# Patient Record
Sex: MALE | Race: OTHER | HISPANIC OR LATINO | Employment: OTHER | URBAN - METROPOLITAN AREA
[De-identification: names, ages, dates, MRNs, and addresses within clinical notes are randomized per-mention and may not be internally consistent; named-entity substitution may affect disease eponyms.]

---

## 2017-01-24 ENCOUNTER — HISTORICAL (OUTPATIENT)
Dept: ADMINISTRATIVE | Facility: HOSPITAL | Age: 57
End: 2017-01-24

## 2017-02-16 ENCOUNTER — HISTORICAL (OUTPATIENT)
Dept: RADIOLOGY | Facility: HOSPITAL | Age: 57
End: 2017-02-16

## 2017-04-17 ENCOUNTER — HISTORICAL (OUTPATIENT)
Dept: ADMINISTRATIVE | Facility: HOSPITAL | Age: 57
End: 2017-04-17

## 2017-04-17 LAB
ABS NEUT (OLG): 2.34 X10(3)/MCL (ref 2.1–9.2)
ALBUMIN SERPL-MCNC: 3.3 GM/DL (ref 3.4–5)
ALBUMIN/GLOB SERPL: 1 {RATIO}
ALP SERPL-CCNC: 288 UNIT/L (ref 50–136)
ALT SERPL-CCNC: 73 UNIT/L (ref 12–78)
AST SERPL-CCNC: 66 UNIT/L (ref 15–37)
BASOPHILS # BLD AUTO: 0 X10(3)/MCL (ref 0–0.2)
BASOPHILS NFR BLD AUTO: 1 %
BILIRUB SERPL-MCNC: 0.5 MG/DL (ref 0.2–1)
BILIRUBIN DIRECT+TOT PNL SERPL-MCNC: 0.2 MG/DL (ref 0–0.2)
BILIRUBIN DIRECT+TOT PNL SERPL-MCNC: 0.3 MG/DL (ref 0–0.8)
BUN SERPL-MCNC: 21 MG/DL (ref 7–18)
CALCIUM SERPL-MCNC: 7.8 MG/DL (ref 8.5–10.1)
CHLORIDE SERPL-SCNC: 105 MMOL/L (ref 98–107)
CO2 SERPL-SCNC: 27 MMOL/L (ref 21–32)
CREAT SERPL-MCNC: 1.44 MG/DL (ref 0.7–1.3)
EOSINOPHIL # BLD AUTO: 0 X10(3)/MCL (ref 0–0.9)
EOSINOPHIL NFR BLD AUTO: 0 %
ERYTHROCYTE [DISTWIDTH] IN BLOOD BY AUTOMATED COUNT: 13.5 % (ref 11.5–17)
EST. AVERAGE GLUCOSE BLD GHB EST-MCNC: 209 MG/DL
GLOBULIN SER-MCNC: 3.2 GM/DL (ref 2.4–3.5)
GLUCOSE SERPL-MCNC: 395 MG/DL (ref 74–106)
HBA1C MFR BLD: 8.9 % (ref 4.2–6.3)
HCT VFR BLD AUTO: 31.5 % (ref 42–52)
HGB BLD-MCNC: 10.4 GM/DL (ref 14–18)
LYMPHOCYTES # BLD AUTO: 1.3 X10(3)/MCL (ref 0.6–4.6)
LYMPHOCYTES NFR BLD AUTO: 32 %
MCH RBC QN AUTO: 31.9 PG (ref 27–31)
MCHC RBC AUTO-ENTMCNC: 33 GM/DL (ref 33–36)
MCV RBC AUTO: 96.6 FL (ref 80–94)
MONOCYTES # BLD AUTO: 0.3 X10(3)/MCL (ref 0.1–1.3)
MONOCYTES NFR BLD AUTO: 8 %
NEUTROPHILS # BLD AUTO: 2.34 X10(3)/MCL (ref 2.1–9.2)
NEUTROPHILS NFR BLD AUTO: 58 %
PLATELET # BLD AUTO: 115 X10(3)/MCL (ref 130–400)
PMV BLD AUTO: 12.3 FL (ref 9.4–12.4)
POTASSIUM SERPL-SCNC: 4.7 MMOL/L (ref 3.5–5.1)
PROT SERPL-MCNC: 6.5 GM/DL (ref 6.4–8.2)
RBC # BLD AUTO: 3.26 X10(6)/MCL (ref 4.7–6.1)
SODIUM SERPL-SCNC: 140 MMOL/L (ref 136–145)
TSH SERPL-ACNC: 0.84 MIU/ML (ref 0.36–3.74)
WBC # SPEC AUTO: 4 X10(3)/MCL (ref 4.5–11.5)

## 2017-07-03 ENCOUNTER — HISTORICAL (OUTPATIENT)
Dept: ADMINISTRATIVE | Facility: HOSPITAL | Age: 57
End: 2017-07-03

## 2017-07-03 LAB
ALBUMIN SERPL-MCNC: 2.6 GM/DL (ref 3.4–5)
ALBUMIN/GLOB SERPL: 0.7 RATIO (ref 1.1–2)
ALP SERPL-CCNC: 161 UNIT/L (ref 50–136)
ALT SERPL-CCNC: 48 UNIT/L (ref 12–78)
AST SERPL-CCNC: 49 UNIT/L (ref 15–37)
BILIRUB SERPL-MCNC: 0.8 MG/DL (ref 0.2–1)
BILIRUBIN DIRECT+TOT PNL SERPL-MCNC: 0.3 MG/DL (ref 0–0.8)
BILIRUBIN DIRECT+TOT PNL SERPL-MCNC: 0.5 MG/DL (ref 0–0.5)
BUN SERPL-MCNC: 12 MG/DL (ref 7–18)
CALCIUM SERPL-MCNC: 8.1 MG/DL (ref 8.5–10.1)
CHLORIDE SERPL-SCNC: 109 MMOL/L (ref 98–107)
CO2 SERPL-SCNC: 24 MMOL/L (ref 21–32)
CREAT SERPL-MCNC: 0.53 MG/DL (ref 0.7–1.3)
ERYTHROCYTE [DISTWIDTH] IN BLOOD BY AUTOMATED COUNT: 13.3 % (ref 11.5–17)
GLOBULIN SER-MCNC: 3.7 GM/DL (ref 2.4–3.5)
GLUCOSE SERPL-MCNC: 201 MG/DL (ref 74–106)
HCT VFR BLD AUTO: 32.1 % (ref 42–52)
HGB BLD-MCNC: 10.5 GM/DL (ref 14–18)
IRON SERPL-MCNC: 22 MCG/DL (ref 50–175)
MCH RBC QN AUTO: 31.8 PG (ref 27–31)
MCHC RBC AUTO-ENTMCNC: 32.7 GM/DL (ref 33–36)
MCV RBC AUTO: 97.3 FL (ref 80–94)
PLATELET # BLD AUTO: 141 X10(3)/MCL (ref 130–400)
PMV BLD AUTO: 11.3 FL (ref 9.4–12.4)
POTASSIUM SERPL-SCNC: 3.6 MMOL/L (ref 3.5–5.1)
PROT SERPL-MCNC: 6.3 GM/DL (ref 6.4–8.2)
RBC # BLD AUTO: 3.3 X10(6)/MCL (ref 4.7–6.1)
SODIUM SERPL-SCNC: 143 MMOL/L (ref 136–145)
WBC # SPEC AUTO: 5.2 X10(3)/MCL (ref 4.5–11.5)

## 2018-01-30 ENCOUNTER — HISTORICAL (OUTPATIENT)
Dept: ADMINISTRATIVE | Facility: HOSPITAL | Age: 58
End: 2018-01-30

## 2018-01-30 LAB
AMMONIA PLAS-MSCNC: 21 MCMOL/L (ref 11–32)
AMPHET UR QL SCN: NORMAL
BARBITURATE SCN PRESENT UR: NORMAL
BENZODIAZ UR QL SCN: NORMAL
CANNABINOIDS UR QL SCN: NORMAL
COCAINE UR QL SCN: NORMAL
OPIATES UR QL SCN: NORMAL
PCP UR QL: NORMAL
PH UR STRIP.AUTO: 7 [PH] (ref 5–7.5)
SP GR FLD REFRACTOMETRY: 1.02 (ref 1–1.03)

## 2018-02-05 ENCOUNTER — HISTORICAL (OUTPATIENT)
Dept: RADIOLOGY | Facility: HOSPITAL | Age: 58
End: 2018-02-05

## 2018-02-05 LAB — POC CREATININE: 0.5 MG/DL (ref 0.6–1.3)

## 2018-02-08 ENCOUNTER — HISTORICAL (OUTPATIENT)
Dept: RADIOLOGY | Facility: HOSPITAL | Age: 58
End: 2018-02-08

## 2018-02-12 LAB — CRC RECOMMENDATION EXT: NORMAL

## 2018-02-14 ENCOUNTER — HISTORICAL (OUTPATIENT)
Dept: ADMINISTRATIVE | Facility: HOSPITAL | Age: 58
End: 2018-02-14

## 2018-02-14 LAB
ABS NEUT (OLG): 1.24 X10(3)/MCL (ref 2.1–9.2)
ALBUMIN SERPL-MCNC: 3.2 GM/DL (ref 3.4–5)
ALBUMIN/GLOB SERPL: 1 RATIO (ref 1.1–2)
ALP SERPL-CCNC: 251 UNIT/L (ref 50–136)
ALT SERPL-CCNC: 57 UNIT/L (ref 12–78)
ANISOCYTOSIS BLD QL SMEAR: 2
AST SERPL-CCNC: 64 UNIT/L (ref 15–37)
BASOPHILS NFR BLD MANUAL: 1 % (ref 0–2)
BILIRUB SERPL-MCNC: 0.5 MG/DL (ref 0.2–1)
BILIRUBIN DIRECT+TOT PNL SERPL-MCNC: 0.2 MG/DL (ref 0–0.8)
BILIRUBIN DIRECT+TOT PNL SERPL-MCNC: 0.3 MG/DL (ref 0–0.5)
BUN SERPL-MCNC: 19 MG/DL (ref 7–18)
CALCIUM SERPL-MCNC: 8.5 MG/DL (ref 8.5–10.1)
CHLORIDE SERPL-SCNC: 105 MMOL/L (ref 98–107)
CO2 SERPL-SCNC: 28 MMOL/L (ref 21–32)
CREAT SERPL-MCNC: 0.57 MG/DL (ref 0.7–1.3)
EOSINOPHIL NFR BLD MANUAL: 1 % (ref 0–8)
ERYTHROCYTE [DISTWIDTH] IN BLOOD BY AUTOMATED COUNT: 15.4 % (ref 11.5–17)
GLOBULIN SER-MCNC: 3.3 GM/DL (ref 2.4–3.5)
GLUCOSE SERPL-MCNC: 186 MG/DL (ref 74–106)
HAV IGM SERPL QL IA: NEGATIVE
HBV CORE IGM SERPL QL IA: NEGATIVE
HBV SURFACE AG SERPL QL IA: NEGATIVE
HCT VFR BLD AUTO: 31.1 % (ref 42–52)
HGB BLD-MCNC: 10.3 GM/DL (ref 14–18)
HIV 1+2 AB+HIV1 P24 AG SERPL QL IA: NEGATIVE
INR PPP: 1.12 (ref 0–1.27)
LYMPHOCYTES NFR BLD MANUAL: 27 % (ref 13–40)
MACROCYTES BLD QL SMEAR: 2
MCH RBC QN AUTO: 33.2 PG (ref 27–31)
MCHC RBC AUTO-ENTMCNC: 33.1 GM/DL (ref 33–36)
MCV RBC AUTO: 100.3 FL (ref 80–94)
MONOCYTES NFR BLD MANUAL: 4 % (ref 2–11)
NEUTROPHILS NFR BLD MANUAL: 67 % (ref 47–80)
PLATELET # BLD AUTO: 93 X10(3)/MCL (ref 130–400)
PLATELET # BLD EST: NORMAL 10*3/UL
PMV BLD AUTO: 11.9 FL (ref 7.4–10.4)
POLYCHROMASIA BLD QL SMEAR: 1
POTASSIUM SERPL-SCNC: 4.4 MMOL/L (ref 3.5–5.1)
PROT SERPL-MCNC: 6.5 GM/DL (ref 6.4–8.2)
PROTHROMBIN TIME: 14.8 SECOND(S) (ref 12.2–14.7)
RBC # BLD AUTO: 3.1 X10(6)/MCL (ref 4.7–6.1)
SODIUM SERPL-SCNC: 139 MMOL/L (ref 136–145)
TARGETS BLD QL SMEAR: 1
WBC # SPEC AUTO: 2.5 X10(3)/MCL (ref 4.5–11.5)

## 2018-04-20 ENCOUNTER — HISTORICAL (OUTPATIENT)
Dept: ADMINISTRATIVE | Facility: HOSPITAL | Age: 58
End: 2018-04-20

## 2018-04-20 LAB
ALBUMIN SERPL-MCNC: 3.7 GM/DL (ref 3.4–5)
ALBUMIN/GLOB SERPL: 1 RATIO (ref 1.1–2)
ALP SERPL-CCNC: 248 UNIT/L (ref 50–136)
ALT SERPL-CCNC: 25 UNIT/L (ref 12–78)
AMMONIA PLAS-MSCNC: 26 MCMOL/L (ref 11–32)
AST SERPL-CCNC: 19 UNIT/L (ref 15–37)
BILIRUB SERPL-MCNC: 0.6 MG/DL (ref 0.2–1)
BILIRUBIN DIRECT+TOT PNL SERPL-MCNC: 0.2 MG/DL (ref 0–0.5)
BILIRUBIN DIRECT+TOT PNL SERPL-MCNC: 0.4 MG/DL (ref 0–0.8)
BUN SERPL-MCNC: 16 MG/DL (ref 7–18)
CALCIUM SERPL-MCNC: 9.1 MG/DL (ref 8.5–10.1)
CHLORIDE SERPL-SCNC: 107 MMOL/L (ref 98–107)
CO2 SERPL-SCNC: 26 MMOL/L (ref 21–32)
CREAT SERPL-MCNC: 0.64 MG/DL (ref 0.7–1.3)
ERYTHROCYTE [DISTWIDTH] IN BLOOD BY AUTOMATED COUNT: 12.5 % (ref 11.5–17)
EST. AVERAGE GLUCOSE BLD GHB EST-MCNC: 151 MG/DL
GLOBULIN SER-MCNC: 3.7 GM/DL (ref 2.4–3.5)
GLUCOSE SERPL-MCNC: 130 MG/DL (ref 74–106)
HBA1C MFR BLD: 6.9 % (ref 4.2–6.3)
HCT VFR BLD AUTO: 34.8 % (ref 42–52)
HGB BLD-MCNC: 11.4 GM/DL (ref 14–18)
MCH RBC QN AUTO: 32.5 PG (ref 27–31)
MCHC RBC AUTO-ENTMCNC: 32.8 GM/DL (ref 33–36)
MCV RBC AUTO: 99.1 FL (ref 80–94)
PLATELET # BLD AUTO: 110 X10(3)/MCL (ref 130–400)
PMV BLD AUTO: 11.8 FL (ref 9.4–12.4)
POTASSIUM SERPL-SCNC: 4.1 MMOL/L (ref 3.5–5.1)
PROT SERPL-MCNC: 7.4 GM/DL (ref 6.4–8.2)
RBC # BLD AUTO: 3.51 X10(6)/MCL (ref 4.7–6.1)
SODIUM SERPL-SCNC: 140 MMOL/L (ref 136–145)
WBC # SPEC AUTO: 4.2 X10(3)/MCL (ref 4.5–11.5)

## 2018-07-23 ENCOUNTER — HISTORICAL (OUTPATIENT)
Dept: RADIOLOGY | Facility: HOSPITAL | Age: 58
End: 2018-07-23

## 2020-10-16 ENCOUNTER — HISTORICAL (OUTPATIENT)
Dept: ADMINISTRATIVE | Facility: HOSPITAL | Age: 60
End: 2020-10-16

## 2020-10-16 LAB — AMMONIA PLAS-MSCNC: 71.5 UMOL/L (ref 18–72)

## 2021-03-30 ENCOUNTER — HISTORICAL (OUTPATIENT)
Dept: RADIOLOGY | Facility: HOSPITAL | Age: 61
End: 2021-03-30

## 2021-04-27 ENCOUNTER — HISTORICAL (OUTPATIENT)
Dept: RADIOLOGY | Facility: HOSPITAL | Age: 61
End: 2021-04-27

## 2021-04-27 LAB — POC CREATININE: 0.9 MG/DL (ref 0.6–1.3)

## 2021-05-17 ENCOUNTER — TELEPHONE (OUTPATIENT)
Dept: TRANSPLANT | Facility: CLINIC | Age: 61
End: 2021-05-17

## 2021-05-19 ENCOUNTER — TELEPHONE (OUTPATIENT)
Dept: TRANSPLANT | Facility: CLINIC | Age: 61
End: 2021-05-19

## 2021-05-20 ENCOUNTER — PATIENT MESSAGE (OUTPATIENT)
Dept: TRANSPLANT | Facility: CLINIC | Age: 61
End: 2021-05-20

## 2021-05-20 ENCOUNTER — TELEPHONE (OUTPATIENT)
Dept: TRANSPLANT | Facility: CLINIC | Age: 61
End: 2021-05-20

## 2021-05-25 ENCOUNTER — OFFICE VISIT (OUTPATIENT)
Dept: TRANSPLANT | Facility: CLINIC | Age: 61
End: 2021-05-25
Payer: COMMERCIAL

## 2021-05-25 ENCOUNTER — LAB VISIT (OUTPATIENT)
Dept: LAB | Facility: HOSPITAL | Age: 61
End: 2021-05-25
Attending: INTERNAL MEDICINE
Payer: COMMERCIAL

## 2021-05-25 VITALS
HEIGHT: 65 IN | DIASTOLIC BLOOD PRESSURE: 72 MMHG | TEMPERATURE: 98 F | WEIGHT: 155.44 LBS | OXYGEN SATURATION: 100 % | HEART RATE: 74 BPM | RESPIRATION RATE: 14 BRPM | BODY MASS INDEX: 25.9 KG/M2 | SYSTOLIC BLOOD PRESSURE: 112 MMHG

## 2021-05-25 DIAGNOSIS — K74.60 HEPATIC CIRRHOSIS, UNSPECIFIED HEPATIC CIRRHOSIS TYPE, UNSPECIFIED WHETHER ASCITES PRESENT: ICD-10-CM

## 2021-05-25 DIAGNOSIS — C22.0 HCC (HEPATOCELLULAR CARCINOMA): ICD-10-CM

## 2021-05-25 DIAGNOSIS — K74.69 COMPENSATED CIRRHOSIS RELATED TO HEPATITIS C VIRUS (HCV): Primary | ICD-10-CM

## 2021-05-25 DIAGNOSIS — Z76.82 ORGAN TRANSPLANT CANDIDATE: ICD-10-CM

## 2021-05-25 DIAGNOSIS — B19.20 COMPENSATED CIRRHOSIS RELATED TO HEPATITIS C VIRUS (HCV): Primary | ICD-10-CM

## 2021-05-25 PROBLEM — D64.9 ANEMIA: Status: ACTIVE | Noted: 2021-05-25

## 2021-05-25 PROBLEM — E11.9 DIABETES MELLITUS: Status: ACTIVE | Noted: 2021-05-25

## 2021-05-25 LAB
ABO + RH BLD: NORMAL
AFP SERPL-MCNC: 9.6 NG/ML (ref 0–8.4)
ALBUMIN SERPL BCP-MCNC: 3.9 G/DL (ref 3.5–5.2)
ALP SERPL-CCNC: 159 U/L (ref 55–135)
ALT SERPL W/O P-5'-P-CCNC: 20 U/L (ref 10–44)
AMPHET+METHAMPHET UR QL: NEGATIVE
ANION GAP SERPL CALC-SCNC: 8 MMOL/L (ref 8–16)
AST SERPL-CCNC: 22 U/L (ref 10–40)
BARBITURATES UR QL SCN>200 NG/ML: NEGATIVE
BASOPHILS # BLD AUTO: 0.05 K/UL (ref 0–0.2)
BASOPHILS NFR BLD: 0.7 % (ref 0–1.9)
BENZODIAZ UR QL SCN>200 NG/ML: NEGATIVE
BILIRUB DIRECT SERPL-MCNC: 0.2 MG/DL (ref 0.1–0.3)
BILIRUB SERPL-MCNC: 0.5 MG/DL (ref 0.1–1)
BILIRUB UR QL STRIP: NEGATIVE
BLD GP AB SCN CELLS X3 SERPL QL: NORMAL
BUN SERPL-MCNC: 26 MG/DL (ref 8–23)
BZE UR QL SCN: NEGATIVE
CALCIUM SERPL-MCNC: 9.3 MG/DL (ref 8.7–10.5)
CANNABINOIDS UR QL SCN: NEGATIVE
CHLORIDE SERPL-SCNC: 103 MMOL/L (ref 95–110)
CLARITY UR REFRACT.AUTO: CLEAR
CO2 SERPL-SCNC: 26 MMOL/L (ref 23–29)
COLOR UR AUTO: YELLOW
CREAT SERPL-MCNC: 0.9 MG/DL (ref 0.5–1.4)
CREAT UR-MCNC: 128 MG/DL (ref 23–375)
DIFFERENTIAL METHOD: ABNORMAL
EOSINOPHIL # BLD AUTO: 0.1 K/UL (ref 0–0.5)
EOSINOPHIL NFR BLD: 1.7 % (ref 0–8)
ERYTHROCYTE [DISTWIDTH] IN BLOOD BY AUTOMATED COUNT: 13.4 % (ref 11.5–14.5)
EST. GFR  (AFRICAN AMERICAN): >60 ML/MIN/1.73 M^2
EST. GFR  (NON AFRICAN AMERICAN): >60 ML/MIN/1.73 M^2
ETHANOL UR-MCNC: <10 MG/DL
GGT SERPL-CCNC: 52 U/L (ref 8–55)
GLUCOSE SERPL-MCNC: 134 MG/DL (ref 70–110)
GLUCOSE UR QL STRIP: ABNORMAL
HCT VFR BLD AUTO: 38.8 % (ref 40–54)
HGB BLD-MCNC: 12.7 G/DL (ref 14–18)
HGB UR QL STRIP: NEGATIVE
IMM GRANULOCYTES # BLD AUTO: 0.01 K/UL (ref 0–0.04)
IMM GRANULOCYTES NFR BLD AUTO: 0.1 % (ref 0–0.5)
INR PPP: 1 (ref 0.8–1.2)
KETONES UR QL STRIP: NEGATIVE
LEUKOCYTE ESTERASE UR QL STRIP: NEGATIVE
LYMPHOCYTES # BLD AUTO: 1.2 K/UL (ref 1–4.8)
LYMPHOCYTES NFR BLD: 17.2 % (ref 18–48)
MCH RBC QN AUTO: 31.7 PG (ref 27–31)
MCHC RBC AUTO-ENTMCNC: 32.7 G/DL (ref 32–36)
MCV RBC AUTO: 97 FL (ref 82–98)
METHADONE UR QL SCN>300 NG/ML: NEGATIVE
MICROSCOPIC COMMENT: NORMAL
MONOCYTES # BLD AUTO: 0.6 K/UL (ref 0.3–1)
MONOCYTES NFR BLD: 8.7 % (ref 4–15)
NEUTROPHILS # BLD AUTO: 5 K/UL (ref 1.8–7.7)
NEUTROPHILS NFR BLD: 71.6 % (ref 38–73)
NITRITE UR QL STRIP: NEGATIVE
NRBC BLD-RTO: 0 /100 WBC
OPIATES UR QL SCN: NEGATIVE
PCP UR QL SCN>25 NG/ML: NEGATIVE
PH UR STRIP: 5 [PH] (ref 5–8)
PLATELET # BLD AUTO: 149 K/UL (ref 150–450)
PMV BLD AUTO: 12.7 FL (ref 9.2–12.9)
POTASSIUM SERPL-SCNC: 5 MMOL/L (ref 3.5–5.1)
PROT SERPL-MCNC: 7.8 G/DL (ref 6–8.4)
PROT UR QL STRIP: NEGATIVE
PROTHROMBIN TIME: 11.3 SEC (ref 9–12.5)
RBC # BLD AUTO: 4.01 M/UL (ref 4.6–6.2)
RBC #/AREA URNS AUTO: 0 /HPF (ref 0–4)
SODIUM SERPL-SCNC: 137 MMOL/L (ref 136–145)
SP GR UR STRIP: 1.02 (ref 1–1.03)
SQUAMOUS #/AREA URNS AUTO: 0 /HPF
TOXICOLOGY INFORMATION: NORMAL
URN SPEC COLLECT METH UR: ABNORMAL
WBC # BLD AUTO: 6.93 K/UL (ref 3.9–12.7)
WBC #/AREA URNS AUTO: 0 /HPF (ref 0–5)

## 2021-05-25 PROCEDURE — 99999 PR PBB SHADOW E&M-EST. PATIENT-LVL V: ICD-10-PCS | Mod: PBBFAC,TXP,, | Performed by: STUDENT IN AN ORGANIZED HEALTH CARE EDUCATION/TRAINING PROGRAM

## 2021-05-25 PROCEDURE — 80321 ALCOHOLS BIOMARKERS 1OR 2: CPT | Mod: TXP | Performed by: STUDENT IN AN ORGANIZED HEALTH CARE EDUCATION/TRAINING PROGRAM

## 2021-05-25 PROCEDURE — 3008F PR BODY MASS INDEX (BMI) DOCUMENTED: ICD-10-PCS | Mod: CPTII,S$GLB,TXP, | Performed by: STUDENT IN AN ORGANIZED HEALTH CARE EDUCATION/TRAINING PROGRAM

## 2021-05-25 PROCEDURE — 99999 PR PBB SHADOW E&M-EST. PATIENT-LVL V: CPT | Mod: PBBFAC,TXP,, | Performed by: STUDENT IN AN ORGANIZED HEALTH CARE EDUCATION/TRAINING PROGRAM

## 2021-05-25 PROCEDURE — 82977 ASSAY OF GGT: CPT | Mod: TXP | Performed by: STUDENT IN AN ORGANIZED HEALTH CARE EDUCATION/TRAINING PROGRAM

## 2021-05-25 PROCEDURE — 86706 HEP B SURFACE ANTIBODY: CPT | Mod: TXP | Performed by: STUDENT IN AN ORGANIZED HEALTH CARE EDUCATION/TRAINING PROGRAM

## 2021-05-25 PROCEDURE — 80307 DRUG TEST PRSMV CHEM ANLYZR: CPT | Mod: TXP | Performed by: STUDENT IN AN ORGANIZED HEALTH CARE EDUCATION/TRAINING PROGRAM

## 2021-05-25 PROCEDURE — 87340 HEPATITIS B SURFACE AG IA: CPT | Mod: TXP | Performed by: STUDENT IN AN ORGANIZED HEALTH CARE EDUCATION/TRAINING PROGRAM

## 2021-05-25 PROCEDURE — 86803 HEPATITIS C AB TEST: CPT | Mod: TXP | Performed by: STUDENT IN AN ORGANIZED HEALTH CARE EDUCATION/TRAINING PROGRAM

## 2021-05-25 PROCEDURE — 82105 ALPHA-FETOPROTEIN SERUM: CPT | Mod: TXP | Performed by: STUDENT IN AN ORGANIZED HEALTH CARE EDUCATION/TRAINING PROGRAM

## 2021-05-25 PROCEDURE — 86900 BLOOD TYPING SEROLOGIC ABO: CPT | Mod: TXP | Performed by: STUDENT IN AN ORGANIZED HEALTH CARE EDUCATION/TRAINING PROGRAM

## 2021-05-25 PROCEDURE — 82248 BILIRUBIN DIRECT: CPT | Mod: TXP | Performed by: STUDENT IN AN ORGANIZED HEALTH CARE EDUCATION/TRAINING PROGRAM

## 2021-05-25 PROCEDURE — 36415 COLL VENOUS BLD VENIPUNCTURE: CPT | Mod: TXP | Performed by: STUDENT IN AN ORGANIZED HEALTH CARE EDUCATION/TRAINING PROGRAM

## 2021-05-25 PROCEDURE — 86790 VIRUS ANTIBODY NOS: CPT | Mod: TXP | Performed by: STUDENT IN AN ORGANIZED HEALTH CARE EDUCATION/TRAINING PROGRAM

## 2021-05-25 PROCEDURE — 99205 PR OFFICE/OUTPT VISIT, NEW, LEVL V, 60-74 MIN: ICD-10-PCS | Mod: S$GLB,TXP,, | Performed by: STUDENT IN AN ORGANIZED HEALTH CARE EDUCATION/TRAINING PROGRAM

## 2021-05-25 PROCEDURE — 85610 PROTHROMBIN TIME: CPT | Mod: TXP | Performed by: STUDENT IN AN ORGANIZED HEALTH CARE EDUCATION/TRAINING PROGRAM

## 2021-05-25 PROCEDURE — 81001 URINALYSIS AUTO W/SCOPE: CPT | Mod: TXP | Performed by: STUDENT IN AN ORGANIZED HEALTH CARE EDUCATION/TRAINING PROGRAM

## 2021-05-25 PROCEDURE — 3008F BODY MASS INDEX DOCD: CPT | Mod: CPTII,S$GLB,TXP, | Performed by: STUDENT IN AN ORGANIZED HEALTH CARE EDUCATION/TRAINING PROGRAM

## 2021-05-25 PROCEDURE — 80053 COMPREHEN METABOLIC PANEL: CPT | Mod: TXP | Performed by: STUDENT IN AN ORGANIZED HEALTH CARE EDUCATION/TRAINING PROGRAM

## 2021-05-25 PROCEDURE — 86704 HEP B CORE ANTIBODY TOTAL: CPT | Mod: TXP | Performed by: STUDENT IN AN ORGANIZED HEALTH CARE EDUCATION/TRAINING PROGRAM

## 2021-05-25 PROCEDURE — 1126F PR PAIN SEVERITY QUANTIFIED, NO PAIN PRESENT: ICD-10-PCS | Mod: S$GLB,TXP,, | Performed by: STUDENT IN AN ORGANIZED HEALTH CARE EDUCATION/TRAINING PROGRAM

## 2021-05-25 PROCEDURE — 99205 OFFICE O/P NEW HI 60 MIN: CPT | Mod: S$GLB,TXP,, | Performed by: STUDENT IN AN ORGANIZED HEALTH CARE EDUCATION/TRAINING PROGRAM

## 2021-05-25 PROCEDURE — 85025 COMPLETE CBC W/AUTO DIFF WBC: CPT | Mod: TXP | Performed by: STUDENT IN AN ORGANIZED HEALTH CARE EDUCATION/TRAINING PROGRAM

## 2021-05-25 PROCEDURE — 1126F AMNT PAIN NOTED NONE PRSNT: CPT | Mod: S$GLB,TXP,, | Performed by: STUDENT IN AN ORGANIZED HEALTH CARE EDUCATION/TRAINING PROGRAM

## 2021-05-25 RX ORDER — INSULIN GLARGINE 300 U/ML
INJECTION, SOLUTION SUBCUTANEOUS
Status: ON HOLD | COMMUNITY
Start: 2021-05-20 | End: 2022-01-28 | Stop reason: HOSPADM

## 2021-05-25 RX ORDER — ROSUVASTATIN CALCIUM 20 MG/1
20 TABLET, COATED ORAL NIGHTLY
COMMUNITY
Start: 2020-12-02 | End: 2021-06-10

## 2021-05-25 RX ORDER — SEMAGLUTIDE 1.34 MG/ML
INJECTION, SOLUTION SUBCUTANEOUS
Status: ON HOLD | COMMUNITY
Start: 2021-04-30 | End: 2022-01-28 | Stop reason: DRUGHIGH

## 2021-05-25 RX ORDER — FERROUS SULFATE 325(65) MG
325 TABLET ORAL 2 TIMES DAILY
Status: ON HOLD | COMMUNITY
End: 2022-01-24 | Stop reason: HOSPADM

## 2021-05-25 RX ORDER — CARVEDILOL 12.5 MG/1
3.12 TABLET ORAL 2 TIMES DAILY
Status: ON HOLD | COMMUNITY
End: 2022-01-27 | Stop reason: HOSPADM

## 2021-05-25 RX ORDER — PEN NEEDLE, DIABETIC 31 GX5/16"
NEEDLE, DISPOSABLE MISCELLANEOUS
COMMUNITY
Start: 2021-02-24

## 2021-05-25 RX ORDER — ESCITALOPRAM OXALATE 5 MG/1
5 TABLET ORAL DAILY
Status: ON HOLD | COMMUNITY
Start: 2021-03-09 | End: 2022-01-24 | Stop reason: SDUPTHER

## 2021-05-25 RX ORDER — GLECAPREVIR AND PIBRENTASVIR 40; 100 MG/1; MG/1
3 TABLET, FILM COATED ORAL DAILY
COMMUNITY
End: 2021-06-10

## 2021-05-25 RX ORDER — BENAZEPRIL HYDROCHLORIDE 5 MG/1
5 TABLET ORAL DAILY
Status: ON HOLD | COMMUNITY
Start: 2021-04-22 | End: 2022-01-24 | Stop reason: HOSPADM

## 2021-05-25 RX ORDER — SITAGLIPTIN 50 MG/1
50 TABLET, FILM COATED ORAL DAILY
Status: ON HOLD | COMMUNITY
Start: 2021-05-21 | End: 2022-01-28 | Stop reason: HOSPADM

## 2021-05-25 RX ORDER — METOPROLOL SUCCINATE 25 MG/1
25 TABLET, EXTENDED RELEASE ORAL DAILY
COMMUNITY
End: 2021-06-10

## 2021-05-25 RX ORDER — SUCRALFATE 1 G/1
1 TABLET ORAL
COMMUNITY
End: 2021-06-10

## 2021-05-25 RX ORDER — ASPIRIN 81 MG/1
TABLET ORAL NIGHTLY
Status: ON HOLD | COMMUNITY
Start: 2021-05-12 | End: 2022-01-24 | Stop reason: SDUPTHER

## 2021-05-25 RX ORDER — BLOOD SUGAR DIAGNOSTIC
STRIP MISCELLANEOUS
COMMUNITY
Start: 2020-12-17

## 2021-05-25 RX ORDER — PIOGLITAZONEHYDROCHLORIDE 45 MG/1
45 TABLET ORAL DAILY
COMMUNITY
End: 2021-06-10

## 2021-05-26 ENCOUNTER — HISTORICAL (OUTPATIENT)
Dept: RADIOLOGY | Facility: HOSPITAL | Age: 61
End: 2021-05-26

## 2021-05-26 ENCOUNTER — TELEPHONE (OUTPATIENT)
Dept: TRANSPLANT | Facility: CLINIC | Age: 61
End: 2021-05-26

## 2021-05-26 LAB
HBV CORE AB SERPL QL IA: POSITIVE
HBV SURFACE AB SER-ACNC: NEGATIVE M[IU]/ML
HBV SURFACE AG SERPL QL IA: NEGATIVE
HCV AB SERPL QL IA: POSITIVE
HEPATITIS A ANTIBODY, IGG: NEGATIVE

## 2021-06-01 ENCOUNTER — CONFERENCE (OUTPATIENT)
Dept: TRANSPLANT | Facility: CLINIC | Age: 61
End: 2021-06-01

## 2021-06-01 LAB — PHOSPHATIDYLETHANOL (PETH): NEGATIVE NG/ML

## 2021-06-03 ENCOUNTER — TELEPHONE (OUTPATIENT)
Dept: TRANSPLANT | Facility: CLINIC | Age: 61
End: 2021-06-03

## 2021-06-07 ENCOUNTER — TELEPHONE (OUTPATIENT)
Dept: TRANSPLANT | Facility: CLINIC | Age: 61
End: 2021-06-07

## 2021-06-10 ENCOUNTER — OFFICE VISIT (OUTPATIENT)
Dept: INTERVENTIONAL RADIOLOGY/VASCULAR | Facility: CLINIC | Age: 61
End: 2021-06-10
Payer: COMMERCIAL

## 2021-06-10 DIAGNOSIS — E78.5 DYSLIPIDEMIA: ICD-10-CM

## 2021-06-10 DIAGNOSIS — K74.60 HEPATIC CIRRHOSIS, UNSPECIFIED HEPATIC CIRRHOSIS TYPE, UNSPECIFIED WHETHER ASCITES PRESENT: ICD-10-CM

## 2021-06-10 DIAGNOSIS — K74.69 COMPENSATED CIRRHOSIS RELATED TO HEPATITIS C VIRUS (HCV): ICD-10-CM

## 2021-06-10 DIAGNOSIS — Z76.82 ORGAN TRANSPLANT CANDIDATE: ICD-10-CM

## 2021-06-10 DIAGNOSIS — I10 HYPERTENSION, UNSPECIFIED TYPE: ICD-10-CM

## 2021-06-10 DIAGNOSIS — C22.0 HCC (HEPATOCELLULAR CARCINOMA): ICD-10-CM

## 2021-06-10 DIAGNOSIS — E11.65 TYPE 2 DIABETES MELLITUS WITH HYPERGLYCEMIA, UNSPECIFIED WHETHER LONG TERM INSULIN USE: Primary | ICD-10-CM

## 2021-06-10 DIAGNOSIS — C22.0 HCC (HEPATOCELLULAR CARCINOMA): Primary | ICD-10-CM

## 2021-06-10 DIAGNOSIS — B19.20 COMPENSATED CIRRHOSIS RELATED TO HEPATITIS C VIRUS (HCV): ICD-10-CM

## 2021-06-10 PROCEDURE — 99203 OFFICE O/P NEW LOW 30 MIN: CPT | Mod: 95,TXP,, | Performed by: FAMILY MEDICINE

## 2021-06-10 PROCEDURE — 99203 PR OFFICE/OUTPT VISIT, NEW, LEVL III, 30-44 MIN: ICD-10-PCS | Mod: 95,TXP,, | Performed by: FAMILY MEDICINE

## 2021-06-14 ENCOUNTER — TELEPHONE (OUTPATIENT)
Dept: TRANSPLANT | Facility: CLINIC | Age: 61
End: 2021-06-14

## 2021-06-16 RX ORDER — MIDAZOLAM HYDROCHLORIDE 1 MG/ML
1 INJECTION INTRAMUSCULAR; INTRAVENOUS
Status: CANCELLED | OUTPATIENT
Start: 2021-06-16

## 2021-06-16 RX ORDER — HEPARIN SODIUM 200 [USP'U]/100ML
1000 INJECTION, SOLUTION INTRAVENOUS CONTINUOUS
Status: CANCELLED | OUTPATIENT
Start: 2021-06-16

## 2021-06-16 RX ORDER — FENTANYL CITRATE 50 UG/ML
50 INJECTION, SOLUTION INTRAMUSCULAR; INTRAVENOUS
Status: CANCELLED | OUTPATIENT
Start: 2021-06-16

## 2021-06-17 ENCOUNTER — HOSPITAL ENCOUNTER (OUTPATIENT)
Dept: RADIOLOGY | Facility: HOSPITAL | Age: 61
Discharge: HOME | End: 2021-06-17
Attending: STUDENT IN AN ORGANIZED HEALTH CARE EDUCATION/TRAINING PROGRAM
Payer: COMMERCIAL

## 2021-06-17 ENCOUNTER — CLINICAL SUPPORT (OUTPATIENT)
Dept: TRANSPLANT | Facility: CLINIC | Age: 61
End: 2021-06-17
Payer: COMMERCIAL

## 2021-06-17 ENCOUNTER — EVALUATION (OUTPATIENT)
Dept: TRANSPLANT | Facility: CLINIC | Age: 61
End: 2021-06-17
Payer: COMMERCIAL

## 2021-06-17 VITALS
BODY MASS INDEX: 25.42 KG/M2 | SYSTOLIC BLOOD PRESSURE: 108 MMHG | WEIGHT: 152.56 LBS | SYSTOLIC BLOOD PRESSURE: 108 MMHG | TEMPERATURE: 98 F | OXYGEN SATURATION: 97 % | WEIGHT: 152.56 LBS | HEIGHT: 65 IN | BODY MASS INDEX: 25.42 KG/M2 | DIASTOLIC BLOOD PRESSURE: 65 MMHG | HEART RATE: 75 BPM | RESPIRATION RATE: 16 BRPM | DIASTOLIC BLOOD PRESSURE: 65 MMHG | HEART RATE: 75 BPM | TEMPERATURE: 98 F | OXYGEN SATURATION: 97 % | RESPIRATION RATE: 16 BRPM | HEIGHT: 65 IN

## 2021-06-17 DIAGNOSIS — Z76.82 ORGAN TRANSPLANT CANDIDATE: ICD-10-CM

## 2021-06-17 DIAGNOSIS — K74.60 HEPATIC CIRRHOSIS, UNSPECIFIED HEPATIC CIRRHOSIS TYPE, UNSPECIFIED WHETHER ASCITES PRESENT: ICD-10-CM

## 2021-06-17 DIAGNOSIS — I10 HYPERTENSION, UNSPECIFIED TYPE: ICD-10-CM

## 2021-06-17 DIAGNOSIS — E78.5 DYSLIPIDEMIA: ICD-10-CM

## 2021-06-17 DIAGNOSIS — C22.0 HCC (HEPATOCELLULAR CARCINOMA): ICD-10-CM

## 2021-06-17 DIAGNOSIS — E11.65 TYPE 2 DIABETES MELLITUS WITH HYPERGLYCEMIA, UNSPECIFIED WHETHER LONG TERM INSULIN USE: ICD-10-CM

## 2021-06-17 DIAGNOSIS — B19.20 COMPENSATED CIRRHOSIS RELATED TO HEPATITIS C VIRUS (HCV): ICD-10-CM

## 2021-06-17 DIAGNOSIS — K74.69 COMPENSATED CIRRHOSIS RELATED TO HEPATITIS C VIRUS (HCV): ICD-10-CM

## 2021-06-17 PROCEDURE — 99999 PR PBB SHADOW E&M-EST. PATIENT-LVL III: ICD-10-PCS | Mod: PBBFAC,TXP,, | Performed by: TRANSPLANT SURGERY

## 2021-06-17 PROCEDURE — 3051F HG A1C>EQUAL 7.0%<8.0%: CPT | Mod: CPTII,NTX,S$GLB, | Performed by: TRANSPLANT SURGERY

## 2021-06-17 PROCEDURE — 3051F PR MOST RECENT HEMOGLOBIN A1C LEVEL 7.0 - < 8.0%: ICD-10-PCS | Mod: CPTII,NTX,S$GLB, | Performed by: TRANSPLANT SURGERY

## 2021-06-17 PROCEDURE — 76700 US ABDOMEN COMP WITH DOPPLER_PRE LIVER TRANSPLANT: ICD-10-PCS | Mod: 26,59,TXP, | Performed by: RADIOLOGY

## 2021-06-17 PROCEDURE — 99999 PR PBB SHADOW E&M-EST. PATIENT-LVL III: CPT | Mod: PBBFAC,TXP,,

## 2021-06-17 PROCEDURE — 71046 XR CHEST PA AND LATERAL: ICD-10-PCS | Mod: 26,TXP,, | Performed by: RADIOLOGY

## 2021-06-17 PROCEDURE — 99999 PR PBB SHADOW E&M-EST. PATIENT-LVL III: ICD-10-PCS | Mod: PBBFAC,TXP,,

## 2021-06-17 PROCEDURE — 93975 US ABDOMEN COMP WITH DOPPLER_PRE LIVER TRANSPLANT: ICD-10-PCS | Mod: 26,TXP,, | Performed by: RADIOLOGY

## 2021-06-17 PROCEDURE — 99205 OFFICE O/P NEW HI 60 MIN: CPT | Mod: NTX,S$GLB,, | Performed by: TRANSPLANT SURGERY

## 2021-06-17 PROCEDURE — 99999 PR PBB SHADOW E&M-EST. PATIENT-LVL III: CPT | Mod: PBBFAC,TXP,, | Performed by: TRANSPLANT SURGERY

## 2021-06-17 PROCEDURE — 93975 VASCULAR STUDY: CPT | Mod: TC,TXP

## 2021-06-17 PROCEDURE — 99205 PR OFFICE/OUTPT VISIT, NEW, LEVL V, 60-74 MIN: ICD-10-PCS | Mod: NTX,S$GLB,, | Performed by: TRANSPLANT SURGERY

## 2021-06-17 PROCEDURE — 71046 X-RAY EXAM CHEST 2 VIEWS: CPT | Mod: 26,TXP,, | Performed by: RADIOLOGY

## 2021-06-17 PROCEDURE — 93975 VASCULAR STUDY: CPT | Mod: 26,TXP,, | Performed by: RADIOLOGY

## 2021-06-17 PROCEDURE — 76700 US EXAM ABDOM COMPLETE: CPT | Mod: 26,59,TXP, | Performed by: RADIOLOGY

## 2021-06-17 PROCEDURE — 71046 X-RAY EXAM CHEST 2 VIEWS: CPT | Mod: TC,FY,TXP

## 2021-06-18 ENCOUNTER — DOCUMENTATION ONLY (OUTPATIENT)
Dept: CARDIOLOGY | Facility: CLINIC | Age: 61
End: 2021-06-18

## 2021-06-18 ENCOUNTER — HOSPITAL ENCOUNTER (OUTPATIENT)
Dept: RADIOLOGY | Facility: HOSPITAL | Age: 61
Discharge: HOME | End: 2021-06-18
Attending: STUDENT IN AN ORGANIZED HEALTH CARE EDUCATION/TRAINING PROGRAM
Payer: COMMERCIAL

## 2021-06-18 ENCOUNTER — OFFICE VISIT (OUTPATIENT)
Dept: CARDIOLOGY | Facility: CLINIC | Age: 61
End: 2021-06-18
Payer: COMMERCIAL

## 2021-06-18 VITALS
HEART RATE: 70 BPM | SYSTOLIC BLOOD PRESSURE: 111 MMHG | DIASTOLIC BLOOD PRESSURE: 62 MMHG | WEIGHT: 155.88 LBS | HEIGHT: 65 IN | BODY MASS INDEX: 25.97 KG/M2 | OXYGEN SATURATION: 98 %

## 2021-06-18 DIAGNOSIS — B19.20 HEPATITIS C VIRUS INFECTION WITHOUT HEPATIC COMA, UNSPECIFIED CHRONICITY: ICD-10-CM

## 2021-06-18 DIAGNOSIS — I10 HYPERTENSION, UNSPECIFIED TYPE: ICD-10-CM

## 2021-06-18 DIAGNOSIS — B19.20 COMPENSATED CIRRHOSIS RELATED TO HEPATITIS C VIRUS (HCV): ICD-10-CM

## 2021-06-18 DIAGNOSIS — K74.69 COMPENSATED CIRRHOSIS RELATED TO HEPATITIS C VIRUS (HCV): ICD-10-CM

## 2021-06-18 DIAGNOSIS — C22.0 HCC (HEPATOCELLULAR CARCINOMA): ICD-10-CM

## 2021-06-18 DIAGNOSIS — Z76.82 ORGAN TRANSPLANT CANDIDATE: ICD-10-CM

## 2021-06-18 DIAGNOSIS — E11.65 TYPE 2 DIABETES MELLITUS WITH HYPERGLYCEMIA, UNSPECIFIED WHETHER LONG TERM INSULIN USE: ICD-10-CM

## 2021-06-18 DIAGNOSIS — K74.60 HEPATIC CIRRHOSIS, UNSPECIFIED HEPATIC CIRRHOSIS TYPE, UNSPECIFIED WHETHER ASCITES PRESENT: ICD-10-CM

## 2021-06-18 DIAGNOSIS — E78.5 DYSLIPIDEMIA: ICD-10-CM

## 2021-06-18 DIAGNOSIS — Z95.3 HISTORY OF AORTIC VALVE REPLACEMENT WITH BIOPROSTHETIC VALVE: ICD-10-CM

## 2021-06-18 PROCEDURE — 99205 PR OFFICE/OUTPT VISIT, NEW, LEVL V, 60-74 MIN: ICD-10-PCS | Mod: S$GLB,TXP,, | Performed by: INTERNAL MEDICINE

## 2021-06-18 PROCEDURE — 71250 CT CHEST WITHOUT CONTRAST: ICD-10-PCS | Mod: 26,TXP,, | Performed by: RADIOLOGY

## 2021-06-18 PROCEDURE — 71250 CT THORAX DX C-: CPT | Mod: TC,TXP

## 2021-06-18 PROCEDURE — 71250 CT THORAX DX C-: CPT | Mod: 26,TXP,, | Performed by: RADIOLOGY

## 2021-06-18 PROCEDURE — 3008F BODY MASS INDEX DOCD: CPT | Mod: CPTII,S$GLB,TXP, | Performed by: INTERNAL MEDICINE

## 2021-06-18 PROCEDURE — 99205 OFFICE O/P NEW HI 60 MIN: CPT | Mod: S$GLB,TXP,, | Performed by: INTERNAL MEDICINE

## 2021-06-18 PROCEDURE — 3008F PR BODY MASS INDEX (BMI) DOCUMENTED: ICD-10-PCS | Mod: CPTII,S$GLB,TXP, | Performed by: INTERNAL MEDICINE

## 2021-06-18 PROCEDURE — 99999 PR PBB SHADOW E&M-EST. PATIENT-LVL IV: ICD-10-PCS | Mod: PBBFAC,TXP,, | Performed by: INTERNAL MEDICINE

## 2021-06-18 PROCEDURE — 99999 PR PBB SHADOW E&M-EST. PATIENT-LVL IV: CPT | Mod: PBBFAC,TXP,, | Performed by: INTERNAL MEDICINE

## 2021-06-18 PROCEDURE — 1126F AMNT PAIN NOTED NONE PRSNT: CPT | Mod: S$GLB,TXP,, | Performed by: INTERNAL MEDICINE

## 2021-06-18 PROCEDURE — 1126F PR PAIN SEVERITY QUANTIFIED, NO PAIN PRESENT: ICD-10-PCS | Mod: S$GLB,TXP,, | Performed by: INTERNAL MEDICINE

## 2021-06-18 RX ORDER — DIPHENHYDRAMINE HCL 50 MG
50 CAPSULE ORAL ONCE
Status: CANCELLED | OUTPATIENT
Start: 2021-06-18 | End: 2021-06-18

## 2021-06-18 RX ORDER — SODIUM CHLORIDE 9 MG/ML
INJECTION, SOLUTION INTRAVENOUS CONTINUOUS
Status: CANCELLED | OUTPATIENT
Start: 2021-06-18 | End: 2021-06-18

## 2021-06-21 DIAGNOSIS — K74.69 COMPENSATED CIRRHOSIS RELATED TO HEPATITIS C VIRUS (HCV): ICD-10-CM

## 2021-06-21 DIAGNOSIS — I10 HYPERTENSION, UNSPECIFIED TYPE: ICD-10-CM

## 2021-06-21 DIAGNOSIS — B19.20 COMPENSATED CIRRHOSIS RELATED TO HEPATITIS C VIRUS (HCV): ICD-10-CM

## 2021-06-21 DIAGNOSIS — Z76.82 ORGAN TRANSPLANT CANDIDATE: Primary | ICD-10-CM

## 2021-06-21 DIAGNOSIS — C22.0 HCC (HEPATOCELLULAR CARCINOMA): ICD-10-CM

## 2021-06-21 DIAGNOSIS — E11.65 TYPE 2 DIABETES MELLITUS WITH HYPERGLYCEMIA, UNSPECIFIED WHETHER LONG TERM INSULIN USE: ICD-10-CM

## 2021-06-24 ENCOUNTER — HOSPITAL ENCOUNTER (OUTPATIENT)
Dept: RADIOLOGY | Facility: HOSPITAL | Age: 61
Discharge: HOME | End: 2021-06-24
Attending: FAMILY MEDICINE
Payer: COMMERCIAL

## 2021-06-24 ENCOUNTER — RESEARCH ENCOUNTER (OUTPATIENT)
Dept: RESEARCH | Facility: HOSPITAL | Age: 61
End: 2021-06-24

## 2021-06-24 ENCOUNTER — HOSPITAL ENCOUNTER (OUTPATIENT)
Dept: INTERVENTIONAL RADIOLOGY/VASCULAR | Facility: HOSPITAL | Age: 61
Discharge: HOME | End: 2021-06-24
Attending: FAMILY MEDICINE
Payer: COMMERCIAL

## 2021-06-24 ENCOUNTER — OFFICE VISIT (OUTPATIENT)
Dept: INFECTIOUS DISEASES | Facility: CLINIC | Age: 61
End: 2021-06-24
Payer: COMMERCIAL

## 2021-06-24 ENCOUNTER — TELEPHONE (OUTPATIENT)
Dept: INFECTIOUS DISEASES | Facility: CLINIC | Age: 61
End: 2021-06-24

## 2021-06-24 VITALS
HEART RATE: 62 BPM | OXYGEN SATURATION: 99 % | SYSTOLIC BLOOD PRESSURE: 134 MMHG | RESPIRATION RATE: 18 BRPM | DIASTOLIC BLOOD PRESSURE: 72 MMHG | BODY MASS INDEX: 26.16 KG/M2 | TEMPERATURE: 97 F | WEIGHT: 157 LBS | HEIGHT: 65 IN

## 2021-06-24 VITALS
SYSTOLIC BLOOD PRESSURE: 147 MMHG | BODY MASS INDEX: 25.75 KG/M2 | DIASTOLIC BLOOD PRESSURE: 74 MMHG | TEMPERATURE: 98 F | HEIGHT: 65 IN | WEIGHT: 154.56 LBS | HEART RATE: 71 BPM

## 2021-06-24 DIAGNOSIS — K74.69 COMPENSATED CIRRHOSIS RELATED TO HEPATITIS C VIRUS (HCV): ICD-10-CM

## 2021-06-24 DIAGNOSIS — Z76.82 ORGAN TRANSPLANT CANDIDATE: ICD-10-CM

## 2021-06-24 DIAGNOSIS — Z23 NEED FOR ZOSTER VACCINATION: ICD-10-CM

## 2021-06-24 DIAGNOSIS — C22.0 HCC (HEPATOCELLULAR CARCINOMA): ICD-10-CM

## 2021-06-24 DIAGNOSIS — I10 HYPERTENSION, UNSPECIFIED TYPE: ICD-10-CM

## 2021-06-24 DIAGNOSIS — E78.5 DYSLIPIDEMIA: ICD-10-CM

## 2021-06-24 DIAGNOSIS — E11.65 TYPE 2 DIABETES MELLITUS WITH HYPERGLYCEMIA, UNSPECIFIED WHETHER LONG TERM INSULIN USE: ICD-10-CM

## 2021-06-24 DIAGNOSIS — B78.9 STRONGYLOIDES STERCORALIS INFECTION: ICD-10-CM

## 2021-06-24 DIAGNOSIS — Z23 NEED FOR VACCINATION WITH TWINRIX: ICD-10-CM

## 2021-06-24 DIAGNOSIS — K74.60 HEPATIC CIRRHOSIS, UNSPECIFIED HEPATIC CIRRHOSIS TYPE, UNSPECIFIED WHETHER ASCITES PRESENT: ICD-10-CM

## 2021-06-24 DIAGNOSIS — Z23 NEED FOR VACCINATION WITH 13-POLYVALENT PNEUMOCOCCAL CONJUGATE VACCINE: ICD-10-CM

## 2021-06-24 DIAGNOSIS — Z23 IMMUNIZATION DUE: Primary | ICD-10-CM

## 2021-06-24 DIAGNOSIS — B19.20 COMPENSATED CIRRHOSIS RELATED TO HEPATITIS C VIRUS (HCV): ICD-10-CM

## 2021-06-24 DIAGNOSIS — Z95.3 HISTORY OF AORTIC VALVE REPLACEMENT WITH BIOPROSTHETIC VALVE: Primary | ICD-10-CM

## 2021-06-24 LAB
ALBUMIN SERPL BCP-MCNC: 3.9 G/DL (ref 3.5–5.2)
ALP SERPL-CCNC: 153 U/L (ref 55–135)
ALT SERPL W/O P-5'-P-CCNC: 20 U/L (ref 10–44)
ANION GAP SERPL CALC-SCNC: 10 MMOL/L (ref 8–16)
AST SERPL-CCNC: 23 U/L (ref 10–40)
BASOPHILS # BLD AUTO: 0.03 K/UL (ref 0–0.2)
BASOPHILS NFR BLD: 0.6 % (ref 0–1.9)
BILIRUB DIRECT SERPL-MCNC: 0.2 MG/DL (ref 0.1–0.3)
BILIRUB SERPL-MCNC: 0.5 MG/DL (ref 0.1–1)
BUN SERPL-MCNC: 32 MG/DL (ref 8–23)
CALCIUM SERPL-MCNC: 9.6 MG/DL (ref 8.7–10.5)
CHLORIDE SERPL-SCNC: 107 MMOL/L (ref 95–110)
CO2 SERPL-SCNC: 22 MMOL/L (ref 23–29)
CREAT SERPL-MCNC: 0.9 MG/DL (ref 0.5–1.4)
DIFFERENTIAL METHOD: ABNORMAL
EOSINOPHIL # BLD AUTO: 0.2 K/UL (ref 0–0.5)
EOSINOPHIL NFR BLD: 3 % (ref 0–8)
ERYTHROCYTE [DISTWIDTH] IN BLOOD BY AUTOMATED COUNT: 12.7 % (ref 11.5–14.5)
EST. GFR  (AFRICAN AMERICAN): >60 ML/MIN/1.73 M^2
EST. GFR  (NON AFRICAN AMERICAN): >60 ML/MIN/1.73 M^2
GLUCOSE SERPL-MCNC: 184 MG/DL (ref 70–110)
HCT VFR BLD AUTO: 34.8 % (ref 40–54)
HGB BLD-MCNC: 11.7 G/DL (ref 14–18)
IMM GRANULOCYTES # BLD AUTO: 0.03 K/UL (ref 0–0.04)
IMM GRANULOCYTES NFR BLD AUTO: 0.6 % (ref 0–0.5)
INR PPP: 1 (ref 0.8–1.2)
LYMPHOCYTES # BLD AUTO: 1 K/UL (ref 1–4.8)
LYMPHOCYTES NFR BLD: 20 % (ref 18–48)
MCH RBC QN AUTO: 32.4 PG (ref 27–31)
MCHC RBC AUTO-ENTMCNC: 33.6 G/DL (ref 32–36)
MCV RBC AUTO: 96 FL (ref 82–98)
MONOCYTES # BLD AUTO: 0.5 K/UL (ref 0.3–1)
MONOCYTES NFR BLD: 9.6 % (ref 4–15)
NEUTROPHILS # BLD AUTO: 3.3 K/UL (ref 1.8–7.7)
NEUTROPHILS NFR BLD: 66.2 % (ref 38–73)
NRBC BLD-RTO: 0 /100 WBC
PLATELET # BLD AUTO: 142 K/UL (ref 150–450)
PMV BLD AUTO: 11.4 FL (ref 9.2–12.9)
POCT GLUCOSE: 211 MG/DL (ref 70–110)
POTASSIUM SERPL-SCNC: 5.2 MMOL/L (ref 3.5–5.1)
PROT SERPL-MCNC: 7.2 G/DL (ref 6–8.4)
PROTHROMBIN TIME: 11.1 SEC (ref 9–12.5)
RBC # BLD AUTO: 3.61 M/UL (ref 4.6–6.2)
SODIUM SERPL-SCNC: 139 MMOL/L (ref 136–145)
WBC # BLD AUTO: 4.99 K/UL (ref 3.9–12.7)

## 2021-06-24 PROCEDURE — 99152 MOD SED SAME PHYS/QHP 5/>YRS: CPT | Mod: NTX,,, | Performed by: RADIOLOGY

## 2021-06-24 PROCEDURE — 36247 INS CATH ABD/L-EXT ART 3RD: CPT | Mod: 51,RT,NTX, | Performed by: RADIOLOGY

## 2021-06-24 PROCEDURE — 75726 ARTERY X-RAYS ABDOMEN: CPT | Mod: 26,59,NTX, | Performed by: RADIOLOGY

## 2021-06-24 PROCEDURE — 99152 MOD SED SAME PHYS/QHP 5/>YRS: CPT | Mod: NTX | Performed by: RADIOLOGY

## 2021-06-24 PROCEDURE — 99999 PR PBB SHADOW E&M-EST. PATIENT-LVL IV: ICD-10-PCS | Mod: PBBFAC,TXP,, | Performed by: INTERNAL MEDICINE

## 2021-06-24 PROCEDURE — 90636 HEP A/HEP B VACC ADULT IM: CPT | Mod: S$GLB,TXP,, | Performed by: INTERNAL MEDICINE

## 2021-06-24 PROCEDURE — 90636 HEPATITIS A HEPATITIS B COMBINED VACCINE IM: ICD-10-PCS | Mod: S$GLB,TXP,, | Performed by: INTERNAL MEDICINE

## 2021-06-24 PROCEDURE — 99203 OFFICE O/P NEW LOW 30 MIN: CPT | Mod: 25,S$GLB,TXP, | Performed by: INTERNAL MEDICINE

## 2021-06-24 PROCEDURE — 75726 CHG ANGIO VISCERAL SELECTV/SUBSELEC: ICD-10-PCS | Mod: 26,59,NTX, | Performed by: RADIOLOGY

## 2021-06-24 PROCEDURE — 37243 IR EMBOLIZATION COMP FOR TUMOR_ORGAN ISCHEMIA_INFARC: ICD-10-PCS | Mod: NTX,,, | Performed by: RADIOLOGY

## 2021-06-24 PROCEDURE — 90471 IMMUNIZATION ADMIN: CPT | Mod: S$GLB,TXP,, | Performed by: INTERNAL MEDICINE

## 2021-06-24 PROCEDURE — 90750 HZV VACC RECOMBINANT IM: CPT | Mod: S$GLB,TXP,, | Performed by: INTERNAL MEDICINE

## 2021-06-24 PROCEDURE — 99152 PR MOD CONSCIOUS SEDATION, SAME PHYS, 5+ YRS, FIRST 15 MIN: ICD-10-PCS | Mod: NTX,,, | Performed by: RADIOLOGY

## 2021-06-24 PROCEDURE — 90670 PNEUMOCOCCAL CONJUGATE VACCINE 13-VALENT LESS THAN 5YO & GREATER THAN: ICD-10-PCS | Mod: S$GLB,TXP,, | Performed by: INTERNAL MEDICINE

## 2021-06-24 PROCEDURE — 25500020 PHARM REV CODE 255: Mod: NTX | Performed by: FAMILY MEDICINE

## 2021-06-24 PROCEDURE — 90472 IMMUNIZATION ADMIN EACH ADD: CPT | Mod: S$GLB,TXP,, | Performed by: INTERNAL MEDICINE

## 2021-06-24 PROCEDURE — 76937 US GUIDE VASCULAR ACCESS: CPT | Mod: 26,NTX,, | Performed by: RADIOLOGY

## 2021-06-24 PROCEDURE — 85025 COMPLETE CBC W/AUTO DIFF WBC: CPT | Mod: NTX | Performed by: FAMILY MEDICINE

## 2021-06-24 PROCEDURE — 76937 PR  US GUIDE, VASCULAR ACCESS: ICD-10-PCS | Mod: 26,NTX,, | Performed by: RADIOLOGY

## 2021-06-24 PROCEDURE — 72197 MRI PELVIS W WO CONTRAST: ICD-10-PCS | Mod: 26,NTX,, | Performed by: RADIOLOGY

## 2021-06-24 PROCEDURE — A9585 GADOBUTROL INJECTION: HCPCS | Mod: NTX | Performed by: FAMILY MEDICINE

## 2021-06-24 PROCEDURE — 36248 PR PR INS CATH ABD/L-EXT ART ADDL 2ND ORD/3RD ORD/BYD: ICD-10-PCS | Mod: LT,NTX,, | Performed by: RADIOLOGY

## 2021-06-24 PROCEDURE — 3008F BODY MASS INDEX DOCD: CPT | Mod: CPTII,S$GLB,TXP, | Performed by: INTERNAL MEDICINE

## 2021-06-24 PROCEDURE — 36248 INS CATH ABD/L-EXT ART ADDL: CPT | Mod: LT,NTX | Performed by: RADIOLOGY

## 2021-06-24 PROCEDURE — 36248 INS CATH ABD/L-EXT ART ADDL: CPT | Mod: LT,NTX,, | Performed by: RADIOLOGY

## 2021-06-24 PROCEDURE — 80053 COMPREHEN METABOLIC PANEL: CPT | Mod: NTX | Performed by: FAMILY MEDICINE

## 2021-06-24 PROCEDURE — 90471 ZOSTER RECOMBINANT VACCINE: ICD-10-PCS | Mod: S$GLB,TXP,, | Performed by: INTERNAL MEDICINE

## 2021-06-24 PROCEDURE — 75774 PR  ANGIO EA ADDNL SELECTV VESSEL: ICD-10-PCS | Mod: 26,59,NTX, | Performed by: RADIOLOGY

## 2021-06-24 PROCEDURE — 82248 BILIRUBIN DIRECT: CPT | Mod: NTX | Performed by: FAMILY MEDICINE

## 2021-06-24 PROCEDURE — 25000003 PHARM REV CODE 250: Mod: NTX | Performed by: RADIOLOGY

## 2021-06-24 PROCEDURE — 99153 MOD SED SAME PHYS/QHP EA: CPT | Mod: NTX | Performed by: RADIOLOGY

## 2021-06-24 PROCEDURE — 76937 US GUIDE VASCULAR ACCESS: CPT | Mod: TC,TXP | Performed by: RADIOLOGY

## 2021-06-24 PROCEDURE — 3008F PR BODY MASS INDEX (BMI) DOCUMENTED: ICD-10-PCS | Mod: CPTII,S$GLB,TXP, | Performed by: INTERNAL MEDICINE

## 2021-06-24 PROCEDURE — 75726 ARTERY X-RAYS ABDOMEN: CPT | Mod: TC,59,NTX | Performed by: RADIOLOGY

## 2021-06-24 PROCEDURE — 1126F AMNT PAIN NOTED NONE PRSNT: CPT | Mod: S$GLB,TXP,, | Performed by: INTERNAL MEDICINE

## 2021-06-24 PROCEDURE — 75774 ARTERY X-RAY EACH VESSEL: CPT | Mod: TC,59,NTX | Performed by: RADIOLOGY

## 2021-06-24 PROCEDURE — 72197 MRI PELVIS W/O & W/DYE: CPT | Mod: TC,NTX

## 2021-06-24 PROCEDURE — 63600175 PHARM REV CODE 636 W HCPCS: Mod: NTX | Performed by: RADIOLOGY

## 2021-06-24 PROCEDURE — 1126F PR PAIN SEVERITY QUANTIFIED, NO PAIN PRESENT: ICD-10-PCS | Mod: S$GLB,TXP,, | Performed by: INTERNAL MEDICINE

## 2021-06-24 PROCEDURE — 90750 ZOSTER RECOMBINANT VACCINE: ICD-10-PCS | Mod: S$GLB,TXP,, | Performed by: INTERNAL MEDICINE

## 2021-06-24 PROCEDURE — 3051F HG A1C>EQUAL 7.0%<8.0%: CPT | Mod: CPTII,S$GLB,TXP, | Performed by: INTERNAL MEDICINE

## 2021-06-24 PROCEDURE — 63600175 PHARM REV CODE 636 W HCPCS: Mod: TXP | Performed by: PHYSICIAN ASSISTANT

## 2021-06-24 PROCEDURE — C1894 INTRO/SHEATH, NON-LASER: HCPCS | Mod: TXP

## 2021-06-24 PROCEDURE — G0269 OCCLUSIVE DEVICE IN VEIN ART: HCPCS | Mod: NTX | Performed by: RADIOLOGY

## 2021-06-24 PROCEDURE — 36247 PR PLACE CATH SUBSUBSELECT ART,ABD/PEL: ICD-10-PCS | Mod: 51,RT,NTX, | Performed by: RADIOLOGY

## 2021-06-24 PROCEDURE — 90670 PCV13 VACCINE IM: CPT | Mod: S$GLB,TXP,, | Performed by: INTERNAL MEDICINE

## 2021-06-24 PROCEDURE — 99999 PR PBB SHADOW E&M-EST. PATIENT-LVL IV: CPT | Mod: PBBFAC,TXP,, | Performed by: INTERNAL MEDICINE

## 2021-06-24 PROCEDURE — 90472 HEPATITIS A HEPATITIS B COMBINED VACCINE IM: ICD-10-PCS | Mod: S$GLB,TXP,, | Performed by: INTERNAL MEDICINE

## 2021-06-24 PROCEDURE — 37243 VASC EMBOLIZE/OCCLUDE ORGAN: CPT | Mod: NTX | Performed by: RADIOLOGY

## 2021-06-24 PROCEDURE — 36415 COLL VENOUS BLD VENIPUNCTURE: CPT | Mod: NTX | Performed by: FAMILY MEDICINE

## 2021-06-24 PROCEDURE — 27200996 IR EMBOLIZATION COMP FOR TUMOR_ORGAN ISCHEMIA_INFARC: Mod: TXP

## 2021-06-24 PROCEDURE — 85610 PROTHROMBIN TIME: CPT | Mod: NTX | Performed by: FAMILY MEDICINE

## 2021-06-24 PROCEDURE — 3051F PR MOST RECENT HEMOGLOBIN A1C LEVEL 7.0 - < 8.0%: ICD-10-PCS | Mod: CPTII,S$GLB,TXP, | Performed by: INTERNAL MEDICINE

## 2021-06-24 PROCEDURE — 25000003 PHARM REV CODE 250: Mod: NTX | Performed by: PHYSICIAN ASSISTANT

## 2021-06-24 PROCEDURE — 72197 MRI PELVIS W/O & W/DYE: CPT | Mod: 26,NTX,, | Performed by: RADIOLOGY

## 2021-06-24 PROCEDURE — 75774 ARTERY X-RAY EACH VESSEL: CPT | Mod: 26,59,NTX, | Performed by: RADIOLOGY

## 2021-06-24 PROCEDURE — 99203 PR OFFICE/OUTPT VISIT, NEW, LEVL III, 30-44 MIN: ICD-10-PCS | Mod: 25,S$GLB,TXP, | Performed by: INTERNAL MEDICINE

## 2021-06-24 PROCEDURE — 36247 INS CATH ABD/L-EXT ART 3RD: CPT | Mod: RT,NTX | Performed by: RADIOLOGY

## 2021-06-24 RX ORDER — AMOXICILLIN AND CLAVULANATE POTASSIUM 500; 125 MG/1; MG/1
1 TABLET, FILM COATED ORAL 2 TIMES DAILY
Qty: 14 TABLET | Refills: 0 | Status: SHIPPED | OUTPATIENT
Start: 2021-06-24 | End: 2021-07-26

## 2021-06-24 RX ORDER — GADOBUTROL 604.72 MG/ML
8 INJECTION INTRAVENOUS
Status: COMPLETED | OUTPATIENT
Start: 2021-06-24 | End: 2021-06-24

## 2021-06-24 RX ORDER — LIDOCAINE HYDROCHLORIDE 10 MG/ML
1 INJECTION, SOLUTION EPIDURAL; INFILTRATION; INTRACAUDAL; PERINEURAL ONCE AS NEEDED
Status: DISCONTINUED | OUTPATIENT
Start: 2021-06-24 | End: 2021-06-25 | Stop reason: HOSPADM

## 2021-06-24 RX ORDER — OXYCODONE HYDROCHLORIDE 5 MG/1
5 TABLET ORAL EVERY 4 HOURS PRN
Qty: 20 TABLET | Refills: 0 | Status: ON HOLD | OUTPATIENT
Start: 2021-06-24 | End: 2022-01-27 | Stop reason: HOSPADM

## 2021-06-24 RX ORDER — DIPHENHYDRAMINE HYDROCHLORIDE 50 MG/ML
50 INJECTION INTRAMUSCULAR; INTRAVENOUS ONCE
Status: COMPLETED | OUTPATIENT
Start: 2021-06-24 | End: 2021-06-24

## 2021-06-24 RX ORDER — MIDAZOLAM HYDROCHLORIDE 1 MG/ML
INJECTION INTRAMUSCULAR; INTRAVENOUS CODE/TRAUMA/SEDATION MEDICATION
Status: COMPLETED | OUTPATIENT
Start: 2021-06-24 | End: 2021-06-24

## 2021-06-24 RX ORDER — LIDOCAINE HYDROCHLORIDE 10 MG/ML
INJECTION INFILTRATION; PERINEURAL CODE/TRAUMA/SEDATION MEDICATION
Status: COMPLETED | OUTPATIENT
Start: 2021-06-24 | End: 2021-06-24

## 2021-06-24 RX ORDER — ONDANSETRON 4 MG/1
4 TABLET, FILM COATED ORAL EVERY 6 HOURS PRN
Qty: 28 TABLET | Refills: 0 | Status: SHIPPED | OUTPATIENT
Start: 2021-06-24 | End: 2021-07-26 | Stop reason: SDUPTHER

## 2021-06-24 RX ORDER — OXYCODONE HYDROCHLORIDE 5 MG/1
5 TABLET ORAL EVERY 4 HOURS PRN
Status: DISCONTINUED | OUTPATIENT
Start: 2021-06-24 | End: 2021-06-25 | Stop reason: HOSPADM

## 2021-06-24 RX ORDER — SODIUM CHLORIDE 9 MG/ML
INJECTION, SOLUTION INTRAVENOUS CONTINUOUS
Status: DISCONTINUED | OUTPATIENT
Start: 2021-06-24 | End: 2021-06-25 | Stop reason: HOSPADM

## 2021-06-24 RX ORDER — IVERMECTIN 3 MG/1
15 TABLET ORAL ONCE
Qty: 5 TABLET | Refills: 0 | Status: SHIPPED | OUTPATIENT
Start: 2021-06-24 | End: 2021-06-24

## 2021-06-24 RX ORDER — ONDANSETRON 2 MG/ML
4 INJECTION INTRAMUSCULAR; INTRAVENOUS EVERY 6 HOURS PRN
Status: DISCONTINUED | OUTPATIENT
Start: 2021-06-24 | End: 2021-06-25 | Stop reason: HOSPADM

## 2021-06-24 RX ORDER — FENTANYL CITRATE 50 UG/ML
INJECTION, SOLUTION INTRAMUSCULAR; INTRAVENOUS CODE/TRAUMA/SEDATION MEDICATION
Status: COMPLETED | OUTPATIENT
Start: 2021-06-24 | End: 2021-06-24

## 2021-06-24 RX ADMIN — SODIUM CHLORIDE 3 G: 9 INJECTION, SOLUTION INTRAVENOUS at 08:06

## 2021-06-24 RX ADMIN — HYDROCORTISONE SODIUM SUCCINATE 200 MG: 100 INJECTION, POWDER, FOR SOLUTION INTRAMUSCULAR; INTRAVENOUS at 08:06

## 2021-06-24 RX ADMIN — MIDAZOLAM HYDROCHLORIDE 2 MG: 1 INJECTION INTRAMUSCULAR; INTRAVENOUS at 10:06

## 2021-06-24 RX ADMIN — GADOBUTROL 8 ML: 604.72 INJECTION INTRAVENOUS at 04:06

## 2021-06-24 RX ADMIN — OXYCODONE HYDROCHLORIDE 5 MG: 5 TABLET ORAL at 01:06

## 2021-06-24 RX ADMIN — SODIUM CHLORIDE: 0.9 INJECTION, SOLUTION INTRAVENOUS at 08:06

## 2021-06-24 RX ADMIN — LIDOCAINE HYDROCHLORIDE 5 ML: 10 INJECTION, SOLUTION INFILTRATION; PERINEURAL at 10:06

## 2021-06-24 RX ADMIN — IOHEXOL 45 ML: 300 INJECTION, SOLUTION INTRAVENOUS at 11:06

## 2021-06-24 RX ADMIN — DIPHENHYDRAMINE HYDROCHLORIDE 50 MG: 50 INJECTION, SOLUTION INTRAMUSCULAR; INTRAVENOUS at 08:06

## 2021-06-24 RX ADMIN — FENTANYL CITRATE 50 MCG: 50 INJECTION, SOLUTION INTRAMUSCULAR; INTRAVENOUS at 10:06

## 2021-06-25 DIAGNOSIS — C22.0 HCC (HEPATOCELLULAR CARCINOMA): Primary | ICD-10-CM

## 2021-06-30 ENCOUNTER — HOSPITAL ENCOUNTER (OUTPATIENT)
Dept: CARDIOLOGY | Facility: HOSPITAL | Age: 61
Discharge: HOME | End: 2021-06-30
Attending: STUDENT IN AN ORGANIZED HEALTH CARE EDUCATION/TRAINING PROGRAM
Payer: COMMERCIAL

## 2021-06-30 VITALS
BODY MASS INDEX: 25.66 KG/M2 | SYSTOLIC BLOOD PRESSURE: 134 MMHG | DIASTOLIC BLOOD PRESSURE: 72 MMHG | HEART RATE: 74 BPM | HEIGHT: 65 IN | WEIGHT: 154 LBS

## 2021-06-30 DIAGNOSIS — I10 HYPERTENSION, UNSPECIFIED TYPE: ICD-10-CM

## 2021-06-30 DIAGNOSIS — Z76.82 ORGAN TRANSPLANT CANDIDATE: ICD-10-CM

## 2021-06-30 DIAGNOSIS — C22.0 HCC (HEPATOCELLULAR CARCINOMA): ICD-10-CM

## 2021-06-30 DIAGNOSIS — E11.65 TYPE 2 DIABETES MELLITUS WITH HYPERGLYCEMIA, UNSPECIFIED WHETHER LONG TERM INSULIN USE: ICD-10-CM

## 2021-06-30 DIAGNOSIS — K74.69 COMPENSATED CIRRHOSIS RELATED TO HEPATITIS C VIRUS (HCV): ICD-10-CM

## 2021-06-30 DIAGNOSIS — B19.20 COMPENSATED CIRRHOSIS RELATED TO HEPATITIS C VIRUS (HCV): ICD-10-CM

## 2021-06-30 LAB
ASCENDING AORTA: 2.69 CM
AV INDEX (PROSTH): 0.65
AV MEAN GRADIENT: 9 MMHG
AV PEAK GRADIENT: 19 MMHG
AV VALVE AREA: 1.95 CM2
AV VELOCITY RATIO: 0.5
BSA FOR ECHO PROCEDURE: 1.78 M2
CV ECHO LV RWT: 0.46 CM
DOP CALC AO PEAK VEL: 2.2 M/S
DOP CALC AO VTI: 32.01 CM
DOP CALC LVOT AREA: 3 CM2
DOP CALC LVOT DIAMETER: 1.95 CM
DOP CALC LVOT PEAK VEL: 1.1 M/S
DOP CALC LVOT STROKE VOLUME: 62.33 CM3
DOP CALCLVOT PEAK VEL VTI: 20.88 CM
E WAVE DECELERATION TIME: 207.5 MSEC
E/A RATIO: 1.42
E/E' RATIO: 7.5 M/S
ECHO LV POSTERIOR WALL: 0.97 CM (ref 0.6–1.1)
EJECTION FRACTION: 65 %
FRACTIONAL SHORTENING: 36 % (ref 28–44)
INTERVENTRICULAR SEPTUM: 0.98 CM (ref 0.6–1.1)
IVRT: 91.34 MSEC
LA MAJOR: 4.98 CM
LA MINOR: 4.94 CM
LA WIDTH: 4.12 CM
LEFT ATRIUM SIZE: 4.34 CM
LEFT ATRIUM VOLUME INDEX MOD: 36 ML/M2
LEFT ATRIUM VOLUME INDEX: 42.8 ML/M2
LEFT ATRIUM VOLUME MOD: 63.34 CM3
LEFT ATRIUM VOLUME: 75.38 CM3
LEFT INTERNAL DIMENSION IN SYSTOLE: 2.73 CM (ref 2.1–4)
LEFT VENTRICLE DIASTOLIC VOLUME INDEX: 46.22 ML/M2
LEFT VENTRICLE DIASTOLIC VOLUME: 81.34 ML
LEFT VENTRICLE MASS INDEX: 77 G/M2
LEFT VENTRICLE SYSTOLIC VOLUME INDEX: 15.8 ML/M2
LEFT VENTRICLE SYSTOLIC VOLUME: 27.85 ML
LEFT VENTRICULAR INTERNAL DIMENSION IN DIASTOLE: 4.26 CM (ref 3.5–6)
LEFT VENTRICULAR MASS: 135.53 G
LV LATERAL E/E' RATIO: 6.82 M/S
LV SEPTAL E/E' RATIO: 8.33 M/S
MV A" WAVE DURATION": 11.13 MSEC
MV PEAK A VEL: 0.53 M/S
MV PEAK E VEL: 0.75 M/S
MV STENOSIS PRESSURE HALF TIME: 60.17 MS
MV VALVE AREA P 1/2 METHOD: 3.66 CM2
PISA TR MAX VEL: 2.34 M/S
PULM VEIN S/D RATIO: 0.91
PV PEAK D VEL: 0.58 M/S
PV PEAK S VEL: 0.53 M/S
RA MAJOR: 5.08 CM
RA PRESSURE: 3 MMHG
RA WIDTH: 4.25 CM
RIGHT VENTRICULAR END-DIASTOLIC DIMENSION: 3.85 CM
RV TISSUE DOPPLER FREE WALL SYSTOLIC VELOCITY 1 (APICAL 4 CHAMBER VIEW): 7.1 CM/S
SINUS: 2.92 CM
STJ: 2.59 CM
TDI LATERAL: 0.11 M/S
TDI SEPTAL: 0.09 M/S
TDI: 0.1 M/S
TR MAX PG: 22 MMHG
TRICUSPID ANNULAR PLANE SYSTOLIC EXCURSION: 1.15 CM
TV REST PULMONARY ARTERY PRESSURE: 25 MMHG

## 2021-06-30 PROCEDURE — 93306 ECHO (CUPID ONLY): ICD-10-PCS | Mod: 26,TXP,, | Performed by: INTERNAL MEDICINE

## 2021-06-30 PROCEDURE — 93306 TTE W/DOPPLER COMPLETE: CPT | Mod: TXP

## 2021-06-30 PROCEDURE — 93306 TTE W/DOPPLER COMPLETE: CPT | Mod: 26,TXP,, | Performed by: INTERNAL MEDICINE

## 2021-07-05 ENCOUNTER — PATIENT MESSAGE (OUTPATIENT)
Dept: MEDSURG UNIT | Facility: HOSPITAL | Age: 61
End: 2021-07-05

## 2021-07-06 ENCOUNTER — DOCUMENTATION ONLY (OUTPATIENT)
Dept: CARDIOLOGY | Facility: CLINIC | Age: 61
End: 2021-07-06

## 2021-07-08 ENCOUNTER — HOSPITAL ENCOUNTER (OUTPATIENT)
Facility: HOSPITAL | Age: 61
Discharge: HOME | End: 2021-07-08
Attending: INTERNAL MEDICINE | Admitting: INTERNAL MEDICINE
Payer: COMMERCIAL

## 2021-07-08 VITALS
OXYGEN SATURATION: 98 % | TEMPERATURE: 98 F | HEIGHT: 65 IN | DIASTOLIC BLOOD PRESSURE: 65 MMHG | HEART RATE: 70 BPM | WEIGHT: 155 LBS | SYSTOLIC BLOOD PRESSURE: 132 MMHG | RESPIRATION RATE: 16 BRPM | BODY MASS INDEX: 25.83 KG/M2

## 2021-07-08 DIAGNOSIS — C22.0 HCC (HEPATOCELLULAR CARCINOMA): ICD-10-CM

## 2021-07-08 DIAGNOSIS — B19.20 HEPATITIS C VIRUS INFECTION WITHOUT HEPATIC COMA, UNSPECIFIED CHRONICITY: ICD-10-CM

## 2021-07-08 DIAGNOSIS — Z95.3 HISTORY OF AORTIC VALVE REPLACEMENT WITH BIOPROSTHETIC VALVE: ICD-10-CM

## 2021-07-08 LAB
ABO + RH BLD: NORMAL
ANION GAP SERPL CALC-SCNC: 8 MMOL/L (ref 8–16)
APTT BLDCRRT: 25.9 SEC (ref 21–32)
BASOPHILS # BLD AUTO: 0.03 K/UL (ref 0–0.2)
BASOPHILS NFR BLD: 0.6 % (ref 0–1.9)
BLD GP AB SCN CELLS X3 SERPL QL: NORMAL
BUN SERPL-MCNC: 17 MG/DL (ref 8–23)
CALCIUM SERPL-MCNC: 9.5 MG/DL (ref 8.7–10.5)
CHLORIDE SERPL-SCNC: 105 MMOL/L (ref 95–110)
CO2 SERPL-SCNC: 25 MMOL/L (ref 23–29)
CREAT SERPL-MCNC: 0.8 MG/DL (ref 0.5–1.4)
DIFFERENTIAL METHOD: ABNORMAL
EOSINOPHIL # BLD AUTO: 0.1 K/UL (ref 0–0.5)
EOSINOPHIL NFR BLD: 1.4 % (ref 0–8)
ERYTHROCYTE [DISTWIDTH] IN BLOOD BY AUTOMATED COUNT: 11.9 % (ref 11.5–14.5)
EST. GFR  (AFRICAN AMERICAN): >60 ML/MIN/1.73 M^2
EST. GFR  (NON AFRICAN AMERICAN): >60 ML/MIN/1.73 M^2
GLUCOSE SERPL-MCNC: 264 MG/DL (ref 70–110)
HCT VFR BLD AUTO: 30.2 % (ref 40–54)
HGB BLD-MCNC: 10.1 G/DL (ref 14–18)
IMM GRANULOCYTES # BLD AUTO: 0.01 K/UL (ref 0–0.04)
IMM GRANULOCYTES NFR BLD AUTO: 0.2 % (ref 0–0.5)
INR PPP: 1 (ref 0.8–1.2)
LYMPHOCYTES # BLD AUTO: 0.7 K/UL (ref 1–4.8)
LYMPHOCYTES NFR BLD: 14 % (ref 18–48)
MCH RBC QN AUTO: 31.9 PG (ref 27–31)
MCHC RBC AUTO-ENTMCNC: 33.4 G/DL (ref 32–36)
MCV RBC AUTO: 95 FL (ref 82–98)
MONOCYTES # BLD AUTO: 0.4 K/UL (ref 0.3–1)
MONOCYTES NFR BLD: 8 % (ref 4–15)
NEUTROPHILS # BLD AUTO: 3.9 K/UL (ref 1.8–7.7)
NEUTROPHILS NFR BLD: 75.8 % (ref 38–73)
NRBC BLD-RTO: 0 /100 WBC
PLATELET # BLD AUTO: 223 K/UL (ref 150–450)
PMV BLD AUTO: 10.8 FL (ref 9.2–12.9)
POCT GLUCOSE: 249 MG/DL (ref 70–110)
POTASSIUM SERPL-SCNC: 5.1 MMOL/L (ref 3.5–5.1)
PROTHROMBIN TIME: 11.4 SEC (ref 9–12.5)
RBC # BLD AUTO: 3.17 M/UL (ref 4.6–6.2)
SODIUM SERPL-SCNC: 138 MMOL/L (ref 136–145)
WBC # BLD AUTO: 5.14 K/UL (ref 3.9–12.7)

## 2021-07-08 PROCEDURE — 93005 ELECTROCARDIOGRAM TRACING: CPT | Mod: TXP

## 2021-07-08 PROCEDURE — 85610 PROTHROMBIN TIME: CPT | Mod: TXP | Performed by: PHYSICIAN ASSISTANT

## 2021-07-08 PROCEDURE — C1769 GUIDE WIRE: HCPCS | Mod: TXP | Performed by: INTERNAL MEDICINE

## 2021-07-08 PROCEDURE — 93010 EKG 12-LEAD: ICD-10-PCS | Mod: TXP,,, | Performed by: INTERNAL MEDICINE

## 2021-07-08 PROCEDURE — 63600175 PHARM REV CODE 636 W HCPCS: Mod: TXP | Performed by: INTERNAL MEDICINE

## 2021-07-08 PROCEDURE — C1894 INTRO/SHEATH, NON-LASER: HCPCS | Mod: TXP | Performed by: INTERNAL MEDICINE

## 2021-07-08 PROCEDURE — 85730 THROMBOPLASTIN TIME PARTIAL: CPT | Mod: TXP | Performed by: PHYSICIAN ASSISTANT

## 2021-07-08 PROCEDURE — 93010 ELECTROCARDIOGRAM REPORT: CPT | Mod: TXP,,, | Performed by: INTERNAL MEDICINE

## 2021-07-08 PROCEDURE — 93456 PR CATH PLACE/CORONARY ANGIO, IMG SUPER/INTERP,W RIGHT HEART CATH: ICD-10-PCS | Mod: 26,TXP,, | Performed by: INTERNAL MEDICINE

## 2021-07-08 PROCEDURE — 99152 MOD SED SAME PHYS/QHP 5/>YRS: CPT | Mod: TXP | Performed by: INTERNAL MEDICINE

## 2021-07-08 PROCEDURE — C1887 CATHETER, GUIDING: HCPCS | Mod: TXP | Performed by: INTERNAL MEDICINE

## 2021-07-08 PROCEDURE — 25000003 PHARM REV CODE 250: Mod: TXP | Performed by: PHYSICIAN ASSISTANT

## 2021-07-08 PROCEDURE — 86900 BLOOD TYPING SEROLOGIC ABO: CPT | Mod: TXP | Performed by: PHYSICIAN ASSISTANT

## 2021-07-08 PROCEDURE — 93456 R HRT CORONARY ARTERY ANGIO: CPT | Mod: TXP | Performed by: INTERNAL MEDICINE

## 2021-07-08 PROCEDURE — 25000003 PHARM REV CODE 250: Mod: TXP | Performed by: INTERNAL MEDICINE

## 2021-07-08 PROCEDURE — 99152 PR MOD CONSCIOUS SEDATION, SAME PHYS, 5+ YRS, FIRST 15 MIN: ICD-10-PCS | Mod: TXP,,, | Performed by: INTERNAL MEDICINE

## 2021-07-08 PROCEDURE — 82962 GLUCOSE BLOOD TEST: CPT | Mod: TXP | Performed by: INTERNAL MEDICINE

## 2021-07-08 PROCEDURE — C1751 CATH, INF, PER/CENT/MIDLINE: HCPCS | Mod: TXP | Performed by: INTERNAL MEDICINE

## 2021-07-08 PROCEDURE — 25500020 PHARM REV CODE 255: Mod: TXP | Performed by: INTERNAL MEDICINE

## 2021-07-08 PROCEDURE — 93456 R HRT CORONARY ARTERY ANGIO: CPT | Mod: 26,TXP,, | Performed by: INTERNAL MEDICINE

## 2021-07-08 PROCEDURE — 99152 MOD SED SAME PHYS/QHP 5/>YRS: CPT | Mod: TXP,,, | Performed by: INTERNAL MEDICINE

## 2021-07-08 PROCEDURE — 80048 BASIC METABOLIC PNL TOTAL CA: CPT | Mod: TXP | Performed by: PHYSICIAN ASSISTANT

## 2021-07-08 PROCEDURE — 85025 COMPLETE CBC W/AUTO DIFF WBC: CPT | Mod: TXP | Performed by: PHYSICIAN ASSISTANT

## 2021-07-08 RX ORDER — DIPHENHYDRAMINE HCL 50 MG
50 CAPSULE ORAL ONCE
Status: COMPLETED | OUTPATIENT
Start: 2021-07-08 | End: 2021-07-08

## 2021-07-08 RX ORDER — SODIUM CHLORIDE 9 MG/ML
INJECTION, SOLUTION INTRAVENOUS CONTINUOUS
Status: ACTIVE | OUTPATIENT
Start: 2021-07-08 | End: 2021-07-08

## 2021-07-08 RX ORDER — HEPARIN SOD,PORCINE/0.9 % NACL 1000/500ML
INTRAVENOUS SOLUTION INTRAVENOUS
Status: DISCONTINUED | OUTPATIENT
Start: 2021-07-08 | End: 2021-07-08 | Stop reason: HOSPADM

## 2021-07-08 RX ORDER — FENTANYL CITRATE 50 UG/ML
INJECTION, SOLUTION INTRAMUSCULAR; INTRAVENOUS
Status: DISCONTINUED | OUTPATIENT
Start: 2021-07-08 | End: 2021-07-08 | Stop reason: HOSPADM

## 2021-07-08 RX ORDER — HEPARIN SODIUM 1000 [USP'U]/ML
INJECTION, SOLUTION INTRAVENOUS; SUBCUTANEOUS
Status: DISCONTINUED | OUTPATIENT
Start: 2021-07-08 | End: 2021-07-08 | Stop reason: HOSPADM

## 2021-07-08 RX ORDER — ACETAMINOPHEN 325 MG/1
650 TABLET ORAL EVERY 4 HOURS PRN
Status: DISCONTINUED | OUTPATIENT
Start: 2021-07-08 | End: 2021-07-08 | Stop reason: HOSPADM

## 2021-07-08 RX ORDER — ONDANSETRON 8 MG/1
8 TABLET, ORALLY DISINTEGRATING ORAL EVERY 8 HOURS PRN
Status: DISCONTINUED | OUTPATIENT
Start: 2021-07-08 | End: 2021-07-08 | Stop reason: HOSPADM

## 2021-07-08 RX ORDER — SODIUM CHLORIDE 9 MG/ML
INJECTION, SOLUTION INTRAVENOUS CONTINUOUS
Status: DISCONTINUED | OUTPATIENT
Start: 2021-07-08 | End: 2021-07-08 | Stop reason: HOSPADM

## 2021-07-08 RX ORDER — MIDAZOLAM HYDROCHLORIDE 1 MG/ML
INJECTION, SOLUTION INTRAMUSCULAR; INTRAVENOUS
Status: DISCONTINUED | OUTPATIENT
Start: 2021-07-08 | End: 2021-07-08 | Stop reason: HOSPADM

## 2021-07-08 RX ADMIN — DIPHENHYDRAMINE HYDROCHLORIDE 50 MG: 50 CAPSULE ORAL at 08:07

## 2021-07-08 RX ADMIN — SODIUM CHLORIDE: 0.9 INJECTION, SOLUTION INTRAVENOUS at 08:07

## 2021-07-10 ENCOUNTER — NURSE TRIAGE (OUTPATIENT)
Dept: ADMINISTRATIVE | Facility: CLINIC | Age: 61
End: 2021-07-10

## 2021-07-12 ENCOUNTER — HOSPITAL ENCOUNTER (OUTPATIENT)
Dept: RADIOLOGY | Facility: CLINIC | Age: 61
Discharge: HOME | End: 2021-07-12
Attending: STUDENT IN AN ORGANIZED HEALTH CARE EDUCATION/TRAINING PROGRAM
Payer: COMMERCIAL

## 2021-07-12 DIAGNOSIS — E78.5 DYSLIPIDEMIA: ICD-10-CM

## 2021-07-12 DIAGNOSIS — C22.0 HCC (HEPATOCELLULAR CARCINOMA): ICD-10-CM

## 2021-07-12 DIAGNOSIS — K74.60 HEPATIC CIRRHOSIS, UNSPECIFIED HEPATIC CIRRHOSIS TYPE, UNSPECIFIED WHETHER ASCITES PRESENT: ICD-10-CM

## 2021-07-12 DIAGNOSIS — Z76.82 ORGAN TRANSPLANT CANDIDATE: ICD-10-CM

## 2021-07-12 DIAGNOSIS — K74.69 COMPENSATED CIRRHOSIS RELATED TO HEPATITIS C VIRUS (HCV): ICD-10-CM

## 2021-07-12 DIAGNOSIS — B19.20 COMPENSATED CIRRHOSIS RELATED TO HEPATITIS C VIRUS (HCV): ICD-10-CM

## 2021-07-12 DIAGNOSIS — E11.65 TYPE 2 DIABETES MELLITUS WITH HYPERGLYCEMIA, UNSPECIFIED WHETHER LONG TERM INSULIN USE: ICD-10-CM

## 2021-07-12 DIAGNOSIS — I10 HYPERTENSION, UNSPECIFIED TYPE: ICD-10-CM

## 2021-07-12 PROCEDURE — 77080 DEXA BONE DENSITY SPINE HIP: ICD-10-PCS | Mod: 26,TXP,, | Performed by: INTERNAL MEDICINE

## 2021-07-12 PROCEDURE — 77080 DXA BONE DENSITY AXIAL: CPT | Mod: 26,TXP,, | Performed by: INTERNAL MEDICINE

## 2021-07-12 PROCEDURE — 77080 DXA BONE DENSITY AXIAL: CPT | Mod: TC,TXP

## 2021-07-13 ENCOUNTER — EPISODE CHANGES (OUTPATIENT)
Dept: TRANSPLANT | Facility: CLINIC | Age: 61
End: 2021-07-13

## 2021-07-15 ENCOUNTER — HOSPITAL ENCOUNTER (OUTPATIENT)
Dept: RADIOLOGY | Facility: HOSPITAL | Age: 61
Discharge: HOME | End: 2021-07-15
Attending: PHYSICIAN ASSISTANT
Payer: COMMERCIAL

## 2021-07-15 ENCOUNTER — OFFICE VISIT (OUTPATIENT)
Dept: INTERVENTIONAL RADIOLOGY/VASCULAR | Facility: CLINIC | Age: 61
End: 2021-07-15
Payer: COMMERCIAL

## 2021-07-15 VITALS
DIASTOLIC BLOOD PRESSURE: 72 MMHG | WEIGHT: 154.56 LBS | HEIGHT: 65 IN | BODY MASS INDEX: 25.75 KG/M2 | HEART RATE: 71 BPM | SYSTOLIC BLOOD PRESSURE: 135 MMHG

## 2021-07-15 DIAGNOSIS — C22.0 HCC (HEPATOCELLULAR CARCINOMA): ICD-10-CM

## 2021-07-15 DIAGNOSIS — C22.0 HCC (HEPATOCELLULAR CARCINOMA): Primary | ICD-10-CM

## 2021-07-15 PROCEDURE — 74183 MRI ABD W/O CNTR FLWD CNTR: CPT | Mod: TC,TXP

## 2021-07-15 PROCEDURE — 25500020 PHARM REV CODE 255: Mod: TXP | Performed by: PHYSICIAN ASSISTANT

## 2021-07-15 PROCEDURE — 3008F BODY MASS INDEX DOCD: CPT | Mod: CPTII,S$GLB,TXP, | Performed by: FAMILY MEDICINE

## 2021-07-15 PROCEDURE — 74183 MRI ABD W/O CNTR FLWD CNTR: CPT | Mod: 26,TXP,, | Performed by: RADIOLOGY

## 2021-07-15 PROCEDURE — 99213 OFFICE O/P EST LOW 20 MIN: CPT | Mod: S$GLB,TXP,, | Performed by: FAMILY MEDICINE

## 2021-07-15 PROCEDURE — 74183 MRI ABDOMEN W WO CONTRAST: ICD-10-PCS | Mod: 26,TXP,, | Performed by: RADIOLOGY

## 2021-07-15 PROCEDURE — 3008F PR BODY MASS INDEX (BMI) DOCUMENTED: ICD-10-PCS | Mod: CPTII,S$GLB,TXP, | Performed by: FAMILY MEDICINE

## 2021-07-15 PROCEDURE — A9585 GADOBUTROL INJECTION: HCPCS | Mod: TXP | Performed by: PHYSICIAN ASSISTANT

## 2021-07-15 PROCEDURE — 99999 PR PBB SHADOW E&M-EST. PATIENT-LVL III: ICD-10-PCS | Mod: PBBFAC,TXP,, | Performed by: FAMILY MEDICINE

## 2021-07-15 PROCEDURE — 99999 PR PBB SHADOW E&M-EST. PATIENT-LVL III: CPT | Mod: PBBFAC,TXP,, | Performed by: FAMILY MEDICINE

## 2021-07-15 PROCEDURE — 99213 PR OFFICE/OUTPT VISIT, EST, LEVL III, 20-29 MIN: ICD-10-PCS | Mod: S$GLB,TXP,, | Performed by: FAMILY MEDICINE

## 2021-07-15 RX ORDER — GADOBUTROL 604.72 MG/ML
10 INJECTION INTRAVENOUS
Status: COMPLETED | OUTPATIENT
Start: 2021-07-15 | End: 2021-07-15

## 2021-07-15 RX ADMIN — GADOBUTROL 10 ML: 604.72 INJECTION INTRAVENOUS at 08:07

## 2021-07-16 ENCOUNTER — PATIENT MESSAGE (OUTPATIENT)
Dept: TRANSPLANT | Facility: CLINIC | Age: 61
End: 2021-07-16

## 2021-07-16 ENCOUNTER — OFFICE VISIT (OUTPATIENT)
Dept: TRANSPLANT | Facility: CLINIC | Age: 61
End: 2021-07-16
Payer: COMMERCIAL

## 2021-07-16 DIAGNOSIS — C22.0 HCC (HEPATOCELLULAR CARCINOMA): ICD-10-CM

## 2021-07-16 DIAGNOSIS — B19.20 COMPENSATED CIRRHOSIS RELATED TO HEPATITIS C VIRUS (HCV): Primary | ICD-10-CM

## 2021-07-16 DIAGNOSIS — K74.69 COMPENSATED CIRRHOSIS RELATED TO HEPATITIS C VIRUS (HCV): Primary | ICD-10-CM

## 2021-07-16 DIAGNOSIS — Z76.82 LIVER TRANSPLANT CANDIDATE: ICD-10-CM

## 2021-07-16 PROCEDURE — 99213 PR OFFICE/OUTPT VISIT, EST, LEVL III, 20-29 MIN: ICD-10-PCS | Mod: 95,TXP,, | Performed by: STUDENT IN AN ORGANIZED HEALTH CARE EDUCATION/TRAINING PROGRAM

## 2021-07-16 PROCEDURE — 99213 OFFICE O/P EST LOW 20 MIN: CPT | Mod: 95,TXP,, | Performed by: STUDENT IN AN ORGANIZED HEALTH CARE EDUCATION/TRAINING PROGRAM

## 2021-07-20 ENCOUNTER — CONFERENCE (OUTPATIENT)
Dept: TRANSPLANT | Facility: CLINIC | Age: 61
End: 2021-07-20

## 2021-07-23 ENCOUNTER — TELEPHONE (OUTPATIENT)
Dept: INTERVENTIONAL RADIOLOGY/VASCULAR | Facility: HOSPITAL | Age: 61
End: 2021-07-23

## 2021-07-23 DIAGNOSIS — Z00.6 RESEARCH STUDY PATIENT: Primary | ICD-10-CM

## 2021-07-23 DIAGNOSIS — C22.0 HCC (HEPATOCELLULAR CARCINOMA): ICD-10-CM

## 2021-07-26 ENCOUNTER — RESEARCH ENCOUNTER (OUTPATIENT)
Dept: RESEARCH | Facility: HOSPITAL | Age: 61
End: 2021-07-26

## 2021-07-26 ENCOUNTER — CLINICAL SUPPORT (OUTPATIENT)
Dept: INFECTIOUS DISEASES | Facility: CLINIC | Age: 61
End: 2021-07-26
Payer: COMMERCIAL

## 2021-07-26 ENCOUNTER — HOSPITAL ENCOUNTER (OUTPATIENT)
Dept: INTERVENTIONAL RADIOLOGY/VASCULAR | Facility: HOSPITAL | Age: 61
Discharge: HOME | End: 2021-07-26
Attending: FAMILY MEDICINE
Payer: COMMERCIAL

## 2021-07-26 VITALS
RESPIRATION RATE: 16 BRPM | BODY MASS INDEX: 26.16 KG/M2 | SYSTOLIC BLOOD PRESSURE: 156 MMHG | OXYGEN SATURATION: 100 % | HEART RATE: 65 BPM | HEIGHT: 65 IN | WEIGHT: 157 LBS | TEMPERATURE: 98 F | DIASTOLIC BLOOD PRESSURE: 78 MMHG

## 2021-07-26 DIAGNOSIS — C22.0 HCC (HEPATOCELLULAR CARCINOMA): Primary | ICD-10-CM

## 2021-07-26 DIAGNOSIS — C22.0 HCC (HEPATOCELLULAR CARCINOMA): ICD-10-CM

## 2021-07-26 LAB — POCT GLUCOSE: 194 MG/DL (ref 70–110)

## 2021-07-26 PROCEDURE — 76377 3D RENDER W/INTRP POSTPROCES: CPT | Mod: 26,NTX,, | Performed by: RADIOLOGY

## 2021-07-26 PROCEDURE — 76937 US GUIDE VASCULAR ACCESS: CPT | Mod: TC,TXP | Performed by: RADIOLOGY

## 2021-07-26 PROCEDURE — 36247 INS CATH ABD/L-EXT ART 3RD: CPT | Mod: RT,NTX | Performed by: RADIOLOGY

## 2021-07-26 PROCEDURE — 76377 PR  3D RENDERING W/ IMAGE POSTPROCESS: ICD-10-PCS | Mod: 26,NTX,, | Performed by: RADIOLOGY

## 2021-07-26 PROCEDURE — 76937 US GUIDE VASCULAR ACCESS: CPT | Mod: 26,NTX,, | Performed by: RADIOLOGY

## 2021-07-26 PROCEDURE — 90471 HEPATITIS A HEPATITIS B COMBINED VACCINE IM: ICD-10-PCS | Mod: S$GLB,TXP,, | Performed by: INTERNAL MEDICINE

## 2021-07-26 PROCEDURE — 36247 INS CATH ABD/L-EXT ART 3RD: CPT | Mod: 51,RT,NTX, | Performed by: RADIOLOGY

## 2021-07-26 PROCEDURE — C1894 INTRO/SHEATH, NON-LASER: HCPCS | Mod: NTX

## 2021-07-26 PROCEDURE — 75887 VEIN X-RAY LIVER W/O HEMODYN: CPT | Mod: 26,NTX,, | Performed by: RADIOLOGY

## 2021-07-26 PROCEDURE — 63600175 PHARM REV CODE 636 W HCPCS: Mod: NTX | Performed by: RADIOLOGY

## 2021-07-26 PROCEDURE — 37243 VASC EMBOLIZE/OCCLUDE ORGAN: CPT | Mod: NTX | Performed by: RADIOLOGY

## 2021-07-26 PROCEDURE — 99152 MOD SED SAME PHYS/QHP 5/>YRS: CPT | Mod: TXP | Performed by: RADIOLOGY

## 2021-07-26 PROCEDURE — 27200996 IR EMBOLIZATION COMP FOR TUMOR_ORGAN ISCHEMIA_INFARC: Mod: NTX

## 2021-07-26 PROCEDURE — 96420 CHEMO IA PUSH TECNIQUE: CPT | Mod: NTX | Performed by: RADIOLOGY

## 2021-07-26 PROCEDURE — 76380 PR  CT SCAN,LIMITED/LOCALIZED F/U STUDY: ICD-10-PCS | Mod: 26,59,NTX, | Performed by: RADIOLOGY

## 2021-07-26 PROCEDURE — 75726 ARTERY X-RAYS ABDOMEN: CPT | Mod: TC,59,TXP | Performed by: RADIOLOGY

## 2021-07-26 PROCEDURE — 75887 VEIN X-RAY LIVER W/O HEMODYN: CPT | Mod: TC,NTX | Performed by: RADIOLOGY

## 2021-07-26 PROCEDURE — 99153 MOD SED SAME PHYS/QHP EA: CPT | Mod: TXP | Performed by: RADIOLOGY

## 2021-07-26 PROCEDURE — 99152 PR MOD CONSCIOUS SEDATION, SAME PHYS, 5+ YRS, FIRST 15 MIN: ICD-10-PCS | Mod: NTX,,, | Performed by: RADIOLOGY

## 2021-07-26 PROCEDURE — 75774 PR  ANGIO EA ADDNL SELECTV VESSEL: ICD-10-PCS | Mod: 26,59,NTX, | Performed by: RADIOLOGY

## 2021-07-26 PROCEDURE — 99152 MOD SED SAME PHYS/QHP 5/>YRS: CPT | Mod: NTX,,, | Performed by: RADIOLOGY

## 2021-07-26 PROCEDURE — 75726 ARTERY X-RAYS ABDOMEN: CPT | Mod: 26,59,NTX, | Performed by: RADIOLOGY

## 2021-07-26 PROCEDURE — 76377 3D RENDER W/INTRP POSTPROCES: CPT | Mod: TC,NTX | Performed by: RADIOLOGY

## 2021-07-26 PROCEDURE — 75887 PR  PERCUT XHEPATIC PORTOGRAM: ICD-10-PCS | Mod: 26,NTX,, | Performed by: RADIOLOGY

## 2021-07-26 PROCEDURE — 76380 CAT SCAN FOLLOW-UP STUDY: CPT | Mod: 26,59,NTX, | Performed by: RADIOLOGY

## 2021-07-26 PROCEDURE — 63600175 PHARM REV CODE 636 W HCPCS: Mod: TXP | Performed by: FAMILY MEDICINE

## 2021-07-26 PROCEDURE — 90636 HEP A/HEP B VACC ADULT IM: CPT | Mod: S$GLB,TXP,, | Performed by: INTERNAL MEDICINE

## 2021-07-26 PROCEDURE — 36247 PR PLACE CATH SUBSUBSELECT ART,ABD/PEL: ICD-10-PCS | Mod: 51,RT,NTX, | Performed by: RADIOLOGY

## 2021-07-26 PROCEDURE — 90636 HEPATITIS A HEPATITIS B COMBINED VACCINE IM: ICD-10-PCS | Mod: S$GLB,TXP,, | Performed by: INTERNAL MEDICINE

## 2021-07-26 PROCEDURE — 75774 ARTERY X-RAY EACH VESSEL: CPT | Mod: TC,59,TXP | Performed by: RADIOLOGY

## 2021-07-26 PROCEDURE — 75774 ARTERY X-RAY EACH VESSEL: CPT | Mod: 26,59,NTX, | Performed by: RADIOLOGY

## 2021-07-26 PROCEDURE — 90471 IMMUNIZATION ADMIN: CPT | Mod: S$GLB,TXP,, | Performed by: INTERNAL MEDICINE

## 2021-07-26 PROCEDURE — 75726 CHG ANGIO VISCERAL SELECTV/SUBSELEC: ICD-10-PCS | Mod: 26,59,NTX, | Performed by: RADIOLOGY

## 2021-07-26 PROCEDURE — 76380 CAT SCAN FOLLOW-UP STUDY: CPT | Mod: TC,59,TXP | Performed by: RADIOLOGY

## 2021-07-26 PROCEDURE — 76937 PR  US GUIDE, VASCULAR ACCESS: ICD-10-PCS | Mod: 26,NTX,, | Performed by: RADIOLOGY

## 2021-07-26 PROCEDURE — 37243 IR EMBOLIZATION COMP FOR TUMOR_ORGAN ISCHEMIA_INFARC: ICD-10-PCS | Mod: NTX,,, | Performed by: RADIOLOGY

## 2021-07-26 PROCEDURE — G0269 OCCLUSIVE DEVICE IN VEIN ART: HCPCS | Mod: NTX | Performed by: RADIOLOGY

## 2021-07-26 RX ORDER — FENTANYL CITRATE 50 UG/ML
INJECTION, SOLUTION INTRAMUSCULAR; INTRAVENOUS CODE/TRAUMA/SEDATION MEDICATION
Status: COMPLETED | OUTPATIENT
Start: 2021-07-26 | End: 2021-07-26

## 2021-07-26 RX ORDER — MIDAZOLAM HYDROCHLORIDE 1 MG/ML
1 INJECTION INTRAMUSCULAR; INTRAVENOUS
Status: DISCONTINUED | OUTPATIENT
Start: 2021-07-26 | End: 2021-07-27 | Stop reason: HOSPADM

## 2021-07-26 RX ORDER — ONDANSETRON 4 MG/1
4 TABLET, FILM COATED ORAL EVERY 6 HOURS PRN
Qty: 10 TABLET | Refills: 0 | Status: SHIPPED | OUTPATIENT
Start: 2021-07-26 | End: 2021-08-19 | Stop reason: HOSPADM

## 2021-07-26 RX ORDER — AMOXICILLIN AND CLAVULANATE POTASSIUM 500; 125 MG/1; MG/1
1 TABLET, FILM COATED ORAL 2 TIMES DAILY
Qty: 10 TABLET | Refills: 0 | Status: SHIPPED | OUTPATIENT
Start: 2021-07-26 | End: 2021-08-19 | Stop reason: HOSPADM

## 2021-07-26 RX ORDER — LIDOCAINE HYDROCHLORIDE 10 MG/ML
1 INJECTION, SOLUTION EPIDURAL; INFILTRATION; INTRACAUDAL; PERINEURAL ONCE
Status: DISCONTINUED | OUTPATIENT
Start: 2021-07-26 | End: 2021-07-27 | Stop reason: HOSPADM

## 2021-07-26 RX ORDER — LORAZEPAM 2 MG/ML
INJECTION INTRAMUSCULAR CODE/TRAUMA/SEDATION MEDICATION
Status: COMPLETED | OUTPATIENT
Start: 2021-07-26 | End: 2021-07-26

## 2021-07-26 RX ORDER — CALCIUM CARBONATE 600 MG
600 TABLET ORAL 2 TIMES DAILY
Status: ON HOLD | COMMUNITY
End: 2022-01-24 | Stop reason: HOSPADM

## 2021-07-26 RX ORDER — OXYCODONE AND ACETAMINOPHEN 5; 325 MG/1; MG/1
1 TABLET ORAL EVERY 6 HOURS PRN
Qty: 15 EACH | Refills: 0 | Status: SHIPPED | OUTPATIENT
Start: 2021-07-26 | End: 2021-07-26 | Stop reason: SDUPTHER

## 2021-07-26 RX ORDER — AMOXICILLIN AND CLAVULANATE POTASSIUM 500; 125 MG/1; MG/1
1 TABLET, FILM COATED ORAL 2 TIMES DAILY
Qty: 10 TABLET | Refills: 0 | Status: SHIPPED | OUTPATIENT
Start: 2021-07-26 | End: 2021-07-26 | Stop reason: SDUPTHER

## 2021-07-26 RX ORDER — ONDANSETRON 2 MG/ML
4 INJECTION INTRAMUSCULAR; INTRAVENOUS ONCE
Status: DISCONTINUED | OUTPATIENT
Start: 2021-07-26 | End: 2021-07-27 | Stop reason: HOSPADM

## 2021-07-26 RX ORDER — ONDANSETRON 4 MG/1
4 TABLET, FILM COATED ORAL EVERY 6 HOURS PRN
Qty: 10 TABLET | Refills: 0 | Status: SHIPPED | OUTPATIENT
Start: 2021-07-26 | End: 2021-07-26 | Stop reason: SDUPTHER

## 2021-07-26 RX ORDER — LIDOCAINE HYDROCHLORIDE 10 MG/ML
1 INJECTION, SOLUTION EPIDURAL; INFILTRATION; INTRACAUDAL; PERINEURAL ONCE AS NEEDED
Status: DISCONTINUED | OUTPATIENT
Start: 2021-07-26 | End: 2021-07-27 | Stop reason: HOSPADM

## 2021-07-26 RX ORDER — SODIUM CHLORIDE 9 MG/ML
INJECTION, SOLUTION INTRAVENOUS CONTINUOUS
Status: DISCONTINUED | OUTPATIENT
Start: 2021-07-26 | End: 2021-07-27 | Stop reason: HOSPADM

## 2021-07-26 RX ORDER — FENTANYL CITRATE 50 UG/ML
50 INJECTION, SOLUTION INTRAMUSCULAR; INTRAVENOUS
Status: DISCONTINUED | OUTPATIENT
Start: 2021-07-26 | End: 2021-07-27 | Stop reason: HOSPADM

## 2021-07-26 RX ORDER — MIDAZOLAM HYDROCHLORIDE 1 MG/ML
INJECTION INTRAMUSCULAR; INTRAVENOUS CODE/TRAUMA/SEDATION MEDICATION
Status: COMPLETED | OUTPATIENT
Start: 2021-07-26 | End: 2021-07-26

## 2021-07-26 RX ORDER — HEPARIN SODIUM 200 [USP'U]/100ML
1000 INJECTION, SOLUTION INTRAVENOUS CONTINUOUS
Status: DISCONTINUED | OUTPATIENT
Start: 2021-07-26 | End: 2021-07-27 | Stop reason: HOSPADM

## 2021-07-26 RX ORDER — OXYCODONE AND ACETAMINOPHEN 5; 325 MG/1; MG/1
1 TABLET ORAL EVERY 6 HOURS PRN
Qty: 15 EACH | Refills: 0 | Status: SHIPPED | OUTPATIENT
Start: 2021-07-26 | End: 2021-08-19 | Stop reason: HOSPADM

## 2021-07-26 RX ORDER — DIPHENHYDRAMINE HYDROCHLORIDE 50 MG/ML
50 INJECTION INTRAMUSCULAR; INTRAVENOUS ONCE
Status: COMPLETED | OUTPATIENT
Start: 2021-07-26 | End: 2021-07-26

## 2021-07-26 RX ADMIN — HYDROCORTISONE SODIUM SUCCINATE 200 MG: 100 INJECTION, POWDER, FOR SOLUTION INTRAMUSCULAR; INTRAVENOUS at 10:07

## 2021-07-26 RX ADMIN — MIDAZOLAM HYDROCHLORIDE 1 MG: 1 INJECTION INTRAMUSCULAR; INTRAVENOUS at 12:07

## 2021-07-26 RX ADMIN — AMPICILLIN SODIUM AND SULBACTAM SODIUM 3 G: 2; 1 INJECTION, POWDER, FOR SOLUTION INTRAMUSCULAR; INTRAVENOUS at 11:07

## 2021-07-26 RX ADMIN — DIPHENHYDRAMINE HYDROCHLORIDE 50 MG: 50 INJECTION, SOLUTION INTRAMUSCULAR; INTRAVENOUS at 10:07

## 2021-07-26 RX ADMIN — FENTANYL CITRATE 50 MCG: 50 INJECTION, SOLUTION INTRAMUSCULAR; INTRAVENOUS at 12:07

## 2021-07-28 ENCOUNTER — TELEPHONE (OUTPATIENT)
Dept: INTERVENTIONAL RADIOLOGY/VASCULAR | Facility: HOSPITAL | Age: 61
End: 2021-07-28

## 2021-08-02 ENCOUNTER — TELEPHONE (OUTPATIENT)
Dept: INTERVENTIONAL RADIOLOGY/VASCULAR | Facility: CLINIC | Age: 61
End: 2021-08-02

## 2021-08-09 ENCOUNTER — HISTORICAL (OUTPATIENT)
Dept: ADMINISTRATIVE | Facility: HOSPITAL | Age: 61
End: 2021-08-09

## 2021-08-09 ENCOUNTER — TELEPHONE (OUTPATIENT)
Dept: TRANSPLANT | Facility: CLINIC | Age: 61
End: 2021-08-09

## 2021-08-09 LAB
AFP-TM SERPL-MCNC: 3.8 NG/ML
ALBUMIN SERPL-MCNC: 3.2 GM/DL (ref 3.4–4.8)
ALBUMIN/GLOB SERPL: 0.9 RATIO (ref 1.1–2)
ALP SERPL-CCNC: 225 UNIT/L (ref 40–150)
ALT SERPL-CCNC: 42 UNIT/L (ref 0–55)
AST SERPL-CCNC: 35 UNIT/L (ref 5–34)
BILIRUB SERPL-MCNC: 0.3 MG/DL
BILIRUBIN DIRECT+TOT PNL SERPL-MCNC: 0.1 MG/DL (ref 0–0.8)
BILIRUBIN DIRECT+TOT PNL SERPL-MCNC: 0.2 MG/DL (ref 0–0.5)
BUN SERPL-MCNC: 16.9 MG/DL (ref 8.4–25.7)
CALCIUM SERPL-MCNC: 9.2 MG/DL (ref 8.8–10)
CHLORIDE SERPL-SCNC: 102 MMOL/L (ref 98–107)
CO2 SERPL-SCNC: 26 MMOL/L (ref 23–31)
CREAT SERPL-MCNC: 0.79 MG/DL (ref 0.73–1.18)
GLOBULIN SER-MCNC: 3.4 GM/DL (ref 2.4–3.5)
GLUCOSE SERPL-MCNC: 292 MG/DL (ref 82–115)
POTASSIUM SERPL-SCNC: 5 MMOL/L (ref 3.5–5.1)
PROT SERPL-MCNC: 6.6 GM/DL (ref 5.8–7.6)
SODIUM SERPL-SCNC: 137 MMOL/L (ref 136–145)

## 2021-08-11 ENCOUNTER — TELEPHONE (OUTPATIENT)
Dept: TRANSPLANT | Facility: CLINIC | Age: 61
End: 2021-08-11

## 2021-08-12 ENCOUNTER — TELEPHONE (OUTPATIENT)
Dept: TRANSPLANT | Facility: CLINIC | Age: 61
End: 2021-08-12

## 2021-08-13 DIAGNOSIS — Z01.812 PRE-PROCEDURE LAB EXAM: ICD-10-CM

## 2021-08-13 DIAGNOSIS — C22.0 HCC (HEPATOCELLULAR CARCINOMA): Primary | ICD-10-CM

## 2021-08-16 ENCOUNTER — TELEPHONE (OUTPATIENT)
Dept: TRANSPLANT | Facility: CLINIC | Age: 61
End: 2021-08-16

## 2021-08-17 ENCOUNTER — PATIENT MESSAGE (OUTPATIENT)
Dept: INFECTIOUS DISEASES | Facility: CLINIC | Age: 61
End: 2021-08-17

## 2021-08-17 DIAGNOSIS — C22.0 HCC (HEPATOCELLULAR CARCINOMA): Primary | ICD-10-CM

## 2021-08-18 ENCOUNTER — TELEPHONE (OUTPATIENT)
Dept: INTERVENTIONAL RADIOLOGY/VASCULAR | Facility: HOSPITAL | Age: 61
End: 2021-08-18

## 2021-08-18 DIAGNOSIS — C22.0 HCC (HEPATOCELLULAR CARCINOMA): ICD-10-CM

## 2021-08-18 DIAGNOSIS — Z00.6 RESEARCH STUDY PATIENT: Primary | ICD-10-CM

## 2021-08-19 ENCOUNTER — RESEARCH ENCOUNTER (OUTPATIENT)
Dept: RESEARCH | Facility: HOSPITAL | Age: 61
End: 2021-08-19

## 2021-08-19 ENCOUNTER — HOSPITAL ENCOUNTER (OUTPATIENT)
Dept: INTERVENTIONAL RADIOLOGY/VASCULAR | Facility: HOSPITAL | Age: 61
Discharge: HOME | End: 2021-08-19
Attending: FAMILY MEDICINE
Payer: COMMERCIAL

## 2021-08-19 VITALS
OXYGEN SATURATION: 100 % | BODY MASS INDEX: 25.83 KG/M2 | HEIGHT: 65 IN | DIASTOLIC BLOOD PRESSURE: 71 MMHG | WEIGHT: 155 LBS | SYSTOLIC BLOOD PRESSURE: 128 MMHG | RESPIRATION RATE: 12 BRPM | HEART RATE: 66 BPM | TEMPERATURE: 98 F

## 2021-08-19 DIAGNOSIS — C22.0 HCC (HEPATOCELLULAR CARCINOMA): ICD-10-CM

## 2021-08-19 LAB
POCT GLUCOSE: 193 MG/DL (ref 70–110)
SARS-COV-2 RDRP RESP QL NAA+PROBE: NEGATIVE

## 2021-08-19 PROCEDURE — 76937 US GUIDE VASCULAR ACCESS: CPT | Mod: 26,NTX,, | Performed by: RADIOLOGY

## 2021-08-19 PROCEDURE — 76380 CAT SCAN FOLLOW-UP STUDY: CPT | Mod: 26,59,NTX, | Performed by: RADIOLOGY

## 2021-08-19 PROCEDURE — 37243 IR EMBOLIZATION COMP FOR TUMOR_ORGAN ISCHEMIA_INFARC: ICD-10-PCS | Mod: NTX,,, | Performed by: RADIOLOGY

## 2021-08-19 PROCEDURE — 75726 ARTERY X-RAYS ABDOMEN: CPT | Mod: 26,59,NTX, | Performed by: RADIOLOGY

## 2021-08-19 PROCEDURE — 37243 VASC EMBOLIZE/OCCLUDE ORGAN: CPT | Mod: NTX | Performed by: RADIOLOGY

## 2021-08-19 PROCEDURE — 77263 PR  RADIATION THERAPY PLAN COMPLEX: ICD-10-PCS | Mod: NTX,,, | Performed by: RADIOLOGY

## 2021-08-19 PROCEDURE — G0269 OCCLUSIVE DEVICE IN VEIN ART: HCPCS | Mod: TXP | Performed by: RADIOLOGY

## 2021-08-19 PROCEDURE — 76380 CAT SCAN FOLLOW-UP STUDY: CPT | Mod: TC,NTX | Performed by: RADIOLOGY

## 2021-08-19 PROCEDURE — 75887 VEIN X-RAY LIVER W/O HEMODYN: CPT | Mod: TC,TXP | Performed by: RADIOLOGY

## 2021-08-19 PROCEDURE — 36247 PR PLACE CATH SUBSUBSELECT ART,ABD/PEL: ICD-10-PCS | Mod: 51,NTX,, | Performed by: RADIOLOGY

## 2021-08-19 PROCEDURE — 75726 ARTERY X-RAYS ABDOMEN: CPT | Mod: TC,NTX | Performed by: RADIOLOGY

## 2021-08-19 PROCEDURE — 36247 INS CATH ABD/L-EXT ART 3RD: CPT | Mod: 51,NTX,, | Performed by: RADIOLOGY

## 2021-08-19 PROCEDURE — 76380 PR  CT SCAN,LIMITED/LOCALIZED F/U STUDY: ICD-10-PCS | Mod: 26,59,NTX, | Performed by: RADIOLOGY

## 2021-08-19 PROCEDURE — 76937 PR  US GUIDE, VASCULAR ACCESS: ICD-10-PCS | Mod: 26,NTX,, | Performed by: RADIOLOGY

## 2021-08-19 PROCEDURE — 75887 VEIN X-RAY LIVER W/O HEMODYN: CPT | Mod: 26,NTX,, | Performed by: RADIOLOGY

## 2021-08-19 PROCEDURE — 96420 CHEMO IA PUSH TECNIQUE: CPT | Mod: TC,TXP | Performed by: RADIOLOGY

## 2021-08-19 PROCEDURE — 76377 3D RENDER W/INTRP POSTPROCES: CPT | Mod: TC,TXP | Performed by: RADIOLOGY

## 2021-08-19 PROCEDURE — 77263 THER RADIOLOGY TX PLNG CPLX: CPT | Mod: NTX,,, | Performed by: RADIOLOGY

## 2021-08-19 PROCEDURE — 99153 MOD SED SAME PHYS/QHP EA: CPT | Mod: TXP | Performed by: RADIOLOGY

## 2021-08-19 PROCEDURE — 63600175 PHARM REV CODE 636 W HCPCS: Mod: NTX | Performed by: PHYSICIAN ASSISTANT

## 2021-08-19 PROCEDURE — 75726 CHG ANGIO VISCERAL SELECTV/SUBSELEC: ICD-10-PCS | Mod: 26,59,NTX, | Performed by: RADIOLOGY

## 2021-08-19 PROCEDURE — 75774 ARTERY X-RAY EACH VESSEL: CPT | Mod: TC,NTX,59 | Performed by: RADIOLOGY

## 2021-08-19 PROCEDURE — 36247 INS CATH ABD/L-EXT ART 3RD: CPT | Mod: TXP | Performed by: RADIOLOGY

## 2021-08-19 PROCEDURE — C1894 INTRO/SHEATH, NON-LASER: HCPCS | Mod: NTX

## 2021-08-19 PROCEDURE — 75774 PR  ANGIO EA ADDNL SELECTV VESSEL: ICD-10-PCS | Mod: 26,59,NTX, | Performed by: RADIOLOGY

## 2021-08-19 PROCEDURE — 76377 3D RENDER W/INTRP POSTPROCES: CPT | Mod: 26,NTX,, | Performed by: RADIOLOGY

## 2021-08-19 PROCEDURE — 63600175 PHARM REV CODE 636 W HCPCS: Mod: NTX | Performed by: RADIOLOGY

## 2021-08-19 PROCEDURE — 99152 MOD SED SAME PHYS/QHP 5/>YRS: CPT | Mod: NTX | Performed by: RADIOLOGY

## 2021-08-19 PROCEDURE — 75887 PR  PERCUT XHEPATIC PORTOGRAM: ICD-10-PCS | Mod: 26,NTX,, | Performed by: RADIOLOGY

## 2021-08-19 PROCEDURE — 76377 PR  3D RENDERING W/ IMAGE POSTPROCESS: ICD-10-PCS | Mod: 26,NTX,, | Performed by: RADIOLOGY

## 2021-08-19 PROCEDURE — 75774 ARTERY X-RAY EACH VESSEL: CPT | Mod: 26,59,NTX, | Performed by: RADIOLOGY

## 2021-08-19 PROCEDURE — 76937 US GUIDE VASCULAR ACCESS: CPT | Mod: TC,TXP | Performed by: RADIOLOGY

## 2021-08-19 PROCEDURE — U0002 COVID-19 LAB TEST NON-CDC: HCPCS | Mod: TXP | Performed by: PHYSICIAN ASSISTANT

## 2021-08-19 RX ORDER — ONDANSETRON 4 MG/1
4 TABLET, FILM COATED ORAL 2 TIMES DAILY
Qty: 20 TABLET | Refills: 0 | Status: ON HOLD | OUTPATIENT
Start: 2021-08-19 | End: 2022-01-24 | Stop reason: HOSPADM

## 2021-08-19 RX ORDER — FENTANYL CITRATE 50 UG/ML
INJECTION, SOLUTION INTRAMUSCULAR; INTRAVENOUS CODE/TRAUMA/SEDATION MEDICATION
Status: COMPLETED | OUTPATIENT
Start: 2021-08-19 | End: 2021-08-19

## 2021-08-19 RX ORDER — OXYCODONE AND ACETAMINOPHEN 5; 325 MG/1; MG/1
1 TABLET ORAL EVERY 8 HOURS PRN
Qty: 20 TABLET | Refills: 0 | Status: ON HOLD | OUTPATIENT
Start: 2021-08-19 | End: 2022-01-24 | Stop reason: HOSPADM

## 2021-08-19 RX ORDER — ONDANSETRON 2 MG/ML
INJECTION INTRAMUSCULAR; INTRAVENOUS CODE/TRAUMA/SEDATION MEDICATION
Status: COMPLETED | OUTPATIENT
Start: 2021-08-19 | End: 2021-08-19

## 2021-08-19 RX ORDER — DIPHENHYDRAMINE HYDROCHLORIDE 50 MG/ML
50 INJECTION INTRAMUSCULAR; INTRAVENOUS ONCE
Status: COMPLETED | OUTPATIENT
Start: 2021-08-19 | End: 2021-08-19

## 2021-08-19 RX ORDER — AMOXICILLIN AND CLAVULANATE POTASSIUM 875; 125 MG/1; MG/1
1 TABLET, FILM COATED ORAL EVERY 12 HOURS
Qty: 14 TABLET | Refills: 0 | Status: SHIPPED | OUTPATIENT
Start: 2021-08-19 | End: 2021-08-26

## 2021-08-19 RX ORDER — MIDAZOLAM HYDROCHLORIDE 1 MG/ML
1 INJECTION INTRAMUSCULAR; INTRAVENOUS
Status: DISCONTINUED | OUTPATIENT
Start: 2021-08-19 | End: 2021-08-20 | Stop reason: HOSPADM

## 2021-08-19 RX ORDER — MIDAZOLAM HYDROCHLORIDE 1 MG/ML
INJECTION INTRAMUSCULAR; INTRAVENOUS CODE/TRAUMA/SEDATION MEDICATION
Status: COMPLETED | OUTPATIENT
Start: 2021-08-19 | End: 2021-08-19

## 2021-08-19 RX ORDER — SODIUM CHLORIDE 9 MG/ML
INJECTION, SOLUTION INTRAVENOUS CONTINUOUS
Status: DISCONTINUED | OUTPATIENT
Start: 2021-08-19 | End: 2021-08-20 | Stop reason: HOSPADM

## 2021-08-19 RX ORDER — FENTANYL CITRATE 50 UG/ML
50 INJECTION, SOLUTION INTRAMUSCULAR; INTRAVENOUS
Status: DISCONTINUED | OUTPATIENT
Start: 2021-08-19 | End: 2021-08-20 | Stop reason: HOSPADM

## 2021-08-19 RX ORDER — LIDOCAINE HYDROCHLORIDE 10 MG/ML
1 INJECTION, SOLUTION EPIDURAL; INFILTRATION; INTRACAUDAL; PERINEURAL ONCE AS NEEDED
Status: DISCONTINUED | OUTPATIENT
Start: 2021-08-19 | End: 2021-08-20 | Stop reason: HOSPADM

## 2021-08-19 RX ADMIN — FENTANYL CITRATE 50 MCG: 50 INJECTION, SOLUTION INTRAMUSCULAR; INTRAVENOUS at 02:08

## 2021-08-19 RX ADMIN — HYDROCORTISONE SODIUM SUCCINATE 200 MG: 100 INJECTION, POWDER, FOR SOLUTION INTRAMUSCULAR; INTRAVENOUS at 11:08

## 2021-08-19 RX ADMIN — DOXORUBICIN HYDROCHLORIDE 50 MG: 2 INJECTION, SOLUTION INTRAVENOUS at 02:08

## 2021-08-19 RX ADMIN — DIPHENHYDRAMINE HYDROCHLORIDE 50 MG: 50 INJECTION, SOLUTION INTRAMUSCULAR; INTRAVENOUS at 11:08

## 2021-08-19 RX ADMIN — AMPICILLIN SODIUM AND SULBACTAM SODIUM 3 G: 2; 1 INJECTION, POWDER, FOR SOLUTION INTRAMUSCULAR; INTRAVENOUS at 12:08

## 2021-08-19 RX ADMIN — ONDANSETRON 4 MG: 2 INJECTION INTRAMUSCULAR; INTRAVENOUS at 03:08

## 2021-08-19 RX ADMIN — MIDAZOLAM HYDROCHLORIDE 1 MG: 1 INJECTION INTRAMUSCULAR; INTRAVENOUS at 02:08

## 2021-08-20 PROCEDURE — 63600175 PHARM REV CODE 636 W HCPCS: Mod: TXP | Performed by: PHYSICIAN ASSISTANT

## 2021-08-24 ENCOUNTER — TELEPHONE (OUTPATIENT)
Dept: INTERVENTIONAL RADIOLOGY/VASCULAR | Facility: HOSPITAL | Age: 61
End: 2021-08-24

## 2021-08-24 DIAGNOSIS — C22.0 HCC (HEPATOCELLULAR CARCINOMA): Primary | ICD-10-CM

## 2021-09-15 ENCOUNTER — TELEPHONE (OUTPATIENT)
Dept: TRANSPLANT | Facility: CLINIC | Age: 61
End: 2021-09-15

## 2021-09-17 ENCOUNTER — TELEPHONE (OUTPATIENT)
Dept: TRANSPLANT | Facility: CLINIC | Age: 61
End: 2021-09-17

## 2021-09-17 LAB
ERYTHROCYTE [DISTWIDTH] IN BLOOD BY AUTOMATED COUNT: 12.4 % (ref 11.5–15)
EXT ALBUMIN: 4 (ref 3.5–5.2)
EXT ALBUMIN: 40 (ref 35–52)
EXT ALKALINE PHOSPHATASE: 522 (ref 40–123)
EXT ALKALINE PHOSPHATASE: 522 (ref 40–123)
EXT ALT: 61 (ref 5–50)
EXT ALT: 81 (ref 5–50)
EXT AST: 40 (ref 9–50)
EXT AST: 40 (ref 9–50)
EXT BASOPHIL%: 0.7 (ref 0–0.2)
EXT BASOPHIL%: 0.7 (ref 0–2)
EXT BILIRUBIN TOTAL: 0.3
EXT BILIRUBIN TOTAL: 0.3 (ref ?–1.2)
EXT BUN: 23 (ref 8–23)
EXT BUN: 23 (ref 8–23)
EXT CALCIUM: 9.7 (ref 8.5–10.5)
EXT CALCIUM: 9.7 (ref 8.5–10.5)
EXT CHLORIDE: 95 (ref 95–107)
EXT CHLORIDE: 95 (ref 95–107)
EXT CO2: 26 (ref 19–31)
EXT CO2: 26 (ref 19–31)
EXT CREATININE: 0.59 MG/DL (ref 0.8–1.4)
EXT CREATININE: 0.59 MG/DL (ref 0.8–1.4)
EXT EOSINOPHIL%: 1.4 (ref 0–7)
EXT EOSINOPHIL%: 1.4 (ref 0–7)
EXT GFR MDRD AF AMER: 125 (ref 1.73–?)
EXT GFR MDRD AF AMER: 125 (ref 60–?)
EXT GFR MDRD NON AF AMER: 109 (ref 1.73–?)
EXT GFR MDRD NON AF AMER: 109 (ref 60–?)
EXT GLUCOSE: 277 (ref 70–90)
EXT GLUCOSE: 277 (ref 70–99)
EXT HEMATOCRIT: 31.5 (ref 40–51)
EXT HEMATOCRIT: 31.5 (ref 40–51)
EXT HEMOGLOBIN: 9.9 (ref 13.5–17)
EXT HEMOGLOBIN: 9.9 (ref 13.5–17)
EXT LYMPH%: 15.2 (ref 20–45)
EXT LYMPH%: 15.2 (ref 20–45)
EXT MONOCYTES%: 7.1 (ref 4–12)
EXT MONOCYTES%: 7.1 (ref 4–12)
EXT PLATELETS: 235 (ref 130–400)
EXT PLATELETS: 235 (ref 130–400)
EXT POTASSIUM: 5.1 (ref 3.5–5.4)
EXT POTASSIUM: 5.1 (ref 3.5–5.4)
EXT PROTEIN TOTAL: 7.3 (ref 6.1–8.3)
EXT PROTEIN TOTAL: 7.3 (ref 6.1–8.3)
EXT SODIUM: 135 MMOL/L (ref 133–146)
EXT SODIUM: 135 MMOL/L (ref 133–148)
EXT WBC: 8.3 (ref 3.5–11)
EXT WBC: 8.3 (ref 3.5–11)
IMMATURE GRANULOCYTES: 0.4 (ref 0–1)
MCH RBC QN AUTO: 28.1 PG (ref 25–33)
MCHC RBC AUTO-ENTMCNC: 31.4 G/DL (ref 31–36)
MCV RBC AUTO: 89.5 FL (ref 80–99)

## 2021-09-21 ENCOUNTER — HOSPITAL ENCOUNTER (OUTPATIENT)
Dept: RADIOLOGY | Facility: HOSPITAL | Age: 61
Discharge: HOME | End: 2021-09-21
Attending: PHYSICIAN ASSISTANT
Payer: COMMERCIAL

## 2021-09-21 ENCOUNTER — DOCUMENTATION ONLY (OUTPATIENT)
Dept: TRANSPLANT | Facility: CLINIC | Age: 61
End: 2021-09-21

## 2021-09-21 DIAGNOSIS — C22.0 HCC (HEPATOCELLULAR CARCINOMA): ICD-10-CM

## 2021-09-21 DIAGNOSIS — Z00.6 RESEARCH STUDY PATIENT: Primary | ICD-10-CM

## 2021-09-21 PROCEDURE — 74183 MRI ABDOMEN W WO CONTRAST: ICD-10-PCS | Mod: 26,TXP,, | Performed by: INTERNAL MEDICINE

## 2021-09-21 PROCEDURE — 74183 MRI ABD W/O CNTR FLWD CNTR: CPT | Mod: TC,TXP

## 2021-09-21 PROCEDURE — 74183 MRI ABD W/O CNTR FLWD CNTR: CPT | Mod: 26,TXP,, | Performed by: INTERNAL MEDICINE

## 2021-09-21 PROCEDURE — A9585 GADOBUTROL INJECTION: HCPCS | Mod: TXP | Performed by: PHYSICIAN ASSISTANT

## 2021-09-21 PROCEDURE — 25500020 PHARM REV CODE 255: Mod: TXP | Performed by: PHYSICIAN ASSISTANT

## 2021-09-21 RX ORDER — GADOBUTROL 604.72 MG/ML
10 INJECTION INTRAVENOUS
Status: COMPLETED | OUTPATIENT
Start: 2021-09-21 | End: 2021-09-21

## 2021-09-21 RX ADMIN — GADOBUTROL 10 ML: 604.72 INJECTION INTRAVENOUS at 11:09

## 2021-09-22 ENCOUNTER — PATIENT MESSAGE (OUTPATIENT)
Dept: TRANSPLANT | Facility: CLINIC | Age: 61
End: 2021-09-22

## 2021-09-22 ENCOUNTER — TELEPHONE (OUTPATIENT)
Dept: TRANSPLANT | Facility: CLINIC | Age: 61
End: 2021-09-22

## 2021-09-22 ENCOUNTER — OFFICE VISIT (OUTPATIENT)
Dept: TRANSPLANT | Facility: CLINIC | Age: 61
End: 2021-09-22
Payer: COMMERCIAL

## 2021-09-22 ENCOUNTER — CLINICAL SUPPORT (OUTPATIENT)
Dept: INTERVENTIONAL RADIOLOGY/VASCULAR | Facility: CLINIC | Age: 61
End: 2021-09-22
Payer: COMMERCIAL

## 2021-09-22 DIAGNOSIS — B19.20 COMPENSATED CIRRHOSIS RELATED TO HEPATITIS C VIRUS (HCV): Primary | ICD-10-CM

## 2021-09-22 DIAGNOSIS — C22.0 HCC (HEPATOCELLULAR CARCINOMA): ICD-10-CM

## 2021-09-22 DIAGNOSIS — Z76.82 LIVER TRANSPLANT CANDIDATE: ICD-10-CM

## 2021-09-22 DIAGNOSIS — K74.69 COMPENSATED CIRRHOSIS RELATED TO HEPATITIS C VIRUS (HCV): Primary | ICD-10-CM

## 2021-09-22 DIAGNOSIS — C22.0 HCC (HEPATOCELLULAR CARCINOMA): Primary | ICD-10-CM

## 2021-09-22 PROCEDURE — 3051F PR MOST RECENT HEMOGLOBIN A1C LEVEL 7.0 - < 8.0%: ICD-10-PCS | Mod: CPTII,95,TXP, | Performed by: STUDENT IN AN ORGANIZED HEALTH CARE EDUCATION/TRAINING PROGRAM

## 2021-09-22 PROCEDURE — 99214 OFFICE O/P EST MOD 30 MIN: CPT | Mod: 95,TXP,, | Performed by: STUDENT IN AN ORGANIZED HEALTH CARE EDUCATION/TRAINING PROGRAM

## 2021-09-22 PROCEDURE — 99214 OFFICE O/P EST MOD 30 MIN: CPT | Mod: 95,TXP,, | Performed by: PHYSICIAN ASSISTANT

## 2021-09-22 PROCEDURE — 1159F MED LIST DOCD IN RCRD: CPT | Mod: CPTII,95,TXP, | Performed by: STUDENT IN AN ORGANIZED HEALTH CARE EDUCATION/TRAINING PROGRAM

## 2021-09-22 PROCEDURE — 1160F PR REVIEW ALL MEDS BY PRESCRIBER/CLIN PHARMACIST DOCUMENTED: ICD-10-PCS | Mod: CPTII,95,TXP, | Performed by: STUDENT IN AN ORGANIZED HEALTH CARE EDUCATION/TRAINING PROGRAM

## 2021-09-22 PROCEDURE — 3051F HG A1C>EQUAL 7.0%<8.0%: CPT | Mod: CPTII,95,TXP, | Performed by: STUDENT IN AN ORGANIZED HEALTH CARE EDUCATION/TRAINING PROGRAM

## 2021-09-22 PROCEDURE — 1159F PR MEDICATION LIST DOCUMENTED IN MEDICAL RECORD: ICD-10-PCS | Mod: CPTII,95,TXP, | Performed by: STUDENT IN AN ORGANIZED HEALTH CARE EDUCATION/TRAINING PROGRAM

## 2021-09-22 PROCEDURE — 99214 PR OFFICE/OUTPT VISIT, EST, LEVL IV, 30-39 MIN: ICD-10-PCS | Mod: 95,TXP,, | Performed by: PHYSICIAN ASSISTANT

## 2021-09-22 PROCEDURE — 1160F RVW MEDS BY RX/DR IN RCRD: CPT | Mod: CPTII,95,TXP, | Performed by: STUDENT IN AN ORGANIZED HEALTH CARE EDUCATION/TRAINING PROGRAM

## 2021-09-22 PROCEDURE — 99214 PR OFFICE/OUTPT VISIT, EST, LEVL IV, 30-39 MIN: ICD-10-PCS | Mod: 95,TXP,, | Performed by: STUDENT IN AN ORGANIZED HEALTH CARE EDUCATION/TRAINING PROGRAM

## 2021-09-22 PROCEDURE — 4010F ACE/ARB THERAPY RXD/TAKEN: CPT | Mod: CPTII,95,TXP, | Performed by: STUDENT IN AN ORGANIZED HEALTH CARE EDUCATION/TRAINING PROGRAM

## 2021-09-22 PROCEDURE — 4010F PR ACE/ARB THEARPY RXD/TAKEN: ICD-10-PCS | Mod: CPTII,95,TXP, | Performed by: STUDENT IN AN ORGANIZED HEALTH CARE EDUCATION/TRAINING PROGRAM

## 2021-09-24 ENCOUNTER — TELEPHONE (OUTPATIENT)
Dept: INTERVENTIONAL RADIOLOGY/VASCULAR | Facility: CLINIC | Age: 61
End: 2021-09-24

## 2021-09-28 ENCOUNTER — TELEPHONE (OUTPATIENT)
Dept: TRANSPLANT | Facility: HOSPITAL | Age: 61
End: 2021-09-28

## 2021-09-28 ENCOUNTER — CONFERENCE (OUTPATIENT)
Dept: TRANSPLANT | Facility: CLINIC | Age: 61
End: 2021-09-28

## 2021-09-29 ENCOUNTER — TELEPHONE (OUTPATIENT)
Dept: TRANSPLANT | Facility: CLINIC | Age: 61
End: 2021-09-29

## 2021-09-29 ENCOUNTER — COMMITTEE REVIEW (OUTPATIENT)
Dept: TRANSPLANT | Facility: CLINIC | Age: 61
End: 2021-09-29

## 2021-11-04 DIAGNOSIS — Z76.82 LIVER TRANSPLANT CANDIDATE: ICD-10-CM

## 2021-11-04 DIAGNOSIS — C22.0 HCC (HEPATOCELLULAR CARCINOMA): Primary | ICD-10-CM

## 2021-11-04 DIAGNOSIS — B19.20 COMPENSATED CIRRHOSIS RELATED TO HEPATITIS C VIRUS (HCV): ICD-10-CM

## 2021-11-04 DIAGNOSIS — K74.69 COMPENSATED CIRRHOSIS RELATED TO HEPATITIS C VIRUS (HCV): ICD-10-CM

## 2021-11-18 ENCOUNTER — PATIENT MESSAGE (OUTPATIENT)
Dept: INFECTIOUS DISEASES | Facility: CLINIC | Age: 61
End: 2021-11-18
Payer: COMMERCIAL

## 2021-11-30 DIAGNOSIS — C22.0 HCC (HEPATOCELLULAR CARCINOMA): ICD-10-CM

## 2021-11-30 DIAGNOSIS — Z00.6 RESEARCH STUDY PATIENT: Primary | ICD-10-CM

## 2021-12-01 ENCOUNTER — CLINICAL SUPPORT (OUTPATIENT)
Dept: INFECTIOUS DISEASES | Facility: CLINIC | Age: 61
End: 2021-12-01
Payer: COMMERCIAL

## 2021-12-01 ENCOUNTER — HOSPITAL ENCOUNTER (OUTPATIENT)
Dept: RADIOLOGY | Facility: HOSPITAL | Age: 61
Discharge: HOME | End: 2021-12-01
Attending: PHYSICIAN ASSISTANT
Payer: COMMERCIAL

## 2021-12-01 ENCOUNTER — OFFICE VISIT (OUTPATIENT)
Dept: TRANSPLANT | Facility: CLINIC | Age: 61
End: 2021-12-01
Payer: COMMERCIAL

## 2021-12-01 VITALS
TEMPERATURE: 98 F | OXYGEN SATURATION: 96 % | HEART RATE: 80 BPM | RESPIRATION RATE: 18 BRPM | DIASTOLIC BLOOD PRESSURE: 59 MMHG | BODY MASS INDEX: 25.53 KG/M2 | HEIGHT: 65 IN | SYSTOLIC BLOOD PRESSURE: 100 MMHG | WEIGHT: 153.25 LBS

## 2021-12-01 DIAGNOSIS — C22.0 HCC (HEPATOCELLULAR CARCINOMA): ICD-10-CM

## 2021-12-01 DIAGNOSIS — Z23 IMMUNIZATION DUE: ICD-10-CM

## 2021-12-01 DIAGNOSIS — K76.9 LIVER DISEASE, UNSPECIFIED: ICD-10-CM

## 2021-12-01 DIAGNOSIS — B19.20 COMPENSATED CIRRHOSIS RELATED TO HEPATITIS C VIRUS (HCV): Primary | ICD-10-CM

## 2021-12-01 DIAGNOSIS — K74.69 COMPENSATED CIRRHOSIS RELATED TO HEPATITIS C VIRUS (HCV): Primary | ICD-10-CM

## 2021-12-01 DIAGNOSIS — Z76.82 LIVER TRANSPLANT CANDIDATE: ICD-10-CM

## 2021-12-01 LAB
AMPHET+METHAMPHET UR QL: NEGATIVE
BARBITURATES UR QL SCN>200 NG/ML: NEGATIVE
BENZODIAZ UR QL SCN>200 NG/ML: NEGATIVE
BZE UR QL SCN: NEGATIVE
CANNABINOIDS UR QL SCN: NEGATIVE
CREAT UR-MCNC: 193 MG/DL (ref 23–375)
ETHANOL UR-MCNC: <10 MG/DL
METHADONE UR QL SCN>300 NG/ML: NEGATIVE
OPIATES UR QL SCN: NEGATIVE
PCP UR QL SCN>25 NG/ML: NEGATIVE
TOXICOLOGY INFORMATION: NORMAL

## 2021-12-01 PROCEDURE — 90732 PNEUMOCOCCAL POLYSACCHARIDE VACCINE 23-VALENT =>2YO SQ IM: ICD-10-PCS | Mod: S$GLB,TXP,, | Performed by: INTERNAL MEDICINE

## 2021-12-01 PROCEDURE — 90472 IMMUNIZATION ADMIN EACH ADD: CPT | Mod: S$GLB,TXP,, | Performed by: INTERNAL MEDICINE

## 2021-12-01 PROCEDURE — 74183 MRI ABD W/O CNTR FLWD CNTR: CPT | Mod: TC,TXP

## 2021-12-01 PROCEDURE — 99215 PR OFFICE/OUTPT VISIT, EST, LEVL V, 40-54 MIN: ICD-10-PCS | Mod: S$GLB,TXP,, | Performed by: STUDENT IN AN ORGANIZED HEALTH CARE EDUCATION/TRAINING PROGRAM

## 2021-12-01 PROCEDURE — 90750 HZV VACC RECOMBINANT IM: CPT | Mod: S$GLB,TXP,, | Performed by: INTERNAL MEDICINE

## 2021-12-01 PROCEDURE — 90686 FLU VACCINE (QUAD) GREATER THAN OR EQUAL TO 3YO PRESERVATIVE FREE IM: ICD-10-PCS | Mod: S$GLB,TXP,, | Performed by: INTERNAL MEDICINE

## 2021-12-01 PROCEDURE — 90636 HEP A/HEP B VACC ADULT IM: CPT | Mod: S$GLB,TXP,, | Performed by: INTERNAL MEDICINE

## 2021-12-01 PROCEDURE — 90471 PNEUMOCOCCAL POLYSACCHARIDE VACCINE 23-VALENT =>2YO SQ IM: ICD-10-PCS | Mod: S$GLB,TXP,, | Performed by: INTERNAL MEDICINE

## 2021-12-01 PROCEDURE — 74183 MRI ABD W/O CNTR FLWD CNTR: CPT | Mod: 26,TXP,, | Performed by: RADIOLOGY

## 2021-12-01 PROCEDURE — 90686 IIV4 VACC NO PRSV 0.5 ML IM: CPT | Mod: S$GLB,TXP,, | Performed by: INTERNAL MEDICINE

## 2021-12-01 PROCEDURE — 90471 IMMUNIZATION ADMIN: CPT | Mod: S$GLB,TXP,, | Performed by: INTERNAL MEDICINE

## 2021-12-01 PROCEDURE — 90636 HEPATITIS A HEPATITIS B COMBINED VACCINE IM: ICD-10-PCS | Mod: S$GLB,TXP,, | Performed by: INTERNAL MEDICINE

## 2021-12-01 PROCEDURE — 90472 ZOSTER RECOMBINANT VACCINE: ICD-10-PCS | Mod: S$GLB,TXP,, | Performed by: INTERNAL MEDICINE

## 2021-12-01 PROCEDURE — 90732 PPSV23 VACC 2 YRS+ SUBQ/IM: CPT | Mod: S$GLB,TXP,, | Performed by: INTERNAL MEDICINE

## 2021-12-01 PROCEDURE — 74183 MRI ABDOMEN W WO CONTRAST: ICD-10-PCS | Mod: 26,TXP,, | Performed by: RADIOLOGY

## 2021-12-01 PROCEDURE — 25500020 PHARM REV CODE 255: Mod: TXP | Performed by: PHYSICIAN ASSISTANT

## 2021-12-01 PROCEDURE — 90750 ZOSTER RECOMBINANT VACCINE: ICD-10-PCS | Mod: S$GLB,TXP,, | Performed by: INTERNAL MEDICINE

## 2021-12-01 PROCEDURE — 4010F ACE/ARB THERAPY RXD/TAKEN: CPT | Mod: CPTII,S$GLB,TXP, | Performed by: STUDENT IN AN ORGANIZED HEALTH CARE EDUCATION/TRAINING PROGRAM

## 2021-12-01 PROCEDURE — A9585 GADOBUTROL INJECTION: HCPCS | Mod: TXP | Performed by: PHYSICIAN ASSISTANT

## 2021-12-01 PROCEDURE — 99999 PR PBB SHADOW E&M-EST. PATIENT-LVL III: CPT | Mod: PBBFAC,TXP,,

## 2021-12-01 PROCEDURE — 90471 PR IMMUNIZ ADMIN,1 SINGLE/COMB VAC/TOXOID: ICD-10-PCS | Mod: S$GLB,TXP,, | Performed by: INTERNAL MEDICINE

## 2021-12-01 PROCEDURE — 99215 OFFICE O/P EST HI 40 MIN: CPT | Mod: S$GLB,TXP,, | Performed by: STUDENT IN AN ORGANIZED HEALTH CARE EDUCATION/TRAINING PROGRAM

## 2021-12-01 PROCEDURE — 99999 PR PBB SHADOW E&M-EST. PATIENT-LVL III: ICD-10-PCS | Mod: PBBFAC,TXP,,

## 2021-12-01 PROCEDURE — 99999 PR PBB SHADOW E&M-EST. PATIENT-LVL IV: ICD-10-PCS | Mod: PBBFAC,TXP,, | Performed by: STUDENT IN AN ORGANIZED HEALTH CARE EDUCATION/TRAINING PROGRAM

## 2021-12-01 PROCEDURE — 99999 PR PBB SHADOW E&M-EST. PATIENT-LVL IV: CPT | Mod: PBBFAC,TXP,, | Performed by: STUDENT IN AN ORGANIZED HEALTH CARE EDUCATION/TRAINING PROGRAM

## 2021-12-01 PROCEDURE — 4010F PR ACE/ARB THEARPY RXD/TAKEN: ICD-10-PCS | Mod: CPTII,S$GLB,TXP, | Performed by: STUDENT IN AN ORGANIZED HEALTH CARE EDUCATION/TRAINING PROGRAM

## 2021-12-01 PROCEDURE — 80307 DRUG TEST PRSMV CHEM ANLYZR: CPT | Mod: TXP | Performed by: STUDENT IN AN ORGANIZED HEALTH CARE EDUCATION/TRAINING PROGRAM

## 2021-12-01 RX ORDER — GADOBUTROL 604.72 MG/ML
10 INJECTION INTRAVENOUS
Status: COMPLETED | OUTPATIENT
Start: 2021-12-01 | End: 2021-12-01

## 2021-12-01 RX ADMIN — GADOBUTROL 10 ML: 604.72 INJECTION INTRAVENOUS at 09:12

## 2021-12-03 ENCOUNTER — TELEPHONE (OUTPATIENT)
Dept: INTERVENTIONAL RADIOLOGY/VASCULAR | Facility: CLINIC | Age: 61
End: 2021-12-03
Payer: COMMERCIAL

## 2021-12-03 ENCOUNTER — TELEPHONE (OUTPATIENT)
Dept: TRANSPLANT | Facility: CLINIC | Age: 61
End: 2021-12-03
Payer: COMMERCIAL

## 2021-12-07 ENCOUNTER — CONFERENCE (OUTPATIENT)
Dept: TRANSPLANT | Facility: CLINIC | Age: 61
End: 2021-12-07
Payer: COMMERCIAL

## 2021-12-08 ENCOUNTER — COMMITTEE REVIEW (OUTPATIENT)
Dept: TRANSPLANT | Facility: CLINIC | Age: 61
End: 2021-12-08
Payer: COMMERCIAL

## 2021-12-08 DIAGNOSIS — Z76.82 LIVER TRANSPLANT CANDIDATE: ICD-10-CM

## 2021-12-08 DIAGNOSIS — C22.0 HCC (HEPATOCELLULAR CARCINOMA): Primary | ICD-10-CM

## 2021-12-08 NOTE — COMMITTEE REVIEW
Eder Kong's case presented to selection committee.  Patient has been accepted for liver transplant due to Primary Liver Malignancy: Hepatoma (HCC) and Cirrhosis and complications of end stage liver disease including HCC, Anemia, Thrombocytopenia  with a MELD score of 6.  Patient has no absolute contraindications for liver transplant.  Patient will be listed pending financial approval.    Patient will accept HBcAb positive livers.  Patient will accept HCVAB positive livers.  Patient will accept DCD livers.  Patient will accept HCV SHIVANI positive livers  Patient will accept HBV SHIVANI positive livers  I was present at the committee meeting and attest to the decision of the committee.    Uche Coy  12/08/2021

## 2021-12-09 ENCOUNTER — TELEPHONE (OUTPATIENT)
Dept: TRANSPLANT | Facility: CLINIC | Age: 61
End: 2021-12-09
Payer: COMMERCIAL

## 2021-12-10 ENCOUNTER — PATIENT MESSAGE (OUTPATIENT)
Dept: TRANSPLANT | Facility: CLINIC | Age: 61
End: 2021-12-10
Payer: COMMERCIAL

## 2021-12-10 ENCOUNTER — TELEPHONE (OUTPATIENT)
Dept: TRANSPLANT | Facility: CLINIC | Age: 61
End: 2021-12-10
Payer: COMMERCIAL

## 2021-12-13 ENCOUNTER — TELEPHONE (OUTPATIENT)
Dept: TRANSPLANT | Facility: CLINIC | Age: 61
End: 2021-12-13
Payer: COMMERCIAL

## 2021-12-22 ENCOUNTER — CLINICAL SUPPORT (OUTPATIENT)
Dept: INTERVENTIONAL RADIOLOGY/VASCULAR | Facility: CLINIC | Age: 61
End: 2021-12-22
Payer: COMMERCIAL

## 2021-12-22 DIAGNOSIS — C22.0 HCC (HEPATOCELLULAR CARCINOMA): Primary | ICD-10-CM

## 2021-12-22 PROCEDURE — 99213 PR OFFICE/OUTPT VISIT, EST, LEVL III, 20-29 MIN: ICD-10-PCS | Mod: 95,TXP,, | Performed by: PHYSICIAN ASSISTANT

## 2021-12-22 PROCEDURE — 99213 OFFICE O/P EST LOW 20 MIN: CPT | Mod: 95,TXP,, | Performed by: PHYSICIAN ASSISTANT

## 2022-01-07 DIAGNOSIS — C22.0 HCC (HEPATOCELLULAR CARCINOMA): ICD-10-CM

## 2022-01-07 DIAGNOSIS — Z00.6 RESEARCH STUDY PATIENT: Primary | ICD-10-CM

## 2022-01-13 ENCOUNTER — LAB VISIT (OUTPATIENT)
Dept: LAB | Facility: HOSPITAL | Age: 62
End: 2022-01-13
Attending: STUDENT IN AN ORGANIZED HEALTH CARE EDUCATION/TRAINING PROGRAM
Payer: COMMERCIAL

## 2022-01-13 DIAGNOSIS — Z00.6 RESEARCH STUDY PATIENT: ICD-10-CM

## 2022-01-13 DIAGNOSIS — C22.0 HCC (HEPATOCELLULAR CARCINOMA): ICD-10-CM

## 2022-01-13 DIAGNOSIS — Z76.82 LIVER TRANSPLANT CANDIDATE: ICD-10-CM

## 2022-01-13 LAB
AFP SERPL-MCNC: 5.2 NG/ML (ref 0–8.4)
RESEARCH LAB: NORMAL

## 2022-01-13 PROCEDURE — 82105 ALPHA-FETOPROTEIN SERUM: CPT | Mod: TXP | Performed by: STUDENT IN AN ORGANIZED HEALTH CARE EDUCATION/TRAINING PROGRAM

## 2022-01-13 PROCEDURE — 36415 COLL VENOUS BLD VENIPUNCTURE: CPT | Mod: TXP | Performed by: STUDENT IN AN ORGANIZED HEALTH CARE EDUCATION/TRAINING PROGRAM

## 2022-01-14 ENCOUNTER — RESEARCH ENCOUNTER (OUTPATIENT)
Dept: RESEARCH | Facility: HOSPITAL | Age: 62
End: 2022-01-14
Payer: COMMERCIAL

## 2022-01-14 NOTE — PROGRESS NOTES
RESEARCH STUDY FOLLOW-UP SPECIMEN COLLECTION ENCOUNTER  ORGAN TRANSPLANT  [facility] JESSY LARRY    Study Title: Role of Tumor-Induced Immune Tolerance in the Patient Response to Locoregional Therapy: Implications in Assessment Risk of Hepatocellular Carcinoma Recurrence Following Liver Transplantation    IRB #: [id number]    IRB Approval Date: 6/8/2016    : Darrin Leyva MD  Sub-investigator: Pranav Nieto, PhD    Patient Number: [id number]    In accordance with the study protocol, Research Lab orders were placed and follow-up specimens were collected on (date: 01/13/2022) in:  [facility] VNP: YES/NO: no  [facility] LABTX: YES/NO: no  [facility] IM: YES/NO: no  Blood specimens were collected at [facility]. A  [facility] processing.    Sarah Renteria  Admin Research- Liver Transplant

## 2022-01-19 ENCOUNTER — TELEPHONE (OUTPATIENT)
Dept: INTERVENTIONAL RADIOLOGY/VASCULAR | Facility: CLINIC | Age: 62
End: 2022-01-19
Payer: COMMERCIAL

## 2022-01-19 DIAGNOSIS — C22.0 HCC (HEPATOCELLULAR CARCINOMA): Primary | ICD-10-CM

## 2022-01-19 NOTE — TELEPHONE ENCOUNTER
Called patient to discuss lab results.  AFP stable.  Will MRI and appoint in 2 mo as per previous plan.     Questions solicited and answered.     Yvrose Dennison PA-C  Interventional Radiology  Clinic [phone]

## 2022-01-22 ENCOUNTER — TELEPHONE (OUTPATIENT)
Dept: TRANSPLANT | Facility: HOSPITAL | Age: 62
End: 2022-01-22
Payer: COMMERCIAL

## 2022-01-22 ENCOUNTER — ANESTHESIA (OUTPATIENT)
Dept: SURGERY | Facility: HOSPITAL | Age: 62
DRG: 006 | End: 2022-01-22
Payer: COMMERCIAL

## 2022-01-22 ENCOUNTER — TELEPHONE (OUTPATIENT)
Dept: TRANSPLANT | Facility: CLINIC | Age: 62
End: 2022-01-22
Payer: COMMERCIAL

## 2022-01-22 ENCOUNTER — HOSPITAL ENCOUNTER (INPATIENT)
Facility: HOSPITAL | Age: 62
LOS: 6 days | Discharge: HOME HEALTH | DRG: 006 | End: 2022-01-28
Attending: TRANSPLANT SURGERY | Admitting: TRANSPLANT SURGERY
Payer: COMMERCIAL

## 2022-01-22 DIAGNOSIS — E44.1 MALNUTRITION OF MILD DEGREE: ICD-10-CM

## 2022-01-22 DIAGNOSIS — E11.65 TYPE 2 DIABETES MELLITUS WITH HYPERGLYCEMIA, UNSPECIFIED WHETHER LONG TERM INSULIN USE: ICD-10-CM

## 2022-01-22 DIAGNOSIS — Z94.4 S/P LIVER TRANSPLANT: Primary | ICD-10-CM

## 2022-01-22 DIAGNOSIS — Z99.11 ENCOUNTER FOR WEANING FROM VENTILATOR: ICD-10-CM

## 2022-01-22 DIAGNOSIS — Z91.89 AT RISK FOR OPPORTUNISTIC INFECTIONS: ICD-10-CM

## 2022-01-22 DIAGNOSIS — T38.0X5A ADVERSE EFFECT OF CORTICOSTEROIDS, INITIAL ENCOUNTER: ICD-10-CM

## 2022-01-22 DIAGNOSIS — Z76.82 LIVER TRANSPLANT CANDIDATE: ICD-10-CM

## 2022-01-22 DIAGNOSIS — R53.1 WEAKNESS: ICD-10-CM

## 2022-01-22 DIAGNOSIS — T38.0X5S ADVERSE EFFECT OF CORTICOSTEROIDS, SEQUELA: ICD-10-CM

## 2022-01-22 DIAGNOSIS — D63.8 ANEMIA OF CHRONIC DISEASE: ICD-10-CM

## 2022-01-22 DIAGNOSIS — Z29.89 PROPHYLACTIC IMMUNOTHERAPY: ICD-10-CM

## 2022-01-22 DIAGNOSIS — Z79.60 LONG-TERM USE OF IMMUNOSUPPRESSANT MEDICATION: ICD-10-CM

## 2022-01-22 DIAGNOSIS — D69.6 THROMBOCYTOPENIA, UNSPECIFIED: ICD-10-CM

## 2022-01-22 LAB
BASOPHILS # BLD AUTO: 0.04 K/UL (ref 0–0.2)
BASOPHILS NFR BLD: 0.8 % (ref 0–1.9)
BLOOD BANK HEPATITIS FREEZE AND HOLD: NORMAL
DIFFERENTIAL METHOD: ABNORMAL
EOSINOPHIL # BLD AUTO: 0.1 K/UL (ref 0–0.5)
EOSINOPHIL NFR BLD: 2.2 % (ref 0–8)
ERYTHROCYTE [DISTWIDTH] IN BLOOD BY AUTOMATED COUNT: 13.5 % (ref 11.5–14.5)
ESTIMATED AVG GLUCOSE: 171 MG/DL (ref 68–131)
HBA1C MFR BLD: 7.6 % (ref 4–5.6)
HCT VFR BLD AUTO: 36.5 % (ref 40–54)
HGB BLD-MCNC: 11.7 G/DL (ref 14–18)
IMM GRANULOCYTES # BLD AUTO: 0.02 K/UL (ref 0–0.04)
IMM GRANULOCYTES NFR BLD AUTO: 0.4 % (ref 0–0.5)
LYMPHOCYTES # BLD AUTO: 1 K/UL (ref 1–4.8)
LYMPHOCYTES NFR BLD: 20.2 % (ref 18–48)
MCH RBC QN AUTO: 30.6 PG (ref 27–31)
MCHC RBC AUTO-ENTMCNC: 32.1 G/DL (ref 32–36)
MCV RBC AUTO: 96 FL (ref 82–98)
MONOCYTES # BLD AUTO: 0.5 K/UL (ref 0.3–1)
MONOCYTES NFR BLD: 9.7 % (ref 4–15)
NEUTROPHILS # BLD AUTO: 3.3 K/UL (ref 1.8–7.7)
NEUTROPHILS NFR BLD: 66.7 % (ref 38–73)
NRBC BLD-RTO: 0 /100 WBC
PLATELET # BLD AUTO: 132 K/UL (ref 150–450)
PMV BLD AUTO: 11.3 FL (ref 9.2–12.9)
POCT GLUCOSE: 244 MG/DL (ref 70–110)
RBC # BLD AUTO: 3.82 M/UL (ref 4.6–6.2)
SARS-COV-2 RDRP RESP QL NAA+PROBE: NEGATIVE
WBC # BLD AUTO: 4.94 K/UL (ref 3.9–12.7)

## 2022-01-22 PROCEDURE — 93010 EKG 12-LEAD: ICD-10-PCS | Mod: ,,, | Performed by: INTERNAL MEDICINE

## 2022-01-22 PROCEDURE — 86920 COMPATIBILITY TEST SPIN: CPT | Mod: NTX | Performed by: NURSE PRACTITIONER

## 2022-01-22 PROCEDURE — 36415 COLL VENOUS BLD VENIPUNCTURE: CPT | Mod: NTX | Performed by: NURSE PRACTITIONER

## 2022-01-22 PROCEDURE — 83735 ASSAY OF MAGNESIUM: CPT | Mod: NTX | Performed by: NURSE PRACTITIONER

## 2022-01-22 PROCEDURE — 86706 HEP B SURFACE ANTIBODY: CPT | Mod: NTX | Performed by: NURSE PRACTITIONER

## 2022-01-22 PROCEDURE — 85610 PROTHROMBIN TIME: CPT | Mod: NTX | Performed by: NURSE PRACTITIONER

## 2022-01-22 PROCEDURE — 85384 FIBRINOGEN ACTIVITY: CPT | Mod: NTX | Performed by: NURSE PRACTITIONER

## 2022-01-22 PROCEDURE — 86900 BLOOD TYPING SEROLOGIC ABO: CPT | Mod: NTX | Performed by: NURSE PRACTITIONER

## 2022-01-22 PROCEDURE — 87389 HIV-1 AG W/HIV-1&-2 AB AG IA: CPT | Mod: NTX | Performed by: NURSE PRACTITIONER

## 2022-01-22 PROCEDURE — 20600001 HC STEP DOWN PRIVATE ROOM: Mod: NTX

## 2022-01-22 PROCEDURE — 85730 THROMBOPLASTIN TIME PARTIAL: CPT | Mod: NTX | Performed by: NURSE PRACTITIONER

## 2022-01-22 PROCEDURE — 85025 COMPLETE CBC W/AUTO DIFF WBC: CPT | Mod: NTX | Performed by: NURSE PRACTITIONER

## 2022-01-22 PROCEDURE — 99000 SPECIMEN HANDLING OFFICE-LAB: CPT | Mod: NTX | Performed by: NURSE PRACTITIONER

## 2022-01-22 PROCEDURE — U0002 COVID-19 LAB TEST NON-CDC: HCPCS | Mod: NTX | Performed by: NURSE PRACTITIONER

## 2022-01-22 PROCEDURE — 93005 ELECTROCARDIOGRAM TRACING: CPT

## 2022-01-22 PROCEDURE — 82248 BILIRUBIN DIRECT: CPT | Mod: NTX | Performed by: NURSE PRACTITIONER

## 2022-01-22 PROCEDURE — 99223 PR INITIAL HOSPITAL CARE,LEVL III: ICD-10-PCS | Mod: NTX,,, | Performed by: NURSE PRACTITIONER

## 2022-01-22 PROCEDURE — 93010 ELECTROCARDIOGRAM REPORT: CPT | Mod: ,,, | Performed by: INTERNAL MEDICINE

## 2022-01-22 PROCEDURE — 86704 HEP B CORE ANTIBODY TOTAL: CPT | Mod: NTX | Performed by: NURSE PRACTITIONER

## 2022-01-22 PROCEDURE — 87522 HEPATITIS C REVRS TRNSCRPJ: CPT | Mod: NTX | Performed by: NURSE PRACTITIONER

## 2022-01-22 PROCEDURE — 80053 COMPREHEN METABOLIC PANEL: CPT | Mod: NTX | Performed by: NURSE PRACTITIONER

## 2022-01-22 PROCEDURE — 86803 HEPATITIS C AB TEST: CPT | Mod: NTX | Performed by: NURSE PRACTITIONER

## 2022-01-22 PROCEDURE — 86850 RBC ANTIBODY SCREEN: CPT | Mod: NTX | Performed by: NURSE PRACTITIONER

## 2022-01-22 PROCEDURE — 87340 HEPATITIS B SURFACE AG IA: CPT | Mod: NTX | Performed by: NURSE PRACTITIONER

## 2022-01-22 PROCEDURE — 99223 1ST HOSP IP/OBS HIGH 75: CPT | Mod: NTX,,, | Performed by: NURSE PRACTITIONER

## 2022-01-22 PROCEDURE — 83036 HEMOGLOBIN GLYCOSYLATED A1C: CPT | Mod: NTX | Performed by: NURSE PRACTITIONER

## 2022-01-22 RX ORDER — MUPIROCIN 20 MG/G
OINTMENT TOPICAL
Status: DISCONTINUED | OUTPATIENT
Start: 2022-01-22 | End: 2022-01-23 | Stop reason: HOSPADM

## 2022-01-22 RX ORDER — HYDROCODONE BITARTRATE AND ACETAMINOPHEN 500; 5 MG/1; MG/1
TABLET ORAL
Status: DISCONTINUED | OUTPATIENT
Start: 2022-01-22 | End: 2022-01-23 | Stop reason: HOSPADM

## 2022-01-22 RX ORDER — METHYLPREDNISOLONE SODIUM SUCCINATE 500 MG/8ML
500 INJECTION INTRAMUSCULAR; INTRAVENOUS
Status: DISCONTINUED | OUTPATIENT
Start: 2022-01-22 | End: 2022-01-23 | Stop reason: HOSPADM

## 2022-01-23 ENCOUNTER — ANESTHESIA EVENT (OUTPATIENT)
Dept: SURGERY | Facility: HOSPITAL | Age: 62
DRG: 006 | End: 2022-01-23
Payer: COMMERCIAL

## 2022-01-23 PROBLEM — E11.65 TYPE 2 DIABETES MELLITUS WITH HYPERGLYCEMIA: Status: ACTIVE | Noted: 2022-01-23

## 2022-01-23 PROBLEM — Z91.89 AT RISK FOR OPPORTUNISTIC INFECTIONS: Status: ACTIVE | Noted: 2022-01-23

## 2022-01-23 PROBLEM — T38.0X5A ADVERSE EFFECT OF CORTICOSTEROIDS: Status: ACTIVE | Noted: 2022-01-23

## 2022-01-23 PROBLEM — Z94.4 S/P LIVER TRANSPLANT: Status: ACTIVE | Noted: 2022-01-23

## 2022-01-23 PROBLEM — Z29.89 PROPHYLACTIC IMMUNOTHERAPY: Status: ACTIVE | Noted: 2022-01-23

## 2022-01-23 LAB
ABO + RH BLD: NORMAL
ALBUMIN SERPL BCP-MCNC: 2.9 G/DL (ref 3.5–5.2)
ALBUMIN SERPL BCP-MCNC: 3.1 G/DL (ref 3.5–5.2)
ALBUMIN SERPL BCP-MCNC: 3.2 G/DL (ref 3.5–5.2)
ALBUMIN SERPL BCP-MCNC: 3.6 G/DL (ref 3.5–5.2)
ALLENS TEST: ABNORMAL
ALP SERPL-CCNC: 229 U/L (ref 55–135)
ALP SERPL-CCNC: 539 U/L (ref 55–135)
ALT SERPL W/O P-5'-P-CCNC: 2861 U/L (ref 10–44)
ALT SERPL W/O P-5'-P-CCNC: 2913 U/L (ref 10–44)
ALT SERPL W/O P-5'-P-CCNC: 38 U/L (ref 10–44)
AMYLASE SERPL-CCNC: 116 U/L (ref 20–110)
ANION GAP SERPL CALC-SCNC: 11 MMOL/L (ref 8–16)
ANION GAP SERPL CALC-SCNC: 12 MMOL/L (ref 8–16)
ANION GAP SERPL CALC-SCNC: 18 MMOL/L (ref 8–16)
APTT BLDCRRT: 24.4 SEC (ref 21–32)
APTT BLDCRRT: 25.1 SEC (ref 21–32)
APTT BLDCRRT: 37 SEC (ref 21–32)
APTT BLDCRRT: 37 SEC (ref 21–32)
APTT BLDCRRT: 38.7 SEC (ref 21–32)
APTT BLDCRRT: 40.1 SEC (ref 21–32)
APTT BLDCRRT: 54.1 SEC (ref 21–32)
AST SERPL-CCNC: 3229 U/L (ref 10–40)
AST SERPL-CCNC: 46 U/L (ref 10–40)
AST SERPL-CCNC: 4716 U/L (ref 10–40)
AST SERPL-CCNC: 6187 U/L (ref 10–40)
AST SERPL-CCNC: 7221 U/L (ref 10–40)
BACTERIA #/AREA URNS AUTO: NORMAL /HPF
BASOPHILS # BLD AUTO: 0.01 K/UL (ref 0–0.2)
BASOPHILS # BLD AUTO: 0.01 K/UL (ref 0–0.2)
BASOPHILS # BLD AUTO: 0.03 K/UL (ref 0–0.2)
BASOPHILS NFR BLD: 0.1 % (ref 0–1.9)
BASOPHILS NFR BLD: 0.1 % (ref 0–1.9)
BASOPHILS NFR BLD: 0.3 % (ref 0–1.9)
BILIRUB DIRECT SERPL-MCNC: 0.2 MG/DL (ref 0.1–0.3)
BILIRUB SERPL-MCNC: 0.4 MG/DL (ref 0.1–1)
BILIRUB SERPL-MCNC: 1.4 MG/DL (ref 0.1–1)
BILIRUB UR QL STRIP: NEGATIVE
BLD GP AB SCN CELLS X3 SERPL QL: NORMAL
BLD PROD TYP BPU: NORMAL
BLOOD BANK HEPATITIS FREEZE AND HOLD: NORMAL
BLOOD UNIT EXPIRATION DATE: NORMAL
BLOOD UNIT TYPE CODE: 5100
BLOOD UNIT TYPE: NORMAL
BUN SERPL-MCNC: 18 MG/DL (ref 8–23)
BUN SERPL-MCNC: 19 MG/DL (ref 8–23)
BUN SERPL-MCNC: 25 MG/DL (ref 8–23)
CA-I BLDV-SCNC: 1.03 MMOL/L (ref 1.06–1.42)
CA-I BLDV-SCNC: 1.17 MMOL/L (ref 1.06–1.42)
CALCIUM SERPL-MCNC: 10.1 MG/DL (ref 8.7–10.5)
CALCIUM SERPL-MCNC: 7.8 MG/DL (ref 8.7–10.5)
CALCIUM SERPL-MCNC: 9.2 MG/DL (ref 8.7–10.5)
CHLORIDE SERPL-SCNC: 101 MMOL/L (ref 95–110)
CHLORIDE SERPL-SCNC: 105 MMOL/L (ref 95–110)
CHLORIDE SERPL-SCNC: 111 MMOL/L (ref 95–110)
CLARITY UR REFRACT.AUTO: CLEAR
CO2 SERPL-SCNC: 17 MMOL/L (ref 23–29)
CO2 SERPL-SCNC: 18 MMOL/L (ref 23–29)
CO2 SERPL-SCNC: 25 MMOL/L (ref 23–29)
CODING SYSTEM: NORMAL
COLOR UR AUTO: YELLOW
CREAT SERPL-MCNC: 0.8 MG/DL (ref 0.5–1.4)
CREAT SERPL-MCNC: 0.8 MG/DL (ref 0.5–1.4)
CREAT SERPL-MCNC: 1.1 MG/DL (ref 0.5–1.4)
DELSYS: ABNORMAL
DIFFERENTIAL METHOD: ABNORMAL
DISPENSE STATUS: NORMAL
EOSINOPHIL # BLD AUTO: 0 K/UL (ref 0–0.5)
EOSINOPHIL NFR BLD: 0 % (ref 0–8)
EOSINOPHIL NFR BLD: 0 % (ref 0–8)
EOSINOPHIL NFR BLD: 0.1 % (ref 0–8)
ERYTHROCYTE [DISTWIDTH] IN BLOOD BY AUTOMATED COUNT: 13.8 % (ref 11.5–14.5)
ERYTHROCYTE [DISTWIDTH] IN BLOOD BY AUTOMATED COUNT: 13.9 % (ref 11.5–14.5)
ERYTHROCYTE [DISTWIDTH] IN BLOOD BY AUTOMATED COUNT: 14.5 % (ref 11.5–14.5)
ERYTHROCYTE [SEDIMENTATION RATE] IN BLOOD BY WESTERGREN METHOD: 18 MM/H
EST. GFR  (AFRICAN AMERICAN): >60 ML/MIN/1.73 M^2
EST. GFR  (NON AFRICAN AMERICAN): >60 ML/MIN/1.73 M^2
FIBRINOGEN PPP-MCNC: 108 MG/DL (ref 182–400)
FIBRINOGEN PPP-MCNC: 110 MG/DL (ref 182–400)
FIBRINOGEN PPP-MCNC: 121 MG/DL (ref 182–400)
FIBRINOGEN PPP-MCNC: 133 MG/DL (ref 182–400)
FIBRINOGEN PPP-MCNC: 304 MG/DL (ref 182–400)
FIBRINOGEN PPP-MCNC: 354 MG/DL (ref 182–400)
FIO2: 100
FIO2: 100
FIO2: 40
GLUCOSE SERPL-MCNC: 130 MG/DL (ref 70–110)
GLUCOSE SERPL-MCNC: 134 MG/DL (ref 70–110)
GLUCOSE SERPL-MCNC: 135 MG/DL (ref 70–110)
GLUCOSE SERPL-MCNC: 241 MG/DL (ref 70–110)
GLUCOSE SERPL-MCNC: 259 MG/DL (ref 70–110)
GLUCOSE UR QL STRIP: ABNORMAL
HCO3 UR-SCNC: 21.1 MMOL/L (ref 24–28)
HCO3 UR-SCNC: 22 MMOL/L (ref 24–28)
HCO3 UR-SCNC: 22.1 MMOL/L (ref 24–28)
HCO3 UR-SCNC: 25.4 MMOL/L (ref 24–28)
HCT VFR BLD AUTO: 21 % (ref 40–54)
HCT VFR BLD AUTO: 24.8 % (ref 40–54)
HCT VFR BLD AUTO: 25.4 % (ref 40–54)
HCT VFR BLD AUTO: 27 % (ref 40–54)
HCT VFR BLD AUTO: 31.6 % (ref 40–54)
HCT VFR BLD CALC: 24 %PCV (ref 36–54)
HGB BLD-MCNC: 10.5 G/DL (ref 14–18)
HGB BLD-MCNC: 6.8 G/DL (ref 14–18)
HGB BLD-MCNC: 8.2 G/DL (ref 14–18)
HGB BLD-MCNC: 8.4 G/DL (ref 14–18)
HGB BLD-MCNC: 8.8 G/DL (ref 14–18)
HGB UR QL STRIP: NEGATIVE
IMM GRANULOCYTES # BLD AUTO: 0.02 K/UL (ref 0–0.04)
IMM GRANULOCYTES # BLD AUTO: 0.06 K/UL (ref 0–0.04)
IMM GRANULOCYTES # BLD AUTO: 0.07 K/UL (ref 0–0.04)
IMM GRANULOCYTES NFR BLD AUTO: 0.2 % (ref 0–0.5)
IMM GRANULOCYTES NFR BLD AUTO: 0.5 % (ref 0–0.5)
IMM GRANULOCYTES NFR BLD AUTO: 0.7 % (ref 0–0.5)
INR PPP: 1 (ref 0.8–1.2)
INR PPP: 1.1 (ref 0.8–1.2)
INR PPP: 1.7 (ref 0.8–1.2)
INR PPP: 2.1 (ref 0.8–1.2)
INR PPP: 2.1 (ref 0.8–1.2)
INR PPP: 2.4 (ref 0.8–1.2)
KETONES UR QL STRIP: NEGATIVE
LACTATE SERPL-SCNC: 2.9 MMOL/L (ref 0.5–2.2)
LDH SERPL L TO P-CCNC: 4.38 MMOL/L (ref 0.36–1.25)
LDH SERPL L TO P-CCNC: 6867 U/L (ref 110–260)
LEUKOCYTE ESTERASE UR QL STRIP: NEGATIVE
LYMPHOCYTES # BLD AUTO: 0.1 K/UL (ref 1–4.8)
LYMPHOCYTES # BLD AUTO: 0.2 K/UL (ref 1–4.8)
LYMPHOCYTES # BLD AUTO: 0.7 K/UL (ref 1–4.8)
LYMPHOCYTES NFR BLD: 1.1 % (ref 18–48)
LYMPHOCYTES NFR BLD: 1.8 % (ref 18–48)
LYMPHOCYTES NFR BLD: 6.1 % (ref 18–48)
MAGNESIUM SERPL-MCNC: 1.6 MG/DL (ref 1.6–2.6)
MAGNESIUM SERPL-MCNC: 1.8 MG/DL (ref 1.6–2.6)
MAGNESIUM SERPL-MCNC: 2.1 MG/DL (ref 1.6–2.6)
MCH RBC QN AUTO: 31.2 PG (ref 27–31)
MCH RBC QN AUTO: 31.3 PG (ref 27–31)
MCH RBC QN AUTO: 31.7 PG (ref 27–31)
MCHC RBC AUTO-ENTMCNC: 32.4 G/DL (ref 32–36)
MCHC RBC AUTO-ENTMCNC: 32.6 G/DL (ref 32–36)
MCHC RBC AUTO-ENTMCNC: 33.1 G/DL (ref 32–36)
MCV RBC AUTO: 96 FL (ref 82–98)
MCV RBC AUTO: 96 FL (ref 82–98)
MCV RBC AUTO: 97 FL (ref 82–98)
MICROSCOPIC COMMENT: NORMAL
MIN VOL: 7
MIN VOL: 8
MODE: ABNORMAL
MONOCYTES # BLD AUTO: 0.2 K/UL (ref 0.3–1)
MONOCYTES # BLD AUTO: 0.5 K/UL (ref 0.3–1)
MONOCYTES # BLD AUTO: 0.7 K/UL (ref 0.3–1)
MONOCYTES NFR BLD: 2.1 % (ref 4–15)
MONOCYTES NFR BLD: 4.2 % (ref 4–15)
MONOCYTES NFR BLD: 6 % (ref 4–15)
NEUTROPHILS # BLD AUTO: 10.3 K/UL (ref 1.8–7.7)
NEUTROPHILS # BLD AUTO: 8.9 K/UL (ref 1.8–7.7)
NEUTROPHILS # BLD AUTO: 9.3 K/UL (ref 1.8–7.7)
NEUTROPHILS NFR BLD: 86.8 % (ref 38–73)
NEUTROPHILS NFR BLD: 93.4 % (ref 38–73)
NEUTROPHILS NFR BLD: 96.5 % (ref 38–73)
NITRITE UR QL STRIP: NEGATIVE
NRBC BLD-RTO: 0 /100 WBC
PCO2 BLDA: 39.8 MMHG (ref 35–45)
PCO2 BLDA: 40 MMHG (ref 35–45)
PCO2 BLDA: 40.6 MMHG (ref 35–45)
PCO2 BLDA: 41.5 MMHG (ref 35–45)
PEEP: 5
PH SMN: 7.32 [PH] (ref 7.35–7.45)
PH SMN: 7.33 [PH] (ref 7.35–7.45)
PH SMN: 7.35 [PH] (ref 7.35–7.45)
PH SMN: 7.41 [PH] (ref 7.35–7.45)
PH UR STRIP: 6 [PH] (ref 5–8)
PIP: 17
PIP: 17
PLATELET # BLD AUTO: 119 K/UL (ref 150–450)
PLATELET # BLD AUTO: 119 K/UL (ref 150–450)
PLATELET # BLD AUTO: 123 K/UL (ref 150–450)
PLATELET # BLD AUTO: 80 K/UL (ref 150–450)
PLATELET # BLD AUTO: 91 K/UL (ref 150–450)
PMV BLD AUTO: 11 FL (ref 9.2–12.9)
PMV BLD AUTO: 11.1 FL (ref 9.2–12.9)
PMV BLD AUTO: 11.2 FL (ref 9.2–12.9)
PMV BLD AUTO: 11.4 FL (ref 9.2–12.9)
PMV BLD AUTO: 11.5 FL (ref 9.2–12.9)
PO2 BLDA: 175 MMHG (ref 80–100)
PO2 BLDA: 180 MMHG (ref 80–100)
PO2 BLDA: 184 MMHG (ref 80–100)
PO2 BLDA: 480 MMHG (ref 80–100)
PO2 BLDA: 49 MMHG (ref 40–60)
POC BE: -3 MMOL/L
POC BE: -4 MMOL/L
POC BE: -5 MMOL/L
POC BE: 1 MMOL/L
POC IONIZED CALCIUM: 0.98 MMOL/L (ref 1.06–1.42)
POC SATURATED O2: 100 % (ref 95–100)
POC SATURATED O2: 82 % (ref 95–100)
POC SATURATED O2: 99 % (ref 95–100)
POC TCO2: 22 MMOL/L (ref 23–27)
POC TCO2: 23 MMOL/L (ref 23–27)
POC TCO2: 23 MMOL/L (ref 23–27)
POC TCO2: 27 MMOL/L (ref 23–27)
POCT GLUCOSE: 138 MG/DL (ref 70–110)
POCT GLUCOSE: 192 MG/DL (ref 70–110)
POCT GLUCOSE: 196 MG/DL (ref 70–110)
POCT GLUCOSE: 197 MG/DL (ref 70–110)
POCT GLUCOSE: 228 MG/DL (ref 70–110)
POCT GLUCOSE: 241 MG/DL (ref 70–110)
POCT GLUCOSE: 254 MG/DL (ref 70–110)
POCT GLUCOSE: 264 MG/DL (ref 70–110)
POCT GLUCOSE: 264 MG/DL (ref 70–110)
POCT GLUCOSE: 277 MG/DL (ref 70–110)
POCT GLUCOSE: 290 MG/DL (ref 70–110)
POCT GLUCOSE: 301 MG/DL (ref 70–110)
POOLED CRYOPPT GVN BPU: NORMAL
POTASSIUM BLD-SCNC: 3.6 MMOL/L (ref 3.5–5.1)
POTASSIUM SERPL-SCNC: 3.5 MMOL/L (ref 3.5–5.1)
POTASSIUM SERPL-SCNC: 3.7 MMOL/L (ref 3.5–5.1)
POTASSIUM SERPL-SCNC: 3.9 MMOL/L (ref 3.5–5.1)
POTASSIUM SERPL-SCNC: 4.1 MMOL/L (ref 3.5–5.1)
POTASSIUM SERPL-SCNC: 4.3 MMOL/L (ref 3.5–5.1)
PROT SERPL-MCNC: 5 G/DL (ref 6–8.4)
PROT SERPL-MCNC: 7.6 G/DL (ref 6–8.4)
PROT UR QL STRIP: NEGATIVE
PROTHROMBIN TIME: 10.7 SEC (ref 9–12.5)
PROTHROMBIN TIME: 11.5 SEC (ref 9–12.5)
PROTHROMBIN TIME: 18.2 SEC (ref 9–12.5)
PROTHROMBIN TIME: 21.6 SEC (ref 9–12.5)
PROTHROMBIN TIME: 22.2 SEC (ref 9–12.5)
PROTHROMBIN TIME: 24.5 SEC (ref 9–12.5)
RBC # BLD AUTO: 2.17 M/UL (ref 4.6–6.2)
RBC # BLD AUTO: 2.59 M/UL (ref 4.6–6.2)
RBC # BLD AUTO: 2.82 M/UL (ref 4.6–6.2)
RBC #/AREA URNS AUTO: 1 /HPF (ref 0–4)
SAMPLE: ABNORMAL
SITE: ABNORMAL
SODIUM BLD-SCNC: 139 MMOL/L (ref 136–145)
SODIUM SERPL-SCNC: 137 MMOL/L (ref 136–145)
SODIUM SERPL-SCNC: 137 MMOL/L (ref 136–145)
SODIUM SERPL-SCNC: 138 MMOL/L (ref 136–145)
SODIUM SERPL-SCNC: 140 MMOL/L (ref 136–145)
SODIUM SERPL-SCNC: 141 MMOL/L (ref 136–145)
SP GR UR STRIP: 1.02 (ref 1–1.03)
SP02: 100
SP02: 100
URN SPEC COLLECT METH UR: ABNORMAL
VT: 400
WBC # BLD AUTO: 10.76 K/UL (ref 3.9–12.7)
WBC # BLD AUTO: 10.98 K/UL (ref 3.9–12.7)
WBC # BLD AUTO: 9.17 K/UL (ref 3.9–12.7)
YEAST UR QL AUTO: NORMAL

## 2022-01-23 PROCEDURE — 88313 PR  SPECIAL STAINS,GROUP II: ICD-10-PCS | Mod: 26,,, | Performed by: PATHOLOGY

## 2022-01-23 PROCEDURE — 85049 AUTOMATED PLATELET COUNT: CPT | Mod: 91 | Performed by: TRANSPLANT SURGERY

## 2022-01-23 PROCEDURE — 27100088 HC CELL SAVER

## 2022-01-23 PROCEDURE — P9045 ALBUMIN (HUMAN), 5%, 250 ML: HCPCS | Mod: JG | Performed by: TRANSPLANT SURGERY

## 2022-01-23 PROCEDURE — 88307 PR  SURG PATH,LEVEL V: ICD-10-PCS | Mod: 26,,, | Performed by: PATHOLOGY

## 2022-01-23 PROCEDURE — 63600175 PHARM REV CODE 636 W HCPCS: Performed by: STUDENT IN AN ORGANIZED HEALTH CARE EDUCATION/TRAINING PROGRAM

## 2022-01-23 PROCEDURE — 83735 ASSAY OF MAGNESIUM: CPT | Mod: 91 | Performed by: TRANSPLANT SURGERY

## 2022-01-23 PROCEDURE — 88342 IMHCHEM/IMCYTCHM 1ST ANTB: CPT | Performed by: PATHOLOGY

## 2022-01-23 PROCEDURE — 63600175 PHARM REV CODE 636 W HCPCS: Performed by: NURSE ANESTHETIST, CERTIFIED REGISTERED

## 2022-01-23 PROCEDURE — P9012 CRYOPRECIPITATE EACH UNIT: HCPCS | Performed by: ANESTHESIOLOGY

## 2022-01-23 PROCEDURE — 36430 TRANSFUSION BLD/BLD COMPNT: CPT

## 2022-01-23 PROCEDURE — 36620 ARTERIAL: ICD-10-PCS | Mod: 59,,, | Performed by: ANESTHESIOLOGY

## 2022-01-23 PROCEDURE — 99223 1ST HOSP IP/OBS HIGH 75: CPT | Mod: ,,, | Performed by: NURSE PRACTITIONER

## 2022-01-23 PROCEDURE — 85002 BLEEDING TIME TEST: CPT

## 2022-01-23 PROCEDURE — P9017 PLASMA 1 DONOR FRZ W/IN 8 HR: HCPCS | Performed by: NURSE PRACTITIONER

## 2022-01-23 PROCEDURE — 80048 BASIC METABOLIC PNL TOTAL CA: CPT | Performed by: STUDENT IN AN ORGANIZED HEALTH CARE EDUCATION/TRAINING PROGRAM

## 2022-01-23 PROCEDURE — 85025 COMPLETE CBC W/AUTO DIFF WBC: CPT | Mod: 91 | Performed by: STUDENT IN AN ORGANIZED HEALTH CARE EDUCATION/TRAINING PROGRAM

## 2022-01-23 PROCEDURE — 47135 TRANSPLANTATION OF LIVER: CPT | Mod: 82,,, | Performed by: TRANSPLANT SURGERY

## 2022-01-23 PROCEDURE — 25000003 PHARM REV CODE 250: Performed by: TRANSPLANT SURGERY

## 2022-01-23 PROCEDURE — 84132 ASSAY OF SERUM POTASSIUM: CPT | Performed by: TRANSPLANT SURGERY

## 2022-01-23 PROCEDURE — 85730 THROMBOPLASTIN TIME PARTIAL: CPT | Mod: 91 | Performed by: TRANSPLANT SURGERY

## 2022-01-23 PROCEDURE — 93503 INSERT/PLACE HEART CATHETER: CPT | Mod: 59,,, | Performed by: ANESTHESIOLOGY

## 2022-01-23 PROCEDURE — 82150 ASSAY OF AMYLASE: CPT | Performed by: STUDENT IN AN ORGANIZED HEALTH CARE EDUCATION/TRAINING PROGRAM

## 2022-01-23 PROCEDURE — 25000003 PHARM REV CODE 250: Performed by: NURSE PRACTITIONER

## 2022-01-23 PROCEDURE — 94761 N-INVAS EAR/PLS OXIMETRY MLT: CPT

## 2022-01-23 PROCEDURE — 88341 IMHCHEM/IMCYTCHM EA ADD ANTB: CPT | Mod: 59 | Performed by: PATHOLOGY

## 2022-01-23 PROCEDURE — 83615 LACTATE (LD) (LDH) ENZYME: CPT | Performed by: STUDENT IN AN ORGANIZED HEALTH CARE EDUCATION/TRAINING PROGRAM

## 2022-01-23 PROCEDURE — 85610 PROTHROMBIN TIME: CPT | Performed by: TRANSPLANT SURGERY

## 2022-01-23 PROCEDURE — 93503 PR INSERT/PLACE FLOW DIRECT CATH: ICD-10-PCS | Mod: 59,,, | Performed by: ANESTHESIOLOGY

## 2022-01-23 PROCEDURE — 25000003 PHARM REV CODE 250: Performed by: NURSE ANESTHETIST, CERTIFIED REGISTERED

## 2022-01-23 PROCEDURE — 85018 HEMOGLOBIN: CPT | Mod: 91 | Performed by: TRANSPLANT SURGERY

## 2022-01-23 PROCEDURE — P9021 RED BLOOD CELLS UNIT: HCPCS | Performed by: NURSE PRACTITIONER

## 2022-01-23 PROCEDURE — 99223 PR INITIAL HOSPITAL CARE,LEVL III: ICD-10-PCS | Mod: ,,, | Performed by: NURSE PRACTITIONER

## 2022-01-23 PROCEDURE — 37000008 HC ANESTHESIA 1ST 15 MINUTES: Performed by: TRANSPLANT SURGERY

## 2022-01-23 PROCEDURE — 81300000 HC LIVER ACQUISITION CHARGE

## 2022-01-23 PROCEDURE — 88341 IMHCHEM/IMCYTCHM EA ADD ANTB: CPT | Mod: 26,,, | Performed by: PATHOLOGY

## 2022-01-23 PROCEDURE — 84132 ASSAY OF SERUM POTASSIUM: CPT

## 2022-01-23 PROCEDURE — 83605 ASSAY OF LACTIC ACID: CPT

## 2022-01-23 PROCEDURE — 99233 PR SUBSEQUENT HOSPITAL CARE,LEVL III: ICD-10-PCS | Mod: 24,,, | Performed by: SURGERY

## 2022-01-23 PROCEDURE — 85384 FIBRINOGEN ACTIVITY: CPT | Mod: 91 | Performed by: TRANSPLANT SURGERY

## 2022-01-23 PROCEDURE — 85730 THROMBOPLASTIN TIME PARTIAL: CPT | Mod: 91 | Performed by: STUDENT IN AN ORGANIZED HEALTH CARE EDUCATION/TRAINING PROGRAM

## 2022-01-23 PROCEDURE — 88313 SPECIAL STAINS GROUP 2: CPT | Mod: 26,,, | Performed by: PATHOLOGY

## 2022-01-23 PROCEDURE — 80053 COMPREHEN METABOLIC PANEL: CPT | Performed by: STUDENT IN AN ORGANIZED HEALTH CARE EDUCATION/TRAINING PROGRAM

## 2022-01-23 PROCEDURE — 20000000 HC ICU ROOM

## 2022-01-23 PROCEDURE — 85025 COMPLETE CBC W/AUTO DIFF WBC: CPT | Performed by: TRANSPLANT SURGERY

## 2022-01-23 PROCEDURE — 81001 URINALYSIS AUTO W/SCOPE: CPT | Performed by: NURSE PRACTITIONER

## 2022-01-23 PROCEDURE — 84295 ASSAY OF SERUM SODIUM: CPT | Mod: 91 | Performed by: TRANSPLANT SURGERY

## 2022-01-23 PROCEDURE — 86965 POOLING BLOOD PLATELETS: CPT | Performed by: ANESTHESIOLOGY

## 2022-01-23 PROCEDURE — 87086 URINE CULTURE/COLONY COUNT: CPT | Performed by: NURSE PRACTITIONER

## 2022-01-23 PROCEDURE — 81300031 HC LIVER TRANSPORT, FLIGHT WITHIN 301-700 MILES

## 2022-01-23 PROCEDURE — 99900035 HC TECH TIME PER 15 MIN (STAT)

## 2022-01-23 PROCEDURE — 94002 VENT MGMT INPAT INIT DAY: CPT

## 2022-01-23 PROCEDURE — 85520 HEPARIN ASSAY: CPT

## 2022-01-23 PROCEDURE — 25000003 PHARM REV CODE 250: Performed by: STUDENT IN AN ORGANIZED HEALTH CARE EDUCATION/TRAINING PROGRAM

## 2022-01-23 PROCEDURE — 88341 PR IHC OR ICC EACH ADD'L SINGLE ANTIBODY  STAINPR: ICD-10-PCS | Mod: 26,,, | Performed by: PATHOLOGY

## 2022-01-23 PROCEDURE — 88307 TISSUE EXAM BY PATHOLOGIST: CPT | Performed by: PATHOLOGY

## 2022-01-23 PROCEDURE — P9045 ALBUMIN (HUMAN), 5%, 250 ML: HCPCS | Mod: JG | Performed by: NURSE ANESTHETIST, CERTIFIED REGISTERED

## 2022-01-23 PROCEDURE — 88309 TISSUE EXAM BY PATHOLOGIST: CPT | Performed by: PATHOLOGY

## 2022-01-23 PROCEDURE — 63600175 PHARM REV CODE 636 W HCPCS: Mod: NTX | Performed by: NURSE PRACTITIONER

## 2022-01-23 PROCEDURE — 27201423 OPTIME MED/SURG SUP & DEVICES STERILE SUPPLY: Performed by: TRANSPLANT SURGERY

## 2022-01-23 PROCEDURE — 85610 PROTHROMBIN TIME: CPT | Mod: 91 | Performed by: STUDENT IN AN ORGANIZED HEALTH CARE EDUCATION/TRAINING PROGRAM

## 2022-01-23 PROCEDURE — P9045 ALBUMIN (HUMAN), 5%, 250 ML: HCPCS | Mod: JG

## 2022-01-23 PROCEDURE — 88342 CHG IMMUNOCYTOCHEMISTRY: ICD-10-PCS | Mod: 26,,, | Performed by: PATHOLOGY

## 2022-01-23 PROCEDURE — 47135 PR TRANSPLANT LIVER,ALLOTRANSPLANT: ICD-10-PCS | Mod: 82,,, | Performed by: TRANSPLANT SURGERY

## 2022-01-23 PROCEDURE — 37799 UNLISTED PX VASCULAR SURGERY: CPT

## 2022-01-23 PROCEDURE — 85384 FIBRINOGEN ACTIVITY: CPT | Mod: 91 | Performed by: STUDENT IN AN ORGANIZED HEALTH CARE EDUCATION/TRAINING PROGRAM

## 2022-01-23 PROCEDURE — 27000221 HC OXYGEN, UP TO 24 HOURS

## 2022-01-23 PROCEDURE — 85014 HEMATOCRIT: CPT

## 2022-01-23 PROCEDURE — 36620 INSERTION CATHETER ARTERY: CPT | Mod: 59,,, | Performed by: ANESTHESIOLOGY

## 2022-01-23 PROCEDURE — 88342 IMHCHEM/IMCYTCHM 1ST ANTB: CPT | Mod: 91 | Performed by: PATHOLOGY

## 2022-01-23 PROCEDURE — 82803 BLOOD GASES ANY COMBINATION: CPT

## 2022-01-23 PROCEDURE — 27000191 HC C-V MONITORING

## 2022-01-23 PROCEDURE — 36415 COLL VENOUS BLD VENIPUNCTURE: CPT | Performed by: TRANSPLANT SURGERY

## 2022-01-23 PROCEDURE — D9220A PRA ANESTHESIA: ICD-10-PCS | Mod: ,,, | Performed by: ANESTHESIOLOGY

## 2022-01-23 PROCEDURE — 84460 ALANINE AMINO (ALT) (SGPT): CPT | Performed by: TRANSPLANT SURGERY

## 2022-01-23 PROCEDURE — 88313 SPECIAL STAINS GROUP 2: CPT | Performed by: PATHOLOGY

## 2022-01-23 PROCEDURE — 85014 HEMATOCRIT: CPT | Performed by: TRANSPLANT SURGERY

## 2022-01-23 PROCEDURE — 82947 ASSAY GLUCOSE BLOOD QUANT: CPT | Performed by: TRANSPLANT SURGERY

## 2022-01-23 PROCEDURE — 88342 IMHCHEM/IMCYTCHM 1ST ANTB: CPT | Mod: 26,,, | Performed by: PATHOLOGY

## 2022-01-23 PROCEDURE — D9220A PRA ANESTHESIA: Mod: ,,, | Performed by: ANESTHESIOLOGY

## 2022-01-23 PROCEDURE — D9220A PRA ANESTHESIA: Mod: ,,, | Performed by: NURSE ANESTHETIST, CERTIFIED REGISTERED

## 2022-01-23 PROCEDURE — 47135 TRANSPLANTATION OF LIVER: CPT | Mod: ,,, | Performed by: TRANSPLANT SURGERY

## 2022-01-23 PROCEDURE — 99900026 HC AIRWAY MAINTENANCE (STAT)

## 2022-01-23 PROCEDURE — C1729 CATH, DRAINAGE: HCPCS | Performed by: TRANSPLANT SURGERY

## 2022-01-23 PROCEDURE — 27201041 HC RESERVOIR, CARDIOTOMY

## 2022-01-23 PROCEDURE — 47143 PR TRANSPLANT,PREP DONOR LIVER, WHOLE: ICD-10-PCS | Mod: 51,,, | Performed by: TRANSPLANT SURGERY

## 2022-01-23 PROCEDURE — 63600175 PHARM REV CODE 636 W HCPCS: Mod: JG | Performed by: NURSE ANESTHETIST, CERTIFIED REGISTERED

## 2022-01-23 PROCEDURE — 88309 PR  SURG PATH,LEVEL VI: ICD-10-PCS | Mod: 26,,, | Performed by: PATHOLOGY

## 2022-01-23 PROCEDURE — 47135 PR TRANSPLANT LIVER,ALLOTRANSPLANT: ICD-10-PCS | Mod: ,,, | Performed by: TRANSPLANT SURGERY

## 2022-01-23 PROCEDURE — 63600175 PHARM REV CODE 636 W HCPCS: Mod: JG

## 2022-01-23 PROCEDURE — 36000930 HC OR TIME LEV VII 1ST 15 MIN: Performed by: TRANSPLANT SURGERY

## 2022-01-23 PROCEDURE — 37000009 HC ANESTHESIA EA ADD 15 MINS: Performed by: TRANSPLANT SURGERY

## 2022-01-23 PROCEDURE — 99000 SPECIMEN HANDLING OFFICE-LAB: CPT | Performed by: STUDENT IN AN ORGANIZED HEALTH CARE EDUCATION/TRAINING PROGRAM

## 2022-01-23 PROCEDURE — 82330 ASSAY OF CALCIUM: CPT | Performed by: TRANSPLANT SURGERY

## 2022-01-23 PROCEDURE — 84450 TRANSFERASE (AST) (SGOT): CPT | Mod: 91 | Performed by: STUDENT IN AN ORGANIZED HEALTH CARE EDUCATION/TRAINING PROGRAM

## 2022-01-23 PROCEDURE — 88309 TISSUE EXAM BY PATHOLOGIST: CPT | Mod: 26,,, | Performed by: PATHOLOGY

## 2022-01-23 PROCEDURE — 99233 SBSQ HOSP IP/OBS HIGH 50: CPT | Mod: 24,,, | Performed by: SURGERY

## 2022-01-23 PROCEDURE — 84450 TRANSFERASE (AST) (SGOT): CPT | Performed by: TRANSPLANT SURGERY

## 2022-01-23 PROCEDURE — 63600175 PHARM REV CODE 636 W HCPCS: Mod: JG | Performed by: TRANSPLANT SURGERY

## 2022-01-23 PROCEDURE — 84295 ASSAY OF SERUM SODIUM: CPT

## 2022-01-23 PROCEDURE — 82040 ASSAY OF SERUM ALBUMIN: CPT | Performed by: TRANSPLANT SURGERY

## 2022-01-23 PROCEDURE — 83605 ASSAY OF LACTIC ACID: CPT | Performed by: STUDENT IN AN ORGANIZED HEALTH CARE EDUCATION/TRAINING PROGRAM

## 2022-01-23 PROCEDURE — 36000931 HC OR TIME LEV VII EA ADD 15 MIN: Performed by: TRANSPLANT SURGERY

## 2022-01-23 PROCEDURE — D9220A PRA ANESTHESIA: ICD-10-PCS | Mod: ,,, | Performed by: NURSE ANESTHETIST, CERTIFIED REGISTERED

## 2022-01-23 PROCEDURE — 82330 ASSAY OF CALCIUM: CPT

## 2022-01-23 PROCEDURE — 88307 TISSUE EXAM BY PATHOLOGIST: CPT | Mod: 26,,, | Performed by: PATHOLOGY

## 2022-01-23 RX ORDER — NICARDIPINE HYDROCHLORIDE 2.5 MG/ML
INJECTION INTRAVENOUS
Status: DISCONTINUED | OUTPATIENT
Start: 2022-01-23 | End: 2022-01-23 | Stop reason: HOSPADM

## 2022-01-23 RX ORDER — ALBUMIN HUMAN 50 G/1000ML
SOLUTION INTRAVENOUS
Status: COMPLETED | OUTPATIENT
Start: 2022-01-23 | End: 2022-01-23

## 2022-01-23 RX ORDER — LIDOCAINE HCL/PF 100 MG/5ML
SYRINGE (ML) INTRAVENOUS
Status: DISCONTINUED | OUTPATIENT
Start: 2022-01-23 | End: 2022-01-23

## 2022-01-23 RX ORDER — SODIUM CHLORIDE 9 MG/ML
INJECTION, SOLUTION INTRAVENOUS CONTINUOUS
Status: DISCONTINUED | OUTPATIENT
Start: 2022-01-23 | End: 2022-01-28 | Stop reason: HOSPADM

## 2022-01-23 RX ORDER — PREDNISONE 20 MG/1
20 TABLET ORAL DAILY
Status: CANCELLED | OUTPATIENT
Start: 2022-01-29

## 2022-01-23 RX ORDER — KETAMINE HCL IN 0.9 % NACL 50 MG/5 ML
SYRINGE (ML) INTRAVENOUS
Status: DISCONTINUED | OUTPATIENT
Start: 2022-01-23 | End: 2022-01-23

## 2022-01-23 RX ORDER — HYDROCODONE BITARTRATE AND ACETAMINOPHEN 500; 5 MG/1; MG/1
TABLET ORAL
Status: DISCONTINUED | OUTPATIENT
Start: 2022-01-23 | End: 2022-01-23

## 2022-01-23 RX ORDER — METHYLPREDNISOLONE SOD SUCC 125 MG
200 VIAL (EA) INJECTION DAILY
Status: COMPLETED | OUTPATIENT
Start: 2022-01-24 | End: 2022-01-24

## 2022-01-23 RX ORDER — MAGNESIUM SULFATE HEPTAHYDRATE 40 MG/ML
2 INJECTION, SOLUTION INTRAVENOUS
Status: DISCONTINUED | OUTPATIENT
Start: 2022-01-23 | End: 2022-01-25

## 2022-01-23 RX ORDER — HEPARIN SODIUM 1000 [USP'U]/ML
INJECTION, SOLUTION INTRAVENOUS; SUBCUTANEOUS
Status: DISCONTINUED | OUTPATIENT
Start: 2022-01-23 | End: 2022-01-23

## 2022-01-23 RX ORDER — ALBUMIN HUMAN 50 G/1000ML
SOLUTION INTRAVENOUS
Status: COMPLETED
Start: 2022-01-23 | End: 2022-01-23

## 2022-01-23 RX ORDER — MAGNESIUM SULFATE HEPTAHYDRATE 40 MG/ML
4 INJECTION, SOLUTION INTRAVENOUS
Status: DISCONTINUED | OUTPATIENT
Start: 2022-01-23 | End: 2022-01-25

## 2022-01-23 RX ORDER — METHYLPREDNISOLONE SOD SUCC 125 MG
160 VIAL (EA) INJECTION DAILY
Status: COMPLETED | OUTPATIENT
Start: 2022-01-25 | End: 2022-01-25

## 2022-01-23 RX ORDER — PROPOFOL 10 MG/ML
0-50 INJECTION, EMULSION INTRAVENOUS CONTINUOUS
Status: DISCONTINUED | OUTPATIENT
Start: 2022-01-23 | End: 2022-01-24

## 2022-01-23 RX ORDER — PREDNISONE 20 MG/1
20 TABLET ORAL DAILY
Status: DISCONTINUED | OUTPATIENT
Start: 2022-01-29 | End: 2022-01-28 | Stop reason: HOSPADM

## 2022-01-23 RX ORDER — MIDAZOLAM HYDROCHLORIDE 1 MG/ML
INJECTION INTRAMUSCULAR; INTRAVENOUS
Status: DISCONTINUED | OUTPATIENT
Start: 2022-01-23 | End: 2022-01-23

## 2022-01-23 RX ORDER — FUROSEMIDE 10 MG/ML
INJECTION INTRAMUSCULAR; INTRAVENOUS
Status: DISCONTINUED | OUTPATIENT
Start: 2022-01-23 | End: 2022-01-23

## 2022-01-23 RX ORDER — OXYCODONE HCL 5 MG/5 ML
5 SOLUTION, ORAL ORAL EVERY 4 HOURS PRN
Status: DISCONTINUED | OUTPATIENT
Start: 2022-01-23 | End: 2022-01-24

## 2022-01-23 RX ORDER — POTASSIUM CHLORIDE 29.8 MG/ML
80 INJECTION INTRAVENOUS
Status: DISCONTINUED | OUTPATIENT
Start: 2022-01-23 | End: 2022-01-25

## 2022-01-23 RX ORDER — FAMOTIDINE 10 MG/ML
20 INJECTION INTRAVENOUS EVERY 12 HOURS
Status: DISCONTINUED | OUTPATIENT
Start: 2022-01-23 | End: 2022-01-26

## 2022-01-23 RX ORDER — METHYLPREDNISOLONE SOD SUCC 125 MG
120 VIAL (EA) INJECTION DAILY
Status: CANCELLED | OUTPATIENT
Start: 2022-01-26 | End: 2022-01-27

## 2022-01-23 RX ORDER — HYDROMORPHONE HYDROCHLORIDE 1 MG/ML
0.5 INJECTION, SOLUTION INTRAMUSCULAR; INTRAVENOUS; SUBCUTANEOUS EVERY 4 HOURS PRN
Status: DISCONTINUED | OUTPATIENT
Start: 2022-01-23 | End: 2022-01-25

## 2022-01-23 RX ORDER — ROCURONIUM BROMIDE 10 MG/ML
INJECTION, SOLUTION INTRAVENOUS
Status: DISCONTINUED | OUTPATIENT
Start: 2022-01-23 | End: 2022-01-23

## 2022-01-23 RX ORDER — HEPARIN SODIUM 1000 [USP'U]/ML
INJECTION, SOLUTION INTRAVENOUS; SUBCUTANEOUS
Status: DISCONTINUED | OUTPATIENT
Start: 2022-01-23 | End: 2022-01-23 | Stop reason: HOSPADM

## 2022-01-23 RX ORDER — PROPOFOL 10 MG/ML
VIAL (ML) INTRAVENOUS CONTINUOUS PRN
Status: DISCONTINUED | OUTPATIENT
Start: 2022-01-23 | End: 2022-01-23

## 2022-01-23 RX ORDER — PHENYLEPHRINE HYDROCHLORIDE 10 MG/ML
INJECTION INTRAVENOUS
Status: DISCONTINUED | OUTPATIENT
Start: 2022-01-23 | End: 2022-01-23

## 2022-01-23 RX ORDER — POTASSIUM CHLORIDE 14.9 MG/ML
60 INJECTION INTRAVENOUS
Status: DISCONTINUED | OUTPATIENT
Start: 2022-01-23 | End: 2022-01-25

## 2022-01-23 RX ORDER — HYDROCODONE BITARTRATE AND ACETAMINOPHEN 500; 5 MG/1; MG/1
TABLET ORAL
Status: DISCONTINUED | OUTPATIENT
Start: 2022-01-23 | End: 2022-01-25

## 2022-01-23 RX ORDER — MYCOPHENOLATE MOFETIL 200 MG/ML
1000 POWDER, FOR SUSPENSION ORAL 2 TIMES DAILY
Status: DISCONTINUED | OUTPATIENT
Start: 2022-01-23 | End: 2022-01-25

## 2022-01-23 RX ORDER — VALGANCICLOVIR 450 MG/1
450 TABLET, FILM COATED ORAL DAILY
Status: DISCONTINUED | OUTPATIENT
Start: 2022-02-02 | End: 2022-01-28 | Stop reason: HOSPADM

## 2022-01-23 RX ORDER — ALBUMIN HUMAN 50 G/1000ML
25 SOLUTION INTRAVENOUS ONCE
Status: DISCONTINUED | OUTPATIENT
Start: 2022-01-23 | End: 2022-01-23

## 2022-01-23 RX ORDER — ALBUMIN HUMAN 50 G/1000ML
25 SOLUTION INTRAVENOUS ONCE
Status: COMPLETED | OUTPATIENT
Start: 2022-01-23 | End: 2022-01-23

## 2022-01-23 RX ORDER — MUPIROCIN 20 MG/G
1 OINTMENT TOPICAL 2 TIMES DAILY
Status: DISPENSED | OUTPATIENT
Start: 2022-01-23 | End: 2022-01-28

## 2022-01-23 RX ORDER — NOREPINEPHRINE BITARTRATE/D5W 4MG/250ML
0-3 PLASTIC BAG, INJECTION (ML) INTRAVENOUS CONTINUOUS
Status: DISCONTINUED | OUTPATIENT
Start: 2022-01-23 | End: 2022-01-23

## 2022-01-23 RX ORDER — NYSTATIN 100000 [USP'U]/ML
500000 SUSPENSION ORAL
Status: DISCONTINUED | OUTPATIENT
Start: 2022-01-23 | End: 2022-01-28 | Stop reason: HOSPADM

## 2022-01-23 RX ORDER — ONDANSETRON 2 MG/ML
INJECTION INTRAMUSCULAR; INTRAVENOUS
Status: DISCONTINUED | OUTPATIENT
Start: 2022-01-23 | End: 2022-01-23

## 2022-01-23 RX ORDER — SULFAMETHOXAZOLE AND TRIMETHOPRIM 400; 80 MG/1; MG/1
1 TABLET ORAL EVERY MORNING
Status: DISCONTINUED | OUTPATIENT
Start: 2022-01-30 | End: 2022-01-28 | Stop reason: HOSPADM

## 2022-01-23 RX ORDER — VASOPRESSIN 20 [USP'U]/ML
INJECTION, SOLUTION INTRAMUSCULAR; SUBCUTANEOUS
Status: DISCONTINUED | OUTPATIENT
Start: 2022-01-23 | End: 2022-01-23

## 2022-01-23 RX ORDER — PROPOFOL 10 MG/ML
VIAL (ML) INTRAVENOUS
Status: DISCONTINUED | OUTPATIENT
Start: 2022-01-23 | End: 2022-01-23

## 2022-01-23 RX ORDER — EPINEPHRINE 1 MG/ML
INJECTION, SOLUTION INTRACARDIAC; INTRAMUSCULAR; INTRAVENOUS; SUBCUTANEOUS
Status: DISCONTINUED | OUTPATIENT
Start: 2022-01-23 | End: 2022-01-23

## 2022-01-23 RX ORDER — ALBUMIN HUMAN 50 G/1000ML
SOLUTION INTRAVENOUS CONTINUOUS PRN
Status: DISCONTINUED | OUTPATIENT
Start: 2022-01-23 | End: 2022-01-23

## 2022-01-23 RX ORDER — HEPARIN SODIUM 5000 [USP'U]/ML
5000 INJECTION, SOLUTION INTRAVENOUS; SUBCUTANEOUS EVERY 8 HOURS
Status: CANCELLED | OUTPATIENT
Start: 2022-01-23

## 2022-01-23 RX ORDER — SODIUM CHLORIDE 0.9 % (FLUSH) 0.9 %
10 SYRINGE (ML) INJECTION
Status: CANCELLED | OUTPATIENT
Start: 2022-01-23

## 2022-01-23 RX ORDER — MANNITOL 20 G/100ML
INJECTION, SOLUTION INTRAVENOUS
Status: DISCONTINUED | OUTPATIENT
Start: 2022-01-23 | End: 2022-01-23

## 2022-01-23 RX ORDER — METHYLPREDNISOLONE SOD SUCC 125 MG
160 VIAL (EA) INJECTION DAILY
Status: CANCELLED | OUTPATIENT
Start: 2022-01-25 | End: 2022-01-26

## 2022-01-23 RX ORDER — POTASSIUM CHLORIDE 29.8 MG/ML
40 INJECTION INTRAVENOUS
Status: DISCONTINUED | OUTPATIENT
Start: 2022-01-23 | End: 2022-01-25

## 2022-01-23 RX ORDER — FENTANYL CITRATE 50 UG/ML
INJECTION, SOLUTION INTRAMUSCULAR; INTRAVENOUS
Status: DISCONTINUED | OUTPATIENT
Start: 2022-01-23 | End: 2022-01-23

## 2022-01-23 RX ORDER — DEXTROSE MONOHYDRATE AND SODIUM CHLORIDE 5; .9 G/100ML; G/100ML
INJECTION, SOLUTION INTRAVENOUS CONTINUOUS
Status: DISCONTINUED | OUTPATIENT
Start: 2022-01-23 | End: 2022-01-25

## 2022-01-23 RX ORDER — METHYLPREDNISOLONE SOD SUCC 125 MG
120 VIAL (EA) INJECTION DAILY
Status: COMPLETED | OUTPATIENT
Start: 2022-01-26 | End: 2022-01-26

## 2022-01-23 RX ORDER — METHYLPREDNISOLONE SOD SUCC 125 MG
200 VIAL (EA) INJECTION DAILY
Status: CANCELLED | OUTPATIENT
Start: 2022-01-24 | End: 2022-01-25

## 2022-01-23 RX ADMIN — SODIUM CHLORIDE, SODIUM GLUCONATE, SODIUM ACETATE, POTASSIUM CHLORIDE, MAGNESIUM CHLORIDE, SODIUM PHOSPHATE, DIBASIC, AND POTASSIUM PHOSPHATE: .53; .5; .37; .037; .03; .012; .00082 INJECTION, SOLUTION INTRAVENOUS at 05:01

## 2022-01-23 RX ADMIN — EPINEPHRINE 20 MCG: 1 INJECTION, SOLUTION INTRAMUSCULAR; SUBCUTANEOUS at 07:01

## 2022-01-23 RX ADMIN — PHYTONADIONE 10 MG: 10 INJECTION, EMULSION INTRAMUSCULAR; INTRAVENOUS; SUBCUTANEOUS at 02:01

## 2022-01-23 RX ADMIN — FENTANYL CITRATE 50 MCG: 50 INJECTION, SOLUTION INTRAMUSCULAR; INTRAVENOUS at 10:01

## 2022-01-23 RX ADMIN — PHENYLEPHRINE HYDROCHLORIDE 200 MCG: 10 INJECTION INTRAVENOUS at 06:01

## 2022-01-23 RX ADMIN — MYCOPHENOLATE MOFETIL 1000 MG: 200 POWDER, FOR SUSPENSION ORAL at 12:01

## 2022-01-23 RX ADMIN — AMPICILLIN SODIUM AND SULBACTAM SODIUM 3 G: 2; 1 INJECTION, POWDER, FOR SOLUTION INTRAMUSCULAR; INTRAVENOUS at 08:01

## 2022-01-23 RX ADMIN — PROPOFOL 130 MG: 10 INJECTION, EMULSION INTRAVENOUS at 05:01

## 2022-01-23 RX ADMIN — PROPOFOL 50 MCG/KG/MIN: 10 INJECTION, EMULSION INTRAVENOUS at 10:01

## 2022-01-23 RX ADMIN — MUPIROCIN 1 G: 20 OINTMENT TOPICAL at 08:01

## 2022-01-23 RX ADMIN — DEXTROSE AND SODIUM CHLORIDE: 5; .9 INJECTION, SOLUTION INTRAVENOUS at 11:01

## 2022-01-23 RX ADMIN — FAMOTIDINE 20 MG: 10 INJECTION, SOLUTION INTRAVENOUS at 08:01

## 2022-01-23 RX ADMIN — ROCURONIUM BROMIDE 50 MG: 10 INJECTION, SOLUTION INTRAVENOUS at 05:01

## 2022-01-23 RX ADMIN — PHYTONADIONE 10 MG: 10 INJECTION, EMULSION INTRAMUSCULAR; INTRAVENOUS; SUBCUTANEOUS at 10:01

## 2022-01-23 RX ADMIN — TACROLIMUS 1 MG: 1 CAPSULE, GELATIN COATED ORAL at 12:01

## 2022-01-23 RX ADMIN — FENTANYL CITRATE 100 MCG: 50 INJECTION, SOLUTION INTRAMUSCULAR; INTRAVENOUS at 05:01

## 2022-01-23 RX ADMIN — SODIUM CHLORIDE 3 G: 9 INJECTION, SOLUTION INTRAVENOUS at 09:01

## 2022-01-23 RX ADMIN — ALBUMIN HUMAN 25 G: 50 SOLUTION INTRAVENOUS at 11:01

## 2022-01-23 RX ADMIN — NOREPINEPHRINE BITARTRATE 0.04 MCG/KG/MIN: 1 INJECTION, SOLUTION, CONCENTRATE INTRAVENOUS at 06:01

## 2022-01-23 RX ADMIN — TACROLIMUS 1 MG: 1 CAPSULE, GELATIN COATED ORAL at 06:01

## 2022-01-23 RX ADMIN — ROCURONIUM BROMIDE 50 MG: 10 INJECTION, SOLUTION INTRAVENOUS at 07:01

## 2022-01-23 RX ADMIN — MANNITOL 70 G: 20 INJECTION, SOLUTION INTRAVENOUS at 05:01

## 2022-01-23 RX ADMIN — FUROSEMIDE 70 MG: 10 INJECTION, SOLUTION INTRAMUSCULAR; INTRAVENOUS at 05:01

## 2022-01-23 RX ADMIN — SODIUM CHLORIDE 3 G: 9 INJECTION, SOLUTION INTRAVENOUS at 07:01

## 2022-01-23 RX ADMIN — ALBUMIN (HUMAN): 12.5 SOLUTION INTRAVENOUS at 06:01

## 2022-01-23 RX ADMIN — SODIUM CHLORIDE: 0.9 INJECTION, SOLUTION INTRAVENOUS at 08:01

## 2022-01-23 RX ADMIN — Medication 20 MG: at 10:01

## 2022-01-23 RX ADMIN — MYCOPHENOLATE MOFETIL 1000 MG: 200 POWDER, FOR SUSPENSION ORAL at 09:01

## 2022-01-23 RX ADMIN — ROCURONIUM BROMIDE 30 MG: 10 INJECTION, SOLUTION INTRAVENOUS at 08:01

## 2022-01-23 RX ADMIN — CALCIUM CHLORIDE 500 MG: 100 INJECTION, SOLUTION INTRAVENOUS at 07:01

## 2022-01-23 RX ADMIN — Medication 30 MG: at 05:01

## 2022-01-23 RX ADMIN — AMPICILLIN SODIUM AND SULBACTAM SODIUM 3 G: 2; 1 INJECTION, POWDER, FOR SOLUTION INTRAMUSCULAR; INTRAVENOUS at 03:01

## 2022-01-23 RX ADMIN — INSULIN HUMAN 7.6 UNITS/HR: 1 INJECTION, SOLUTION INTRAVENOUS at 10:01

## 2022-01-23 RX ADMIN — INSULIN HUMAN 2 UNITS/HR: 1 INJECTION, SOLUTION INTRAVENOUS at 01:01

## 2022-01-23 RX ADMIN — METHYLPREDNISOLONE SODIUM SUCCINATE 500 MG: 500 INJECTION, POWDER, FOR SOLUTION INTRAMUSCULAR; INTRAVENOUS at 05:01

## 2022-01-23 RX ADMIN — FAMOTIDINE 20 MG: 10 INJECTION, SOLUTION INTRAVENOUS at 12:01

## 2022-01-23 RX ADMIN — PROPOFOL 50 MG: 10 INJECTION, EMULSION INTRAVENOUS at 05:01

## 2022-01-23 RX ADMIN — ALBUMIN (HUMAN) 25 G: 12.5 INJECTION, SOLUTION INTRAVENOUS at 01:01

## 2022-01-23 RX ADMIN — ALBUMIN (HUMAN) 25 G: 12.5 INJECTION, SOLUTION INTRAVENOUS at 11:01

## 2022-01-23 RX ADMIN — SODIUM CHLORIDE 3 G: 9 INJECTION, SOLUTION INTRAVENOUS at 05:01

## 2022-01-23 RX ADMIN — PHENYLEPHRINE HYDROCHLORIDE 200 MCG: 10 INJECTION INTRAVENOUS at 07:01

## 2022-01-23 RX ADMIN — PROPOFOL 35 MCG/KG/MIN: 10 INJECTION, EMULSION INTRAVENOUS at 10:01

## 2022-01-23 RX ADMIN — HYDROMORPHONE HYDROCHLORIDE 0.5 MG: 1 INJECTION, SOLUTION INTRAMUSCULAR; INTRAVENOUS; SUBCUTANEOUS at 10:01

## 2022-01-23 RX ADMIN — PHENYLEPHRINE HYDROCHLORIDE 200 MCG: 10 INJECTION INTRAVENOUS at 05:01

## 2022-01-23 RX ADMIN — FENTANYL CITRATE 100 MCG: 50 INJECTION, SOLUTION INTRAMUSCULAR; INTRAVENOUS at 07:01

## 2022-01-23 RX ADMIN — LIDOCAINE HYDROCHLORIDE 100 MG: 20 INJECTION, SOLUTION INTRAVENOUS at 05:01

## 2022-01-23 RX ADMIN — CALCIUM CHLORIDE 2 G: 100 INJECTION, SOLUTION INTRAVENOUS at 01:01

## 2022-01-23 RX ADMIN — POTASSIUM CHLORIDE 40 MEQ: 29.8 INJECTION, SOLUTION INTRAVENOUS at 06:01

## 2022-01-23 RX ADMIN — VASOPRESSIN 2 UNITS: 20 INJECTION, SOLUTION INTRAMUSCULAR; SUBCUTANEOUS at 07:01

## 2022-01-23 RX ADMIN — VASOPRESSIN 0.04 UNITS/MIN: 20 INJECTION INTRAVENOUS at 06:01

## 2022-01-23 RX ADMIN — MIDAZOLAM HYDROCHLORIDE 2 MG: 1 INJECTION, SOLUTION INTRAMUSCULAR; INTRAVENOUS at 04:01

## 2022-01-23 RX ADMIN — HEPARIN SODIUM 2000 UNITS: 1000 INJECTION, SOLUTION INTRAVENOUS; SUBCUTANEOUS at 06:01

## 2022-01-23 RX ADMIN — ALBUMIN (HUMAN): 12.5 SOLUTION INTRAVENOUS at 07:01

## 2022-01-23 NOTE — TRANSFER OF CARE
"Anesthesia Transfer of Care Note    Patient: Eder Knog    Procedure(s) Performed: Procedure(s) (LRB):  TRANSPLANT, LIVER (N/A)    Patient location: ICU    Anesthesia Type: general    Transport from OR: Transported from OR intubated on 100% O2 by AMBU with adequate controlled ventilation. Upon arrival to PACU/ICU, patient attached to ventilator and auscultated to confirm bilateral breath sounds and adequate TV. Continuous ECG monitoring in transport. Continuous SpO2 monitoring in transport. Continuos invasive BP monitoring in transport    Post pain: adequate analgesia    Post assessment: no apparent anesthetic complications    Post vital signs: stable    Level of consciousness: sedated    Nausea/Vomiting: no nausea/vomiting    Complications: none    Transfer of care protocol was followed      Last vitals:   Visit Vitals  BP (!) 163/88 (BP Location: Right arm, Patient Position: Lying)   Pulse 73   Temp 36.6 °C (97.9 °F) (Oral)   Resp ICU vent   Ht 5' 5" (1.651 m)   Wt 69.7 kg (153 lb 10.6 oz)   SpO2 100%   BMI 25.57 kg/m²     "

## 2022-01-23 NOTE — ASSESSMENT & PLAN NOTE
BG goal 140-180    Start IV insulin infusion protocol for 2 consecutive BG readings > 180  Requires intensive BG monitoring while on protocol (q hourly)     ** Please call Endocrine for any BG related issues **  ** Please notify Endocrine for any change and/or advance in diet**    Discharge planning: TBD

## 2022-01-23 NOTE — SUBJECTIVE & OBJECTIVE
Follow-up For: Procedure(s) (LRB):  TRANSPLANT, LIVER (N/A)    Post-Operative Day: Day of Surgery     Past Medical History:   Diagnosis Date    Anemia     Arthritis     Diabetes     Dizziness     Dyslipidemia     General anesthetics causing adverse effect in therapeutic use     Hep C w/o coma, chronic     failed 2 rounds of interferon-based medication. no protease inhibitorrs used    Hypertension     not on medication    Kidney stone     Liver cancer        Past Surgical History:   Procedure Laterality Date    AORTIC VALVE REPLACEMENT      due to aortic regurgitation    CARDIAC SURGERY      CATHETERIZATION OF BOTH LEFT AND RIGHT HEART N/A 2021    Procedure: CATHETERIZATION, HEART, BOTH LEFT AND RIGHT;  Surgeon: Eder Beltran MD;  Location: [facility] CATH LAB;  Service: Cardiology;  Laterality: N/A;    COLONOSCOPY W/ POLYPECTOMY          ESOPHAGOGASTRODUODENOSCOPY      x2 with cautery in ,     mastoid bone      right ear       Review of patient's allergies indicates:  No Known Allergies    Family History     Problem Relation (Age of Onset)    Diabetes Sister, Brother        Tobacco Use    Smoking status: Former Smoker     Packs/day: 1.00     Years: 25.00     Pack years: 25.00     Quit date: 1999     Years since quittin.6    Smokeless tobacco: Never Used   Substance and Sexual Activity    Alcohol use: No     Comment: quit     Drug use: No    Sexual activity: Not on file      Review of Systems   Unable to perform ROS: Intubated     Objective:     Vital Signs (Most Recent):  Temp: 98.9 °F (37.2 °C) (22 1200)  Pulse: 92 (22 1200)  Resp: (!) 23 (22 1200)  BP: (!) 163/88 (22 0400)  SpO2: 100 % (22 1200) Vital Signs (24h Range):  Temp:  [97.9 °F (36.6 °C)-99.2 °F (37.3 °C)] 98.9 °F (37.2 °C)  Pulse:  [73-93] 92  Resp:  [18-] 23  SpO2:  [98 %-100 %] 100 %  BP: (141-163)/(73-88) 163/88  Arterial Line BP: (101-119)/(42-73) 119/51      Weight: 69.7 kg (153 lb 10.6 oz)  Body mass index is 25.57 kg/m².      Intake/Output Summary (Last 24 hours) at 1/23/2022 1331  Last data filed at 1/23/2022 1200  Gross per 24 hour   Intake 6900.34 ml   Output 2825 ml   Net 4075.34 ml       Physical Exam  Vitals reviewed.   Constitutional:       General: He is not in acute distress.     Comments: Sedated   HENT:      Head: Normocephalic.      Mouth/Throat:      Comments: ETT  OG  Eyes:      Pupils: Pupils are equal, round, and reactive to light.   Neck:      Comments: R IJ Trialysis and PA catheter  Cardiovascular:      Rate and Rhythm: Normal rate and regular rhythm.      Pulses: Normal pulses.   Pulmonary:      Comments: Intubated. Mechanical vent sounds bilaterally  Abdominal:      General: Abdomen is flat. There is no distension.      Palpations: Abdomen is soft.      Comments: Chevron incision with island dressing in place. No strikethrough.  2 PRASANNA drains with initial sanguinous output   Genitourinary:     Comments: Guillen in place. Clear urine  Musculoskeletal:      Right lower leg: No edema.      Left lower leg: No edema.      Comments: PIVs  L Radial art line   Skin:     General: Skin is warm and dry.   Neurological:      General: No focal deficit present.         Vents:  Vent Mode: A/C (01/23/22 1108)  Ventilator Initiated: Yes (01/23/22 1054)  Set Rate: 18 BPM (01/23/22 1108)  Vt Set: 400 mL (01/23/22 1108)  PEEP/CPAP: 5 cmH20 (01/23/22 1108)  Oxygen Concentration (%): 50 (01/23/22 1200)  Peak Airway Pressure: 16 cmH2O (01/23/22 1108)  Plateau Pressure: 0 cmH20 (01/23/22 1108)  Total Ve: 8.03 mL (01/23/22 1108)  Negative Inspiratory Force (cm H2O): 0 (01/23/22 1108)  F/VT Ratio<105 (RSBI): (!) 47.37 (01/23/22 1108)    Lines/Drains/Airways     Central Venous Catheter Line            Percutaneous Central Line Insertion/Assessment - Triple Lumen  01/23/22 0515 right internal jugular <1 day    Pulmonary Artery Catheter Assessment  01/23/22 0515 <1 day           Drain                 Closed/Suction Drain 01/23/22 1000 Right Abdomen Bulb 19 Fr. <1 day         Closed/Suction Drain 01/23/22 1001 Right Abdomen Bulb 19 Fr. <1 day         Urethral Catheter 01/23/22 0440 Non-latex;Straight-tip 16 Fr. <1 day          Airway                 Airway - Non-Surgical 01/23/22 0505 <1 day          Arterial Line            Arterial Line 01/23/22 0502 Left Radial <1 day          Peripheral Intravenous Line                 Peripheral IV - Single Lumen 01/22/22 2326 20 G Anterior;Left Forearm <1 day         KIARA 01/23/22 0521 Left Antecubital <1 day                Significant Labs:    CBC/Anemia Profile:  Recent Labs   Lab 01/22/22 2328 01/22/22 2328 01/23/22  0511 01/23/22  0511 01/23/22  0820 01/23/22  1106 01/23/22  1106   WBC 4.94  --   --   --   --  10.76  --    HGB 11.7*   < > 10.5*  --  8.4* 8.2*  --    HCT 36.5*   < > 31.6*   < > 25.4* 24.8* 24*   *   < > 123*  --  119* 119*  --    MCV 96  --   --   --   --  96  --    RDW 13.5  --   --   --   --  13.8  --     < > = values in this interval not displayed.        Chemistries:  Recent Labs   Lab 01/22/22 2328 01/22/22 2328 01/23/22  0511 01/23/22  0820 01/23/22  1106      < > 137 138 141   K 4.1   < > 3.9 3.5 3.7     --   --   --  105   CO2 25  --   --   --  18*   BUN 19  --   --   --  18   CREATININE 0.8  --   --   --  0.8   CALCIUM 10.1  --   --   --  7.8*   ALBUMIN 3.6   < > 3.1* 3.2* 2.9*   PROT 7.6  --   --   --  5.0*   BILITOT 0.4  --   --   --  1.4*   ALKPHOS 539*  --   --   --  229*   ALT 38  --   --  2,861* 2,913*   AST 46*  --   --  3,229* 7,221*   MG 1.8  --  1.6 2.1  --     < > = values in this interval not displayed.       ABGs:   Recent Labs   Lab 01/23/22  1106 01/23/22  1106 01/23/22  1108   PH 7.410  --   --    PCO2 40.0  --   --    HCO3 25.4  --   --    POCSATURATED 100   < > 82*   BE 1  --   --     < > = values in this interval not displayed.       Significant Imaging: I have reviewed all  pertinent imaging results/findings within the past 24 hours.

## 2022-01-23 NOTE — OP NOTE
Pre-operative Discussion Note  Liver Transplant Surgery    Eder Kong is a 61 y.o. male admitted for liver transplant.  I discussed the planned procedure in detail, including expected hospital course and outcomes, benefits, risks, and potential complications.  Complications discussed included death, graft failure, bleeding, infection, rejection, and neurologic problems.  I discussed the risks of anesthesia, as well as the potential need for re-operation.  The possibility of other complications not specifically mentioned was also discussed.  Also, I discussed the need for lifelong immunosuppression and the possibility of serious complications from immunosuppressive drugs.    The discussion included the risks that the patient will incur if he elects to not have the proposed procedure.    Relevant donor-specific risk factors were disclosed and discussed with the patient, including:   PHS: I discussed the use of organs from donors with PHS risk criteria, including the testing protocols utilized, as well as data from the literature regarding the likelihood of transmission of hepatitis or HIV.  The patient is willing to consider such grafts.  DCD: I discussed the use of organs recovered by donation after cardiac death (DCD), including slightly decreased graft survival and greater risk of arterial and biliary complications. The potential advantage to the recipient is possibly receiving a transplant sooner by accepting such an organ. The patient is willing to consider such grafts.    Specific PHS donor risk criteria for the organ donor include:  Drug injection for nonmedical reasons    HCV Infection Not applicable.    Hepatocellular Carcinoma Recurrence: I explained that the transplant procedure would begin with a search for extrahepatic tumor, and if such tumor is found, the transplant operation will be aborted.  If there is no detectable tumor outside the liver and the transplant proceeds as planned, there is still a risk  of tumor recurrence following transplant, which can be fatal.    The patient was SARS-CoV-2 /COVID-19 tested with negative results.    COVID-19: I discussed the possibility of COVID-19 transmission with the patient. Although SHIVANI testing is available for the virus using a technique which in theory should be very accurate, there is no data yet regarding the likelihood of a  false-negative test leading to virus transmission. Based on accuracy of testing for other viruses, it is expected that this risk is extremely small.     I also discussed that transplant immunosuppression will increase susceptibility to COVID-19 and other viruses, and that although we use stringent precautions to protect patients from infection, it is possible for a transplant recipient to contract this infection. If COVID-19 infection should occur, it would be a serious matter with a significant risk of death.    All questions were answered.  The patient and available family members voice understanding and agree to proceed with the transplant.    UNOS Patient Status  Note on scores:  ICU = 10 = total assistance  TSU = 20-30 = partial assistance  Outpatient admitted for transplant requiring medical care in last year = 40-50 = partial assistance  Scores 60 or higher indicate no assistance, meaning no need for medical care in last year. This would be very unusual for a transplant candidate.    Functional Status: 50% - Requires considerable assistance and frequent medical care  Physical Capacity: No Limitations

## 2022-01-23 NOTE — H&P
[facility] - Transplant Stepdown  Liver Transplant  History & Physical    Patient Name: Eder Kong  MRN: [id number]  Admission Date: 2022  Code Status: Full Code  Primary Care Provider: Martin Mcdaniel MD    Subjective:     History of Present Illness:  Eder Kong is a 61 y.o. 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  Patient presents for liver transplant.  He reports in usual state of health- Patient denies any recent hospitalizations or ED visits.  COVID vaccine completed. The primary surgeon will be Dr. Austin.  OR times times are being set for 0400 and 0530.  Patient has been NPO since 4pm.  All pre-op labs and imaging have been ordered and will be reviewed prior to surgery. Consents to be signed prior to transplant.             Past Medical History:   Diagnosis Date    Anemia     Arthritis     Diabetes     Dizziness     Dyslipidemia     General anesthetics causing adverse effect in therapeutic use     Hep C w/o coma, chronic     failed 2 rounds of interferon-based medication. no protease inhibitorrs used    Hypertension     not on medication    Kidney stone     Liver cancer        Past Surgical History:   Procedure Laterality Date    AORTIC VALVE REPLACEMENT      due to aortic regurgitation    CARDIAC SURGERY      CATHETERIZATION OF BOTH LEFT AND RIGHT HEART N/A 2021    Procedure: CATHETERIZATION, HEART, BOTH LEFT AND RIGHT;  Surgeon: Eder Beltran MD;  Location: [facility] CATH LAB;  Service: Cardiology;  Laterality: N/A;    COLONOSCOPY W/ POLYPECTOMY          ESOPHAGOGASTRODUODENOSCOPY      x2 with cautery in ,     mastoid bone      right ear       Review of patient's allergies indicates:  No Known Allergies    Family History     Problem Relation (Age of Onset)    Diabetes Sister, Brother        Tobacco Use    Smoking status: Former Smoker     Packs/day: 1.00     Years: 25.00     Pack years: 25.00     Quit date: 1999     Years since quittin.6     "Smokeless tobacco: Never Used   Substance and Sexual Activity    Alcohol use: No     Comment: quit     Drug use: No    Sexual activity: Not on file       PTA Medications   Medication Sig    aspirin (ECOTRIN) 81 MG EC tablet every evening.     BD ULTRA-FINE MINI PEN NEEDLE 31 gauge x 3/16" Ndle USE AS DIRECTED EVERY DAY    benazepriL (LOTENSIN) 5 MG tablet Take 5 mg by mouth once daily.    calcium carbonate (OS-EVELYN) 600 mg calcium (1,500 mg) Tab Take 600 mg by mouth 2 (two) times a day.    carvediloL (COREG) 12.5 MG tablet Take 3.125 mg by mouth 2 (two) times daily.     cholecalciferol, vitamin D3, (VITAMIN D3 ORAL) Take by mouth 2 (two) times a day.    CONTOUR NEXT TEST STRIPS Strp USE TO TEST BLOOD GLUCOSE TWICE DAILY    EScitalopram oxalate (LEXAPRO) 5 MG Tab Take 5 mg by mouth once daily.    ferrous sulfate (FEOSOL) 325 mg (65 mg iron) Tab tablet Take 325 mg by mouth 2 (two) times daily.     JANUVIA 50 mg Tab Take 50 mg by mouth once daily.     ondansetron (ZOFRAN) 4 MG tablet Take 1 tablet (4 mg total) by mouth 2 (two) times daily.    oxyCODONE (ROXICODONE) 5 MG immediate release tablet Take 1 tablet (5 mg total) by mouth every 4 (four) hours as needed for Pain.    oxyCODONE-acetaminophen (PERCOCET) 5-325 mg per tablet Take 1 tablet by mouth every 8 (eight) hours as needed for Pain.    OZEMPIC 0.25 mg or 0.5 mg(2 mg/1.5 mL) PnIj INJECT 0.25 MG INTO THE SKIN EVERY WEEK FOR 4 WEEKS THEN INCREASE TO 0.5 MG INTO THE SKIN EVERY WEEK    rifAXIMin (XIFAXAN) 550 mg Tab Take 550 mg by mouth 2 (two) times daily.     TOUJEO SOLOSTAR U-300 INSULIN 300 unit/mL (1.5 mL) InPn pen as needed.        Review of Systems   Constitutional: Negative for activity change and appetite change.   Eyes: Negative for visual disturbance.   Respiratory: Negative for cough and shortness of breath.    Cardiovascular: Negative for chest pain, palpitations and leg swelling.   Gastrointestinal: Negative for abdominal " distention, abdominal pain, constipation, diarrhea, nausea and vomiting.   Genitourinary: Negative for difficulty urinating.   Musculoskeletal: Negative for arthralgias.   Skin: Negative for wound.   Allergic/Immunologic: Negative for immunocompromised state.   Neurological: Negative for dizziness.   Hematological: Negative for adenopathy. Does not bruise/bleed easily.   Psychiatric/Behavioral: Negative for agitation.     Objective:     Vital Signs (Most Recent):  Temp: 98.2 °F (36.8 °C) (01/22/22 2316)  Pulse: 83 (01/22/22 2316)  Resp: 18 (01/22/22 2316)  BP: (!) 141/73 (01/22/22 2316)  SpO2: 98 % (01/22/22 2316) Vital Signs (24h Range):  Temp:  [98.2 °F (36.8 °C)] 98.2 °F (36.8 °C)  Pulse:  [83] 83  Resp:  [18] 18  SpO2:  [98 %] 98 %  BP: (141)/(73) 141/73     Weight: 69.7 kg (153 lb 10.6 oz)  Body mass index is 25.57 kg/m².    No intake or output data in the 24 hours ending 01/23/22 0014    Physical Exam  Vitals and nursing note reviewed.   Constitutional:       Appearance: He is well-developed and well-nourished.   HENT:      Head: Normocephalic.   Eyes:      Conjunctiva/sclera: Conjunctivae normal.   Cardiovascular:      Rate and Rhythm: Normal rate and regular rhythm.      Heart sounds: No murmur heard.      Pulmonary:      Effort: Pulmonary effort is normal.      Breath sounds: Normal breath sounds.   Abdominal:      General: Bowel sounds are normal. There is no distension.      Palpations: Abdomen is soft.      Tenderness: There is no abdominal tenderness.   Musculoskeletal:         General: Normal range of motion.      Cervical back: Normal range of motion.   Skin:     General: Skin is warm and dry.      Capillary Refill: Capillary refill takes less than 2 seconds.   Neurological:      Mental Status: He is alert and oriented to person, place, and time.   Psychiatric:         Mood and Affect: Mood and affect normal.         Behavior: Behavior normal.         Thought Content: Thought content normal.          Judgment: Judgment normal.         Laboratory:  CBC:   Recent Labs   Lab 01/22/22  2328   WBC 4.94   RBC 3.82*   HGB 11.7*   HCT 36.5*   *   MCV 96   MCH 30.6   MCHC 32.1     CMP: No results for input(s): GLU, CALCIUM, ALBUMIN, PROT, NA, K, CO2, CL, BUN, CREATININE, ALKPHOS, ALT, AST, BILITOT in the last 168 hours.  Labs within the past 24 hours have been reviewed.    Diagnostic Results:  XR Chest: No results found for this or any previous visit.    Assessment/Plan:     * Liver transplant candidate          The patient presents for liver transplant.  There are no apparent contraindications to proceeding with the planned transplant.  The patient understands that the transplant could potentially be cancelled pending detailed assessment of the donor organ.    MELD-Na score: 6 at 12/1/2021  8:02 AM  MELD score: 6 at 12/1/2021  8:02 AM  Calculated from:  Serum Creatinine: 0.8 mg/dL (Using min of 1 mg/dL) at 12/1/2021  8:02 AM  Serum Sodium: 138 mmol/L (Using max of 137 mmol/L) at 12/1/2021  8:02 AM  Total Bilirubin: 0.4 mg/dL (Using min of 1 mg/dL) at 12/1/2021  8:02 AM  INR(ratio): 1.0 at 12/1/2021  8:02 AM  Age: 61 years    He will receive IV steroids pulse induction.    Patient was SARS-CoV-2 /COVID-19 tested with negative results.     A complete discussion of the transplant procedure, including risks, complications, and alternatives, as well as any donor-specific risk factors requiring specific disclosure, will be carried out by the responsible staff surgeon prior to the procedure.     Discharge Planning:  No Patient Care Coordination Note on file.      Olamide Trivedi, JOHNNY  Liver Transplant  [facility] - Transplant Stepdown

## 2022-01-23 NOTE — H&P
[facility]  Critical Care - Surgery  History & Physical    Patient Name: Eder Kong  MRN: [id number]  Admission Date: 1/22/2022  Code Status: Full Code  Attending Physician: Sterling Tom MD   Primary Care Provider: Martin Mcdaniel MD   Principal Problem: S/P liver transplant    Subjective:     HPI:  Eder Kong is our 62 yo M with HCC, Hep C, and DM who underwent DCD OLT on 1/23/22. The case was uncomplicated and surgical information can be found below. He was brought to the SICU postoperatively for further care. He arrived sedated on propofol, intubated, and on 0.04 of Levophed. His initial ABG demonstrated excellent ventilation and oxygenation. His iSTAT lactate was 4.38.    Fluids Administered:   Crystalloid (mL) 5,000   FFP (Units) 1   Cryoprecipitate (Units)  1   Platelets (Units) 1   Cell Saver (CCs) 286   Albumin (Units) 1,000        Drains: 19f Isaias drains x 2     Additional Findings:  Estimated Blood Loss: 1,500 mL  Ascites Evacuated: 0 mL     Surgical Data:  Graft type (whole vs. partial): whole liver  IVC reconstruction: end to end ivc  Portal vein status: patent  Portal graft performed? no  Donor arterial anatomy: standard  Donor arterial inflow: common hepatic artery  Recipient arterial anatomy: standard  Recipient arterial inflow: common hepatic artery  Arterial graft performed? no  Biliary reconstruction: end to end (donor common hepatic duct to recipient common hepatic duct)  Biliary stent: none  Disposition of Vessels from donor of transplanted organ: sent to blood bank  Damage during procurement: no  Procurement damage comments: None     Donor Factors:  UNOS ID: [id number]  UNOS Match Run: [id number]  Donor type: donation after circulatory death   Donor with reported PHS risk criteria: yes  Donor CMV serologic status: Negative  Donor with known cancer: no  Donor HCV serologic status: Negative  Donor HBcAb: Negative  Donor HBV SHIVANI: Negative  Donor HCV SHIVANI: Negative  Liver weight:  1259 grams      Hospital/ICU Course:  No notes on file    Follow-up For: Procedure(s) (LRB):  TRANSPLANT, LIVER (N/A)    Post-Operative Day: Day of Surgery     Past Medical History:   Diagnosis Date    Anemia     Arthritis     Diabetes     Dizziness     Dyslipidemia     General anesthetics causing adverse effect in therapeutic use     Hep C w/o coma, chronic     failed 2 rounds of interferon-based medication. no protease inhibitorrs used    Hypertension     not on medication    Kidney stone     Liver cancer        Past Surgical History:   Procedure Laterality Date    AORTIC VALVE REPLACEMENT      due to aortic regurgitation    CARDIAC SURGERY      CATHETERIZATION OF BOTH LEFT AND RIGHT HEART N/A 2021    Procedure: CATHETERIZATION, HEART, BOTH LEFT AND RIGHT;  Surgeon: Eder Beltran MD;  Location: [facility] CATH LAB;  Service: Cardiology;  Laterality: N/A;    COLONOSCOPY W/ POLYPECTOMY          ESOPHAGOGASTRODUODENOSCOPY      x2 with cautery in ,     mastoid bone      right ear       Review of patient's allergies indicates:  No Known Allergies    Family History     Problem Relation (Age of Onset)    Diabetes Sister, Brother        Tobacco Use    Smoking status: Former Smoker     Packs/day: 1.00     Years: 25.00     Pack years: 25.00     Quit date: 1999     Years since quittin.6    Smokeless tobacco: Never Used   Substance and Sexual Activity    Alcohol use: No     Comment: quit     Drug use: No    Sexual activity: Not on file      Review of Systems   Unable to perform ROS: Intubated     Objective:     Vital Signs (Most Recent):  Temp: 98.9 °F (37.2 °C) (22 1200)  Pulse: 92 (22 1200)  Resp: (!) 23 (22 1200)  BP: (!) 163/88 (22 0400)  SpO2: 100 % (22 1200) Vital Signs (24h Range):  Temp:  [97.9 °F (36.6 °C)-99.2 °F (37.3 °C)] 98.9 °F (37.2 °C)  Pulse:  [73-93] 92  Resp:  [18-23] 23  SpO2:  [98 %-100 %] 100 %  BP: (141-163)/(73-88)  163/88  Arterial Line BP: (101-119)/(42-73) 119/51     Weight: 69.7 kg (153 lb 10.6 oz)  Body mass index is 25.57 kg/m².      Intake/Output Summary (Last 24 hours) at 1/23/2022 1331  Last data filed at 1/23/2022 1200  Gross per 24 hour   Intake 6900.34 ml   Output 2825 ml   Net 4075.34 ml       Physical Exam  Vitals reviewed.   Constitutional:       General: He is not in acute distress.     Comments: Sedated   HENT:      Head: Normocephalic.      Mouth/Throat:      Comments: ETT  OG  Eyes:      Pupils: Pupils are equal, round, and reactive to light.   Neck:      Comments: R IJ Trialysis and PA catheter  Cardiovascular:      Rate and Rhythm: Normal rate and regular rhythm.      Pulses: Normal pulses.   Pulmonary:      Comments: Intubated. Mechanical vent sounds bilaterally  Abdominal:      General: Abdomen is flat. There is no distension.      Palpations: Abdomen is soft.      Comments: Chevron incision with island dressing in place. No strikethrough.  2 PRASANNA drains with initial sanguinous output   Genitourinary:     Comments: Guillen in place. Clear urine  Musculoskeletal:      Right lower leg: No edema.      Left lower leg: No edema.      Comments: PIVs  L Radial art line   Skin:     General: Skin is warm and dry.   Neurological:      General: No focal deficit present.         Vents:  Vent Mode: A/C (01/23/22 1108)  Ventilator Initiated: Yes (01/23/22 1054)  Set Rate: 18 BPM (01/23/22 1108)  Vt Set: 400 mL (01/23/22 1108)  PEEP/CPAP: 5 cmH20 (01/23/22 1108)  Oxygen Concentration (%): 50 (01/23/22 1200)  Peak Airway Pressure: 16 cmH2O (01/23/22 1108)  Plateau Pressure: 0 cmH20 (01/23/22 1108)  Total Ve: 8.03 mL (01/23/22 1108)  Negative Inspiratory Force (cm H2O): 0 (01/23/22 1108)  F/VT Ratio<105 (RSBI): (!) 47.37 (01/23/22 1108)    Lines/Drains/Airways     Central Venous Catheter Line            Percutaneous Central Line Insertion/Assessment - Triple Lumen  01/23/22 0515 right internal jugular <1 day    Pulmonary  Artery Catheter Assessment  01/23/22 0515 <1 day          Drain                 Closed/Suction Drain 01/23/22 1000 Right Abdomen Bulb 19 Fr. <1 day         Closed/Suction Drain 01/23/22 1001 Right Abdomen Bulb 19 Fr. <1 day         Urethral Catheter 01/23/22 0440 Non-latex;Straight-tip 16 Fr. <1 day          Airway                 Airway - Non-Surgical 01/23/22 0505 <1 day          Arterial Line            Arterial Line 01/23/22 0502 Left Radial <1 day          Peripheral Intravenous Line                 Peripheral IV - Single Lumen 01/22/22 2326 20 G Anterior;Left Forearm <1 day         KIARA 01/23/22 0521 Left Antecubital <1 day                Significant Labs:    CBC/Anemia Profile:  Recent Labs   Lab 01/22/22 2328 01/22/22 2328 01/23/22  0511 01/23/22  0511 01/23/22  0820 01/23/22  1106 01/23/22  1106   WBC 4.94  --   --   --   --  10.76  --    HGB 11.7*   < > 10.5*  --  8.4* 8.2*  --    HCT 36.5*   < > 31.6*   < > 25.4* 24.8* 24*   *   < > 123*  --  119* 119*  --    MCV 96  --   --   --   --  96  --    RDW 13.5  --   --   --   --  13.8  --     < > = values in this interval not displayed.        Chemistries:  Recent Labs   Lab 01/22/22 2328 01/22/22 2328 01/23/22 0511 01/23/22  0820 01/23/22  1106      < > 137 138 141   K 4.1   < > 3.9 3.5 3.7     --   --   --  105   CO2 25  --   --   --  18*   BUN 19  --   --   --  18   CREATININE 0.8  --   --   --  0.8   CALCIUM 10.1  --   --   --  7.8*   ALBUMIN 3.6   < > 3.1* 3.2* 2.9*   PROT 7.6  --   --   --  5.0*   BILITOT 0.4  --   --   --  1.4*   ALKPHOS 539*  --   --   --  229*   ALT 38  --   --  2,861* 2,913*   AST 46*  --   --  3,229* 7,221*   MG 1.8  --  1.6 2.1  --     < > = values in this interval not displayed.       ABGs:   Recent Labs   Lab 01/23/22  1106 01/23/22  1106 01/23/22  1108   PH 7.410  --   --    PCO2 40.0  --   --    HCO3 25.4  --   --    POCSATURATED 100   < > 82*   BE 1  --   --     < > = values in this interval not  displayed.       Significant Imaging: I have reviewed all pertinent imaging results/findings within the past 24 hours.    Assessment/Plan:     * S/P liver transplant  Eder Kong is our 60 yo M with Hx of HCC, Hep C, and DM who underwent DCD OLT on 1/23/22    Neuro:  - Wean sedation for neuro exam/SBT  - Dilaudid prn for pain     CV:  - MAP goal > 65  - Arrived on 0.04 Levo   -Wean to off as able  - Albumin as indicated for resuscitation     Pulm:  - Intubated. Quickly able to be weaned to minimal settings  - Trend ABG prn  - Plan to extubate pending clinical stability     FEN/GI:  S/p DCD OLTx on 1/23/22  - NPO   - Trend BMP q12hr, AST q4hr  - Lyte replacement prn  - Drains: RUQ x2 with initial sanguinous output  - AM Liver Transplant      Renal:  - Guillen in place, continue  - Monitor UOP  - Trend BUN/Cr     Heme/Onc:  - Initial Hgb 8.2  - Trend CBC q4hr, INR q4hr  - Immuno: Methylprednisolone taper, Prograf, Mycophenolate     ID:  - AF  - Trend WBC with CBC q4hr  - Abx/Antifungals/Antiviral: Unasyn, Bactrim, Fluc, Nystatin, Valganciclovir     Endo:  - Insulin gtt     PPx:  - Famotidine, TEDs/SCDs    Continue ICU care       Critical care was time spent personally by me on the following activities: development of treatment plan with patient or surrogate and bedside caregivers, discussions with consultants, evaluation of patient's response to treatment, examination of patient, ordering and performing treatments and interventions, ordering and review of laboratory studies, ordering and review of radiographic studies, pulse oximetry, re-evaluation of patient's condition.  This critical care time did not overlap with that of any other provider or involve time for any procedures.     Jeison Roche MD  Critical Care - Surgery  [facility]

## 2022-01-23 NOTE — H&P
[facility] - [facility]plant Stepdown  Liver Transplant  History & Physical    Patient Name: Eder Kong  MRN: [id number]  Admission Date: 1/22/2022  Code Status: Full Code  Primary Care Provider: Martin Mcdaniel MD    Subjective:     History of Present Illness:  Eder Kong is a 61 y.o. 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  Patient presents for liver transplant.  He reports in usual state of health- Patient denies any recent hospitalizations or ED visits.  COVID vaccine completed. The primary surgeon will be Dr. Austin.  OR times times are being set for 0400 and 0530.  Patient has been NPO since 4pm.  All pre-op labs and imaging have been ordered and will be reviewed prior to surgery. Consents to be signed prior to transplant.             No new subjective & objective note has been filed under this hospital service since the last note was generated.    Assessment/Plan:     No notes have been filed under this hospital service.  Service: Transplant Liver      The patient presents for liver transplant.  There are no apparent contraindications to proceeding with the planned transplant.  The patient understands that the transplant could potentially be cancelled pending detailed assessment of the donor organ.    MELD-Na score: 6 at 12/1/2021  8:02 AM  MELD score: 6 at 12/1/2021  8:02 AM  Calculated from:  Serum Creatinine: 0.8 mg/dL (Using min of 1 mg/dL) at 12/1/2021  8:02 AM  Serum Sodium: 138 mmol/L (Using max of 137 mmol/L) at 12/1/2021  8:02 AM  Total Bilirubin: 0.4 mg/dL (Using min of 1 mg/dL) at 12/1/2021  8:02 AM  INR(ratio): 1.0 at 12/1/2021  8:02 AM  Age: 61 years    He will receive IV steroids pulse induction.    Patient was SARS-CoV-2 /COVID-19 tested with negative results.     A complete discussion of the transplant procedure, including risks, complications, and alternatives, as well as any donor-specific risk factors requiring specific disclosure, will be carried out by the responsible staff  surgeon prior to the procedure.     Discharge Planning:  No Patient Care Coordination Note on file.      Olamide Trivedi, JOHNNY  Liver Transplant  [facility] - Trans[facility]

## 2022-01-23 NOTE — HPI
Reason for Consult: Management of T2DM, Hyperglycemia     Surgical Procedure and Date: Liver Transplant 1/23/22    Diabetes diagnosis year:     Home Diabetes Medications:  Toujo (unsure of dose- variable 10-30 units) and  ozempic 1 mg weekly   Lab Results   Component Value Date    HGBA1C 7.6 (H) 01/22/2022           How often checking glucose at home? 1-3   BG readings on regimen: 100-200  Hypoglycemia on the regimen?  no  Missed doses on regimen?  no    Diabetes Complications include:     Hyperglycemia    Complicating diabetes co morbidities:   CIRRHOSIS and Glucocorticoid use       HPI:   Patient is a 61 y.o. 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 Endocrinology consulted for management of T2DM.       Lab Results   Component Value Date    HGBA1C 7.6 (H) 01/22/2022

## 2022-01-23 NOTE — PROGRESS NOTES
Patient arrived to unit for liver transplant surgery. Transplant CAYDEN notified of patient's arrival. Chest X-Ray, EKG, and blood work collected. Anesthesia consent signed and witnessed in chart. Blood and surgery Consents pending. SCD/Teds placed. Chlorhexidine bath done. NPO upon arrival. OR time 4 AM, CUT time 5:30 AM.  VSS. NAD. WCTM.   Wife at bedside and updated on care

## 2022-01-23 NOTE — ANESTHESIA PROCEDURE NOTES
Arterial    Diagnosis: esld    Patient location during procedure: done in OR    Staffing  Authorizing Provider: Jayme Roberts MD  Performing Provider: Jayme Roberts MD    Anesthesiologist was present at the time of the procedure.  Arterial  Skin Prep: chlorhexidine gluconate and isopropyl alcohol  Local Infiltration: none  Orientation: right  Location: femoral    Catheter Size: 4 Fr Cook  Catheter placement by Ultrasound guidance. Heme positive aspiration all ports.  Vessel Caliber: medium, patent, compressibility normal  Needle advanced into vessel with real time Ultrasound guidance.  Sterile sheath used.Insertion Attempts: 1  Assessment  Dressing: secured with tape and tegaderm  Patient: Tolerated well

## 2022-01-23 NOTE — ANESTHESIA PROCEDURE NOTES
Arterial    Diagnosis: Liver transplant candidate (Z76.82)    Patient location during procedure: done in OR  Procedure start time: 1/23/2022 5:02 AM  Timeout: 1/23/2022 5:01 AM  Procedure end time: 1/23/2022 5:05 AM    Staffing  Authorizing Provider: Jayme Roberts MD  Performing Provider: Alex Garg CRNA    Anesthesiologist was present at the time of the procedure.    Preanesthetic Checklist  Completed: patient identified, IV checked, site marked, risks and benefits discussed, surgical consent, monitors and equipment checked, pre-op evaluation, timeout performed and anesthesia consent givenArterial  Skin Prep: chlorhexidine gluconate  Local Infiltration: none  Orientation: left  Location: radial    Catheter Size: 20 G  Catheter placement by Anatomical landmarks. Heme positive aspiration all ports.Insertion Attempts: 1  Assessment  Dressing: secured with tape and tegaderm  Patient: Tolerated well

## 2022-01-23 NOTE — ASSESSMENT & PLAN NOTE
Eder Kong is our 60 yo M with Hx of HCC, Hep C, and DM who underwent DCD OLT on 1/23/22    Neuro:  - Wean sedation for neuro exam/SBT  - Dilaudid prn for pain     CV:  - MAP goal > 65  - Arrived on 0.04 Levo   -Wean to off as able  - Albumin as indicated for resuscitation     Pulm:  - Intubated. Quickly able to be weaned to minimal settings  - Trend ABG prn  - Plan to extubate pending clinical stability     FEN/GI:  S/p DCD OLTx on 1/23/22  - NPO   - Trend BMP q12hr, AST q4hr  - Lyte replacement prn  - Drains: RUQ x2 with initial sanguinous output  - AM Liver Transplant US     Renal:  - Guillen in place, continue  - Monitor UOP  - Trend BUN/Cr     Heme/Onc:  - Initial Hgb 8.2  - Trend CBC q4hr, INR q4hr  - Immuno: Methylprednisolone taper, Prograf, Mycophenolate     ID:  - AF  - Trend WBC with CBC q4hr  - Abx/Antifungals/Antiviral: Unasyn, Bactrim, Fluc, Nystatin, Valganciclovir     Endo:  - Insulin gtt     PPx:  - Famotidine, TEDs/SCDs    Continue ICU care

## 2022-01-23 NOTE — PROGRESS NOTES
Autotransfusion/Rapid Infusion Record:      01/23/2022  Autotransfusionist:  Brii Perea    Surgeon(s) and Role:     * Jesús Austin MD - Primary     * Aster Gay MD - Assisting     * Samy Gutierres MD - Fellow  Anesthesiologist:  Austin Gonsalez MD    Past Medical History:   Diagnosis Date    Anemia     Arthritis     Diabetes     Dizziness     Dyslipidemia     General anesthetics causing adverse effect in therapeutic use     Hep C w/o coma, chronic     failed 2 rounds of interferon-based medication. no protease inhibitorrs used    Hypertension     not on medication    Kidney stone     Liver cancer        Procedure(s) (LRB):  TRANSPLANT, LIVER (N/A)     10:21 AM    Equipment:    Cell Saver     R.I.S.  : SynGen Model: CATSmart or CATSplus : [place]   Model: [id number]     Serial number: [id number]   Serial number:    Disposable lot #:    Disposable lot #:      Were extra cardiotomies used for cell saver?  YES   if yes, #:  1    Solutions:  Anticoagulant: ACD-A   Expiration date: 04/2023 Volume used: 1200 ML   Wash solution: 0.9% NaCl   Expiration date: 10/2024 Volume used: 7152 ML     Cell saver checklist  Time completed:           [x]   Circuit assembled correctly     [x]   Cell saver powered and operational     [x]   Vacuum connected, functional, adjust to max -150mmHg     [x]   Anticoagulant drip rate adjusted     [x]   Transfer bag properly labeled with patient name, expiration time, volume,       anticoagulant, OR number, and initials     [x]   Cell saver disinfected after use (completed at end of case)       Cell Saver volumes:    Total volume processed:     1158 mL     Total volume pRBCs recovered     386 mL     Volume pRBCs infused     386 mL         RIS checklist   Time completed:  []   RIS circuit assembled correctly     []   RIS power and operational     []   RIS disinfected after use (completed at end of case)       RIS volumes:    Total volume infused:     (see anesthesia record for blood       product information)   mL       Additional comments:

## 2022-01-23 NOTE — ANESTHESIA PROCEDURE NOTES
Intubation    Date/Time: 1/23/2022 5:05 AM  Performed by: Alex Garg CRNA  Authorized by: Jayme Roberts MD     Intubation:     Induction:  Intravenous    Intubated:  Postinduction    Mask Ventilation:  Easy mask    Attempts:  1    Attempted By:  CRNA    Method of Intubation:  Direct    Blade:  Raymundo 2    Laryngeal View Grade: Grade I - full view of cords      Difficult Airway Encountered?: No      Complications:  None    Airway Device:  Oral endotracheal tube    Airway Device Size:  7.5    Style/Cuff Inflation:  Cuffed (inflated to minimal occlusive pressure)    Inflation Amount (mL):  8    Tube secured:  23    Secured at:  The lips

## 2022-01-23 NOTE — CONSULTS
[facility] - Surgical Intensive Care  Endocrinology  Diabetes Consult Note    Consult Requested by: Sterling Tom MD   Reason for admit: S/P liver transplant    HISTORY OF PRESENT ILLNESS:  Reason for Consult: Management of T2DM, Hyperglycemia     Surgical Procedure and Date: Liver Transplant 1/23/22    Diabetes diagnosis year: AHSAN; intubated     Home Diabetes Medications:  Toujo (unsure of dose) and Januvia 50 mg daily (per chart review)     How often checking glucose at home?  AHSAN    BG readings on regimen: AHSAN  Hypoglycemia on the regimen?  AHSAN  Missed doses on regimen?  AHSAN    Diabetes Complications include:     Hyperglycemia    Complicating diabetes co morbidities:   CIRRHOSIS and Glucocorticoid use       HPI:   Patient is a 61 y.o. 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 Endocrinology consulted for management of T2DM.       Lab Results   Component Value Date    HGBA1C 7.6 (H) 01/22/2022           Interval HPI:   Overnight events: Admitted to SICU s/p liver transplant. Remains intubated at time of consult. BG within goal ranges with no insulin. Received Solu Medrol 500 mg this morning. No diet orders on file  Eating:   NPO  Nausea: No  Hypoglycemia and intervention: No  Fever: No  TPN and/or TF: No  If yes, type of TF/TPN and rate: n/a    PMH, PSH, FH, SH reviewed     ROS:  Unable to obtain due to: Sedation,Intubation,Altered mental status,Critical illness,Reviewed ROS from note dated 1/23/22 by JOHNNY Gomez    Review of Systems    Current Medications and/or Treatments Impacting Glycemic Control  Immunotherapy:    Immunosuppressants         Stop Route Frequency     tacrolimus (PROGRAF) 1 mg/mL oral syringe         -- Oral 2 times daily     mycophenolate mofetil 200 mg/mL suspension 1,000 mg         -- Oral 2 times daily        Steroids:   Hormones (From admission, onward)            Start     Stop Route Frequency Ordered     "01/29/22 0900  predniSONE tablet 20 mg  (methylprednisolone taper panel)        "Followed by" Linked Group Details    -- Oral Daily 01/23/22 1058    01/28/22 0900  methylPREDNISolone sodium succinate injection 40 mg  (methylprednisolone taper panel)        "Followed by" Linked Group Details    01/29 0859 IV Daily 01/23/22 1058    01/27/22 0900  methylPREDNISolone sodium succinate injection 80 mg  (methylprednisolone taper panel)        "Followed by" Linked Group Details    01/28 0859 IV Daily 01/23/22 1058    01/26/22 0900  methylPREDNISolone sodium succinate injection 120 mg  (methylprednisolone taper panel)        "Followed by" Linked Group Details    01/27 0859 IV Daily 01/23/22 1058    01/25/22 0900  methylPREDNISolone sodium succinate injection 160 mg  (methylprednisolone taper panel)        "Followed by" Linked Group Details    01/26 0859 IV Daily 01/23/22 1058    01/24/22 0900  methylPREDNISolone sodium succinate injection 200 mg  (methylprednisolone taper panel)        "Followed by" Linked Group Details    01/25 0859 IV Daily 01/23/22 1058        Pressors:    Autonomic Drugs (From admission, onward)            None        Hyperglycemia/Diabetes Medications:   Antihyperglycemics (From admission, onward)            None             PHYSICAL EXAMINATION:  Vitals:    01/23/22 1100   BP:    Pulse: 91   Resp: 20   Temp:      Body mass index is 25.57 kg/m².    Physical Exam   Constitutional:  Well developed, well nourished, NAD.  ENT: External ears no masses with nose patent  Neck:  Supple; trachea midline  Cardiovascular: Normal heart sounds, no LE edema.     Lungs:  Normal effort; lungs anterior bilaterally clear to auscultation.  Intubated on a ventilator.  Abdomen:  Soft, no masses, no hernias. Hypoactive BS noted.  MS: No clubbing or cyanosis of nails noted; unable to assess gait.  Skin: No rashes, lesions, or ulcers; no nodules.  Chevron abdominal incision with dressing; PRASANAN x 2 to RLQ.  Psychiatric: " AHSAN  Neurological: AHSAN        Labs Reviewed and Include   Recent Labs   Lab 01/22/22 2328 01/23/22  0511 01/23/22  0820   *   < > 135*   CALCIUM 10.1  --   --    ALBUMIN 3.6   < > 3.2*   PROT 7.6  --   --       < > 138   K 4.1   < > 3.5   CO2 25  --   --      --   --    BUN 19  --   --    CREATININE 0.8  --   --    ALKPHOS 539*  --   --    ALT 38   < > 2,861*   AST 46*   < > 3,229*   BILITOT 0.4  --   --     < > = values in this interval not displayed.     Lab Results   Component Value Date    WBC 10.76 01/23/2022    HGB 8.2 (L) 01/23/2022    HCT 24 (L) 01/23/2022    MCV 96 01/23/2022     (L) 01/23/2022     No results for input(s): TSH, FREET4 in the last 168 hours.  Lab Results   Component Value Date    HGBA1C 7.6 (H) 01/22/2022       Nutritional status:   Body mass index is 25.57 kg/m².  Lab Results   Component Value Date    ALBUMIN 3.2 (L) 01/23/2022    ALBUMIN 3.1 (L) 01/23/2022    ALBUMIN 3.6 01/22/2022     No results found for: PREALBUMIN    Estimated Creatinine Clearance: 82.2 mL/min (based on SCr of 0.8 mg/dL).    Accu-Checks  Recent Labs     01/22/22 2320 01/23/22  1103   POCTGLUCOSE 244* 138*        ASSESSMENT and PLAN    * S/P liver transplant  Managed per primary team  Optimize BG control    Type 2 diabetes mellitus with hyperglycemia  BG goal 140-180    Start IV insulin infusion protocol for 2 consecutive BG readings > 180  Requires intensive BG monitoring while on protocol (q hourly)     ** Please call Endocrine for any BG related issues **  ** Please notify Endocrine for any change and/or advance in diet**    Discharge planning: TBD        Prophylactic immunotherapy  May increase insulin resistance.         Adverse effect of corticosteroids  Glucocorticoids markedly increase glucoses. Expect steroid taper to help with glucose control.            Jojo Serrano NP  Endocrinology  [facility] - Surgical Intensive Care

## 2022-01-23 NOTE — ANESTHESIA PROCEDURE NOTES
Right Internal Jugular Central Venous Line    Diagnosis: ESLD  Patient location during procedure: done in OR  Procedure start time: 1/23/2022 5:15 AM  Timeout: 1/23/2022 5:13 AM  Procedure end time: 1/23/2022 5:45 AM    Staffing  Authorizing Provider: Jayme Roberts MD  Performing Provider: Jeison Miranda MD    Staffing  Performed: other anesthesia staff   Anesthesiologist: Jayme Roberts MD  Other anesthesia staff: Jeison Miranda MD  Anesthesiologist was present at the time of the procedure.  Preanesthetic Checklist  Completed: patient identified, IV checked, risks and benefits discussed, surgical consent, monitors and equipment checked, pre-op evaluation, timeout performed and anesthesia consent given  Indication   Indication: vascular access, med administration     Anesthesia   general anesthesia    Central Line   Skin Prep: skin prepped with ChloraPrep, skin prep agent completely dried prior to procedure  Sterile Barriers Followed: Yes    All five maximal barriers used- gloves, gown, cap, mask, and large sterile sheet    hand hygiene performed prior to central venous catheter insertion  Location: right internal jugular.   Catheter type: triple lumen  Catheter Size: 12 Fr  Ultrasound: vascular probe with ultrasound  Vessel Caliber: medium, patent, compressibility normal  Needle advanced into vessel with real time Ultrasound guidance.  Guidewire confirmed in vessel.  Image recorded and saved.  sterile gel and probe cover used in ultrasound-guided central venous catheter insertion   Manometry: Venous cannualation confirmed by visual estimation of blood vessel pressure using manometry.  Insertion Attempts: 1   Securement:line sutured, chlorhexidine patch, sterile dressing applied and blood return through all ports    Post-Procedure   Post-procedure assessment: CXR pending for ICU.  Adverse Events:none      Guidewire Guidewire removed intact.   Medications:  Medication Administration Time:  1/23/2022 5:15 AM

## 2022-01-23 NOTE — ANESTHESIA PROCEDURE NOTES
Purdum Anjel Line    Diagnosis: ESlD  Patient location during procedure: done in OR  Procedure start time: 1/23/2022 5:15 AM  Timeout: 1/23/2022 5:13 AM  Procedure end time: 1/23/2022 5:45 AM    Staffing  Authorizing Provider: Jayme Roberts MD  Performing Provider: Jeison Miranda MD    Anesthesiologist was present at the time of the procedure.  Preanesthetic Checklist  Completed: patient identified, IV checked, risks and benefits discussed, surgical consent, monitors and equipment checked, pre-op evaluation, timeout performed and anesthesia consent given  Purdum Anjel Line  Skin Prep: chlorhexidine gluconate and isopropyl alcohol  Location: right,  internal jugular vein  Vessel Caliber: medium, patent, compressibility normal  Vascular Doppler:  not done  Introducer: 14 Fr double lumen, manometry used.  Device: CCO/Oximetric Catheter  Catheter Size: 8 Fr  Catheter placement by yes. Heme positive aspiration all ports. PAC floated with balloon up until wedged  Locked at: 48 cm.Insertion Attempts: 1  Indication: hemodynamic monitoring  Ultrasound Guidance  Needle advanced into vessel with real time Ultrasound guidance.  Image recorded and saved.  Guidewire confirmed in vessel.  Sterile sheath used.  Assessment  Central Line Bundle Protocol followed. Hand hygiene before procedure, surgical cap worn, surgical mask worn, sterile surgical gloves worn, large sterile drape used.  Catheter location verification: CXR pending for ICU.  Dressing: sutured in place and taped and tegaderm  Patient: Tolerated Well

## 2022-01-23 NOTE — OP NOTE
Operative Report    Date of Procedure: 1/23/2022    Surgeon: Aster Gay MD  First Assistant: Samy Gutierres MD    Pre-operative Diagnosis: Allograft liver for transplantation  Post-operative Diagnosis: Same    Procedure(s) Performed:   1. Back Table Preparation of Liver with needle biopsy  .    Anesthesia: Not applicable  Estimated Blood Loss: Not applicable  Fluids Administered: Not applicable    Findings: Estimated steatosis 0-10%, normal vascular anatomy.  Drains: Not applicable  Specimens: Core biopsy of allograft liver      Preamble  Indications: This report describes only the backbench preparation of the liver prior to transplantation.  The transplant operation itself is described in a separate report.    ABO Confirmation: Immediately following arrival of the donor organ and prior to implantation, a formal ABO confirmation was done according to hospital and [facility] policies.  I confirmed the [facility] ID number of the donor organ and the donor and recipient ABO types, directly verifying these data by comparison with the [facility] report.  This confirmation was personally done by an attending surgeon and circulating nurse, and is officially documented elsewhere.    Time-Out: A complete time out was carried out prior to the procedure, with confirmation of patient identity, correct procedure, correct operative site, appropriate antibiotic prophylaxis, review of any known allergies, and presence of all needed equipment.    Procedure in Detail  The liver was recovered from the transport cooler and inspected for vascular anatomy and overall suitability for transplantation, with findings as noted above.  The remnant of the diaphragm was dissected away.  The phrenic veins and adrenal vein were identified and ligated or sutured as appropriate.  The portal vein and hepatic artery were mobilized toward the hilum of the liver, and extrahepatic branches were ligated.  No vascular reconstruction was required.  The vessels  were tested for leaks.  A needle biopsy was obtained for permanent section histology using a spring-loaded biopsy device. The liver was kept in ice temperature organ preservation solution until the time of implantation.

## 2022-01-23 NOTE — PLAN OF CARE
Patient admitted to  [id number].  Orders received and implemented.  Patient continues intubated, AC 40%/5PEEP and sedated.  Dr. Roche aware of all lab results.  Plan of care reviewed with the patient's wife at the bedside.  Questions and concerns addressed throughout the shift.  Refer to flow sheet for vital signs, gtts, and assessments.  Will continue to monitor.

## 2022-01-23 NOTE — HPI
Eder Kong is a 61 y.o. 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  Patient presents for liver transplant.  He reports in usual state of health- Patient denies any recent hospitalizations or ED visits.  COVID vaccine completed. The primary surgeon will be Dr. Austin.  OR times times are being set for 0400 and 0530.  Patient has been NPO since 4pm.  All pre-op labs and imaging have been ordered and will be reviewed prior to surgery. Consents to be signed prior to transplant.

## 2022-01-23 NOTE — ANESTHESIA PREPROCEDURE EVALUATION
[facility]  Anesthesia Pre-Operative Evaluation         Patient Name/: Eder Kong, 1960  MRN: [id number]    SUBJECTIVE:     Pre-operative evaluation for Procedure(s) (LRB):  TRANSPLANT, LIVER (N/A)     2022    Eder Kong is a 61 y.o. male w/ a significant PMHx of ESLD 2/2 to hepatitis C, anemia, DM, and hx of AVR who presents for liver transplant.    Patient now presents for the above procedure(s).    ________________________________________  Results for orders placed during the hospital encounter of 21    Echo    Interpretation Summary  · The left ventricle is normal in size with concentric remodeling and normal systolic function.  · The estimated ejection fraction is 65%.  · Normal left ventricular diastolic function.  · There is abnormal septal wall motion.  · Normal right ventricular size with normal right ventricular systolic function.  · Moderate left atrial enlargement.  · Mild right atrial enlargement.  · Mild tricuspid regurgitation.  · Normal central venous pressure (3 mmHg).  · The estimated PA systolic pressure is 25 mmHg.    ________________________________________    Prev airway: None documented.    LDA:        Peripheral IV - Single Lumen 22 20 G Anterior;Left Forearm (Active)   Site Assessment Clean;Dry;Intact 22   Extremity Assessment Distal to IV No warmth;No swelling;No redness;No abnormal discoloration 22   Line Status Saline locked;Flushed 22   Dressing Status Clean;Dry;Intact 22   Dressing Intervention Integrity maintained 22   Dressing Change Due 22   Site Change Due 22   Reason Not Rotated Not due 22   Number of days: 0       Drips: None documented.      Patient Active Problem List   Diagnosis    Anemia    Diabetes mellitus    HCC (hepatocellular carcinoma)    HCV (hepatitis C virus)    History of aortic valve replacement with  "bioprosthetic valve    Strongyloides stercoralis infection    Need for zoster vaccination    Need for vaccination with 13-polyvalent pneumococcal conjugate vaccine    Need for vaccination with Twinrix    Liver transplant candidate       Review of patient's allergies indicates:  No Known Allergies    Current Inpatient Medications:       No current facility-administered medications on file prior to encounter.     Current Outpatient Medications on File Prior to Encounter   Medication Sig Dispense Refill    aspirin (ECOTRIN) 81 MG EC tablet every evening.       BD ULTRA-FINE MINI PEN NEEDLE 31 gauge x 3/16" Ndle USE AS DIRECTED EVERY DAY      benazepriL (LOTENSIN) 5 MG tablet Take 5 mg by mouth once daily.      calcium carbonate (OS-EVELYN) 600 mg calcium (1,500 mg) Tab Take 600 mg by mouth 2 (two) times a day.      carvediloL (COREG) 12.5 MG tablet Take 3.125 mg by mouth 2 (two) times daily.       cholecalciferol, vitamin D3, (VITAMIN D3 ORAL) Take by mouth 2 (two) times a day.      CONTOUR NEXT TEST STRIPS Strp USE TO TEST BLOOD GLUCOSE TWICE DAILY      EScitalopram oxalate (LEXAPRO) 5 MG Tab Take 5 mg by mouth once daily.      ferrous sulfate (FEOSOL) 325 mg (65 mg iron) Tab tablet Take 325 mg by mouth 2 (two) times daily.       JANUVIA 50 mg Tab Take 50 mg by mouth once daily.       ondansetron (ZOFRAN) 4 MG tablet Take 1 tablet (4 mg total) by mouth 2 (two) times daily. 20 tablet 0    oxyCODONE (ROXICODONE) 5 MG immediate release tablet Take 1 tablet (5 mg total) by mouth every 4 (four) hours as needed for Pain. 20 tablet 0    oxyCODONE-acetaminophen (PERCOCET) 5-325 mg per tablet Take 1 tablet by mouth every 8 (eight) hours as needed for Pain. 20 tablet 0    OZEMPIC 0.25 mg or 0.5 mg(2 mg/1.5 mL) PnIj INJECT 0.25 MG INTO THE SKIN EVERY WEEK FOR 4 WEEKS THEN INCREASE TO 0.5 MG INTO THE SKIN EVERY WEEK      rifAXIMin (XIFAXAN) 550 mg Tab Take 550 mg by mouth 2 (two) times daily.       TOUJEO " SOLOSTAR U-300 INSULIN 300 unit/mL (1.5 mL) InPn pen as needed.          Past Surgical History:   Procedure Laterality Date    AORTIC VALVE REPLACEMENT  2018    due to aortic regurgitation    CARDIAC SURGERY      CATHETERIZATION OF BOTH LEFT AND RIGHT HEART N/A 7/8/2021    Procedure: CATHETERIZATION, HEART, BOTH LEFT AND RIGHT;  Surgeon: Eder Beltran MD;  [facility] C[facility] LAB;  Service: Cardiology;  Laterality: N/A;    COLONOSCOPY W/ POLYPECTOMY      2011    ESOPHAGOGASTRODUODENOSCOPY      x2 with cautery in 2001, 2012    mastoid bone      right ear       Social History:  Tobacco Use: Medium Risk    Smoking Tobacco Use: Former Smoker    Smokeless Tobacco Use: Never Used       Alcohol Use: Not on file       OBJECTIVE:     Vital Signs Range:  BMI Readings from Last 1 Encounters:   01/22/22 25.57 kg/m²       Temp:  [36.8 °C (98.2 °F)]   Pulse:  [83]   Resp:  [18]   BP: (141)/(73)   SpO2:  [98 %]        Significant Labs:        Component Value Date/Time    WBC 4.94 01/22/2022 2328    HGB 11.7 (L) 01/22/2022 2328    HCT 36.5 (L) 01/22/2022 2328     (L) 01/22/2022 2328     12/01/2021 0802    K 5.1 12/01/2021 0802     12/01/2021 0802    CO2 25 12/01/2021 0802     (H) 12/01/2021 0802    BUN 18 12/01/2021 0802    CREATININE 0.8 12/01/2021 0802    CALCIUM 9.4 12/01/2021 0802    ALBUMIN 3.6 12/01/2021 0802    PROT 7.1 12/01/2021 0802    ALKPHOS 596 (H) 12/01/2021 0802    BILITOT 0.4 12/01/2021 0802    AST 45 (H) 12/01/2021 0802    ALT 47 (H) 12/01/2021 0802    INR 1.0 12/01/2021 0802    HGBA1C 7.6 (H) 01/22/2022 2328        Please see Results Review for additional labs.     Diagnostic Studies: No relevant studies.    EKG:   Results for orders placed or performed during the hospital encounter of 07/08/21   EKG 12-lead    Collection Time: 07/08/21  7:28 AM    Narrative    Test Reason : C22.0,B19.20,Z95.3,    Vent. Rate : 077 BPM     Atrial Rate : 077 BPM     P-R Int : 130 ms           QRS Dur : 098 ms      QT Int : 378 ms       P-R-T Axes : 070 021 034 degrees     QTc Int : 427 ms    Normal sinus rhythm  Normal ECG  When compared with ECG of 10-FEB-2014 09:32,  Nonspecific T wave abnormality has replaced inverted T waves in Inferior  leads  Confirmed by Wilder Bennett MD ([id number]) on 7/8/2021 4:10:54 PM    Referred By: SILVER NESBITT           Confirmed By:Wilder Bennett MD       ECHO:  See subjective, if available.      ASSESSMENT/PLAN:         Anesthesia Evaluation    I have reviewed the Patient Summary Reports.    I have reviewed the Nursing Notes. I have reviewed the NPO Status.      Review of Systems  Anesthesia Hx:  Hx of Anesthetic complications  History of prior surgery of interest to airway management or planning: Denies Family Hx of Anesthesia complications.    Social:  Former Smoker    Hematology/Oncology:         -- Anemia: Denies Current/Recent Cancer   Cardiovascular:   Hypertension Denies MI.  Denies CAD.        no hyperlipidemia ECG has been reviewed.    Pulmonary:   Denies COPD.  Denies Shortness of breath.  Denies Sleep Apnea.    Renal/:   Chronic Renal Disease    Hepatic/GI:   Liver Disease, (ESLD) Hepatitis, C    Neurological:   Denies Seizures.    Endocrine:   Diabetes    Psych:   Denies Psychiatric History.          Physical Exam  General:  Well nourished    Airway/Jaw/Neck:  Airway Findings: Mouth Opening: Normal Tongue: Normal  Mallampati: III  Improves to I with phonation.  TM Distance: Normal, at least 6 cm  Jaw/Neck Findings:     Neck ROM: Normal ROM      Dental:  Dental Findings: In tact    Chest/Lungs:  Chest/Lungs Findings: Clear to auscultation, Normal Respiratory Rate     Heart/Vascular:  Heart Findings: Rate: Normal  Rhythm: Regular Rhythm        Mental Status:  Mental Status Findings:  Cooperative, Alert and Oriented         Anesthesia Plan  Type of Anesthesia, risks & benefits discussed:  Anesthesia Type:  general    Patient's Preference:   Plan  Factors:          Intra-op Monitoring Plan: standard ASA monitors, arterial line, central line, [place]-Anjel and cardiac output  Intra-op Monitoring Plan Comments:   Post Op Pain Control Plan: multimodal analgesia, IV/PO Opioids PRN and per primary service following discharge from PACU  Post Op Pain Control Plan Comments:     Induction:   IV  Beta Blocker:  Patient is not currently on a Beta-Blocker (No further documentation required).       Informed Consent: Patient understands risks and agrees with Anesthesia plan.  Questions answered. Anesthesia consent signed with patient.  ASA Score: 4     Day of Surgery Review of History & Physical:    H&P update referred to the surgeon.         Ready For Surgery From Anesthesia Perspective.

## 2022-01-23 NOTE — TELEPHONE ENCOUNTER
"[facility] RISK CRITERIA BEHAVIOR DONOR ORGAN OFFER NOTE    Notified by Gilberto Burleson, , of liver offer with donor information.  Donor and recipient information read back and verified.  Spoke with Eder Kong and identified no acute medical issues during telephone assessment.  Patient instructed NPO. Patient is aware that some serologies on donor are still pending but HCV Ab is neg and HBcAb is neg.    Patient verbalized understanding that he/she has been called as primary recipient.    The donor's reported social history includes PHS risk criteria    The risk of getting HIV or other hepatitis viruses from this donor is very small compared to the risk of dying while waiting for another liver. The risk of clinical illness from any transmitted infection is also small due to the availability of highly effective oral drugs for all three of these viruses. While the patient has the right to decline any organ for any reason, available scientific data do not support turning down an organ based on Hepatitis C or behavioral risk factors as the risks associated with waiting longer for the transplant are many times greater. Informed patient that Dr. Austin thinks that this is a good liver for the patient.     Patient was asked if they have had a positive COVID-19 test or if they have any signs or symptoms. Informed patient that they will be tested for COVID-19 upon arrival to the hospital, unless have a previous positive result. If tested and result is positive, the transplant will not be able to occur, they will be inactivated on the wait list for 28 days per protocol and required to quarantine.     Patient was also informed that if he turned down the offer, "A transplant doctor will call you after we hang up". Patient was also informed that if he turned down this donor, he would not be punished or removed from the transplant list, that he would remain on the list at his current status/MELD score. However, by " waiting on the list longer rather than receiving this transplant, he will face a greater risk of death than any risk from the slight possibility of transmitted infection.    Patient was also informed that he would have the opportunity to see and talk to the surgeon when he got to the hospital and before he went down to the operating room.    Patient understands risk and agrees to proceed with transplant.

## 2022-01-23 NOTE — RESPIRATORY THERAPY
Received patient from the OR intubated with 7.5 ETT secured at 24cm at the lip. Placed on ventilator with documented settings. ABG done. Ambu bag at bedside. Will continue to monitor.

## 2022-01-23 NOTE — HPI
Eder Kong is our 60 yo M with HCC, Hep C, and DM who underwent DCD OLT on 1/23/22. The case was uncomplicated and surgical information can be found below. He was brought to the SICU postoperatively for further care. He arrived sedated on propofol, intubated, and on 0.04 of Levophed. His initial ABG demonstrated excellent ventilation and oxygenation. His iSTAT lactate was 4.38.    Fluids Administered:   Crystalloid (mL) 5,000   FFP (Units) 1   Cryoprecipitate (Units)  1   Platelets (Units) 1   Cell Saver (CCs) 286   Albumin (Units) 1,000        Drains: 19f Isaisa drains x 2     Additional Findings:  Estimated Blood Loss: 1,500 mL  Ascites Evacuated: 0 mL     Surgical Data:  Graft type (whole vs. partial): whole liver  IVC reconstruction: end to end ivc  Portal vein status: patent  Portal graft performed? no  Donor arterial anatomy: standard  Donor arterial inflow: common hepatic artery  Recipient arterial anatomy: standard  Recipient arterial inflow: common hepatic artery  Arterial graft performed? no  Biliary reconstruction: end to end (donor common hepatic duct to recipient common hepatic duct)  Biliary stent: none  Disposition of Vessels from donor of transplanted organ: sent to blood bank  Damage during procurement: no  Procurement damage comments: None     Donor Factors:  UNOS ID: [id number]  UNOS Match Run: [id number]  Donor type: donation after circulatory death   Donor with reported PHS risk criteria: yes  Donor CMV serologic status: Negative  Donor with known cancer: no  Donor HCV serologic status: Negative  Donor HBcAb: Negative  Donor HBV SHIVANI: Negative  Donor HCV SHIVANI: Negative  Liver weight: 1259 grams

## 2022-01-23 NOTE — OP NOTE
Operative Report    Date of Procedure: 1/23/2022  Date of Transplant (UNOS): 1/23/22    Surgeons:  Surgeon(s) and Role:     * Jesús Austin MD - Primary     * Aster Gay MD - Assisting     * Samy Gutierres MD - Fellow    First Assistant Attestation:  The presence of an additional attending surgeon functioning as first assistant was required due to the complexity of the procedure relative to any available residents. I certify that no resident was available who was qualified to serve as first assistant. Duties performed by the assistant included assisting the primary surgeon.    Pre-operative Diagnosis (UNOS): End Stage Liver Disease due to Primary Liver Malignancy: Hepatoma (HCC) and Cirrhosis, Cirrhosis: Type C and Liver cell carcinoma C22.0    Post-operative Diagnosis: Same; portal vein status:  patent    Principal Procedure Performed: Orthotopic Liver Transplant  (whole liver, DCD donor, biliary reconstruction end to end donor common hepatic duct to recipient common hepatic duct  )  Additional Procedures Performed:   None    Anesthesia: General endotracheal  Findings: cirrhosis and adhesions    Preamble  Indications and Patient Counseling: The patient is a 61 y.o. year-old male with Primary Liver Malignancy: Hepatoma (HCC) and Cirrhosis, Cirrhosis: Type C (UNOS terminology) who has been evaluated for a liver transplant.  The procedure was thoroughly discussed with the patient, including potential risks, complications, and alternatives. Specific complications mentioned included death, graft non-function, bleeding, infection, rejection, renal failure, respiratory failure, and neurologic deficits, as well as the possibility of other complications not specifically mentioned.    Donor Risk Factors:  Prior to the operation, the patient was advised of any donor-specific risk factors requiring specific disclosure. Factors in this case included PHS risk criteria  or donation after cardiac death.      Specific PHS  Donor Risk criteria for the organ donor include:  Drug injection for nonmedical reasons    All questions were answered, the patient voiced appropriate understanding, and he agreed to proceed with the planned procedure.    ABO Confirmation: Immediately following arrival of the donor organ and prior to implantation, a formal ABO confirmation was done according to hospital and UNOS policies.  I confirmed the UNOS ID number of the donor organ ([id number]) and the donor and recipient ABO types, directly verifying these data by comparison with the UNOS Match Run report ([id number].  This confirmation was personally done by an attending surgeon and circulating nurse, and is officially documented elsewhere.    Time-Out: A complete time out was carried out prior to incision, with confirmation of patient identity, correct procedure, correct operative site, appropriate antibiotic prophylaxis, review of any known allergies, and presence of all needed equipment.    Procedure in Detail  Following the induction of general endotracheal anesthesia, appropriate arterial and venous lines were placed by the anesthesia team.  Care was taken to pad all pressure points and avoid any potential traction injuries from positioning. The urinary bladder was catheterized.  Sequential compression boots and Leigha Huggers were used. The chest, abdomen, and upper thighs were sterilely prepped and draped.     The abdomen was entered via a wide bilateral subcostal incision. 0 mL of ascites was removed. The round ligament was ligated and divided. The falciform, left triangular, and gastrohepatic ligaments were divided using the electrocautery, with 2-0 silk ties as needed. The Crain self-retaining retractor was placed to provide exposure. Adhesions between the abdominal viscera and the abdominal wall and the undersurface of the liver were divided as needed using cautery dissection and silk ties or suture ligatures. Hilar dissection was then carried out,  with ligation of the right and left hepatic arteries as well as the cystic duct and the common hepatic duct. The recipient arterial anatomy was found to be: standard. The portal vein was exposed circumferentially from its bifurcation down to the superior border of the pancreas. The portal vein was found to be patent.  Attention was next directed to the right side of the liver where the right triangular ligament was taken down, exposing the bare area of the liver, and the IVC. The infrahepatic IVC was isolated, followed by mobilization of the retrohepatic cava, division of the right adrenal vein, and isolation of the suprahepatic IVC. The patient was given 2000 units of heparin prior to caval cross-clamping. . After assuring adequate stability after cross-clamping, the native liver was excised and the allograft liver was brought to the operative field.  The liver was perfused with cold 5% albumin during implantation to displace the organ preservation solution.  IVC reconstruction technique consisted of end to end ivc using 3-0 and 4-0 polypropylene as appropriate.  The donor portal vein was then anastomosed to the recipient portal inflow site (end portal vein) with 5-0 polypropylene. Once this was completed, anesthesia was notified and the liver was re-perfused. Copious irrigation with warm saline and temporary finger occlusion of portal inflow were used as needed to avoid any problems with hypothermia. Temporary packing, electrocautery, and polypropylene suture ligatures were used as appropriate to provide hemostasis. Once this was satisfactory, attention was directed to the arterial reconstruction. As per the protocol for livers from DCD donors, the allograft hepatic artery was infused with 2 mg of TPA and 5 mg of verapamil. Arterial inflow was provided to the graft by anastomosing the recipient common hepatic artery to the donor  common hepatic artery using 6-0 polypropylene. The liver was assessed for adequacy of  perfusion, which was satisfactory. The gallbladder was then removed from the allograft liver, and a temporary packing period was carried out to help assure hemostatis.   Attention was next directed toward the bile duct. The donor bile duct was prepared and assessed for adequate vascular supply. Biliary reconstruction was then performed by anastomosing the recipient common hepatic duct to the donor duct using 6-0 PDS sutures.  The biliary stent used was: none.  A final check for hemostasis was made. 2 19f Isaias drains were placed through separate incisions below the transverse abdominal incision and placed in the usual positions. The cavity was irrigated with saline. The abdomen was closed in layers using running #1 PDS suture. The incision was irrigated and the skin was closed with staples. At the end of the case all instrument, needle, and sponge counts were correct. The patient was taken to the ICU in good condition.     After reperfusion and hemostasis break, there was minimal but persistent oozing from some surfaces and suture holes. Additional products were given. A patch of Evarrest was placed over the adrenal gland which was friable. After a period of packing there was good hemostasis.     Fluids Administered:   Crystalloid (mL) 5,000   FFP (Units) 1   Cryoprecipitate (Units)  1   Platelets (Units) 1   Cell Saver (CCs) 286   Albumin (Units) 1,000      Specimens: Explant liver  Drains: 19f Isaias drains x 2    Additional Findings:    Estimated Blood Loss: 1,500 mL  Ascites Evacuated: 0 mL    Ischemic Times:  Time of portal reperfusion: 1/23/2022  7:26 AM  Time of arterial reperfusion: 1/23/2022  8:04 AM  Anastomosis (warm ischemia) time: 32 minutes  Cold ischemia time: 355 minutes    Surgical Data:  Graft type (whole vs. partial): whole liver  IVC reconstruction: end to end ivc  Portal vein status: patent  Portal graft performed? no  Donor arterial anatomy: standard  Donor arterial inflow: common hepatic  artery  Recipient arterial anatomy: standard  Recipient arterial inflow: common hepatic artery  Arterial graft performed? no  Biliary reconstruction: end to end (donor common hepatic duct to recipient common hepatic duct)  Biliary stent: none  Disposition of Vessels from donor of transplanted organ: sent to blood bank  Damage during procurement: no  Procurement damage comments: None    Donor Factors:  UNOS ID: [id number]  UNOS Match Run: [id number]  Donor type: donation after circulatory death   Donor with reported PHS increased risk criteria: yes  Donor CMV serologic status: Negative  Donor with known cancer: no  Donor HCV serologic status: Negative  Donor HBcAb: Negative  Donor HBV SHIVANI: Negative  Donor HCV SHIVANI: Negative  Liver weight: 1259 grams

## 2022-01-23 NOTE — OP NOTE
Certification of Assistant at Surgery       Surgery Date: 1/23/2022     Participating Surgeons:  Surgeon(s) and Role:     * Jesús Austin MD - Primary     * Aster Gay MD - Assisting     * Samy Gutierres MD - Fellow    Procedures:  Procedure(s) (LRB):  TRANSPLANT, LIVER (N/A)    Assistant Surgeon's Certification of Necessity:  I understand that section 1842 (b) (6) (d) of the Social Security Act generally prohibits Medicare Part B reasonable charge payment for the services of assistants at surgery in [facility] hospitals when qualified residents are available to furnish such services. I certify that the services for which payment is claimed were medically necessary, and that no qualified resident was available to perform the services. I further understand that these services are subject to post-payment review by the Medicare carrier.      Aster Gay MD    01/23/2022  7:44 AM

## 2022-01-23 NOTE — SUBJECTIVE & OBJECTIVE
"Interval HPI:   Overnight events: Admitted to SICU s/p liver transplant. Remains intubated at time of consult. BG within goal ranges with no insulin. Received Solu Medrol 500 mg this morning. No diet orders on file  Eating:   NPO  Nausea: No  Hypoglycemia and intervention: No  Fever: No  TPN and/or TF: No  If yes, type of TF/TPN and rate: n/a    PMH, PSH, FH, SH reviewed     ROS:  Unable to obtain due to: Sedation,Intubation,Altered mental status,Critical illness,Reviewed ROS from note dated 1/23/22 by JOHNNY Gomez    Review of Systems    Current Medications and/or Treatments Impacting Glycemic Control  Immunotherapy:    Immunosuppressants         Stop Route Frequency     tacrolimus (PROGRAF) 1 mg/mL oral syringe         -- Oral 2 times daily     mycophenolate mofetil 200 mg/mL suspension 1,000 mg         -- Oral 2 times daily        Steroids:   Hormones (From admission, onward)            Start     Stop Route Frequency Ordered    01/29/22 0900  predniSONE tablet 20 mg  (methylprednisolone taper panel)        "Followed by" Linked Group Details    -- Oral Daily 01/23/22 1058    01/28/22 0900  methylPREDNISolone sodium succinate injection 40 mg  (methylprednisolone taper panel)        "Followed by" Linked Group Details    01/29 0859 IV Daily 01/23/22 1058    01/27/22 0900  methylPREDNISolone sodium succinate injection 80 mg  (methylprednisolone taper panel)        "Followed by" Linked Group Details    01/28 0859 IV Daily 01/23/22 1058    01/26/22 0900  methylPREDNISolone sodium succinate injection 120 mg  (methylprednisolone taper panel)        "Followed by" Linked Group Details    01/27 0859 IV Daily 01/23/22 1058    01/25/22 0900  methylPREDNISolone sodium succinate injection 160 mg  (methylprednisolone taper panel)        "Followed by" Linked Group Details    01/26 0859 IV Daily 01/23/22 1058    01/24/22 0900  methylPREDNISolone sodium succinate injection 200 mg  (methylprednisolone taper panel)      " "  "Followed by" Linked Group Details    01/25 0859 IV Daily 01/23/22 1058        Pressors:    Autonomic Drugs (From admission, onward)            None        Hyperglycemia/Diabetes Medications:   Antihyperglycemics (From admission, onward)            None             PHYSICAL EXAMINATION:  Vitals:    01/23/22 1100   BP:    Pulse: 91   Resp: 20   Temp:      Body mass index is 25.57 kg/m².    Physical Exam   Constitutional:  Well developed, well nourished, NAD.  ENT: External ears no masses with nose patent  Neck:  Supple; trachea midline  Cardiovascular: Normal heart sounds, no LE edema.     Lungs:  Normal effort; lungs anterior bilaterally clear to auscultation.  Intubated on a ventilator.  Abdomen:  Soft, no masses, no hernias. Hypoactive BS noted.  MS: No clubbing or cyanosis of nails noted; unable to assess gait.  Skin: No rashes, lesions, or ulcers; no nodules.  Chevron abdominal incision with dressing; PRASANNA x 2 to RLQ.  Psychiatric: AHSAN  Neurological: AHSAN    "

## 2022-01-23 NOTE — SUBJECTIVE & OBJECTIVE
"Past Medical History:   Diagnosis Date    Anemia     Arthritis     Diabetes     Dizziness     Dyslipidemia     General anesthetics causing adverse effect in therapeutic use     Hep C w/o coma, chronic     failed 2 rounds of interferon-based medication. no protease inhibitorrs used    Hypertension     not on medication    Kidney stone     Liver cancer        Past Surgical History:   Procedure Laterality Date    AORTIC VALVE REPLACEMENT      due to aortic regurgitation    CARDIAC SURGERY      CATHETERIZATION OF BOTH LEFT AND RIGHT HEART N/A 2021    Procedure: CATHETERIZATION, HEART, BOTH LEFT AND RIGHT;  Surgeon: Eder Beltran MD;  Location: [facility] CATH LAB;  Service: Cardiology;  Laterality: N/A;    COLONOSCOPY W/ POLYPECTOMY          ESOPHAGOGASTRODUODENOSCOPY      x2 with cautery in ,     mastoid bone      right ear       Review of patient's allergies indicates:  No Known Allergies    Family History     Problem Relation (Age of Onset)    Diabetes Sister, Brother        Tobacco Use    Smoking status: Former Smoker     Packs/day: 1.00     Years: 25.00     Pack years: 25.00     Quit date: 1999     Years since quittin.6    Smokeless tobacco: Never Used   Substance and Sexual Activity    Alcohol use: No     Comment: quit     Drug use: No    Sexual activity: Not on file       PTA Medications   Medication Sig    aspirin (ECOTRIN) 81 MG EC tablet every evening.     BD ULTRA-FINE MINI PEN NEEDLE 31 gauge x 3/16" Ndle USE AS DIRECTED EVERY DAY    benazepriL (LOTENSIN) 5 MG tablet Take 5 mg by mouth once daily.    calcium carbonate (OS-EVELYN) 600 mg calcium (1,500 mg) Tab Take 600 mg by mouth 2 (two) times a day.    carvediloL (COREG) 12.5 MG tablet Take 3.125 mg by mouth 2 (two) times daily.     cholecalciferol, vitamin D3, (VITAMIN D3 ORAL) Take by mouth 2 (two) times a day.    CONTOUR NEXT TEST STRIPS Strp USE TO TEST BLOOD GLUCOSE TWICE DAILY    " EScitalopram oxalate (LEXAPRO) 5 MG Tab Take 5 mg by mouth once daily.    ferrous sulfate (FEOSOL) 325 mg (65 mg iron) Tab tablet Take 325 mg by mouth 2 (two) times daily.     JANUVIA 50 mg Tab Take 50 mg by mouth once daily.     ondansetron (ZOFRAN) 4 MG tablet Take 1 tablet (4 mg total) by mouth 2 (two) times daily.    oxyCODONE (ROXICODONE) 5 MG immediate release tablet Take 1 tablet (5 mg total) by mouth every 4 (four) hours as needed for Pain.    oxyCODONE-acetaminophen (PERCOCET) 5-325 mg per tablet Take 1 tablet by mouth every 8 (eight) hours as needed for Pain.    OZEMPIC 0.25 mg or 0.5 mg(2 mg/1.5 mL) PnIj INJECT 0.25 MG INTO THE SKIN EVERY WEEK FOR 4 WEEKS THEN INCREASE TO 0.5 MG INTO THE SKIN EVERY WEEK    rifAXIMin (XIFAXAN) 550 mg Tab Take 550 mg by mouth 2 (two) times daily.     TOUJEO SOLOSTAR U-300 INSULIN 300 unit/mL (1.5 mL) InPn pen as needed.        Review of Systems   Constitutional: Negative for activity change and appetite change.   Eyes: Negative for visual disturbance.   Respiratory: Negative for cough and shortness of breath.    Cardiovascular: Negative for chest pain, palpitations and leg swelling.   Gastrointestinal: Negative for abdominal distention, abdominal pain, constipation, diarrhea, nausea and vomiting.   Genitourinary: Negative for difficulty urinating.   Musculoskeletal: Negative for arthralgias.   Skin: Negative for wound.   Allergic/Immunologic: Negative for immunocompromised state.   Neurological: Negative for dizziness.   Hematological: Negative for adenopathy. Does not bruise/bleed easily.   Psychiatric/Behavioral: Negative for agitation.     Objective:     Vital Signs (Most Recent):  Temp: 98.2 °F (36.8 °C) (01/22/22 2316)  Pulse: 83 (01/22/22 2316)  Resp: 18 (01/22/22 2316)  BP: (!) 141/73 (01/22/22 2316)  SpO2: 98 % (01/22/22 2316) Vital Signs (24h Range):  Temp:  [98.2 °F (36.8 °C)] 98.2 °F (36.8 °C)  Pulse:  [83] 83  Resp:  [18] 18  SpO2:  [98 %] 98 %  BP:  (141)/(73) 141/73     Weight: 69.7 kg (153 lb 10.6 oz)  Body mass index is 25.57 kg/m².    No intake or output data in the 24 hours ending 01/23/22 0014    Physical Exam  Vitals and nursing note reviewed.   Constitutional:       Appearance: He is well-developed and well-nourished.   HENT:      Head: Normocephalic.   Eyes:      Conjunctiva/sclera: Conjunctivae normal.   Cardiovascular:      Rate and Rhythm: Normal rate and regular rhythm.      Heart sounds: No murmur heard.      Pulmonary:      Effort: Pulmonary effort is normal.      Breath sounds: Normal breath sounds.   Abdominal:      General: Bowel sounds are normal. There is no distension.      Palpations: Abdomen is soft.      Tenderness: There is no abdominal tenderness.   Musculoskeletal:         General: Normal range of motion.      Cervical back: Normal range of motion.   Skin:     General: Skin is warm and dry.      Capillary Refill: Capillary refill takes less than 2 seconds.   Neurological:      Mental Status: He is alert and oriented to person, place, and time.   Psychiatric:         Mood and Affect: Mood and affect normal.         Behavior: Behavior normal.         Thought Content: Thought content normal.         Judgment: Judgment normal.         Laboratory:  CBC:   Recent Labs   Lab 01/22/22  2328   WBC 4.94   RBC 3.82*   HGB 11.7*   HCT 36.5*   *   MCV 96   MCH 30.6   MCHC 32.1     CMP: No results for input(s): GLU, CALCIUM, ALBUMIN, PROT, NA, K, CO2, CL, BUN, CREATININE, ALKPHOS, ALT, AST, BILITOT in the last 168 hours.  Labs within the past 24 hours have been reviewed.    Diagnostic Results:  XR Chest: No results found for this or any previous visit.

## 2022-01-23 NOTE — TELEPHONE ENCOUNTER
On Call SW Note    On Call Transplant SW reviewed patients chart for psychosocial barriers or concerns as patient may have an organ offer. No concerns/needs identified or indicated at this time. Transplant SW remains available.     If patient is transplanted, transplant social work to follow. Transplant social work team remains available

## 2022-01-23 NOTE — PROGRESS NOTES
Pt to OR for liver transplant with escort.  VSS, SCDs and Teds on  Wife notified and updated on patient care

## 2022-01-24 LAB
ALBUMIN SERPL BCP-MCNC: 3.1 G/DL (ref 3.5–5.2)
ALLENS TEST: ABNORMAL
ALLENS TEST: ABNORMAL
ALP SERPL-CCNC: 140 U/L (ref 55–135)
ALT SERPL W/O P-5'-P-CCNC: 1706 U/L (ref 10–44)
ANION GAP SERPL CALC-SCNC: 5 MMOL/L (ref 8–16)
ANION GAP SERPL CALC-SCNC: 8 MMOL/L (ref 8–16)
ANION GAP SERPL CALC-SCNC: 9 MMOL/L (ref 8–16)
ANISOCYTOSIS BLD QL SMEAR: SLIGHT
APTT BLDCRRT: 29.3 SEC (ref 21–32)
APTT BLDCRRT: 32.5 SEC (ref 21–32)
APTT BLDCRRT: 35.7 SEC (ref 21–32)
APTT BLDCRRT: 35.7 SEC (ref 21–32)
APTT BLDCRRT: 37.4 SEC (ref 21–32)
APTT BLDCRRT: 37.6 SEC (ref 21–32)
AST SERPL-CCNC: 1157 U/L (ref 10–40)
AST SERPL-CCNC: 1159 U/L (ref 10–40)
AST SERPL-CCNC: 1277 U/L (ref 10–40)
AST SERPL-CCNC: 1732 U/L (ref 10–40)
AST SERPL-CCNC: 2243 U/L (ref 10–40)
AST SERPL-CCNC: 3215 U/L (ref 10–40)
BACTERIA #/AREA URNS AUTO: ABNORMAL /HPF
BACTERIA UR CULT: NO GROWTH
BASO STIPL BLD QL SMEAR: ABNORMAL
BASO STIPL BLD QL SMEAR: ABNORMAL
BASOPHILS # BLD AUTO: 0.01 K/UL (ref 0–0.2)
BASOPHILS # BLD AUTO: 0.02 K/UL (ref 0–0.2)
BASOPHILS # BLD AUTO: 0.02 K/UL (ref 0–0.2)
BASOPHILS # BLD AUTO: ABNORMAL K/UL (ref 0–0.2)
BASOPHILS NFR BLD: 0 % (ref 0–1.9)
BASOPHILS NFR BLD: 0.1 % (ref 0–1.9)
BASOPHILS NFR BLD: 0.2 % (ref 0–1.9)
BILIRUB DIRECT SERPL-MCNC: 1.9 MG/DL (ref 0.1–0.3)
BILIRUB SERPL-MCNC: 2.7 MG/DL (ref 0.1–1)
BILIRUB UR QL STRIP: NEGATIVE
BLD PROD TYP BPU: NORMAL
BLD PROD TYP BPU: NORMAL
BLOOD UNIT EXPIRATION DATE: NORMAL
BLOOD UNIT EXPIRATION DATE: NORMAL
BLOOD UNIT TYPE CODE: 6200
BLOOD UNIT TYPE CODE: 6200
BLOOD UNIT TYPE: NORMAL
BLOOD UNIT TYPE: NORMAL
BUN SERPL-MCNC: 25 MG/DL (ref 8–23)
BUN SERPL-MCNC: 26 MG/DL (ref 8–23)
BUN SERPL-MCNC: 27 MG/DL (ref 8–23)
CALCIUM SERPL-MCNC: 8 MG/DL (ref 8.7–10.5)
CALCIUM SERPL-MCNC: 8.1 MG/DL (ref 8.7–10.5)
CALCIUM SERPL-MCNC: 8.5 MG/DL (ref 8.7–10.5)
CHLORIDE SERPL-SCNC: 112 MMOL/L (ref 95–110)
CHLORIDE SERPL-SCNC: 112 MMOL/L (ref 95–110)
CHLORIDE SERPL-SCNC: 113 MMOL/L (ref 95–110)
CLARITY UR REFRACT.AUTO: ABNORMAL
CO2 SERPL-SCNC: 19 MMOL/L (ref 23–29)
CO2 SERPL-SCNC: 20 MMOL/L (ref 23–29)
CO2 SERPL-SCNC: 23 MMOL/L (ref 23–29)
CODING SYSTEM: NORMAL
CODING SYSTEM: NORMAL
COLOR UR AUTO: ABNORMAL
CREAT SERPL-MCNC: 0.8 MG/DL (ref 0.5–1.4)
DELSYS: ABNORMAL
DELSYS: ABNORMAL
DIFFERENTIAL METHOD: ABNORMAL
DISPENSE STATUS: NORMAL
DISPENSE STATUS: NORMAL
EOSINOPHIL # BLD AUTO: 0 K/UL (ref 0–0.5)
EOSINOPHIL # BLD AUTO: ABNORMAL K/UL (ref 0–0.5)
EOSINOPHIL NFR BLD: 0 % (ref 0–8)
ERYTHROCYTE [DISTWIDTH] IN BLOOD BY AUTOMATED COUNT: 14.6 % (ref 11.5–14.5)
ERYTHROCYTE [DISTWIDTH] IN BLOOD BY AUTOMATED COUNT: 14.6 % (ref 11.5–14.5)
ERYTHROCYTE [DISTWIDTH] IN BLOOD BY AUTOMATED COUNT: 14.8 % (ref 11.5–14.5)
ERYTHROCYTE [DISTWIDTH] IN BLOOD BY AUTOMATED COUNT: 14.8 % (ref 11.5–14.5)
ERYTHROCYTE [DISTWIDTH] IN BLOOD BY AUTOMATED COUNT: 14.9 % (ref 11.5–14.5)
ERYTHROCYTE [DISTWIDTH] IN BLOOD BY AUTOMATED COUNT: 15.1 % (ref 11.5–14.5)
ERYTHROCYTE [SEDIMENTATION RATE] IN BLOOD BY WESTERGREN METHOD: 18 MM/H
EST. GFR  (AFRICAN AMERICAN): >60 ML/MIN/1.73 M^2
EST. GFR  (NON AFRICAN AMERICAN): >60 ML/MIN/1.73 M^2
FIBRINOGEN PPP-MCNC: 151 MG/DL (ref 182–400)
FIO2: 40
FIO2: 40
GLUCOSE SERPL-MCNC: 129 MG/DL (ref 70–110)
GLUCOSE SERPL-MCNC: 139 MG/DL (ref 70–110)
GLUCOSE SERPL-MCNC: 139 MG/DL (ref 70–110)
GLUCOSE SERPL-MCNC: 142 MG/DL (ref 70–110)
GLUCOSE SERPL-MCNC: 144 MG/DL (ref 70–110)
GLUCOSE SERPL-MCNC: 146 MG/DL (ref 70–110)
GLUCOSE SERPL-MCNC: 176 MG/DL (ref 70–110)
GLUCOSE SERPL-MCNC: 177 MG/DL (ref 70–110)
GLUCOSE UR QL STRIP: ABNORMAL
HBV CORE AB SERPL QL IA: POSITIVE
HBV SURFACE AB SER-ACNC: NEGATIVE M[IU]/ML
HBV SURFACE AG SERPL QL IA: NEGATIVE
HCO3 UR-SCNC: 21.6 MMOL/L (ref 24–28)
HCO3 UR-SCNC: 22.9 MMOL/L (ref 24–28)
HCO3 UR-SCNC: 23.3 MMOL/L (ref 24–28)
HCO3 UR-SCNC: 23.7 MMOL/L (ref 24–28)
HCO3 UR-SCNC: 23.8 MMOL/L (ref 24–28)
HCO3 UR-SCNC: 25.3 MMOL/L (ref 24–28)
HCO3 UR-SCNC: 29.8 MMOL/L (ref 24–28)
HCT VFR BLD AUTO: 26.4 % (ref 40–54)
HCT VFR BLD AUTO: 27.1 % (ref 40–54)
HCT VFR BLD AUTO: 27.2 % (ref 40–54)
HCT VFR BLD AUTO: 27.4 % (ref 40–54)
HCT VFR BLD AUTO: 27.8 % (ref 40–54)
HCT VFR BLD AUTO: 28.9 % (ref 40–54)
HCT VFR BLD CALC: 23 %PCV (ref 36–54)
HCT VFR BLD CALC: 23 %PCV (ref 36–54)
HCT VFR BLD CALC: 24 %PCV (ref 36–54)
HCT VFR BLD CALC: 26 %PCV (ref 36–54)
HCT VFR BLD CALC: 32 %PCV (ref 36–54)
HCV AB SERPL QL IA: POSITIVE
HGB BLD-MCNC: 8.6 G/DL (ref 14–18)
HGB BLD-MCNC: 8.9 G/DL (ref 14–18)
HGB BLD-MCNC: 8.9 G/DL (ref 14–18)
HGB BLD-MCNC: 9 G/DL (ref 14–18)
HGB BLD-MCNC: 9 G/DL (ref 14–18)
HGB BLD-MCNC: 9.4 G/DL (ref 14–18)
HGB UR QL STRIP: ABNORMAL
HIV 1+2 AB+HIV1 P24 AG SERPL QL IA: NEGATIVE
HYALINE CASTS UR QL AUTO: 0 /LPF
HYPOCHROMIA BLD QL SMEAR: ABNORMAL
IMM GRANULOCYTES # BLD AUTO: 0.03 K/UL (ref 0–0.04)
IMM GRANULOCYTES # BLD AUTO: 0.04 K/UL (ref 0–0.04)
IMM GRANULOCYTES # BLD AUTO: 0.05 K/UL (ref 0–0.04)
IMM GRANULOCYTES # BLD AUTO: 0.06 K/UL (ref 0–0.04)
IMM GRANULOCYTES # BLD AUTO: 0.06 K/UL (ref 0–0.04)
IMM GRANULOCYTES # BLD AUTO: ABNORMAL K/UL (ref 0–0.04)
IMM GRANULOCYTES NFR BLD AUTO: 0.3 % (ref 0–0.5)
IMM GRANULOCYTES NFR BLD AUTO: 0.4 % (ref 0–0.5)
IMM GRANULOCYTES NFR BLD AUTO: 0.5 % (ref 0–0.5)
IMM GRANULOCYTES NFR BLD AUTO: ABNORMAL % (ref 0–0.5)
INR PPP: 1.6 (ref 0.8–1.2)
INR PPP: 1.8 (ref 0.8–1.2)
INR PPP: 2.2 (ref 0.8–1.2)
INR PPP: 2.3 (ref 0.8–1.2)
KETONES UR QL STRIP: ABNORMAL
LEUKOCYTE ESTERASE UR QL STRIP: ABNORMAL
LYMPHOCYTES # BLD AUTO: 0.1 K/UL (ref 1–4.8)
LYMPHOCYTES # BLD AUTO: ABNORMAL K/UL (ref 1–4.8)
LYMPHOCYTES NFR BLD: 0.6 % (ref 18–48)
LYMPHOCYTES NFR BLD: 0.7 % (ref 18–48)
LYMPHOCYTES NFR BLD: 1 % (ref 18–48)
LYMPHOCYTES NFR BLD: 1 % (ref 18–48)
LYMPHOCYTES NFR BLD: 1.1 % (ref 18–48)
LYMPHOCYTES NFR BLD: 1.2 % (ref 18–48)
MAGNESIUM SERPL-MCNC: 1.5 MG/DL (ref 1.6–2.6)
MAGNESIUM SERPL-MCNC: 2.2 MG/DL (ref 1.6–2.6)
MCH RBC QN AUTO: 30.2 PG (ref 27–31)
MCH RBC QN AUTO: 30.4 PG (ref 27–31)
MCH RBC QN AUTO: 30.4 PG (ref 27–31)
MCH RBC QN AUTO: 30.6 PG (ref 27–31)
MCH RBC QN AUTO: 31 PG (ref 27–31)
MCH RBC QN AUTO: 31.1 PG (ref 27–31)
MCHC RBC AUTO-ENTMCNC: 32.4 G/DL (ref 32–36)
MCHC RBC AUTO-ENTMCNC: 32.5 G/DL (ref 32–36)
MCHC RBC AUTO-ENTMCNC: 32.5 G/DL (ref 32–36)
MCHC RBC AUTO-ENTMCNC: 32.6 G/DL (ref 32–36)
MCHC RBC AUTO-ENTMCNC: 32.8 G/DL (ref 32–36)
MCHC RBC AUTO-ENTMCNC: 33.1 G/DL (ref 32–36)
MCV RBC AUTO: 93 FL (ref 82–98)
MCV RBC AUTO: 93 FL (ref 82–98)
MCV RBC AUTO: 94 FL (ref 82–98)
MCV RBC AUTO: 96 FL (ref 82–98)
MICROSCOPIC COMMENT: ABNORMAL
MIN VOL: 7
MODE: ABNORMAL
MODE: ABNORMAL
MONOCYTES # BLD AUTO: 0.1 K/UL (ref 0.3–1)
MONOCYTES # BLD AUTO: 0.3 K/UL (ref 0.3–1)
MONOCYTES # BLD AUTO: 0.4 K/UL (ref 0.3–1)
MONOCYTES # BLD AUTO: 0.5 K/UL (ref 0.3–1)
MONOCYTES # BLD AUTO: 0.5 K/UL (ref 0.3–1)
MONOCYTES # BLD AUTO: ABNORMAL K/UL (ref 0.3–1)
MONOCYTES NFR BLD: 1.1 % (ref 4–15)
MONOCYTES NFR BLD: 2.5 % (ref 4–15)
MONOCYTES NFR BLD: 2.8 % (ref 4–15)
MONOCYTES NFR BLD: 3 % (ref 4–15)
MONOCYTES NFR BLD: 3.8 % (ref 4–15)
MONOCYTES NFR BLD: 4.4 % (ref 4–15)
NEUTROPHILS # BLD AUTO: 10.8 K/UL (ref 1.8–7.7)
NEUTROPHILS # BLD AUTO: 10.9 K/UL (ref 1.8–7.7)
NEUTROPHILS # BLD AUTO: 11.8 K/UL (ref 1.8–7.7)
NEUTROPHILS # BLD AUTO: 13.7 K/UL (ref 1.8–7.7)
NEUTROPHILS # BLD AUTO: 9.8 K/UL (ref 1.8–7.7)
NEUTROPHILS NFR BLD: 93.9 % (ref 38–73)
NEUTROPHILS NFR BLD: 94 % (ref 38–73)
NEUTROPHILS NFR BLD: 94.6 % (ref 38–73)
NEUTROPHILS NFR BLD: 95.7 % (ref 38–73)
NEUTROPHILS NFR BLD: 96.3 % (ref 38–73)
NEUTROPHILS NFR BLD: 97.7 % (ref 38–73)
NEUTS BAND NFR BLD MANUAL: 2 %
NITRITE UR QL STRIP: NEGATIVE
NRBC BLD-RTO: 0 /100 WBC
OVALOCYTES BLD QL SMEAR: ABNORMAL
PCO2 BLDA: 35.1 MMHG (ref 35–45)
PCO2 BLDA: 35.4 MMHG (ref 35–45)
PCO2 BLDA: 36.6 MMHG (ref 35–45)
PCO2 BLDA: 38.7 MMHG (ref 35–45)
PCO2 BLDA: 40.2 MMHG (ref 35–45)
PCO2 BLDA: 42.6 MMHG (ref 35–45)
PCO2 BLDA: 43.5 MMHG (ref 35–45)
PEEP: 5
PEEP: 5
PH SMN: 7.33 [PH] (ref 7.35–7.45)
PH SMN: 7.34 [PH] (ref 7.35–7.45)
PH SMN: 7.38 [PH] (ref 7.35–7.45)
PH SMN: 7.38 [PH] (ref 7.35–7.45)
PH SMN: 7.44 [PH] (ref 7.35–7.45)
PH SMN: 7.46 [PH] (ref 7.35–7.45)
PH SMN: 7.5 [PH] (ref 7.35–7.45)
PH UR STRIP: 6 [PH] (ref 5–8)
PHOSPHATE SERPL-MCNC: 2.4 MG/DL (ref 2.7–4.5)
PHOSPHATE SERPL-MCNC: 3.8 MG/DL (ref 2.7–4.5)
PIP: 17
PLATELET # BLD AUTO: 108 K/UL (ref 150–450)
PLATELET # BLD AUTO: 69 K/UL (ref 150–450)
PLATELET # BLD AUTO: 72 K/UL (ref 150–450)
PLATELET # BLD AUTO: 73 K/UL (ref 150–450)
PLATELET # BLD AUTO: 81 K/UL (ref 150–450)
PLATELET # BLD AUTO: 99 K/UL (ref 150–450)
PLATELET BLD QL SMEAR: ABNORMAL
PMV BLD AUTO: 10.7 FL (ref 9.2–12.9)
PMV BLD AUTO: 10.8 FL (ref 9.2–12.9)
PMV BLD AUTO: 11.1 FL (ref 9.2–12.9)
PMV BLD AUTO: 11.1 FL (ref 9.2–12.9)
PMV BLD AUTO: 11.2 FL (ref 9.2–12.9)
PMV BLD AUTO: 11.5 FL (ref 9.2–12.9)
PO2 BLDA: 154 MMHG (ref 80–100)
PO2 BLDA: 159 MMHG (ref 80–100)
PO2 BLDA: 225 MMHG (ref 80–100)
PO2 BLDA: 283 MMHG (ref 80–100)
PO2 BLDA: 306 MMHG (ref 80–100)
PO2 BLDA: 528 MMHG (ref 80–100)
PO2 BLDA: 556 MMHG (ref 80–100)
POC BE: -1 MMOL/L
POC BE: -2 MMOL/L
POC BE: -3 MMOL/L
POC BE: -4 MMOL/L
POC BE: 0 MMOL/L
POC BE: 2 MMOL/L
POC BE: 7 MMOL/L
POC IONIZED CALCIUM: 0.82 MMOL/L (ref 1.06–1.42)
POC IONIZED CALCIUM: 0.97 MMOL/L (ref 1.06–1.42)
POC IONIZED CALCIUM: 1.04 MMOL/L (ref 1.06–1.42)
POC IONIZED CALCIUM: 1.06 MMOL/L (ref 1.06–1.42)
POC IONIZED CALCIUM: 1.21 MMOL/L (ref 1.06–1.42)
POC SATURATED O2: 100 % (ref 95–100)
POC SATURATED O2: 99 % (ref 95–100)
POC SATURATED O2: 99 % (ref 95–100)
POC TCO2: 23 MMOL/L (ref 23–27)
POC TCO2: 24 MMOL/L (ref 23–27)
POC TCO2: 25 MMOL/L (ref 23–27)
POC TCO2: 26 MMOL/L (ref 23–27)
POC TCO2: 31 MMOL/L (ref 23–27)
POCT GLUCOSE: 109 MG/DL (ref 70–110)
POCT GLUCOSE: 122 MG/DL (ref 70–110)
POCT GLUCOSE: 127 MG/DL (ref 70–110)
POCT GLUCOSE: 130 MG/DL (ref 70–110)
POCT GLUCOSE: 139 MG/DL (ref 70–110)
POCT GLUCOSE: 140 MG/DL (ref 70–110)
POCT GLUCOSE: 151 MG/DL (ref 70–110)
POCT GLUCOSE: 157 MG/DL (ref 70–110)
POCT GLUCOSE: 158 MG/DL (ref 70–110)
POCT GLUCOSE: 158 MG/DL (ref 70–110)
POCT GLUCOSE: 162 MG/DL (ref 70–110)
POCT GLUCOSE: 163 MG/DL (ref 70–110)
POCT GLUCOSE: 168 MG/DL (ref 70–110)
POCT GLUCOSE: 171 MG/DL (ref 70–110)
POCT GLUCOSE: 174 MG/DL (ref 70–110)
POCT GLUCOSE: 176 MG/DL (ref 70–110)
POCT GLUCOSE: 189 MG/DL (ref 70–110)
POCT GLUCOSE: 190 MG/DL (ref 70–110)
POCT GLUCOSE: 193 MG/DL (ref 70–110)
POCT GLUCOSE: 195 MG/DL (ref 70–110)
POCT GLUCOSE: 200 MG/DL (ref 70–110)
POCT GLUCOSE: 204 MG/DL (ref 70–110)
POCT GLUCOSE: 207 MG/DL (ref 70–110)
POCT GLUCOSE: 210 MG/DL (ref 70–110)
POIKILOCYTOSIS BLD QL SMEAR: SLIGHT
POLYCHROMASIA BLD QL SMEAR: ABNORMAL
POTASSIUM BLD-SCNC: 3.4 MMOL/L (ref 3.5–5.1)
POTASSIUM BLD-SCNC: 3.5 MMOL/L (ref 3.5–5.1)
POTASSIUM BLD-SCNC: 3.9 MMOL/L (ref 3.5–5.1)
POTASSIUM BLD-SCNC: 3.9 MMOL/L (ref 3.5–5.1)
POTASSIUM BLD-SCNC: 4 MMOL/L (ref 3.5–5.1)
POTASSIUM SERPL-SCNC: 4 MMOL/L (ref 3.5–5.1)
POTASSIUM SERPL-SCNC: 4.2 MMOL/L (ref 3.5–5.1)
POTASSIUM SERPL-SCNC: 4.3 MMOL/L (ref 3.5–5.1)
PROT SERPL-MCNC: 4.9 G/DL (ref 6–8.4)
PROT UR QL STRIP: ABNORMAL
PROTHROMBIN TIME: 17 SEC (ref 9–12.5)
PROTHROMBIN TIME: 19 SEC (ref 9–12.5)
PROTHROMBIN TIME: 23 SEC (ref 9–12.5)
PROTHROMBIN TIME: 23.1 SEC (ref 9–12.5)
PROTHROMBIN TIME: 23.2 SEC (ref 9–12.5)
PROTHROMBIN TIME: 23.8 SEC (ref 9–12.5)
PS: 10
RBC # BLD AUTO: 2.85 M/UL (ref 4.6–6.2)
RBC # BLD AUTO: 2.89 M/UL (ref 4.6–6.2)
RBC # BLD AUTO: 2.9 M/UL (ref 4.6–6.2)
RBC # BLD AUTO: 2.91 M/UL (ref 4.6–6.2)
RBC # BLD AUTO: 2.93 M/UL (ref 4.6–6.2)
RBC # BLD AUTO: 3.09 M/UL (ref 4.6–6.2)
RBC #/AREA URNS AUTO: >100 /HPF (ref 0–4)
SAMPLE: ABNORMAL
SITE: ABNORMAL
SITE: ABNORMAL
SODIUM BLD-SCNC: 136 MMOL/L (ref 136–145)
SODIUM BLD-SCNC: 138 MMOL/L (ref 136–145)
SODIUM BLD-SCNC: 140 MMOL/L (ref 136–145)
SODIUM SERPL-SCNC: 140 MMOL/L (ref 136–145)
SODIUM SERPL-SCNC: 140 MMOL/L (ref 136–145)
SODIUM SERPL-SCNC: 141 MMOL/L (ref 136–145)
SP GR UR STRIP: 1.02 (ref 1–1.03)
SP02: 100
SPONT RATE: 13
UNIT NUMBER: NORMAL
UNIT NUMBER: NORMAL
URN SPEC COLLECT METH UR: ABNORMAL
VT: 400
WBC # BLD AUTO: 10.43 K/UL (ref 3.9–12.7)
WBC # BLD AUTO: 11.17 K/UL (ref 3.9–12.7)
WBC # BLD AUTO: 11.25 K/UL (ref 3.9–12.7)
WBC # BLD AUTO: 12.49 K/UL (ref 3.9–12.7)
WBC # BLD AUTO: 14.21 K/UL (ref 3.9–12.7)
WBC # BLD AUTO: 9.17 K/UL (ref 3.9–12.7)
WBC #/AREA URNS AUTO: >100 /HPF (ref 0–5)
WBC CLUMPS UR QL AUTO: ABNORMAL
YEAST UR QL AUTO: ABNORMAL

## 2022-01-24 PROCEDURE — 85730 THROMBOPLASTIN TIME PARTIAL: CPT | Mod: 91 | Performed by: TRANSPLANT SURGERY

## 2022-01-24 PROCEDURE — 63600175 PHARM REV CODE 636 W HCPCS: Mod: JG | Performed by: STUDENT IN AN ORGANIZED HEALTH CARE EDUCATION/TRAINING PROGRAM

## 2022-01-24 PROCEDURE — 85025 COMPLETE CBC W/AUTO DIFF WBC: CPT | Performed by: TRANSPLANT SURGERY

## 2022-01-24 PROCEDURE — 25000003 PHARM REV CODE 250: Performed by: NURSE PRACTITIONER

## 2022-01-24 PROCEDURE — 82248 BILIRUBIN DIRECT: CPT | Performed by: TRANSPLANT SURGERY

## 2022-01-24 PROCEDURE — 94150 VITAL CAPACITY TEST: CPT

## 2022-01-24 PROCEDURE — 80053 COMPREHEN METABOLIC PANEL: CPT | Performed by: TRANSPLANT SURGERY

## 2022-01-24 PROCEDURE — 25000003 PHARM REV CODE 250: Performed by: STUDENT IN AN ORGANIZED HEALTH CARE EDUCATION/TRAINING PROGRAM

## 2022-01-24 PROCEDURE — 84450 TRANSFERASE (AST) (SGOT): CPT | Performed by: TRANSPLANT SURGERY

## 2022-01-24 PROCEDURE — 27200966 HC CLOSED SUCTION SYSTEM

## 2022-01-24 PROCEDURE — 99900035 HC TECH TIME PER 15 MIN (STAT)

## 2022-01-24 PROCEDURE — 99233 PR SUBSEQUENT HOSPITAL CARE,LEVL III: ICD-10-PCS | Mod: ,,, | Performed by: NURSE PRACTITIONER

## 2022-01-24 PROCEDURE — 83735 ASSAY OF MAGNESIUM: CPT | Performed by: STUDENT IN AN ORGANIZED HEALTH CARE EDUCATION/TRAINING PROGRAM

## 2022-01-24 PROCEDURE — 99291 CRITICAL CARE FIRST HOUR: CPT | Mod: 24,25,, | Performed by: SURGERY

## 2022-01-24 PROCEDURE — 99900017 HC EXTUBATION W/PARAMETERS (STAT)

## 2022-01-24 PROCEDURE — 84100 ASSAY OF PHOSPHORUS: CPT | Performed by: STUDENT IN AN ORGANIZED HEALTH CARE EDUCATION/TRAINING PROGRAM

## 2022-01-24 PROCEDURE — 81001 URINALYSIS AUTO W/SCOPE: CPT | Performed by: STUDENT IN AN ORGANIZED HEALTH CARE EDUCATION/TRAINING PROGRAM

## 2022-01-24 PROCEDURE — 27201037 HC PRESSURE MONITORING SET UP

## 2022-01-24 PROCEDURE — 94761 N-INVAS EAR/PLS OXIMETRY MLT: CPT

## 2022-01-24 PROCEDURE — 85610 PROTHROMBIN TIME: CPT | Performed by: TRANSPLANT SURGERY

## 2022-01-24 PROCEDURE — P9035 PLATELET PHERES LEUKOREDUCED: HCPCS | Performed by: STUDENT IN AN ORGANIZED HEALTH CARE EDUCATION/TRAINING PROGRAM

## 2022-01-24 PROCEDURE — 84100 ASSAY OF PHOSPHORUS: CPT | Mod: 91 | Performed by: TRANSPLANT SURGERY

## 2022-01-24 PROCEDURE — 85610 PROTHROMBIN TIME: CPT | Mod: 91 | Performed by: TRANSPLANT SURGERY

## 2022-01-24 PROCEDURE — P9045 ALBUMIN (HUMAN), 5%, 250 ML: HCPCS | Mod: JG | Performed by: STUDENT IN AN ORGANIZED HEALTH CARE EDUCATION/TRAINING PROGRAM

## 2022-01-24 PROCEDURE — 20000000 HC ICU ROOM

## 2022-01-24 PROCEDURE — 37799 UNLISTED PX VASCULAR SURGERY: CPT

## 2022-01-24 PROCEDURE — 82803 BLOOD GASES ANY COMBINATION: CPT

## 2022-01-24 PROCEDURE — 63600175 PHARM REV CODE 636 W HCPCS: Performed by: STUDENT IN AN ORGANIZED HEALTH CARE EDUCATION/TRAINING PROGRAM

## 2022-01-24 PROCEDURE — 83735 ASSAY OF MAGNESIUM: CPT | Mod: 91 | Performed by: TRANSPLANT SURGERY

## 2022-01-24 PROCEDURE — 99233 SBSQ HOSP IP/OBS HIGH 50: CPT | Mod: ,,, | Performed by: NURSE PRACTITIONER

## 2022-01-24 PROCEDURE — 85384 FIBRINOGEN ACTIVITY: CPT | Performed by: STUDENT IN AN ORGANIZED HEALTH CARE EDUCATION/TRAINING PROGRAM

## 2022-01-24 PROCEDURE — 99900026 HC AIRWAY MAINTENANCE (STAT)

## 2022-01-24 PROCEDURE — 36430 TRANSFUSION BLD/BLD COMPNT: CPT

## 2022-01-24 PROCEDURE — 25000242 PHARM REV CODE 250 ALT 637 W/ HCPCS: Performed by: STUDENT IN AN ORGANIZED HEALTH CARE EDUCATION/TRAINING PROGRAM

## 2022-01-24 PROCEDURE — 85027 COMPLETE CBC AUTOMATED: CPT | Performed by: TRANSPLANT SURGERY

## 2022-01-24 PROCEDURE — 99291 PR CRITICAL CARE, E/M 30-74 MINUTES: ICD-10-PCS | Mod: 24,25,, | Performed by: SURGERY

## 2022-01-24 PROCEDURE — P9012 CRYOPRECIPITATE EACH UNIT: HCPCS | Performed by: STUDENT IN AN ORGANIZED HEALTH CARE EDUCATION/TRAINING PROGRAM

## 2022-01-24 PROCEDURE — 85002 BLEEDING TIME TEST: CPT

## 2022-01-24 PROCEDURE — 84450 TRANSFERASE (AST) (SGOT): CPT | Mod: 91 | Performed by: TRANSPLANT SURGERY

## 2022-01-24 PROCEDURE — 85007 BL SMEAR W/DIFF WBC COUNT: CPT | Performed by: TRANSPLANT SURGERY

## 2022-01-24 PROCEDURE — 85730 THROMBOPLASTIN TIME PARTIAL: CPT | Performed by: TRANSPLANT SURGERY

## 2022-01-24 PROCEDURE — 80048 BASIC METABOLIC PNL TOTAL CA: CPT | Mod: 91 | Performed by: STUDENT IN AN ORGANIZED HEALTH CARE EDUCATION/TRAINING PROGRAM

## 2022-01-24 PROCEDURE — 80048 BASIC METABOLIC PNL TOTAL CA: CPT | Performed by: STUDENT IN AN ORGANIZED HEALTH CARE EDUCATION/TRAINING PROGRAM

## 2022-01-24 PROCEDURE — 86965 POOLING BLOOD PLATELETS: CPT | Performed by: STUDENT IN AN ORGANIZED HEALTH CARE EDUCATION/TRAINING PROGRAM

## 2022-01-24 RX ORDER — ACETAMINOPHEN 500 MG
1 TABLET ORAL DAILY
Qty: 30 TABLET | Refills: 5 | Status: SHIPPED | OUTPATIENT
Start: 2022-01-24 | End: 2022-07-07 | Stop reason: SDUPTHER

## 2022-01-24 RX ORDER — HYDROCODONE BITARTRATE AND ACETAMINOPHEN 500; 5 MG/1; MG/1
TABLET ORAL
Status: DISCONTINUED | OUTPATIENT
Start: 2022-01-24 | End: 2022-01-28 | Stop reason: HOSPADM

## 2022-01-24 RX ORDER — MYCOPHENOLATE MOFETIL 250 MG/1
1000 CAPSULE ORAL 2 TIMES DAILY
Qty: 240 CAPSULE | Refills: 2 | Status: SHIPPED | OUTPATIENT
Start: 2022-01-24 | End: 2022-04-21

## 2022-01-24 RX ORDER — LABETALOL HCL 20 MG/4 ML
10 SYRINGE (ML) INTRAVENOUS EVERY 4 HOURS PRN
Status: DISCONTINUED | OUTPATIENT
Start: 2022-01-24 | End: 2022-01-28 | Stop reason: HOSPADM

## 2022-01-24 RX ORDER — PREDNISONE 5 MG/1
TABLET ORAL
Qty: 75 TABLET | Refills: 1 | Status: ON HOLD | OUTPATIENT
Start: 2022-01-24 | End: 2022-08-19 | Stop reason: HOSPADM

## 2022-01-24 RX ORDER — NICARDIPINE HYDROCHLORIDE 0.2 MG/ML
0-15 INJECTION INTRAVENOUS CONTINUOUS
Status: DISCONTINUED | OUTPATIENT
Start: 2022-01-24 | End: 2022-01-24

## 2022-01-24 RX ORDER — FAMOTIDINE 20 MG/1
20 TABLET, FILM COATED ORAL DAILY
Qty: 30 TABLET | Refills: 0 | Status: SHIPPED | OUTPATIENT
Start: 2022-01-23 | End: 2022-02-28

## 2022-01-24 RX ORDER — VALGANCICLOVIR 450 MG/1
450 TABLET, FILM COATED ORAL DAILY
Qty: 30 TABLET | Refills: 2 | Status: SHIPPED | OUTPATIENT
Start: 2022-01-23 | End: 2022-08-19

## 2022-01-24 RX ORDER — SULFAMETHOXAZOLE AND TRIMETHOPRIM 400; 80 MG/1; MG/1
1 TABLET ORAL EVERY MORNING
Qty: 30 TABLET | Refills: 5 | Status: SHIPPED | OUTPATIENT
Start: 2022-01-23 | End: 2022-07-22

## 2022-01-24 RX ORDER — ESCITALOPRAM OXALATE 5 MG/1
5 TABLET ORAL DAILY
Qty: 30 TABLET | Refills: 5 | Status: SHIPPED | OUTPATIENT
Start: 2022-01-24 | End: 2022-07-11 | Stop reason: SDUPTHER

## 2022-01-24 RX ORDER — ALBUMIN HUMAN 50 G/1000ML
25 SOLUTION INTRAVENOUS ONCE
Status: COMPLETED | OUTPATIENT
Start: 2022-01-24 | End: 2022-01-24

## 2022-01-24 RX ORDER — HYDROCODONE BITARTRATE AND ACETAMINOPHEN 500; 5 MG/1; MG/1
TABLET ORAL
Status: DISCONTINUED | OUTPATIENT
Start: 2022-01-24 | End: 2022-01-25

## 2022-01-24 RX ORDER — ASPIRIN 81 MG/1
81 TABLET ORAL DAILY
Qty: 30 TABLET | Refills: 5 | Status: ON HOLD | OUTPATIENT
Start: 2022-01-24 | End: 2024-03-02 | Stop reason: HOSPADM

## 2022-01-24 RX ORDER — DEXMEDETOMIDINE HYDROCHLORIDE 4 UG/ML
0-1.4 INJECTION, SOLUTION INTRAVENOUS CONTINUOUS
Status: DISCONTINUED | OUTPATIENT
Start: 2022-01-24 | End: 2022-01-24

## 2022-01-24 RX ORDER — NYSTATIN 100000 [USP'U]/ML
500000 SUSPENSION ORAL
Qty: 285 ML | Refills: 0 | Status: SHIPPED | OUTPATIENT
Start: 2022-01-24 | End: 2022-02-17

## 2022-01-24 RX ORDER — HYDRALAZINE HYDROCHLORIDE 20 MG/ML
10 INJECTION INTRAMUSCULAR; INTRAVENOUS EVERY 4 HOURS PRN
Status: DISCONTINUED | OUTPATIENT
Start: 2022-01-24 | End: 2022-01-25

## 2022-01-24 RX ORDER — OXYCODONE HYDROCHLORIDE 5 MG/1
5 TABLET ORAL EVERY 4 HOURS PRN
Status: DISCONTINUED | OUTPATIENT
Start: 2022-01-24 | End: 2022-01-28 | Stop reason: HOSPADM

## 2022-01-24 RX ORDER — CALCIUM CARBONATE 200(500)MG
500 TABLET,CHEWABLE ORAL 2 TIMES DAILY PRN
Status: DISCONTINUED | OUTPATIENT
Start: 2022-01-24 | End: 2022-01-28 | Stop reason: HOSPADM

## 2022-01-24 RX ORDER — TACROLIMUS 1 MG/1
6 CAPSULE ORAL EVERY 12 HOURS
Qty: 360 CAPSULE | Refills: 11 | Status: SHIPPED | OUTPATIENT
Start: 2022-01-24 | End: 2022-01-28 | Stop reason: SDUPTHER

## 2022-01-24 RX ADMIN — FAMOTIDINE 20 MG: 10 INJECTION, SOLUTION INTRAVENOUS at 08:01

## 2022-01-24 RX ADMIN — MYCOPHENOLATE MOFETIL 1000 MG: 200 POWDER, FOR SUSPENSION ORAL at 09:01

## 2022-01-24 RX ADMIN — TACROLIMUS 1 MG: 1 CAPSULE, GELATIN COATED ORAL at 06:01

## 2022-01-24 RX ADMIN — OXYCODONE HYDROCHLORIDE 5 MG: 5 SOLUTION ORAL at 05:01

## 2022-01-24 RX ADMIN — MUPIROCIN 1 G: 20 OINTMENT TOPICAL at 08:01

## 2022-01-24 RX ADMIN — HYDROMORPHONE HYDROCHLORIDE 0.5 MG: 1 INJECTION, SOLUTION INTRAMUSCULAR; INTRAVENOUS; SUBCUTANEOUS at 04:01

## 2022-01-24 RX ADMIN — ALBUMIN (HUMAN) 25 G: 12.5 INJECTION, SOLUTION INTRAVENOUS at 10:01

## 2022-01-24 RX ADMIN — TACROLIMUS 1 MG: 1 CAPSULE, GELATIN COATED ORAL at 09:01

## 2022-01-24 RX ADMIN — PROPOFOL 35 MCG/KG/MIN: 10 INJECTION, EMULSION INTRAVENOUS at 04:01

## 2022-01-24 RX ADMIN — MYCOPHENOLATE MOFETIL 1000 MG: 200 POWDER, FOR SUSPENSION ORAL at 10:01

## 2022-01-24 RX ADMIN — HYDRALAZINE HYDROCHLORIDE 10 MG: 20 INJECTION INTRAMUSCULAR; INTRAVENOUS at 08:01

## 2022-01-24 RX ADMIN — OXYCODONE 5 MG: 5 TABLET ORAL at 07:01

## 2022-01-24 RX ADMIN — LABETALOL HYDROCHLORIDE 10 MG: 5 INJECTION, SOLUTION INTRAVENOUS at 07:01

## 2022-01-24 RX ADMIN — DEXMEDETOMIDINE HYDROCHLORIDE 0.2 MCG/KG/HR: 4 INJECTION, SOLUTION INTRAVENOUS at 05:01

## 2022-01-24 RX ADMIN — SODIUM PHOSPHATE, MONOBASIC, MONOHYDRATE 15 MMOL: 276; 142 INJECTION, SOLUTION INTRAVENOUS at 02:01

## 2022-01-24 RX ADMIN — PHYTONADIONE 10 MG: 10 INJECTION, EMULSION INTRAMUSCULAR; INTRAVENOUS; SUBCUTANEOUS at 05:01

## 2022-01-24 RX ADMIN — HYDROMORPHONE HYDROCHLORIDE 0.5 MG: 1 INJECTION, SOLUTION INTRAMUSCULAR; INTRAVENOUS; SUBCUTANEOUS at 08:01

## 2022-01-24 RX ADMIN — DEXTROSE AND SODIUM CHLORIDE: 5; .9 INJECTION, SOLUTION INTRAVENOUS at 09:01

## 2022-01-24 RX ADMIN — INSULIN HUMAN 6.9 UNITS/HR: 1 INJECTION, SOLUTION INTRAVENOUS at 04:01

## 2022-01-24 RX ADMIN — HYDROMORPHONE HYDROCHLORIDE 0.5 MG: 1 INJECTION, SOLUTION INTRAMUSCULAR; INTRAVENOUS; SUBCUTANEOUS at 11:01

## 2022-01-24 RX ADMIN — NYSTATIN 500000 UNITS: 500000 SUSPENSION ORAL at 07:01

## 2022-01-24 RX ADMIN — HYDRALAZINE HYDROCHLORIDE 10 MG: 20 INJECTION INTRAMUSCULAR; INTRAVENOUS at 04:01

## 2022-01-24 RX ADMIN — CALCIUM CARBONATE (ANTACID) CHEW TAB 500 MG 500 MG: 500 CHEW TAB at 07:01

## 2022-01-24 RX ADMIN — NYSTATIN 500000 UNITS: 500000 SUSPENSION ORAL at 02:01

## 2022-01-24 RX ADMIN — NICARDIPINE HYDROCHLORIDE 5 MG/HR: 0.2 INJECTION INTRAVENOUS at 05:01

## 2022-01-24 RX ADMIN — OXYCODONE 5 MG: 5 TABLET ORAL at 03:01

## 2022-01-24 RX ADMIN — MAGNESIUM SULFATE IN WATER 2 G: 40 INJECTION, SOLUTION INTRAVENOUS at 01:01

## 2022-01-24 RX ADMIN — OXYCODONE HYDROCHLORIDE 5 MG: 5 SOLUTION ORAL at 09:01

## 2022-01-24 RX ADMIN — METHYLPREDNISOLONE SODIUM SUCCINATE 200 MG: 125 INJECTION, POWDER, FOR SOLUTION INTRAMUSCULAR; INTRAVENOUS at 08:01

## 2022-01-24 NOTE — PLAN OF CARE
Recommendations    1. ADAT to Regular with texture per SLP    - If BG >/= 180 mg/dL, add DM restrictions     2. If PO intake < 50%, add Boost Glucose Control TID     3. Provide post transplant diet education to pt    Goals: Pt to meet % EEN/EPN over the course of 7 days  Nutrition Goal Status: new  Communication of RD Recs:  (POC)

## 2022-01-24 NOTE — PLAN OF CARE
Updated Dr. Roche on patient's status overnight. AST trending down, another liver ultrasound scheduled for this morning. Respirations even and unlabored on spontaneous mode on ventilator, switched over to spontaneous around 0530 this morning. Pain controlled. Arouses to voice and follows commands on light sedation. NSR on bedside monitor. SBP maintained 100-160, no pressors. Cardene ordered if needed for hypertension. SpO2 100% on 40% FiO2, 5 PEEP. ABGs stable overnight. PRASANNA output serosanguinous, about 600 mL output. UOP 55-75 mL/hr. MIVF infusing. Insulin gtt adjusted per algorithm, glucose checked hourly. Plan of care reviewed with patient and patient's spouse at bedside. Plan to extubate this morning, trend labs and vitals and control pain. Bed in low position and brake set. See flowsheets for detailed assessment.     Preventative foam dressings placed to sacrum and bilateral heels. No obvious pressure, moisture or device related skin integrity issues assessed. Patient placed on waffle overlay mattress overnight.

## 2022-01-24 NOTE — SUBJECTIVE & OBJECTIVE
"Interval HPI:   Overnight events: No acute events overnight. Patient on the SICU in room 25620/09982 A. Blood glucose stable. BG at goal on current insulin regimen. Steroid use- Solumedrol 200 mg. 1 Day Post-Op  Renal function- Normal  Vasopressors-  None      Diet NPO     Eating:   NPO  Nausea: No  Hypoglycemia and intervention: No  Fever: No  TPN and/or TF: No      /83 (BP Location: Right arm, Patient Position: Lying) Comment: Simultaneous filing. User may not have seen previous data.  Pulse 77 Comment: Simultaneous filing. User may not have seen previous data.  Temp 97.7 °F (36.5 °C) (Core ([place]))   Resp 16 Comment: Simultaneous filing. User may not have seen previous data.  Ht 5' 5" (1.651 m)   Wt 76.5 kg (168 lb 10.4 oz)   SpO2 100% Comment: Simultaneous filing. User may not have seen previous data.  BMI 28.07 kg/m²     Labs Reviewed and Include    Recent Labs   Lab 01/24/22  0400 01/24/22  0400 01/24/22  0818   *   < > 129*   CALCIUM 8.5*   < > 8.0*   ALBUMIN 3.1*  --   --    PROT 4.9*  --   --       < > 141   K 4.2   < > 4.3   CO2 19*   < > 23   *   < > 113*   BUN 25*   < > 26*   CREATININE 0.8   < > 0.8   ALKPHOS 140*  --   --    ALT 1,706*  --   --    AST 2,243*   < > 1,732*   BILITOT 2.7*  --   --     < > = values in this interval not displayed.     Lab Results   Component Value Date    WBC 12.49 01/24/2022    HGB 9.4 (L) 01/24/2022    HCT 28.9 (L) 01/24/2022    MCV 94 01/24/2022    PLT 81 (L) 01/24/2022     No results for input(s): TSH, FREET4 in the last 168 hours.  Lab Results   Component Value Date    HGBA1C 7.6 (H) 01/22/2022       Nutritional status:   Body mass index is 28.07 kg/m².  Lab Results   Component Value Date    ALBUMIN 3.1 (L) 01/24/2022    ALBUMIN 2.9 (L) 01/23/2022    ALBUMIN 3.2 (L) 01/23/2022     No results found for: PREALBUMIN    Estimated Creatinine Clearance: 90.2 mL/min (based on SCr of 0.8 mg/dL).    Accu-Checks  Recent Labs     01/23/22 2210 " "01/23/22  2310 01/24/22  0001 01/24/22  0106 01/24/22  0207 01/24/22  0308 01/24/22  0406 01/24/22  0503 01/24/22  0608 01/24/22  0722   POCTGLUCOSE 196* 197* 190* 176* 158* 158* 189* 171* 162* 140*       Current Medications and/or Treatments Impacting Glycemic Control  Immunotherapy:    Immunosuppressants         Stop Route Frequency     tacrolimus (PROGRAF) 1 mg/mL oral syringe         -- Oral 2 times daily     mycophenolate mofetil 200 mg/mL suspension 1,000 mg         -- Oral 2 times daily        Steroids:   Hormones (From admission, onward)            Start     Stop Route Frequency Ordered    01/29/22 0900  predniSONE tablet 20 mg  (methylprednisolone taper panel)        "Followed by" Linked Group Details    -- Oral Daily 01/23/22 1058    01/28/22 0900  methylPREDNISolone sodium succinate injection 40 mg  (methylprednisolone taper panel)        "Followed by" Linked Group Details    01/29 0859 IV Daily 01/23/22 1058    01/27/22 0900  methylPREDNISolone sodium succinate injection 80 mg  (methylprednisolone taper panel)        "Followed by" Linked Group Details    01/28 0859 IV Daily 01/23/22 1058    01/26/22 0900  methylPREDNISolone sodium succinate injection 120 mg  (methylprednisolone taper panel)        "Followed by" Linked Group Details    01/27 0859 IV Daily 01/23/22 1058    01/25/22 0900  methylPREDNISolone sodium succinate injection 160 mg  (methylprednisolone taper panel)        "Followed by" Linked Group Details    01/26 0859 IV Daily 01/23/22 1058        Pressors:    Autonomic Drugs (From admission, onward)            None        Hyperglycemia/Diabetes Medications:   Antihyperglycemics (From admission, onward)            Start     Stop Route Frequency Ordered    01/23/22 1230  insulin regular in 0.9 % NaCl 100 unit/100 mL (1 unit/mL) infusion        Question:  Enter initial dose from Infusion Protocol Chart (Units/hr):  Answer:  1    -- IV Continuous 01/23/22 1120        "

## 2022-01-24 NOTE — ASSESSMENT & PLAN NOTE
Eder Kong is our 62 yo M with Hx of HCC, Hep C, and DM who underwent DCD OLT on 1/23/22    Neuro:  - Continue minimal sedation while on SBT  - Wean precedex as tolerated  - Dilaudid prn for pain     CV:  - MAP goal > 65     Pulm:  - Intubated. On SBT this AM, tolerating well  - Trend ABG prn  - Plan to extubate pending clinical stability     FEN/GI:  S/p DCD OLTx on 1/23/22  - NPO   - Trend BMP q12hr, AST q4hr  - Lyte replacement prn  - Drains: RUQ x2 with initial serosanguinous output  - Liver Transplant US  - AST peak 7221, now down to 2243 this AM     Renal:  - Guillen in place, continue  - Monitor UOP  - Trend BUN/Cr     Heme/Onc:  - Initial Hgb 8.2  - Trend CBC q4hr, INR q4hr  - Immuno: Methylprednisolone taper, Prograf, Mycophenolate     ID:  - AF  - Trend WBC with CBC q4hr  - Abx/Antifungals/Antiviral:  Bactrim, Nystatin, Valganciclovir     Endo:  - Insulin gtt     PPx:  - Famotidine, TEDs/SCDs    Continue ICU care

## 2022-01-24 NOTE — CONSULTS
"  [facility] - [facility]  Adult Nutrition  Consult Note    SUMMARY     Recommendations    1. ADAT to Regular with texture per SLP    - If BG >/= 180 mg/dL, add DM restrictions     2. If PO intake < 50%, add Boost Glucose Control TID     3. Provide post transplant diet education to pt    Goals: Pt to meet % EEN/EPN over the course of 7 days  Nutrition Goal Status: new  Communication of RD Recs:  (POC)    Assessment and Plan    Nutrition Problem  Increased nutrient needs, protein     Related to (etiology):   Physiological demands, healing     Signs and Symptoms (as evidenced by):   S/p OLTx 1/23      Interventions (treatment strategy):  Collaboration with other providers  Nutrition education     Nutrition Diagnosis Status:   New     Reason for Assessment    Reason For Assessment: consult  Diagnosis:  (S/p OLTx)  Relevant Medical History: HCC, hep C, DM2    General Information Comments: S/p OLTx on 1/23. Pt intubated at time of visit. Now extubated. Wt fluctuations noted PTA. Per outpatient RD, UBW ~157#. Unable to assess PTA PO intake and nutrition hx. NFPE unable to be completed today.    Nutrition Discharge Planning: Post transplant diet education to be provided    Nutrition Risk Screen    Nutrition Risk Screen: no indicators present    Nutrition/Diet History    Spiritual, Cultural Beliefs, Anglican Practices, Values that Affect Care: no  Food Allergies:  (AHSAN)  Factors Affecting Nutritional Intake: NPO    Anthropometrics    Temp: 97.6 °F (36.4 °C)  Height Method: Stated  Height: 5' 5" (165.1 cm)  Height (inches): 65 in  Weight Method: Bed Scale  Weight: 76.5 kg (168 lb 10.4 oz)  Weight (lb): 168.65 lb  Ideal Body Weight (IBW), Male: 136 lb  % Ideal Body Weight, Male (lb): 112.99 %  BMI (Calculated): 28.1  BMI Grade: 25 - 29.9 - overweight       Lab/Procedures/Meds    Pertinent Labs Reviewed: reviewed  Pertinent Labs Comments: BUN 26, glu 129, bili 2.7, AST 1277, ALT 1706  Pertinent Medications " Reviewed: reviewed  Pertinent Medications Comments: albumin, famotidine, methylprednisolone, prednisone, statin, mycophenolate, tacrolimus, precedex, insulin    Estimated/Assessed Needs    Weight Used For Calorie Calculations: 76.5 kg (168 lb 10.4 oz)  Energy Calorie Requirements (kcal): 1784 kcal/day  Energy Need Method: [place]-St Jeor (PAL 1.2)  Protein Requirements: 77-92 g/day (1.0-1.2 g/kg)  Weight Used For Protein Calculations: 76.5 kg (168 lb 10.4 oz)  Fluid Requirements (mL): 1 mL/kcal or per MD  Estimated Fluid Requirement Method: RDA Method  RDA Method (mL): 1784  CHO Requirement: 223 gm      Nutrition Prescription Ordered    Current Diet Order: NPO    Evaluation of Received Nutrient/Fluid Intake    I/O: +5.9L since admit  Energy Calories Required: not meeting needs  Protein Required: not meeting needs  Fluid Required: not meeting needs  Comments: LBM: 1/22  Tolerance:  (NPO, will monitor)  % Intake of Estimated Energy Needs: 0 - 25 %  % Meal Intake: NPO    Nutrition Risk    Level of Risk/Frequency of Follow-up: high       Monitor and Evaluation    Food and Nutrient Intake: energy intake,food and beverage intake  Food and Nutrient Adminstration: diet order  Knowledge/Beliefs/Attitudes: food and nutrition knowledge/skill  Anthropometric Measurements: weight,weight change  Biochemical Data, Medical Tests and Procedures: electrolyte and renal panel,gastrointestinal profile,glucose/endocrine profile,inflammatory profile,lipid profile  Nutrition-Focused Physical Findings: overall appearance       Nutrition Follow-Up    RD Follow-up?: Yes

## 2022-01-24 NOTE — PLAN OF CARE
"      SICU PLAN OF CARE NOTE    Dx: S/P liver transplant    Shift Events: Extubated without incident. Post op liver ultrasound done. 500 mL albumin given for low urine output. Homeland-yamil catheter removed and cordis nail applied. TEG sent for elevated INR, 1 plt and 1 cryo given. PRN hydralazine given for high BP (Cardene d/c'd). Pain well controlled with PRN oxycodone, dilaudid available for breakthrough pain.    Goals of Care: Plan for stepdown tomorrow     Neuro: AAO x4, Follows Commands, and Moves All Extremities.     Vital Signs: BP (!) 159/75 (BP Location: Right arm, Patient Position: Lying)   Pulse 95   Temp 98.3 °F (36.8 °C) (Oral)   Resp (!) 24   Ht 5' 5" (1.651 m)   Wt 76.5 kg (168 lb 10.4 oz)   SpO2 98%   BMI 28.07 kg/m²     Cardiac: SR 70-80s    Respiratory: Room Air    Diet: Clear Liquid, tolerating well. No nausea.    Gtts: Insulin    Urine Output: Urinary Catheter  cc/hour. Red-tinged starting around 1600, MD aware. UA sent.    Drains: PRASANNA 1 and PRASANNA 2, both with serosanguinous output. See flowsheets.      Labs/Accuchecks: q4 CBC, AST, PTT, PT/INR. Q1 Accuchecks.    Skin: No new skin breakdown noted. Chevron incision with staples intact, open to air. Foams on heels and sacrum. Pt makes major movements in position independently, assistance provided when needed.       "

## 2022-01-24 NOTE — PROGRESS NOTES
[facility] - [facility]  Critical Care - Surgery  Progress Note    Patient Name: Eder Kong  MRN: [id number]  Admission Date: 1/22/2022  Hospital Length of Stay: 2 days  Code Status: Full Code  Attending Provider: Sterling Tom MD  Primary Care Provider: Martin Mcdaniel MD   Principal Problem: S/P liver transplant    Subjective:     Hospital/ICU Course:  No notes on file    Interval History/Significant Events:     NAEON  Following commands, on precedex while remains intubated  AF, HDS with intermittent need for nicardipine  On minimal vent settings overnight, tolerating SBT this AM  NGT with 200ml output, D#1 with 580ml out, D#2with 630ml out  Drains serosanguinous  UOP 3.3 L       Follow-up For: Procedure(s) (LRB):  TRANSPLANT, LIVER (N/A)    Post-Operative Day: 1 Day Post-Op    Objective:     Vital Signs (Most Recent):  Temp: 97.7 °F (36.5 °C) (01/24/22 0315)  Pulse: 76 (01/24/22 0630)  Resp: (!) 9 (01/24/22 0630)  BP: (!) 140/77 (01/24/22 0600)  SpO2: 100 % (01/24/22 0630) Vital Signs (24h Range):  Temp:  [97.7 °F (36.5 °C)-99.8 °F (37.7 °C)] 97.7 °F (36.5 °C)  Pulse:  [74-96] 76  Resp:  [9-24] 9  SpO2:  [100 %] 100 %  BP: (130-160)/(72-86) 140/77  Arterial Line BP: (100-150)/(42-73) 126/60     Weight: 76.5 kg (168 lb 10.4 oz)  Body mass index is 28.07 kg/m².      Intake/Output Summary (Last 24 hours) at 1/24/2022 0715  Last data filed at 1/24/2022 0700  Gross per 24 hour   Intake 9636.55 ml   Output 4755 ml   Net 4881.55 ml       Physical Exam  Constitutional:       General: He is not in acute distress.     Comments: Intubated, minimally sedated with precedex   HENT:      Head: Normocephalic.   Eyes:      Extraocular Movements: Extraocular movements intact.   Cardiovascular:      Rate and Rhythm: Normal rate and regular rhythm.      Pulses: Normal pulses.   Pulmonary:      Effort: Pulmonary effort is normal. No respiratory distress.      Comments: Intubated, tolerating SBT  Abdominal:      Palpations:  Abdomen is soft.      Comments: Chevron incision c/d/i.   Drains with serosanguinous appearing output   Musculoskeletal:         General: No swelling.      Cervical back: Neck supple.   Skin:     General: Skin is warm and dry.   Neurological:      General: No focal deficit present.      Mental Status: He is alert.      Comments: Following commands         Vents:  Vent Mode: Spont (01/24/22 0600)  Ventilator Initiated: Yes (01/23/22 1054)  Set Rate: 18 BPM (01/24/22 0544)  Vt Set: 400 mL (01/24/22 0544)  Pressure Support: 10 cmH20 (01/24/22 0600)  PEEP/CPAP: 5 cmH20 (01/24/22 0600)  Oxygen Concentration (%): 40 (01/24/22 0630)  Peak Airway Pressure: 15 cmH2O (01/24/22 0600)  Plateau Pressure: 15 cmH20 (01/24/22 0600)  Total Ve: 5.89 mL (01/24/22 0600)  Negative Inspiratory Force (cm H2O): 0 (01/24/22 0600)  F/VT Ratio<105 (RSBI): (!) 27.7 (01/24/22 0600)    Lines/Drains/Airways     Central Venous Catheter Line             Introducer with Double Lumen 01/23/22 0600 right internal jugular 1 day    Pulmonary Artery Catheter Assessment  01/23/22 0515 1 day    Trialysis (Dialysis) Catheter 01/23/22 0515 right internal jugular 1 day          Drain                 Urethral Catheter 01/23/22 0440 Non-latex;Straight-tip 16 Fr. 1 day         Closed/Suction Drain 01/23/22 1000 Right Abdomen Bulb 19 Fr. <1 day         Closed/Suction Drain 01/23/22 1001 Right Abdomen Bulb 19 Fr. <1 day         NG/OG Tube 01/23/22 1200 orogastric;Taylor sump Center mouth <1 day          Airway                 Airway - Non-Surgical 01/23/22 0505 1 day          Arterial Line            Arterial Line 01/23/22 0500 Right Femoral 1 day    Arterial Line 01/23/22 0502 Left Radial 1 day          Peripheral Intravenous Line                 Peripheral IV - Single Lumen 01/22/22 2326 20 G Anterior;Left Forearm 1 day         KIARA 01/23/22 0521 Left Antecubital 1 day                Significant Labs:    CBC/Anemia Profile:  Recent Labs   Lab 01/23/22 2000  01/24/22 0003 01/24/22  0400   WBC 9.17 9.17 10.43   HGB 8.8* 8.9* 9.0*   HCT 27.0* 27.4* 27.2*   PLT 80* 73* 72*   MCV 96 94 94   RDW 14.5 14.6* 14.8*        Chemistries:  Recent Labs   Lab 01/22/22 2328 01/22/22  2328 01/23/22  0511 01/23/22  0820 01/23/22  1106 01/23/22  1529 01/23/22 2000 01/24/22 0003 01/24/22  0400      < >   < > 138 141  --  140  --  140   K 4.1   < >   < > 3.5 3.7  --  4.3  --  4.2      < >  --   --  105  --  111*  --  112*   CO2 25   < >  --   --  18*  --  17*  --  19*   BUN 19   < >  --   --  18  --  25*  --  25*   CREATININE 0.8   < >  --   --  0.8  --  1.1  --  0.8   CALCIUM 10.1   < >  --   --  7.8*  --  9.2  --  8.5*   ALBUMIN 3.6  --    < > 3.2* 2.9*  --   --   --  3.1*   PROT 7.6  --   --   --  5.0*  --   --   --  4.9*   BILITOT 0.4  --   --   --  1.4*  --   --   --  2.7*   ALKPHOS 539*  --   --   --  229*  --   --   --  140*   ALT 38  --    < > 2,861* 2,913*  --   --   --  1,706*   AST 46*   < >   < > 3,229* 7,221*   < > 4,716* 3,215* 2,243*   MG 1.8  --    < > 2.1  --   --   --  1.5* 2.2   PHOS  --   --   --   --   --   --   --  2.4* 3.8    < > = values in this interval not displayed.       All pertinent labs within the past 24 hours have been reviewed.    Significant Imaging:  I have reviewed all pertinent imaging results/findings within the past 24 hours.    Assessment/Plan:     * S/P liver transplant  Eder Kong is our 60 yo M with Hx of HCC, Hep C, and DM who underwent DCD OLT on 1/23/22    Neuro:  - Continue minimal sedation while on SBT  - Wean precedex as tolerated  - Dilaudid prn for pain     CV:  - MAP goal > 65     Pulm:  - Intubated. On SBT this AM, tolerating well  - Trend ABG prn  - Plan to extubate pending clinical stability     FEN/GI:  S/p DCD OLTx on 1/23/22  - NPO   - Trend BMP q12hr, AST q4hr  - Lyte replacement prn  - Drains: RUQ x2 with initial serosanguinous output  - Liver Transplant US  - AST peak 7221, now down to 2243 this AM     Renal:  -  Guillen in place, continue  - Monitor UOP  - Trend BUN/Cr     Heme/Onc:  - Initial Hgb 8.2  - Trend CBC q4hr, INR q4hr  - Immuno: Methylprednisolone taper, Prograf, Mycophenolate     ID:  - AF  - Trend WBC with CBC q4hr  - Abx/Antifungals/Antiviral:  Bactrim, Nystatin, Valganciclovir     Endo:  - Insulin gtt     PPx:  - Famotidine, TEDs/SCDs    Continue ICU care         Critical Care Daily Checklist:    A: Awake: RASS Goal/Actual Goal: RASS Goal: -3-->moderate sedation  Actual: Reilly Agitation Sedation Scale (RASS): Moderate sedation   B: Spontaneous Breathing Trial Performed?     C: SAT & SBT Coordinated?  yes                      D: Delirium: CAM-ICU     E: Early Mobility Performed? No   F: Feeding Goal:    Status:     Current Diet Order   Procedures    Diet NPO      AS: Analgesia/Sedation Precedex, dilaudid, oxy   T: Thromboembolic Prophylaxis ABBIE, SCD   H: HOB > 300 Yes   U: Stress Ulcer Prophylaxis (if needed) pepcid   G: Glucose Control Insulin gtt   B: Bowel Function Stool Occurrence: 0   I: Indwelling Catheter (Lines & Guillen) Necessity Guillen, ETT, OGT, drains x2, HDC, CVC, elsa   D: De-escalation of Antimicrobials/Pharmacotherapies Continue prophylactic abx/antivirals/antifungal    Plan for the day/ETD Plan for extubation pending repeat liver US    Code Status:  Family/Goals of Care: Full Code  Continue current level and plan of care       Critical secondary to Patient has a condition that poses threat to life and bodily function: S/p liver transplant, currently intubated     Critical care was time spent personally by me on the following activities: development of treatment plan with patient or surrogate and bedside caregivers, discussions with consultants, evaluation of patient's response to treatment, examination of patient, ordering and performing treatments and interventions, ordering and review of laboratory studies, ordering and review of radiographic studies, pulse oximetry, re-evaluation of  patient's condition.  This critical care time did not overlap with that of any other provider or involve time for any procedures.     Gloria Resendiz MD  Critical Care - Surgery  [facility] - [facility]

## 2022-01-24 NOTE — ASSESSMENT & PLAN NOTE
BG goal 140-180    - Continue IIP  - q1hr BG checks  - Requires intensive BG monitoring     ** Please call Endocrine for any BG related issues **  ** Please notify Endocrine for any change and/or advance in diet**    Discharge planning: JESSICA

## 2022-01-24 NOTE — PROGRESS NOTES
TRANSPLANT NOTE:    Admit Date: 1/22/2022    ORGAN:   LIVER  Disease Etiology: Primary Liver Malignancy: Hepatoma (HCC) and Cirrhosis  Donor CMV Status: Negative  Donor HCV Status: Negative  Donor HBcAb: Negative  Donor HBV SHIVANI: Negative  Donor HCV SHIVANI: Negative  Whole or Partial: Whole Liver  Biliary Anastomosis: End to End  Arterial Anatomy: Nile Kong is a 61 y.o. male s/p    Donation after Circulatory Death  liver transplant on 1/23/2022 (Liver) for Primary Liver Malignancy: Hepatoma (HCC) and Cirrhosis.  This patient will follow the Steroid Induction protocol.  This patients immunosuppression will include a steroid taper over 5 weeks, Cellcept for 12 weekx and Prograf maintenance.  Opportunistic infection prophylaxis will include Valcyte for 3 months (CMV D- R+), Bactrim for 6 months, and Nystatin.  Osteoporosis risk assessment identifies needs updated Vit D level, DEXA WNL, therapy will include combo Ca+/Vit D.  I have reviewed the pre-op medications and those have been restarted those, as appropriate.

## 2022-01-24 NOTE — PROGRESS NOTES
"[facility] - [facility]  Endocrinology  Progress Note    Admit Date: 1/22/2022     Reason for Consult: Management of T2DM, Hyperglycemia     Surgical Procedure and Date: Liver Transplant 1/23/22    Diabetes diagnosis year: AHSAN; intubated     Home Diabetes Medications:  Toujo (unsure of dose) and Januvia 50 mg daily (per chart review)     How often checking glucose at home?  AHSAN    BG readings on regimen: AHSAN  Hypoglycemia on the regimen?  AHSAN  Missed doses on regimen?  AHSAN    Diabetes Complications include:     Hyperglycemia    Complicating diabetes co morbidities:   CIRRHOSIS and Glucocorticoid use       HPI:   Patient is a 61 y.o. 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 Endocrinology consulted for management of T2DM.       Lab Results   Component Value Date    HGBA1C 7.6 (H) 01/22/2022           Interval HPI:   Overnight events: No acute events overnight. Patient on the SICU in room 78430/66624 A. Blood glucose stable. BG at goal on current insulin regimen. Steroid use- Solumedrol 200 mg. 1 Day Post-Op  Renal function- Normal  Vasopressors-  None      Diet NPO     Eating:   NPO  Nausea: No  Hypoglycemia and intervention: No  Fever: No  TPN and/or TF: No      /83 (BP Location: Right arm, Patient Position: Lying) Comment: Simultaneous filing. User may not have seen previous data.  Pulse 77 Comment: Simultaneous filing. User may not have seen previous data.  Temp 97.7 °F (36.5 °C) (Core ([facility]))   Resp 16 Comment: Simultaneous filing. User may not have seen previous data.  Ht 5' 5" (1.651 m)   Wt 76.5 kg (168 lb 10.4 oz)   SpO2 100% Comment: Simultaneous filing. User may not have seen previous data.  BMI 28.07 kg/m²     Labs Reviewed and Include    Recent Labs   Lab 01/24/22  0400 01/24/22  0400 01/24/22  0818   *   < > 129*   CALCIUM 8.5*   < > 8.0*   ALBUMIN 3.1*  --   --    PROT 4.9*  --   --       < > " "141   K 4.2   < > 4.3   CO2 19*   < > 23   *   < > 113*   BUN 25*   < > 26*   CREATININE 0.8   < > 0.8   ALKPHOS 140*  --   --    ALT 1,706*  --   --    AST 2,243*   < > 1,732*   BILITOT 2.7*  --   --     < > = values in this interval not displayed.     Lab Results   Component Value Date    WBC 12.49 01/24/2022    HGB 9.4 (L) 01/24/2022    HCT 28.9 (L) 01/24/2022    MCV 94 01/24/2022    PLT 81 (L) 01/24/2022     No results for input(s): TSH, FREET4 in the last 168 hours.  Lab Results   Component Value Date    HGBA1C 7.6 (H) 01/22/2022       Nutritional status:   Body mass index is 28.07 kg/m².  Lab Results   Component Value Date    ALBUMIN 3.1 (L) 01/24/2022    ALBUMIN 2.9 (L) 01/23/2022    ALBUMIN 3.2 (L) 01/23/2022     No results found for: PREALBUMIN    Estimated Creatinine Clearance: 90.2 mL/min (based on SCr of 0.8 mg/dL).    Accu-Checks  Recent Labs     01/23/22  2210 01/23/22  2310 01/24/22  0001 01/24/22  0106 01/24/22  0207 01/24/22  0308 01/24/22  0406 01/24/22  0503 01/24/22  0608 01/24/22  0722   POCTGLUCOSE 196* 197* 190* 176* 158* 158* 189* 171* 162* 140*       Current Medications and/or Treatments Impacting Glycemic Control  Immunotherapy:    Immunosuppressants         Stop Route Frequency     tacrolimus (PROGRAF) 1 mg/mL oral syringe         -- Oral 2 times daily     mycophenolate mofetil 200 mg/mL suspension 1,000 mg         -- Oral 2 times daily        Steroids:   Hormones (From admission, onward)            Start     Stop Route Frequency Ordered    01/29/22 0900  predniSONE tablet 20 mg  (methylprednisolone taper panel)        "Followed by" Linked Group Details    -- Oral Daily 01/23/22 1058    01/28/22 0900  methylPREDNISolone sodium succinate injection 40 mg  (methylprednisolone taper panel)        "Followed by" Linked Group Details    01/29 0859 IV Daily 01/23/22 1058    01/27/22 0900  methylPREDNISolone sodium succinate injection 80 mg  (methylprednisolone taper panel)        "Followed " "by" Linked Group Details    01/28 0859 IV Daily 01/23/22 1058    01/26/22 0900  methylPREDNISolone sodium succinate injection 120 mg  (methylprednisolone taper panel)        "Followed by" Linked Group Details    01/27 0859 IV Daily 01/23/22 1058    01/25/22 0900  methylPREDNISolone sodium succinate injection 160 mg  (methylprednisolone taper panel)        "Followed by" Linked Group Details    01/26 0859 IV Daily 01/23/22 1058        Pressors:    Autonomic Drugs (From admission, onward)            None        Hyperglycemia/Diabetes Medications:   Antihyperglycemics (From admission, onward)            Start     Stop Route Frequency Ordered    01/23/22 1230  insulin regular in 0.9 % NaCl 100 unit/100 mL (1 unit/mL) infusion        Question:  Enter initial dose from Infusion Protocol Chart (Units/hr):  Answer:  1    -- IV Continuous 01/23/22 1120          ASSESSMENT and PLAN    * S/P liver transplant  Managed per primary team  Optimize BG control    Type 2 diabetes mellitus with hyperglycemia  BG goal 140-180    - Continue IIP  - q1hr BG checks  - Requires intensive BG monitoring     ** Please call Endocrine for any BG related issues **  ** Please notify Endocrine for any change and/or advance in diet**    Discharge planning: TBD        Prophylactic immunotherapy  May increase insulin resistance.         Adverse effect of corticosteroids  Glucocorticoids markedly increase glucoses. Expect steroid taper to help with glucose control.           Km Beard, DNP, FNP  Endocrinology  [facility] - Surgical Intensive Care  "

## 2022-01-24 NOTE — ANESTHESIA POSTPROCEDURE EVALUATION
Anesthesia Post Evaluation    Patient: Eder Kong    Procedure(s) Performed: Procedure(s) (LRB):  TRANSPLANT, LIVER (N/A)    Final Anesthesia Type: general      Patient location during evaluation: ICU  Patient participation: Yes- Able to Participate  Level of consciousness: awake  Post-procedure vital signs: reviewed and stable  Pain management: adequate  Airway patency: patent    PONV status at discharge: No PONV  Anesthetic complications: no      Cardiovascular status: blood pressure returned to baseline  Respiratory status: unassisted  Hydration status: euvolemic  Follow-up not needed.          Vitals Value Taken Time   /78 01/24/22 1201   Temp 36.4 °C (97.6 °F) 01/24/22 1200   Pulse 79 01/24/22 1236   Resp 10 01/24/22 1236   SpO2 95 % 01/24/22 1236   Vitals shown include unvalidated device data.      No case tracking events are documented in the log.      Pain/Bibiana Score: Pain Rating Prior to Med Admin: 5 (1/24/2022  9:56 AM)

## 2022-01-24 NOTE — PROGRESS NOTES
Admit Note/Transplant    Met with wife to assess patients needs due to pt recently extubated.  Patient is a 61 y.o.  male, admitted for liver transplant.     Patient admitted from direct admit on 1/22/2022 .  At this time, patient presents as alert and oriented x 4, pleasant, good eye contact, well groomed, recall good, concentration/judgement good, average intelligence, calm, communicative, cooperative and asking and answering questions appropriately.  At this time, patients caregiver presents as alert and oriented x 4, pleasant, good eye contact, well groomed, recall good, concentration/judgement good, average intelligence, calm, communicative, cooperative and asking and answering questions appropriately.      Household/Family Systems (as reported by patients caregiver)     Patient resides with patient's wife and grandchild, at [address] [place] [address].  Support system includes Charo Gonsalez(wife), Jennifer Eaton(sister in law), Brissa Cline (mother-in-law), Rosalie Ratliff (sister-in-law)  Patient does not have dependents that are need of being cared for.     Patients primary caregiver is Charo Gonsalez, patients wife, phone number [phone].  Confirmed patients contact information is [phone] (home);   Telephone Information:   Mobile [phone]   .    During admission, patient's caregiver plans to stay in patient's room.  Confirmed patient and patients caregivers do have access to reliable transportation.    Cognitive Status/Learning     Patients caregiver reports patients reading ability as 12th grade and states patient does not have difficulty with N/A.  Patients caregiver reports patient learns best by  Combination of verbal and hands on learning.   Needed: No.   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    Adherence     Patients caregiver reports patient has a high level of adherence to patients health care regimen.  Adherence counseling and education provided.  Patient's caregiver verbalizes understanding.    Substance Use    Patients caregiver reports patients substance usage as the following:    Tobacco: none, patient denies any use.  Patient last smoked 25 years ago.  Patient was an occasional smoker.   Alcohol: none, patient denies any use.  Patient last alcohol use 25 years ago.  Patient reports alcohol became a problem when he drank, but it was never daily.   Illicit Drugs/Non-prescribed Medications: none, patient denies any use.  Patient dabbled in 20's with IV drugs, no use since then.     Patients caregiver states clear understanding of the potential impact of substance use.  Substance abstinence/cessation counseling, education and resources provided and reviewed.     Services Utilizing/ADLS (as reported by patients caregiver)    Infusion Service: Prior to admission, patient utilizing? no  Home Health: Prior to admission, patient utilizing? no  DME: Prior to admission, no  Pulmonary/Cardiac Rehab: Prior to admission, no  Dialysis:  Prior to admission, no  Transplant Specialty Pharmacy:  Prior to admission, no.    Prior to admission, patients caregiver reports patient was independent with ADLS and was driving.  Patients caregiver reports patient is able to care for self at this time..  Patients caregiver reports patient indicates a willingness to care for self once medically cleared to do so.    Insurance/Medications    Insured by   Payer/Plan Subscr  Sex Relation Sub. Ins. ID Effective Group Num   1. ANGEL CROSS BL* REID GARCIA 1960 Male Self [id number]                                     [address] [address]   2. BLUE CROSS BL*  REID GARCIA 1960 Male Self [id number] 5/[birth year]/21 [id number]                                   [address] [address]      Primary Insurance (for UNOS reporting): Private Insurance  Secondary Insurance (for UNOS reporting): None    Patients caregiver reports patient is able to obtain and afford medications at this time and at time of discharge.    Living Will/Healthcare Power of     Patients caregiver reports patient has a LW and/or HCPA.   provided education regarding LW and HCPA and the completion of forms.    Coping/Mental Health (as reported by patients caregiver)    Patient is coping well with the aid of  family members. Patients caregiver is coping well with the aid of  family members.      Discharge Planning (as reported by patients caregiver)    At time of discharge, patient plans to return to [facility] apartments under the care of wife.  Patients wife will transport patient.  Per rounds today, expected discharge date has not been medically determined at this time. Patients caretaker verbalizes understanding and is involved in treatment planning and discharge process.    Additional Concerns    Patient's caretaker denies additional needs and/or concerns at this time. Patient is being followed for needs, education, resources, information, emotional support, supportive counseling, and for supportive and skilled discharge plan of care.  providing ongoing psychosocial support, education, resources and d/c planning as needed.  SW remains available.  provided resource list, patient choice, psychosocial and supportive counseling, resources, education, assistance and discharge planning with patient and caregiver involvement, ongoing SW availability and services as appropriate.  remains available. Patient's caregiver verbalizes understanding and agreement with information reviewed,  availability and how to access available resources as needed.

## 2022-01-24 NOTE — PT/OT/SLP PROGRESS
Physical Therapy      Patient Name:  Eder Kong   MRN:  [id number]    Patient not seen today. Pt intubated on attempt. Pt extubated. Writing therapist unable to return today. Will follow-up when appropriate.

## 2022-01-24 NOTE — SUBJECTIVE & OBJECTIVE
Interval History/Significant Events:     NAEON  Following commands, on precedex while remains intubated  AF, HDS with intermittent need for nicardipine  On minimal vent settings overnight, tolerating SBT this AM  NGT with 200ml output, D#1 with 580ml out, D#2with 630ml out  Drains serosanguinous  UOP 3.3 L       Follow-up For: Procedure(s) (LRB):  TRANSPLANT, LIVER (N/A)    Post-Operative Day: 1 Day Post-Op    Objective:     Vital Signs (Most Recent):  Temp: 97.7 °F (36.5 °C) (01/24/22 0315)  Pulse: 76 (01/24/22 0630)  Resp: (!) 9 (01/24/22 0630)  BP: (!) 140/77 (01/24/22 0600)  SpO2: 100 % (01/24/22 0630) Vital Signs (24h Range):  Temp:  [97.7 °F (36.5 °C)-99.8 °F (37.7 °C)] 97.7 °F (36.5 °C)  Pulse:  [74-96] 76  Resp:  [9-24] 9  SpO2:  [100 %] 100 %  BP: (130-160)/(72-86) 140/77  Arterial Line BP: (100-150)/(42-73) 126/60     Weight: 76.5 kg (168 lb 10.4 oz)  Body mass index is 28.07 kg/m².      Intake/Output Summary (Last 24 hours) at 1/24/2022 0715  Last data filed at 1/24/2022 0700  Gross per 24 hour   Intake 9636.55 ml   Output 4755 ml   Net 4881.55 ml       Physical Exam  Constitutional:       General: He is not in acute distress.     Comments: Intubated, minimally sedated with precedex   HENT:      Head: Normocephalic.   Eyes:      Extraocular Movements: Extraocular movements intact.   Cardiovascular:      Rate and Rhythm: Normal rate and regular rhythm.      Pulses: Normal pulses.   Pulmonary:      Effort: Pulmonary effort is normal. No respiratory distress.      Comments: Intubated, tolerating SBT  Abdominal:      Palpations: Abdomen is soft.      Comments: Chevron incision c/d/i.   Drains with serosanguinous appearing output   Musculoskeletal:         General: No swelling.      Cervical back: Neck supple.   Skin:     General: Skin is warm and dry.   Neurological:      General: No focal deficit present.      Mental Status: He is alert.      Comments: Following commands         Vents:  Vent Mode: Spont  (01/24/22 0600)  Ventilator Initiated: Yes (01/23/22 1054)  Set Rate: 18 BPM (01/24/22 0544)  Vt Set: 400 mL (01/24/22 0544)  Pressure Support: 10 cmH20 (01/24/22 0600)  PEEP/CPAP: 5 cmH20 (01/24/22 0600)  Oxygen Concentration (%): 40 (01/24/22 0630)  Peak Airway Pressure: 15 cmH2O (01/24/22 0600)  Plateau Pressure: 15 cmH20 (01/24/22 0600)  Total Ve: 5.89 mL (01/24/22 0600)  Negative Inspiratory Force (cm H2O): 0 (01/24/22 0600)  F/VT Ratio<105 (RSBI): (!) 27.7 (01/24/22 0600)    Lines/Drains/Airways     Central Venous Catheter Line             Introducer with Double Lumen 01/23/22 0600 right internal jugular 1 day    Pulmonary Artery Catheter Assessment  01/23/22 0515 1 day    Trialysis (Dialysis) Catheter 01/23/22 0515 right internal jugular 1 day          Drain                 Urethral Catheter 01/23/22 0440 Non-latex;Straight-tip 16 Fr. 1 day         Closed/Suction Drain 01/23/22 1000 Right Abdomen Bulb 19 Fr. <1 day         Closed/Suction Drain 01/23/22 1001 Right Abdomen Bulb 19 Fr. <1 day         NG/OG Tube 01/23/22 1200 orogastric;[place] sump [facility] mouth <1 day          Airway                 Airway - Non-Surgical 01/23/22 0505 1 day          Arterial Line            Arterial Line 01/23/22 0500 Right Femoral 1 day    Arterial Line 01/23/22 0502 Left Radial 1 day          Peripheral Intravenous Line                 Peripheral IV - Single Lumen 01/22/22 2326 20 G Anterior;Left Forearm 1 day         KIARA 01/23/22 0521 Left Antecubital 1 day                Significant Labs:    CBC/Anemia Profile:  Recent Labs   Lab 01/23/22 2000 01/24/22  0003 01/24/22  0400   WBC 9.17 9.17 10.43   HGB 8.8* 8.9* 9.0*   HCT 27.0* 27.4* 27.2*   PLT 80* 73* 72*   MCV 96 94 94   RDW 14.5 14.6* 14.8*        Chemistries:  Recent Labs   Lab 01/22/22  2328 01/22/22  2328 01/23/22  0511 01/23/22  0820 01/23/22  1106 01/23/22  1529 01/23/22 2000 01/24/22  0003 01/24/22  0400      < >   < > 138 141  --  140  --  140   K 4.1   < >    < > 3.5 3.7  --  4.3  --  4.2      < >  --   --  105  --  111*  --  112*   CO2 25   < >  --   --  18*  --  17*  --  19*   BUN 19   < >  --   --  18  --  25*  --  25*   CREATININE 0.8   < >  --   --  0.8  --  1.1  --  0.8   CALCIUM 10.1   < >  --   --  7.8*  --  9.2  --  8.5*   ALBUMIN 3.6  --    < > 3.2* 2.9*  --   --   --  3.1*   PROT 7.6  --   --   --  5.0*  --   --   --  4.9*   BILITOT 0.4  --   --   --  1.4*  --   --   --  2.7*   ALKPHOS 539*  --   --   --  229*  --   --   --  140*   ALT 38  --    < > 2,861* 2,913*  --   --   --  1,706*   AST 46*   < >   < > 3,229* 7,221*   < > 4,716* 3,215* 2,243*   MG 1.8  --    < > 2.1  --   --   --  1.5* 2.2   PHOS  --   --   --   --   --   --   --  2.4* 3.8    < > = values in this interval not displayed.       All pertinent labs within the past 24 hours have been reviewed.    Significant Imaging:  I have reviewed all pertinent imaging results/findings within the past 24 hours.

## 2022-01-24 NOTE — CARE UPDATE
Patient extubated to 2 lpm nasal cannula. Following extubation parameters reported to nurse:  NIF:-32  VC:1121  RSBI: 29  Positive leak test   Patient is awake, alert and oriented.

## 2022-01-25 LAB
ALBUMIN SERPL BCP-MCNC: 3.3 G/DL (ref 3.5–5.2)
ALP SERPL-CCNC: 127 U/L (ref 55–135)
ALT SERPL W/O P-5'-P-CCNC: 1771 U/L (ref 10–44)
ANION GAP SERPL CALC-SCNC: 9 MMOL/L (ref 8–16)
ANISOCYTOSIS BLD QL SMEAR: SLIGHT
AST SERPL-CCNC: 1309 U/L (ref 10–40)
BASOPHILS # BLD AUTO: 0.02 K/UL (ref 0–0.2)
BASOPHILS NFR BLD: 0.1 % (ref 0–1.9)
BILIRUB DIRECT SERPL-MCNC: 2.2 MG/DL (ref 0.1–0.3)
BILIRUB SERPL-MCNC: 3.3 MG/DL (ref 0.1–1)
BUN SERPL-MCNC: 32 MG/DL (ref 8–23)
CALCIUM SERPL-MCNC: 7.7 MG/DL (ref 8.7–10.5)
CHLORIDE SERPL-SCNC: 112 MMOL/L (ref 95–110)
CO2 SERPL-SCNC: 19 MMOL/L (ref 23–29)
CREAT SERPL-MCNC: 0.7 MG/DL (ref 0.5–1.4)
DIFFERENTIAL METHOD: ABNORMAL
EOSINOPHIL # BLD AUTO: 0 K/UL (ref 0–0.5)
EOSINOPHIL NFR BLD: 0 % (ref 0–8)
ERYTHROCYTE [DISTWIDTH] IN BLOOD BY AUTOMATED COUNT: 14.9 % (ref 11.5–14.5)
EST. GFR  (AFRICAN AMERICAN): >60 ML/MIN/1.73 M^2
EST. GFR  (NON AFRICAN AMERICAN): >60 ML/MIN/1.73 M^2
GLUCOSE SERPL-MCNC: 139 MG/DL (ref 70–110)
HCT VFR BLD AUTO: 27.2 % (ref 40–54)
HGB BLD-MCNC: 9 G/DL (ref 14–18)
HYPOCHROMIA BLD QL SMEAR: ABNORMAL
IMM GRANULOCYTES # BLD AUTO: 0.07 K/UL (ref 0–0.04)
IMM GRANULOCYTES NFR BLD AUTO: 0.5 % (ref 0–0.5)
INR PPP: 1.5 (ref 0.8–1.2)
LYMPHOCYTES # BLD AUTO: 0.1 K/UL (ref 1–4.8)
LYMPHOCYTES NFR BLD: 1 % (ref 18–48)
MAGNESIUM SERPL-MCNC: 1.8 MG/DL (ref 1.6–2.6)
MCH RBC QN AUTO: 30.6 PG (ref 27–31)
MCHC RBC AUTO-ENTMCNC: 33.1 G/DL (ref 32–36)
MCV RBC AUTO: 93 FL (ref 82–98)
MONOCYTES # BLD AUTO: 0.5 K/UL (ref 0.3–1)
MONOCYTES NFR BLD: 3.4 % (ref 4–15)
NEUTROPHILS # BLD AUTO: 12.7 K/UL (ref 1.8–7.7)
NEUTROPHILS NFR BLD: 95 % (ref 38–73)
NRBC BLD-RTO: 0 /100 WBC
OVALOCYTES BLD QL SMEAR: ABNORMAL
PHOSPHATE SERPL-MCNC: 2.8 MG/DL (ref 2.7–4.5)
PLATELET # BLD AUTO: 97 K/UL (ref 150–450)
PMV BLD AUTO: 11.3 FL (ref 9.2–12.9)
POCT GLUCOSE: 140 MG/DL (ref 70–110)
POCT GLUCOSE: 147 MG/DL (ref 70–110)
POCT GLUCOSE: 150 MG/DL (ref 70–110)
POCT GLUCOSE: 161 MG/DL (ref 70–110)
POCT GLUCOSE: 169 MG/DL (ref 70–110)
POCT GLUCOSE: 171 MG/DL (ref 70–110)
POCT GLUCOSE: 173 MG/DL (ref 70–110)
POCT GLUCOSE: 175 MG/DL (ref 70–110)
POCT GLUCOSE: 184 MG/DL (ref 70–110)
POCT GLUCOSE: 194 MG/DL (ref 70–110)
POCT GLUCOSE: 194 MG/DL (ref 70–110)
POCT GLUCOSE: 199 MG/DL (ref 70–110)
POCT GLUCOSE: 200 MG/DL (ref 70–110)
POCT GLUCOSE: 288 MG/DL (ref 70–110)
POIKILOCYTOSIS BLD QL SMEAR: SLIGHT
POLYCHROMASIA BLD QL SMEAR: ABNORMAL
POTASSIUM SERPL-SCNC: 4.6 MMOL/L (ref 3.5–5.1)
PROT SERPL-MCNC: 5.1 G/DL (ref 6–8.4)
PROTHROMBIN TIME: 16 SEC (ref 9–12.5)
RBC # BLD AUTO: 2.94 M/UL (ref 4.6–6.2)
SODIUM SERPL-SCNC: 140 MMOL/L (ref 136–145)
TACROLIMUS BLD-MCNC: 3.9 NG/ML (ref 5–15)
WBC # BLD AUTO: 13.38 K/UL (ref 3.9–12.7)

## 2022-01-25 PROCEDURE — 99232 SBSQ HOSP IP/OBS MODERATE 35: CPT | Mod: ,,, | Performed by: NURSE PRACTITIONER

## 2022-01-25 PROCEDURE — 85025 COMPLETE CBC W/AUTO DIFF WBC: CPT | Performed by: TRANSPLANT SURGERY

## 2022-01-25 PROCEDURE — 82248 BILIRUBIN DIRECT: CPT | Performed by: TRANSPLANT SURGERY

## 2022-01-25 PROCEDURE — 99233 PR SUBSEQUENT HOSPITAL CARE,LEVL III: ICD-10-PCS | Mod: 24,,, | Performed by: SURGERY

## 2022-01-25 PROCEDURE — 99233 SBSQ HOSP IP/OBS HIGH 50: CPT | Mod: 24,,, | Performed by: SURGERY

## 2022-01-25 PROCEDURE — 25000003 PHARM REV CODE 250: Performed by: TRANSPLANT SURGERY

## 2022-01-25 PROCEDURE — 63600175 PHARM REV CODE 636 W HCPCS: Performed by: CLINICAL NURSE SPECIALIST

## 2022-01-25 PROCEDURE — 25000003 PHARM REV CODE 250: Performed by: STUDENT IN AN ORGANIZED HEALTH CARE EDUCATION/TRAINING PROGRAM

## 2022-01-25 PROCEDURE — 63600175 PHARM REV CODE 636 W HCPCS: Performed by: STUDENT IN AN ORGANIZED HEALTH CARE EDUCATION/TRAINING PROGRAM

## 2022-01-25 PROCEDURE — 99900035 HC TECH TIME PER 15 MIN (STAT)

## 2022-01-25 PROCEDURE — 87086 URINE CULTURE/COLONY COUNT: CPT | Performed by: STUDENT IN AN ORGANIZED HEALTH CARE EDUCATION/TRAINING PROGRAM

## 2022-01-25 PROCEDURE — 85610 PROTHROMBIN TIME: CPT | Performed by: TRANSPLANT SURGERY

## 2022-01-25 PROCEDURE — 20600001 HC STEP DOWN PRIVATE ROOM

## 2022-01-25 PROCEDURE — 80197 ASSAY OF TACROLIMUS: CPT | Performed by: TRANSPLANT SURGERY

## 2022-01-25 PROCEDURE — 25000003 PHARM REV CODE 250: Performed by: NURSE PRACTITIONER

## 2022-01-25 PROCEDURE — 83735 ASSAY OF MAGNESIUM: CPT | Performed by: STUDENT IN AN ORGANIZED HEALTH CARE EDUCATION/TRAINING PROGRAM

## 2022-01-25 PROCEDURE — 80053 COMPREHEN METABOLIC PANEL: CPT | Performed by: TRANSPLANT SURGERY

## 2022-01-25 PROCEDURE — 84100 ASSAY OF PHOSPHORUS: CPT | Performed by: STUDENT IN AN ORGANIZED HEALTH CARE EDUCATION/TRAINING PROGRAM

## 2022-01-25 PROCEDURE — 94761 N-INVAS EAR/PLS OXIMETRY MLT: CPT

## 2022-01-25 PROCEDURE — 63600175 PHARM REV CODE 636 W HCPCS: Performed by: NURSE PRACTITIONER

## 2022-01-25 PROCEDURE — 99232 PR SUBSEQUENT HOSPITAL CARE,LEVL II: ICD-10-PCS | Mod: ,,, | Performed by: NURSE PRACTITIONER

## 2022-01-25 RX ORDER — IBUPROFEN 200 MG
24 TABLET ORAL
Status: DISCONTINUED | OUTPATIENT
Start: 2022-01-25 | End: 2022-01-26

## 2022-01-25 RX ORDER — ACETAMINOPHEN 325 MG/1
650 TABLET ORAL EVERY 6 HOURS PRN
Status: DISCONTINUED | OUTPATIENT
Start: 2022-01-25 | End: 2022-01-28 | Stop reason: HOSPADM

## 2022-01-25 RX ORDER — INSULIN ASPART 100 [IU]/ML
0-10 INJECTION, SOLUTION INTRAVENOUS; SUBCUTANEOUS
Status: DISCONTINUED | OUTPATIENT
Start: 2022-01-25 | End: 2022-01-26

## 2022-01-25 RX ORDER — TACROLIMUS 1 MG/1
2 CAPSULE ORAL 2 TIMES DAILY
Status: DISCONTINUED | OUTPATIENT
Start: 2022-01-25 | End: 2022-01-26

## 2022-01-25 RX ORDER — INSULIN ASPART 100 [IU]/ML
6 INJECTION, SOLUTION INTRAVENOUS; SUBCUTANEOUS
Status: DISCONTINUED | OUTPATIENT
Start: 2022-01-25 | End: 2022-01-26

## 2022-01-25 RX ORDER — HYDRALAZINE HYDROCHLORIDE 25 MG/1
25 TABLET, FILM COATED ORAL EVERY 8 HOURS PRN
Status: DISCONTINUED | OUTPATIENT
Start: 2022-01-25 | End: 2022-01-28 | Stop reason: HOSPADM

## 2022-01-25 RX ORDER — CARVEDILOL 12.5 MG/1
12.5 TABLET ORAL 2 TIMES DAILY
Status: DISCONTINUED | OUTPATIENT
Start: 2022-01-25 | End: 2022-01-28 | Stop reason: HOSPADM

## 2022-01-25 RX ORDER — GLUCAGON 1 MG
1 KIT INJECTION
Status: DISCONTINUED | OUTPATIENT
Start: 2022-01-25 | End: 2022-01-26

## 2022-01-25 RX ORDER — MYCOPHENOLATE MOFETIL 250 MG/1
1000 CAPSULE ORAL 2 TIMES DAILY
Status: DISCONTINUED | OUTPATIENT
Start: 2022-01-25 | End: 2022-01-28 | Stop reason: HOSPADM

## 2022-01-25 RX ORDER — LIDOCAINE HYDROCHLORIDE 10 MG/ML
10 INJECTION INFILTRATION; PERINEURAL ONCE
Status: COMPLETED | OUTPATIENT
Start: 2022-01-25 | End: 2022-01-25

## 2022-01-25 RX ORDER — IBUPROFEN 200 MG
16 TABLET ORAL
Status: DISCONTINUED | OUTPATIENT
Start: 2022-01-25 | End: 2022-01-26

## 2022-01-25 RX ADMIN — NYSTATIN 500000 UNITS: 500000 SUSPENSION ORAL at 09:01

## 2022-01-25 RX ADMIN — MUPIROCIN 1 G: 20 OINTMENT TOPICAL at 09:01

## 2022-01-25 RX ADMIN — INSULIN HUMAN 1.5 UNITS/HR: 1 INJECTION, SOLUTION INTRAVENOUS at 01:01

## 2022-01-25 RX ADMIN — OXYCODONE 5 MG: 5 TABLET ORAL at 05:01

## 2022-01-25 RX ADMIN — METHYLPREDNISOLONE SODIUM SUCCINATE 160 MG: 125 INJECTION, POWDER, FOR SOLUTION INTRAMUSCULAR; INTRAVENOUS at 09:01

## 2022-01-25 RX ADMIN — MAGNESIUM SULFATE IN WATER 2 G: 40 INJECTION, SOLUTION INTRAVENOUS at 04:01

## 2022-01-25 RX ADMIN — OXYCODONE 5 MG: 5 TABLET ORAL at 09:01

## 2022-01-25 RX ADMIN — NYSTATIN 500000 UNITS: 500000 SUSPENSION ORAL at 05:01

## 2022-01-25 RX ADMIN — INSULIN ASPART 6 UNITS: 100 INJECTION, SOLUTION INTRAVENOUS; SUBCUTANEOUS at 09:01

## 2022-01-25 RX ADMIN — LABETALOL HYDROCHLORIDE 10 MG: 5 INJECTION, SOLUTION INTRAVENOUS at 05:01

## 2022-01-25 RX ADMIN — CARVEDILOL 12.5 MG: 12.5 TABLET, FILM COATED ORAL at 09:01

## 2022-01-25 RX ADMIN — LIDOCAINE HYDROCHLORIDE 10 ML: 10 INJECTION, SOLUTION INFILTRATION; PERINEURAL at 10:01

## 2022-01-25 RX ADMIN — HYDRALAZINE HYDROCHLORIDE 25 MG: 25 TABLET, FILM COATED ORAL at 12:01

## 2022-01-25 RX ADMIN — FAMOTIDINE 20 MG: 10 INJECTION, SOLUTION INTRAVENOUS at 09:01

## 2022-01-25 RX ADMIN — MYCOPHENOLATE MOFETIL 1000 MG: 200 POWDER, FOR SUSPENSION ORAL at 09:01

## 2022-01-25 RX ADMIN — TACROLIMUS 2 MG: 1 CAPSULE ORAL at 05:01

## 2022-01-25 RX ADMIN — HYDRALAZINE HYDROCHLORIDE 10 MG: 20 INJECTION INTRAMUSCULAR; INTRAVENOUS at 03:01

## 2022-01-25 RX ADMIN — TACROLIMUS 1 MG: 1 CAPSULE, GELATIN COATED ORAL at 09:01

## 2022-01-25 RX ADMIN — MYCOPHENOLATE MOFETIL 1000 MG: 250 CAPSULE ORAL at 09:01

## 2022-01-25 RX ADMIN — ACETAMINOPHEN 650 MG: 325 TABLET ORAL at 09:01

## 2022-01-25 NOTE — PROGRESS NOTES
Patient arrived to the unit from sicu, aao, oriented to room and call bell, BP slightly elevated, scant pain. Patients wife at the bedside attentive to patient. Visi applied.

## 2022-01-25 NOTE — ASSESSMENT & PLAN NOTE
Eder Kong is our 62 yo M with Hx of HCC, Hep C, and DM who underwent DCD OLT on 1/23/22    Neuro:  - Continue current pain regimen of oxy with dilaudid for breakthrough     CV:  - MAP goal > 65  - PRN antihypertensives for SBP >160 sustained     Pulm:  - Stable on room air  - Continue to monitor     FEN/GI:  S/p DCD OLTx on 1/23/22  - CLD   - Trend BMP q12hr, AST q4hr  - Lyte replacement prn  - Drains: RUQ x2 with initial serosanguinous output  - Liver Transplant US yesterday satisfactory  - AST peak 7221, has trended down, but small increase this AM  - INR downtrending     Renal:  - Guillen in place, continue  - Monitor UOP  - Trend BUN/Cr  - UA with WBC and bacteria, culture pending  - May increase to treatment dose bactrim after discussion with transplant pharmacist     Heme/Onc:  - Hgb 9 this AM  - Trend CBC q12hr, INR q4hr  - Immuno: Methylprednisolone taper, Prograf, Mycophenolate     ID:  - AF  - Trend WBC with CBC q12hr, elevated this AM likely 2/2 UTI  - Abx/Antifungals/Antiviral:  Bactrim, Nystatin, Valganciclovir     Endo:  - Insulin gtt per endocrinology     PPx:  - Famotidine, TEDs/SCDs  - Will consider DVT chemoprophylaxis if INR continues to trend down    Continue ICU care

## 2022-01-25 NOTE — SUBJECTIVE & OBJECTIVE
Interval History/Significant Events:     NAEON  Pain relatively well controlled  Extubated yesterday morning, now comfortable on room air  HDS, requiring PRNs for HTN overnight  Tolerating liquid diet  D#1 with 265ml out, D#2with 450ml out   Drains serosanguinous  UOP 1.1 L, blood tinged      Follow-up For: Procedure(s) (LRB):  TRANSPLANT, LIVER (N/A)    Post-Operative Day: 1 Day Post-Op    Objective:     Vital Signs (Most Recent):  Temp: 98.1 °F (36.7 °C) (01/25/22 0300)  Pulse: 84 (01/25/22 0615)  Resp: (!) 21 (01/25/22 0615)  BP: (!) 141/73 (01/25/22 0600)  SpO2: 96 % (01/25/22 0615) Vital Signs (24h Range):  Temp:  [97.6 °F (36.4 °C)-98.4 °F (36.9 °C)] 98.1 °F (36.7 °C)  Pulse:  [74-96] 84  Resp:  [9-26] 21  SpO2:  [94 %-100 %] 96 %  BP: (136-171)/(63-83) 141/73     Weight: 76.5 kg (168 lb 10.4 oz)  Body mass index is 28.07 kg/m².      Intake/Output Summary (Last 24 hours) at 1/25/2022 0627  Last data filed at 1/25/2022 0600  Gross per 24 hour   Intake 2637.47 ml   Output 1981 ml   Net 656.47 ml       Physical Exam  Constitutional:       General: He is not in acute distress.  HENT:      Head: Normocephalic.      Nose: Nose normal.      Mouth/Throat:      Mouth: Mucous membranes are moist.      Pharynx: Oropharynx is clear.   Eyes:      Extraocular Movements: Extraocular movements intact.   Cardiovascular:      Rate and Rhythm: Normal rate and regular rhythm.      Pulses: Normal pulses.   Pulmonary:      Effort: Pulmonary effort is normal. No respiratory distress.   Abdominal:      Palpations: Abdomen is soft.      Comments: Chevron incision c/d/i.   Drains with serosanguinous appearing output   Genitourinary:     Comments: Pink/red urine  Musculoskeletal:         General: No swelling.      Cervical back: Normal range of motion and neck supple.   Skin:     General: Skin is warm and dry.   Neurological:      General: No focal deficit present.      Mental Status: He is alert and oriented to person, place, and time.    Psychiatric:         Mood and Affect: Mood normal.         Vents:  Vent Mode: Spont (01/24/22 1000)  Ventilator Initiated: Yes (01/23/22 1054)  Set Rate: 18 BPM (01/24/22 0544)  Vt Set: 400 mL (01/24/22 0544)  Pressure Support: 5 cmH20 (01/24/22 1000)  PEEP/CPAP: 5 cmH20 (01/24/22 1000)  Oxygen Concentration (%): 40 (Simultaneous filing. User may not have seen previous data.) (01/24/22 1000)  Peak Airway Pressure: 11 cmH2O (01/24/22 1000)  Plateau Pressure: 15 cmH20 (01/24/22 1000)  Total Ve: 6.05 mL (01/24/22 1000)  Negative Inspiratory Force (cm H2O): -32 (01/24/22 1000)  F/VT Ratio<105 (RSBI): (!) 26.94 (01/24/22 1000)    Lines/Drains/Airways     Central Venous Catheter Line             Introducer with Double Lumen 01/23/22 0600 right internal jugular 2 days    Trialysis (Dialysis) Catheter 01/23/22 0515 right internal jugular 2 days          Drain                 Urethral Catheter 01/23/22 0440 Non-latex;Straight-tip 16 Fr. 2 days         Closed/Suction Drain 01/23/22 1000 Right Abdomen Bulb 19 Fr. 1 day         Closed/Suction Drain 01/23/22 1001 Right Abdomen Bulb 19 Fr. 1 day          Arterial Line            Arterial Line 01/23/22 0500 Right Femoral 2 days    Arterial Line 01/23/22 0502 Left Radial 2 days          Peripheral Intravenous Line                 Peripheral IV - Single Lumen 01/22/22 2326 20 G Anterior;Left Forearm 2 days         KIARA 01/23/22 0521 Left Antecubital 2 days                Significant Labs:    CBC/Anemia Profile:  Recent Labs   Lab 01/24/22  1613 01/24/22 2002 01/25/22  0307   WBC 11.17 14.21* 13.38*   HGB 8.9* 9.0* 9.0*   HCT 27.1* 27.8* 27.2*   PLT 99* 108* 97*   MCV 93 96 93   RDW 14.6* 15.1* 14.9*        Chemistries:  Recent Labs   Lab 01/23/22  1106 01/23/22  1529 01/23/22 2000 01/24/22  0003 01/24/22  0400 01/24/22  0400 01/24/22  0818 01/24/22  1208 01/24/22  1613 01/24/22  1656 01/24/22 2002 01/25/22  0307      < >   < >  --  140   < > 141  --   --  140  --  140   K  3.7   < >   < >  --  4.2   < > 4.3  --   --  4.0  --  4.6      < >   < >  --  112*   < > 113*  --   --  112*  --  112*   CO2 18*   < >   < >  --  19*   < > 23  --   --  20*  --  19*   BUN 18   < >   < >  --  25*   < > 26*  --   --  27*  --  32*   CREATININE 0.8   < >   < >  --  0.8   < > 0.8  --   --  0.8  --  0.7   CALCIUM 7.8*   < >   < >  --  8.5*   < > 8.0*  --   --  8.1*  --  7.7*   ALBUMIN 2.9*  --   --   --  3.1*  --   --   --   --   --   --  3.3*   PROT 5.0*  --   --   --  4.9*  --   --   --   --   --   --  5.1*   BILITOT 1.4*  --   --   --  2.7*  --   --   --   --   --   --  3.3*   ALKPHOS 229*  --   --   --  140*  --   --   --   --   --   --  127   ALT 2,913*  --   --   --  1,706*  --   --   --   --   --   --  1,771*   AST 7,221*   < >   < > 3,215* 2,243*   < > 1,732*   < > 1,157*  --  1,159* 1,309*   MG  --   --   --  1.5* 2.2  --   --   --   --   --   --  1.8   PHOS  --   --   --  2.4* 3.8  --   --   --   --   --   --  2.8    < > = values in this interval not displayed.       All pertinent labs within the past 24 hours have been reviewed.    Significant Imaging:  I have reviewed all pertinent imaging results/findings within the past 24 hours.

## 2022-01-25 NOTE — PROGRESS NOTES
Met with patient in ICU. Patient is awake and sitting up.  Provided with Post Liver Transplant Handbook: My New Journey, Living Safely After My Liver Transplant.  Encouraged to read and review information in preparation for class once reaching to step down unit. Verbalized understanding. Asked if there were any questions or concerns that could be addressed at this time. Denied at this time.

## 2022-01-25 NOTE — NURSING
Pt AAOx4. Guillen, Fem a-line, R IJ introducer, L radial a-line, and L KIARA removed today with no complications. Pt tolerating diabetic diet, no complaints of nausea. Passing gas.     Vital signs stable. Home Coreg restarted today. PRN hydralazine given x1. Pain well controlled with PRN oxycodone.    Plan to stepdown to TSU.

## 2022-01-25 NOTE — PT/OT/SLP PROGRESS
Physical Therapy Missed Treatment Note      Patient Name:  Eder Kong   MRN:  [id number]  Admitting Diagnosis:  S/P liver transplant   Recent Surgery: Procedure(s) (LRB):  TRANSPLANT, LIVER (N/A) 2 Days Post-Op  Admit Date: 1/22/2022  Length of Stay: 3 days    Patient not seen today due to PT with x2 attempts on this date. AM (1005): pt in bed and RN reporting about to pull pt's femoral lines. Requested PT to return in PM. PM (1660): RN and tech present and about to transfer to step-down floor. RN reports pt transferred OOB earlier with no assist. Will attempt for initial evaluation tomorrow.    Yesica Nowak, PT, DPT  1/25/2022   Pager: [phone]

## 2022-01-25 NOTE — PROGRESS NOTES
[facility] - Surgical Intensive Care  Critical Care - Surgery  Progress Note    Patient Name: Eder Kong  MRN: [id number]  Admission Date: 1/22/2022  Hospital Length of Stay: 3 days  Code Status: Full Code  Attending Provider: Sterling Tom MD  Primary Care Provider: Martin Mcdaniel MD   Principal Problem: S/P liver transplant    Subjective:     Hospital/ICU Course:  No notes on file    Interval History/Significant Events:     NAEON  Pain relatively well controlled  Extubated yesterday morning, now comfortable on room air  HDS, requiring PRNs for HTN overnight  Tolerating liquid diet  D#1 with 265ml out, D#2with 450ml out   Drains serosanguinous  UOP 1.1 L, blood tinged      Follow-up For: Procedure(s) (LRB):  TRANSPLANT, LIVER (N/A)    Post-Operative Day: 1 Day Post-Op    Objective:     Vital Signs (Most Recent):  Temp: 98.1 °F (36.7 °C) (01/25/22 0300)  Pulse: 84 (01/25/22 0615)  Resp: (!) 21 (01/25/22 0615)  BP: (!) 141/73 (01/25/22 0600)  SpO2: 96 % (01/25/22 0615) Vital Signs (24h Range):  Temp:  [97.6 °F (36.4 °C)-98.4 °F (36.9 °C)] 98.1 °F (36.7 °C)  Pulse:  [74-96] 84  Resp:  [9-26] 21  SpO2:  [94 %-100 %] 96 %  BP: (136-171)/(63-83) 141/73     Weight: 76.5 kg (168 lb 10.4 oz)  Body mass index is 28.07 kg/m².      Intake/Output Summary (Last 24 hours) at 1/25/2022 0627  Last data filed at 1/25/2022 0600  Gross per 24 hour   Intake 2637.47 ml   Output 1981 ml   Net 656.47 ml       Physical Exam  Constitutional:       General: He is not in acute distress.  HENT:      Head: Normocephalic.      Nose: Nose normal.      Mouth/Throat:      Mouth: Mucous membranes are moist.      Pharynx: Oropharynx is clear.   Eyes:      Extraocular Movements: Extraocular movements intact.   Cardiovascular:      Rate and Rhythm: Normal rate and regular rhythm.      Pulses: Normal pulses.   Pulmonary:      Effort: Pulmonary effort is normal. No respiratory distress.   Abdominal:      Palpations: Abdomen is soft.      Comments: Chevron  incision c/d/i.   Drains with serosanguinous appearing output   Genitourinary:     Comments: Pink/red urine  Musculoskeletal:         General: No swelling.      Cervical back: Normal range of motion and neck supple.   Skin:     General: Skin is warm and dry.   Neurological:      General: No focal deficit present.      Mental Status: He is alert and oriented to person, place, and time.   Psychiatric:         Mood and Affect: Mood normal.         Vents:  Vent Mode: Spont (01/24/22 1000)  Ventilator Initiated: Yes (01/23/22 1054)  Set Rate: 18 BPM (01/24/22 0544)  Vt Set: 400 mL (01/24/22 0544)  Pressure Support: 5 cmH20 (01/24/22 1000)  PEEP/CPAP: 5 cmH20 (01/24/22 1000)  Oxygen Concentration (%): 40 (Simultaneous filing. User may not have seen previous data.) (01/24/22 1000)  Peak Airway Pressure: 11 cmH2O (01/24/22 1000)  Plateau Pressure: 15 cmH20 (01/24/22 1000)  Total Ve: 6.05 mL (01/24/22 1000)  Negative Inspiratory Force (cm H2O): -32 (01/24/22 1000)  F/VT Ratio<105 (RSBI): (!) 26.94 (01/24/22 1000)    Lines/Drains/Airways     Central Venous Catheter Line             Introducer with Double Lumen 01/23/22 0600 right internal jugular 2 days    Trialysis (Dialysis) Catheter 01/23/22 0515 right internal jugular 2 days          Drain                 Urethral Catheter 01/23/22 0440 Non-latex;Straight-tip 16 Fr. 2 days         Closed/Suction Drain 01/23/22 1000 Right Abdomen Bulb 19 Fr. 1 day         Closed/Suction Drain 01/23/22 1001 Right Abdomen Bulb 19 Fr. 1 day          Arterial Line            Arterial Line 01/23/22 0500 Right Femoral 2 days    Arterial Line 01/23/22 0502 Left Radial 2 days          Peripheral Intravenous Line                 Peripheral IV - Single Lumen 01/22/22 2326 20 G Anterior;Left Forearm 2 days         KIARA 01/23/22 0521 Left Antecubital 2 days                Significant Labs:    CBC/Anemia Profile:  Recent Labs   Lab 01/24/22  1613 01/24/22 2002 01/25/22  0307   WBC 11.17 14.21* 13.38*    HGB 8.9* 9.0* 9.0*   HCT 27.1* 27.8* 27.2*   PLT 99* 108* 97*   MCV 93 96 93   RDW 14.6* 15.1* 14.9*        Chemistries:  Recent Labs   Lab 01/23/22  1106 01/23/22  1529 01/23/22 2000 01/24/22  0003 01/24/22  0400 01/24/22  0400 01/24/22  0818 01/24/22  1208 01/24/22  1613 01/24/22  1656 01/24/22 2002 01/25/22  0307      < >   < >  --  140   < > 141  --   --  140  --  140   K 3.7   < >   < >  --  4.2   < > 4.3  --   --  4.0  --  4.6      < >   < >  --  112*   < > 113*  --   --  112*  --  112*   CO2 18*   < >   < >  --  19*   < > 23  --   --  20*  --  19*   BUN 18   < >   < >  --  25*   < > 26*  --   --  27*  --  32*   CREATININE 0.8   < >   < >  --  0.8   < > 0.8  --   --  0.8  --  0.7   CALCIUM 7.8*   < >   < >  --  8.5*   < > 8.0*  --   --  8.1*  --  7.7*   ALBUMIN 2.9*  --   --   --  3.1*  --   --   --   --   --   --  3.3*   PROT 5.0*  --   --   --  4.9*  --   --   --   --   --   --  5.1*   BILITOT 1.4*  --   --   --  2.7*  --   --   --   --   --   --  3.3*   ALKPHOS 229*  --   --   --  140*  --   --   --   --   --   --  127   ALT 2,913*  --   --   --  1,706*  --   --   --   --   --   --  1,771*   AST 7,221*   < >   < > 3,215* 2,243*   < > 1,732*   < > 1,157*  --  1,159* 1,309*   MG  --   --   --  1.5* 2.2  --   --   --   --   --   --  1.8   PHOS  --   --   --  2.4* 3.8  --   --   --   --   --   --  2.8    < > = values in this interval not displayed.       All pertinent labs within the past 24 hours have been reviewed.    Significant Imaging:  I have reviewed all pertinent imaging results/findings within the past 24 hours.    Assessment/Plan:     * S/P liver transplant  Eder Kong is our 60 yo M with Hx of HCC, Hep C, and DM who underwent DCD OLT on 1/23/22    Neuro:  - Continue current pain regimen of oxy with dilaudid for breakthrough     CV:  - MAP goal > 65  - PRN antihypertensives for SBP >160 sustained     Pulm:  - Stable on room air  - Continue to monitor     FEN/GI:  S/p DCD OLTx on  1/23/22  - CLD   - Trend BMP q12hr, AST q4hr  - Lyte replacement prn  - Drains: RUQ x2 with initial serosanguinous output  - Liver Transplant US yesterday satisfactory  - AST peak 7221, has trended down, but small increase this AM  - INR downtrending     Renal:  - Barber in place, continue  - Monitor UOP  - Trend BUN/Cr  - UA with WBC and bacteria, culture pending  - May increase to treatment dose bactrim after discussion with transplant pharmacist     Heme/Onc:  - Hgb 9 this AM  - Trend CBC q12hr, INR q4hr  - Immuno: Methylprednisolone taper, Prograf, Mycophenolate     ID:  - AF  - Trend WBC with CBC q12hr, elevated this AM likely 2/2 UTI  - Abx/Antifungals/Antiviral:  Bactrim, Nystatin, Valganciclovir     Endo:  - Insulin gtt per endocrinology     PPx:  - Famotidine, TEDs/SCDs  - Will consider DVT chemoprophylaxis if INR continues to trend down    Continue ICU care         Critical Care Daily Checklist:    A: Awake: RASS Goal/Actual Goal: RASS Goal: 0-->alert and calm  Actual: Reilly Agitation Sedation Scale (RASS): Alert and calm   B: Spontaneous Breathing Trial Performed? Spon. Breathing Trial Initiated?: Initiated (01/24/22 1000)   C: SAT & SBT Coordinated?  n/a                      D: Delirium: CAM-ICU     E: Early Mobility Performed? No   F: Feeding Goal: Goals: Pt to meet % EEN/EPN over the course of 7 days  Status: Nutrition Goal Status: new   Current Diet Order   Procedures    Diet Clear liquid (no sugar)/Bariatric      AS: Analgesia/Sedation Oxy, dilaudid   T: Thromboembolic Prophylaxis SCD/ABBIE   H: HOB > 300 Yes   U: Stress Ulcer Prophylaxis (if needed) n/a   G: Glucose Control Insulin gtt   B: Bowel Function Stool Occurrence: 0   I: Indwelling Catheter (Lines & Barber) Necessity RIJ HDC, Introducer w CVC, barber, radial elsa, fem elsa   D: De-escalation of Antimicrobials/Pharmacotherapies Continue ppx abx, will consider broadening coverage for UTI    Plan for the day/ETD Continue to trend labs,  de-escalate lines as able, advance diet, consider stepdown to TSU if remains stable    Code Status:  Family/Goals of Care: Full Code  Continue current care plan       Critical secondary to Patient has a condition that poses threat to life and bodily function: s/p liver transplantation     Critical care was time spent personally by me on the following activities: development of treatment plan with patient or surrogate and bedside caregivers, discussions with consultants, evaluation of patient's response to treatment, examination of patient, ordering and performing treatments and interventions, ordering and review of laboratory studies, ordering and review of radiographic studies, pulse oximetry, re-evaluation of patient's condition.  This critical care time did not overlap with that of any other provider or involve time for any procedures.     Gloria Resendiz MD  Critical Care - Surgery  [facility] - [facility]

## 2022-01-25 NOTE — NURSING TRANSFER
Nursing Transfer Note      1/25/2022     Reason patient is being transferred: stepdown orders    Transfer To: TSU    Transfer via wheelchair    Transfer with cardiac monitoring    Transported by RN    Medicines sent: mupirocin, nystatin    Any special needs or follow-up needed: No    Chart send with patient: Yes    Notified: spouse, accompanied patient     Upon arrival to floor: cardiac monitor applied, patient oriented to room, call bell in reach and bed in lowest position. TSU RN to bedside.    Spoke with Lida. Per Dr. Pineda, Azael should begin taking 4 mg daily in the morning, no evening dose. Follow up 4/8/2020 as scheduled. Instructed to call with any concerns.

## 2022-01-25 NOTE — SUBJECTIVE & OBJECTIVE
"Interval HPI:   Overnight events:  Transferred to TSU. BG reasonably well controlled on IV insulin infusion rates 1.1- 4.5 u/hr. Solumedrol 160 mg daily; steroids per primary. 2 Days Post-Op  Eating:   Diet advancing. Diet diabetic [facility]; 2000 Calorie  Nausea: No  Hypoglycemia and intervention: No  Fever: No  TPN and/or TF: No    BP (!) 169/81 (BP Location: Right arm, Patient Position: Lying)   Pulse 81   Temp 97.8 °F (36.6 °C) (Oral)   Resp 16   Ht 5' 5" (1.651 m)   Wt 76.5 kg (168 lb 10.4 oz)   SpO2 97%   BMI 28.07 kg/m²     Labs Reviewed and Include    Recent Labs   Lab 01/25/22  0307   *   CALCIUM 7.7*   ALBUMIN 3.3*   PROT 5.1*      K 4.6   CO2 19*   *   BUN 32*   CREATININE 0.7   ALKPHOS 127   ALT 1,771*   AST 1,309*   BILITOT 3.3*     Lab Results   Component Value Date    WBC 13.38 (H) 01/25/2022    HGB 9.0 (L) 01/25/2022    HCT 27.2 (L) 01/25/2022    MCV 93 01/25/2022    PLT 97 (L) 01/25/2022     No results for input(s): TSH, FREET4 in the last 168 hours.  Lab Results   Component Value Date    HGBA1C 7.6 (H) 01/22/2022       Nutritional status:   Body mass index is 28.07 kg/m².  Lab Results   Component Value Date    ALBUMIN 3.3 (L) 01/25/2022    ALBUMIN 3.1 (L) 01/24/2022    ALBUMIN 2.9 (L) 01/23/2022     No results found for: PREALBUMIN    Estimated Creatinine Clearance: 103.1 mL/min (based on SCr of 0.7 mg/dL).    Accu-Checks  Recent Labs     01/25/22  0103 01/25/22  0203 01/25/22  0305 01/25/22  0403 01/25/22  0606 01/25/22  0722 01/25/22  0832 01/25/22  0916 01/25/22  1022 01/25/22  1151   POCTGLUCOSE 147* 175* 161* 171* 184* 194* 200* 194* 173* 150*       Current Medications and/or Treatments Impacting Glycemic Control  Immunotherapy:    Immunosuppressants         Stop Route Frequency     tacrolimus (PROGRAF) 1 mg/mL oral syringe         -- Oral 2 times daily     mycophenolate mofetil 200 mg/mL suspension 1,000 mg         -- Oral 2 times daily        Steroids: " "  Hormones (From admission, onward)            Start     Stop Route Frequency Ordered    01/29/22 0900  predniSONE tablet 20 mg  (methylprednisolone taper panel)        "Followed by" Linked Group Details    -- Oral Daily 01/23/22 1058    01/28/22 0900  methylPREDNISolone sodium succinate injection 40 mg  (methylprednisolone taper panel)        "Followed by" Linked Group Details    01/29 0859 IV Daily 01/23/22 1058    01/27/22 0900  methylPREDNISolone sodium succinate injection 80 mg  (methylprednisolone taper panel)        "Followed by" Linked Group Details    01/28 0859 IV Daily 01/23/22 1058    01/26/22 0900  methylPREDNISolone sodium succinate injection 120 mg  (methylprednisolone taper panel)        "Followed by" Linked Group Details    01/27 0859 IV Daily 01/23/22 1058        Pressors:    Autonomic Drugs (From admission, onward)            None        Hyperglycemia/Diabetes Medications:   Antihyperglycemics (From admission, onward)            Start     Stop Route Frequency Ordered    01/25/22 1645  insulin aspart U-100 pen 6 Units         -- SubQ 3 times daily with meals 01/25/22 1246    01/25/22 1345  insulin regular in 0.9 % NaCl 100 unit/100 mL (1 unit/mL) infusion        Question:  Enter initial dose (Units/hr):  Answer:  1.5    -- IV Continuous 01/25/22 1246    01/25/22 1342  insulin aspart U-100 pen 0-10 Units         -- SubQ As needed (PRN) 01/25/22 1246        "

## 2022-01-25 NOTE — PLAN OF CARE
Pt remains AAO x4. RA. O2 sat >94%.Afebrile.  CVP 7 flat. SR 80 bpm. SBP <160 mmHg maintained c prn hydralazine. Clear, diminished breath sounds. S1S2. Abd soft, tender c palpation. Chevron incision clean, dry, and intact. Staples intact, edges well approximated. PRASANNA drains c moderate serosanguinous drainage. UO approx 40-50 ml/hr, red in color. UA results and current medications reviewed c Dr. Lundberg. Pulses 1-2+. Remains on insulin. No skin breakdown noted. Repositioned/turned per self and staff q 2. Pain managed well c prn dilaudid and oxycodone.  Pt and spouse updated on POC. See chart and doc flowsheet for details. Will continue to monitor.

## 2022-01-26 PROBLEM — R53.1 WEAKNESS: Status: ACTIVE | Noted: 2022-01-26

## 2022-01-26 PROBLEM — D63.8 ANEMIA OF CHRONIC DISEASE: Status: ACTIVE | Noted: 2022-01-26

## 2022-01-26 PROBLEM — Z79.60 LONG-TERM USE OF IMMUNOSUPPRESSANT MEDICATION: Status: ACTIVE | Noted: 2022-01-26

## 2022-01-26 LAB
ALBUMIN SERPL BCP-MCNC: 2.8 G/DL (ref 3.5–5.2)
ALP SERPL-CCNC: 139 U/L (ref 55–135)
ALT SERPL W/O P-5'-P-CCNC: 1679 U/L (ref 10–44)
ANION GAP SERPL CALC-SCNC: 6 MMOL/L (ref 8–16)
AST SERPL-CCNC: 613 U/L (ref 10–40)
BACTERIA UR CULT: NO GROWTH
BASOPHILS # BLD AUTO: 0.01 K/UL (ref 0–0.2)
BASOPHILS NFR BLD: 0.1 % (ref 0–1.9)
BILIRUB DIRECT SERPL-MCNC: 1.8 MG/DL (ref 0.1–0.3)
BILIRUB SERPL-MCNC: 2.8 MG/DL (ref 0.1–1)
BLD PROD TYP BPU: NORMAL
BLOOD UNIT EXPIRATION DATE: NORMAL
BLOOD UNIT TYPE CODE: 5100
BLOOD UNIT TYPE CODE: 9500
BLOOD UNIT TYPE: NORMAL
BUN SERPL-MCNC: 48 MG/DL (ref 8–23)
CALCIUM SERPL-MCNC: 7.5 MG/DL (ref 8.7–10.5)
CHLORIDE SERPL-SCNC: 112 MMOL/L (ref 95–110)
CO2 SERPL-SCNC: 22 MMOL/L (ref 23–29)
CODING SYSTEM: NORMAL
CREAT SERPL-MCNC: 0.9 MG/DL (ref 0.5–1.4)
DIFFERENTIAL METHOD: ABNORMAL
DISPENSE STATUS: NORMAL
EOSINOPHIL # BLD AUTO: 0 K/UL (ref 0–0.5)
EOSINOPHIL NFR BLD: 0 % (ref 0–8)
ERYTHROCYTE [DISTWIDTH] IN BLOOD BY AUTOMATED COUNT: 14.7 % (ref 11.5–14.5)
EST. GFR  (AFRICAN AMERICAN): >60 ML/MIN/1.73 M^2
EST. GFR  (NON AFRICAN AMERICAN): >60 ML/MIN/1.73 M^2
GLUCOSE SERPL-MCNC: 143 MG/DL (ref 70–110)
HCT VFR BLD AUTO: 27.8 % (ref 40–54)
HEPATITIS C VIRUS (HCV) RNA DETECTION/QUANTIFICATION RT-PCR: NORMAL IU/ML
HGB BLD-MCNC: 9 G/DL (ref 14–18)
IMM GRANULOCYTES # BLD AUTO: 0.11 K/UL (ref 0–0.04)
IMM GRANULOCYTES NFR BLD AUTO: 1 % (ref 0–0.5)
INR PPP: 1.3 (ref 0.8–1.2)
LYMPHOCYTES # BLD AUTO: 0.1 K/UL (ref 1–4.8)
LYMPHOCYTES NFR BLD: 1.2 % (ref 18–48)
MCH RBC QN AUTO: 30.1 PG (ref 27–31)
MCHC RBC AUTO-ENTMCNC: 32.4 G/DL (ref 32–36)
MCV RBC AUTO: 93 FL (ref 82–98)
MONOCYTES # BLD AUTO: 0.5 K/UL (ref 0.3–1)
MONOCYTES NFR BLD: 4.3 % (ref 4–15)
NEUTROPHILS # BLD AUTO: 10.5 K/UL (ref 1.8–7.7)
NEUTROPHILS NFR BLD: 93.4 % (ref 38–73)
NRBC BLD-RTO: 0 /100 WBC
NUM UNITS TRANS FFP: NORMAL
PLATELET # BLD AUTO: 73 K/UL (ref 150–450)
PMV BLD AUTO: 10.8 FL (ref 9.2–12.9)
POCT GLUCOSE: 185 MG/DL (ref 70–110)
POCT GLUCOSE: 256 MG/DL (ref 70–110)
POCT GLUCOSE: 277 MG/DL (ref 70–110)
POCT GLUCOSE: 285 MG/DL (ref 70–110)
POCT GLUCOSE: 327 MG/DL (ref 70–110)
POTASSIUM SERPL-SCNC: 4 MMOL/L (ref 3.5–5.1)
PROT SERPL-MCNC: 4.6 G/DL (ref 6–8.4)
PROTHROMBIN TIME: 13.9 SEC (ref 9–12.5)
RBC # BLD AUTO: 2.99 M/UL (ref 4.6–6.2)
SODIUM SERPL-SCNC: 140 MMOL/L (ref 136–145)
TACROLIMUS BLD-MCNC: 4.4 NG/ML (ref 5–15)
TRANS ERYTHROCYTES VOL PATIENT: NORMAL ML
WBC # BLD AUTO: 11.21 K/UL (ref 3.9–12.7)

## 2022-01-26 PROCEDURE — 99233 SBSQ HOSP IP/OBS HIGH 50: CPT | Mod: 24,,, | Performed by: PHYSICIAN ASSISTANT

## 2022-01-26 PROCEDURE — 97161 PT EVAL LOW COMPLEX 20 MIN: CPT

## 2022-01-26 PROCEDURE — 85025 COMPLETE CBC W/AUTO DIFF WBC: CPT | Performed by: TRANSPLANT SURGERY

## 2022-01-26 PROCEDURE — 25000003 PHARM REV CODE 250: Performed by: STUDENT IN AN ORGANIZED HEALTH CARE EDUCATION/TRAINING PROGRAM

## 2022-01-26 PROCEDURE — 63600175 PHARM REV CODE 636 W HCPCS: Performed by: STUDENT IN AN ORGANIZED HEALTH CARE EDUCATION/TRAINING PROGRAM

## 2022-01-26 PROCEDURE — 99233 PR SUBSEQUENT HOSPITAL CARE,LEVL III: ICD-10-PCS | Mod: 24,,, | Performed by: PHYSICIAN ASSISTANT

## 2022-01-26 PROCEDURE — 63600175 PHARM REV CODE 636 W HCPCS: Performed by: NURSE PRACTITIONER

## 2022-01-26 PROCEDURE — 20600001 HC STEP DOWN PRIVATE ROOM

## 2022-01-26 PROCEDURE — 82248 BILIRUBIN DIRECT: CPT | Performed by: TRANSPLANT SURGERY

## 2022-01-26 PROCEDURE — C9399 UNCLASSIFIED DRUGS OR BIOLOG: HCPCS | Performed by: NURSE PRACTITIONER

## 2022-01-26 PROCEDURE — 99232 PR SUBSEQUENT HOSPITAL CARE,LEVL II: ICD-10-PCS | Mod: ,,, | Performed by: NURSE PRACTITIONER

## 2022-01-26 PROCEDURE — 25000003 PHARM REV CODE 250: Performed by: PHYSICIAN ASSISTANT

## 2022-01-26 PROCEDURE — 94799 UNLISTED PULMONARY SVC/PX: CPT

## 2022-01-26 PROCEDURE — 63600175 PHARM REV CODE 636 W HCPCS: Performed by: PHYSICIAN ASSISTANT

## 2022-01-26 PROCEDURE — 99900035 HC TECH TIME PER 15 MIN (STAT)

## 2022-01-26 PROCEDURE — 80197 ASSAY OF TACROLIMUS: CPT | Performed by: TRANSPLANT SURGERY

## 2022-01-26 PROCEDURE — 80053 COMPREHEN METABOLIC PANEL: CPT | Performed by: TRANSPLANT SURGERY

## 2022-01-26 PROCEDURE — 85610 PROTHROMBIN TIME: CPT | Performed by: TRANSPLANT SURGERY

## 2022-01-26 PROCEDURE — 25000003 PHARM REV CODE 250: Performed by: NURSE PRACTITIONER

## 2022-01-26 PROCEDURE — 99232 SBSQ HOSP IP/OBS MODERATE 35: CPT | Mod: ,,, | Performed by: NURSE PRACTITIONER

## 2022-01-26 PROCEDURE — 97116 GAIT TRAINING THERAPY: CPT

## 2022-01-26 PROCEDURE — 63600175 PHARM REV CODE 636 W HCPCS: Performed by: CLINICAL NURSE SPECIALIST

## 2022-01-26 RX ORDER — NAPROXEN SODIUM 220 MG/1
81 TABLET, FILM COATED ORAL DAILY
Status: DISCONTINUED | OUTPATIENT
Start: 2022-01-26 | End: 2022-01-28 | Stop reason: HOSPADM

## 2022-01-26 RX ORDER — GLUCAGON 1 MG
1 KIT INJECTION
Status: DISCONTINUED | OUTPATIENT
Start: 2022-01-26 | End: 2022-01-28 | Stop reason: HOSPADM

## 2022-01-26 RX ORDER — INSULIN ASPART 100 [IU]/ML
6 INJECTION, SOLUTION INTRAVENOUS; SUBCUTANEOUS
Status: DISCONTINUED | OUTPATIENT
Start: 2022-01-26 | End: 2022-01-27

## 2022-01-26 RX ORDER — IBUPROFEN 200 MG
24 TABLET ORAL
Status: DISCONTINUED | OUTPATIENT
Start: 2022-01-26 | End: 2022-01-28 | Stop reason: HOSPADM

## 2022-01-26 RX ORDER — DOCUSATE SODIUM 100 MG/1
100 CAPSULE, LIQUID FILLED ORAL 2 TIMES DAILY
Status: DISCONTINUED | OUTPATIENT
Start: 2022-01-26 | End: 2022-01-28 | Stop reason: HOSPADM

## 2022-01-26 RX ORDER — HEPARIN SODIUM 5000 [USP'U]/ML
5000 INJECTION, SOLUTION INTRAVENOUS; SUBCUTANEOUS EVERY 8 HOURS
Status: DISCONTINUED | OUTPATIENT
Start: 2022-01-26 | End: 2022-01-28 | Stop reason: HOSPADM

## 2022-01-26 RX ORDER — LIDOCAINE HYDROCHLORIDE 10 MG/ML
10 INJECTION INFILTRATION; PERINEURAL ONCE
Status: DISCONTINUED | OUTPATIENT
Start: 2022-01-26 | End: 2022-01-28 | Stop reason: HOSPADM

## 2022-01-26 RX ORDER — FAMOTIDINE 20 MG/1
20 TABLET, FILM COATED ORAL 2 TIMES DAILY
Status: DISCONTINUED | OUTPATIENT
Start: 2022-01-26 | End: 2022-01-28 | Stop reason: HOSPADM

## 2022-01-26 RX ORDER — TACROLIMUS 1 MG/1
3 CAPSULE ORAL 2 TIMES DAILY
Status: DISCONTINUED | OUTPATIENT
Start: 2022-01-26 | End: 2022-01-27

## 2022-01-26 RX ORDER — INSULIN ASPART 100 [IU]/ML
1-10 INJECTION, SOLUTION INTRAVENOUS; SUBCUTANEOUS
Status: DISCONTINUED | OUTPATIENT
Start: 2022-01-26 | End: 2022-01-28 | Stop reason: HOSPADM

## 2022-01-26 RX ORDER — POLYETHYLENE GLYCOL 3350 17 G/17G
17 POWDER, FOR SOLUTION ORAL DAILY
Status: DISCONTINUED | OUTPATIENT
Start: 2022-01-26 | End: 2022-01-28 | Stop reason: HOSPADM

## 2022-01-26 RX ORDER — IBUPROFEN 200 MG
16 TABLET ORAL
Status: DISCONTINUED | OUTPATIENT
Start: 2022-01-26 | End: 2022-01-28 | Stop reason: HOSPADM

## 2022-01-26 RX ORDER — TACROLIMUS 1 MG/1
1 CAPSULE ORAL ONCE
Status: COMPLETED | OUTPATIENT
Start: 2022-01-26 | End: 2022-01-26

## 2022-01-26 RX ADMIN — POLYETHYLENE GLYCOL 3350 17 G: 17 POWDER, FOR SOLUTION ORAL at 02:01

## 2022-01-26 RX ADMIN — MYCOPHENOLATE MOFETIL 1000 MG: 250 CAPSULE ORAL at 08:01

## 2022-01-26 RX ADMIN — NYSTATIN 500000 UNITS: 500000 SUSPENSION ORAL at 09:01

## 2022-01-26 RX ADMIN — DOCUSATE SODIUM 100 MG: 100 CAPSULE ORAL at 08:01

## 2022-01-26 RX ADMIN — TACROLIMUS 2 MG: 1 CAPSULE ORAL at 09:01

## 2022-01-26 RX ADMIN — METHYLPREDNISOLONE SODIUM SUCCINATE 120 MG: 125 INJECTION, POWDER, FOR SOLUTION INTRAMUSCULAR; INTRAVENOUS at 09:01

## 2022-01-26 RX ADMIN — TACROLIMUS 3 MG: 1 CAPSULE ORAL at 05:01

## 2022-01-26 RX ADMIN — INSULIN ASPART 6 UNITS: 100 INJECTION, SOLUTION INTRAVENOUS; SUBCUTANEOUS at 12:01

## 2022-01-26 RX ADMIN — NYSTATIN 500000 UNITS: 500000 SUSPENSION ORAL at 08:01

## 2022-01-26 RX ADMIN — ASPIRIN 81 MG CHEWABLE TABLET 81 MG: 81 TABLET CHEWABLE at 12:01

## 2022-01-26 RX ADMIN — INSULIN ASPART 6 UNITS: 100 INJECTION, SOLUTION INTRAVENOUS; SUBCUTANEOUS at 05:01

## 2022-01-26 RX ADMIN — MUPIROCIN 1 G: 20 OINTMENT TOPICAL at 08:01

## 2022-01-26 RX ADMIN — INSULIN ASPART 4 UNITS: 100 INJECTION, SOLUTION INTRAVENOUS; SUBCUTANEOUS at 08:01

## 2022-01-26 RX ADMIN — FAMOTIDINE 20 MG: 10 INJECTION, SOLUTION INTRAVENOUS at 09:01

## 2022-01-26 RX ADMIN — MUPIROCIN 1 G: 20 OINTMENT TOPICAL at 09:01

## 2022-01-26 RX ADMIN — DOCUSATE SODIUM 100 MG: 100 CAPSULE ORAL at 02:01

## 2022-01-26 RX ADMIN — CARVEDILOL 12.5 MG: 12.5 TABLET, FILM COATED ORAL at 08:01

## 2022-01-26 RX ADMIN — OXYCODONE 5 MG: 5 TABLET ORAL at 12:01

## 2022-01-26 RX ADMIN — FAMOTIDINE 20 MG: 20 TABLET ORAL at 08:01

## 2022-01-26 RX ADMIN — INSULIN DETEMIR 28 UNITS: 100 INJECTION, SOLUTION SUBCUTANEOUS at 12:01

## 2022-01-26 RX ADMIN — TACROLIMUS 1 MG: 1 CAPSULE ORAL at 12:01

## 2022-01-26 RX ADMIN — MYCOPHENOLATE MOFETIL 1000 MG: 250 CAPSULE ORAL at 09:01

## 2022-01-26 RX ADMIN — HEPARIN SODIUM 5000 UNITS: 5000 INJECTION INTRAVENOUS; SUBCUTANEOUS at 03:01

## 2022-01-26 RX ADMIN — NYSTATIN 500000 UNITS: 500000 SUSPENSION ORAL at 03:01

## 2022-01-26 RX ADMIN — CARVEDILOL 12.5 MG: 12.5 TABLET, FILM COATED ORAL at 09:01

## 2022-01-26 RX ADMIN — INSULIN ASPART 6 UNITS: 100 INJECTION, SOLUTION INTRAVENOUS; SUBCUTANEOUS at 08:01

## 2022-01-26 RX ADMIN — HEPARIN SODIUM 5000 UNITS: 5000 INJECTION INTRAVENOUS; SUBCUTANEOUS at 08:01

## 2022-01-26 NOTE — ASSESSMENT & PLAN NOTE
- Continue Cellcept and Prograf. Will monitor for signs of toxic side effects, check daily tacrolimus troughs, and change meds accordingly.

## 2022-01-26 NOTE — PT/OT/SLP EVAL
"Physical Therapy Evaluation    Patient Name:  Eder Kong   MRN:  [id number]    Recommendations:     Discharge Recommendations:  home health PT   Discharge Equipment Recommendations: none   Barriers to discharge: None    Assessment:     Eder Kong is a 61 y.o. male admitted with a medical diagnosis of S/P liver transplant.  He presents with the following impairments/functional limitations:  weakness,impaired endurance,impaired functional mobilty,gait instability,impaired skin,pain Patient was able to complete bed mobility with SBA. He demonstrated ability to complete transfers with CGA. He ambulated 320 ft into the hallway with no AD and CGA/HHA.    Rehab Prognosis: Good; patient would benefit from acute skilled PT services to address these deficits and reach maximum level of function.    Recent Surgery: Procedure(s) (LRB):  TRANSPLANT, LIVER (N/A) 3 Days Post-Op    Plan:     During this hospitalization, patient to be seen 4 x/week to address the identified rehab impairments via gait training,therapeutic activities,therapeutic exercises and progress toward the following goals:    · Plan of Care Expires:  02/24/22    Subjective     Chief Complaint: "I think the pain medicine works."  Patient/Family Comments/goals: return home to [facility]  Pain/Comfort:  Pain Rating 1: 5/10  Location - Side 1: Bilateral  Location - Orientation 1: anterior  Location 1: abdomen  Pain Addressed 1: Reposition,Distraction,Cessation of Activity,Pre-medicate for activity  Pain Rating Post-Intervention 1: 5/10    Patients cultural, spiritual, Restorationism conflicts given the current situation: no    Living Environment:  Patient lives at home with his wife and high school granddaughter in a [facility] with no [place]. Inside the bathroom, there is a tub-shower combination with grab bars but no seat. Upon discharge, patient and wife will be staying in the [facility] apartments.   Prior to admission, patients level of function was independent and worked full time at " his own business.  Equipment used at home: none.  DME owned (not currently used): none.  Upon discharge, patient will have assistance from wife and granddaughter.    Objective:     Communicated with nurse prior to session.  Patient found HOB elevated with pulse ox (continuous),telemetry,central line  upon PT entry to room.    General Precautions: Standard, fall   Orthopedic Precautions:N/A   Braces: N/A  Respiratory Status: Room air    Exams:  · Cognitive Exam:  Patient is oriented to Person, Place, Time and Situation  · Gross Motor Coordination:  WFL  · RLE ROM: WFL  · RLE Strength: WFL  · LLE ROM: WFL  · LLE Strength: WFL    Functional Mobility:  · Bed Mobility:     · Supine to Sit: stand by assistance  · Sit to Supine: stand by assistance  · Transfers:     · Sit to Stand:  contact guard assistance with no AD  · Gait: patient ambulated 320 feet CGA/HHA with no AD. Patient initially ambulated about 10 ft within room with CGA, and HHA was added for improved stability for the duration of gait trial. Patient demonstrated mildly unsteady gait and reported feeling leg weakness toward the end of the trial. One standing rest break taken, and no LOB noted.     Therapeutic Activities and Exercises:   Patient educated on PT POC, call for assistance, gait/trf techniques    AM-PAC 6 CLICK MOBILITY  Total Score:17     Patient left HOB elevated with all lines intact, call button in reach and nurse notified.    GOALS:   Multidisciplinary Problems     Physical Therapy Goals        Problem: Physical Therapy Goal    Goal Priority Disciplines Outcome Goal Variances Interventions   Physical Therapy Goal     PT, PT/OT Ongoing, Progressing     Description: Goals to be met by: 2022     Patient will increase functional independence with mobility by performin. Supine to sit with Modified Andrew  2. Sit to supine with Modified Andrew  3. Sit to stand transfer with Supervision  4. Gait  x 150 feet with Stand-by  Assistance using No Assistive Device.   5. Lower extremity exercise program x15 reps per handout, with assistance as needed                     History:     Past Medical History:   Diagnosis Date    Anemia     Arthritis     Diabetes     Dizziness     Dyslipidemia     General anesthetics causing adverse effect in therapeutic use     Hep C w/o coma, chronic     failed 2 rounds of interferon-based medication. no protease inhibitorrs used    Hypertension     not on medication    Kidney stone     Liver cancer        Past Surgical History:   Procedure Laterality Date    AORTIC VALVE REPLACEMENT  2018    due to aortic regurgitation    CARDIAC SURGERY      CATHETERIZATION OF BOTH LEFT AND RIGHT HEART N/A 7/8/2021    Procedure: CATHETERIZATION, HEART, BOTH LEFT AND RIGHT;  Surgeon: Eder Beltran MD;  Location: [facility] CATH LAB;  Service: Cardiology;  Laterality: N/A;    COLONOSCOPY W/ POLYPECTOMY      2011    ESOPHAGOGASTRODUODENOSCOPY      x2 with cautery in 2001, 2012    LIVER TRANSPLANT N/A 1/23/2022    Procedure: TRANSPLANT, LIVER;  Surgeon: Jesús Austin MD;  Location: [facility] OR [address] [place] [address];  Service: Transplant;  Laterality: N/A;    mastoid bone      right ear       Time Tracking:     PT Received On: 01/26/22  PT Start Time: 1400     PT Stop Time: 1418  PT Total Time (min): 18 min     Billable Minutes: Evaluation 8 and Gait Training 10      01/26/2022

## 2022-01-26 NOTE — PROGRESS NOTES
SW Update    SW met with the pt and his wife to discuss high co-pay amounts and post discharge lodging. Pt presents with his wife Charo and the pt was alert and engaged in discussion appropriately. Pt will discharge to [facility] [address] under the care of his wife. Pt's wife to check into the apartment tomorrow at 10am. SW answered all of the pt's questions regarding the apartments and check in.    Pt has BCBS and has a high deductible amount as well as a high out of pocket amount. Pt and wife have met their deductible for the year and they have met half of their out of pocket expense with >4000 remaining. SW discussed high expected co-pays at discharge for transplant medications. Pt and wife reports that they have money saved up and will be able to cover the discharge amount. Pt may need assistance from case management, depending on the discharge amount. Once the out of pocket is reached, the co-pays may decrease.     SW provided the pt and wife with a patient assistance program for medication packet for them to complete incase the co-pays do not decrease significantly after the out of pocket is met. Pharmacist is working with pt to see if any co-pay cards can be used for any medications. Pt's wife to complete packet and turn back in to a SW once complete.     Pt and caregiver did not identify any other concerns or questions at this time.     SW remains available.

## 2022-01-26 NOTE — ASSESSMENT & PLAN NOTE
BG goal 140-180    Discontinue IV insulin infusion. Start levemir 28 units daily (20% reduction of insulin infusion dose)   BG monitoring ac/hs and moderate dose correction scale.   continue novolog 6 units with meals    Discharge planning: JESSICA

## 2022-01-26 NOTE — SUBJECTIVE & OBJECTIVE
"Interval HPI:   Overnight events: Remains in TSU. NAEON. BG reasonably well controlled on IV insulin infusion at 1.5 u/hr with scheduled insulin and prn correction scale. 3 Days Post-Op. Solumedrol 120 mg; steroids per primary.   Eatin% Diet diabetic [facility]; 2000 Calorie  Nausea: No  Hypoglycemia and intervention: No  Fever: No  TPN and/or TF: No    /71 (Patient Position: Lying)   Pulse 72   Temp 98 °F (36.7 °C) (Oral)   Resp 16   Ht 5' 5" (1.651 m)   Wt 78.5 kg (173 lb 1 oz)   SpO2 96%   BMI 28.80 kg/m²     Labs Reviewed and Include    Recent Labs   Lab 22  0531   *   CALCIUM 7.5*   ALBUMIN 2.8*   PROT 4.6*      K 4.0   CO2 22*   *   BUN 48*   CREATININE 0.9   ALKPHOS 139*   ALT 1,679*   *   BILITOT 2.8*     Lab Results   Component Value Date    WBC 11.21 2022    HGB 9.0 (L) 2022    HCT 27.8 (L) 2022    MCV 93 2022    PLT 73 (L) 2022     No results for input(s): TSH, FREET4 in the last 168 hours.  Lab Results   Component Value Date    HGBA1C 7.6 (H) 2022       Nutritional status:   Body mass index is 28.8 kg/m².  Lab Results   Component Value Date    ALBUMIN 2.8 (L) 2022    ALBUMIN 3.3 (L) 2022    ALBUMIN 3.1 (L) 2022     No results found for: PREALBUMIN    Estimated Creatinine Clearance: 81.2 mL/min (based on SCr of 0.9 mg/dL).    Accu-Checks  Recent Labs     22  0722 22  0832 22  0916 22  1022 22  1151 22  1319 22  1738 22  2103 22  0211 22  0840   POCTGLUCOSE 194* 200* 194* 173* 150* 169* 199* 288* 185* 256*       Current Medications and/or Treatments Impacting Glycemic Control  Immunotherapy:    Immunosuppressants         Stop Route Frequency     mycophenolate capsule 1,000 mg         -- Oral 2 times daily     tacrolimus capsule 2 mg         -- Oral 2 times daily        Steroids:   Hormones (From admission, onward)            Start     " "Stop Route Frequency Ordered    01/29/22 0900  predniSONE tablet 20 mg  (methylprednisolone taper panel)        "Followed by" Linked Group Details    -- Oral Daily 01/23/22 1058    01/28/22 0900  methylPREDNISolone sodium succinate injection 40 mg  (methylprednisolone taper panel)        "Followed by" Linked Group Details    01/29 0859 IV Daily 01/23/22 1058    01/27/22 0900  methylPREDNISolone sodium succinate injection 80 mg  (methylprednisolone taper panel)        "Followed by" Linked Group Details    01/28 0859 IV Daily 01/23/22 1058        Pressors:    Autonomic Drugs (From admission, onward)            None        Hyperglycemia/Diabetes Medications:   Antihyperglycemics (From admission, onward)            Start     Stop Route Frequency Ordered    01/26/22 1130  insulin aspart U-100 pen 6 Units         -- SubQ 3 times daily with meals 01/26/22 0945    01/26/22 1100  insulin detemir U-100 pen 28 Units         -- SubQ Daily 01/26/22 0945    01/26/22 1045  insulin aspart U-100 pen 1-10 Units         -- SubQ Before meals & nightly PRN 01/26/22 0945        "

## 2022-01-26 NOTE — PLAN OF CARE
Problem: Physical Therapy Goal  Goal: Physical Therapy Goal  Description: Goals to be met by: 2022     Patient will increase functional independence with mobility by performin. Supine to sit with Supervision  2. Sit to supine with Supervision  3. Sit to stand transfer with Supervision  4. Gait  x 150 feet with Stand-by Assistance using No Assistive Device.   5. Lower extremity exercise program x15 reps per handout, with assistance as needed    PT EVAL: 2022

## 2022-01-26 NOTE — PROGRESS NOTES
EDUCATION NOTE:    Met with Eder Kong and his caregivers to provide teaching re: immunosuppressant medications.  Reviewed medication section of the Liver Transplant Education book that was provided.  Emphasized the importance of compliance, role of the blue medication card, concerns for drug interactions, and process of obtaining refills.  Counseled regarding Prograf, Cellcept , prednisone, including directions for use, monitoring, how to handle missed doses, and side effects.  Mr. Kong and his wife verbalized understanding and had the opportunity to ask questions.

## 2022-01-26 NOTE — PROGRESS NOTES
Met with patient and his wife for  discharge teaching.  Reviewed My New Journey: Living Smart After My Liver Transplant.  Sections reviewed were: First Steps, Appointments and Prevention.  Medication section  reviewed by Pharm D.  Allowed time for questions and answers.  Asked patient to complete the review questions in the discharge book.

## 2022-01-26 NOTE — PLAN OF CARE
Patient POD #4 - LFTs slowly trending down. Patient OOB to chair for several hours today, ambulated in hallway with PT. RIJ Trialysis d/c'd per order - PIV placed. PRASANNA d/c'd. Insulin gtt d/c'd - patient transitioned to SQ Levemir. No BM since surgery - bowel regimen started. Coordinator and pharmacy teaching done today. Self-meds to start this afternoon.     Patient's wife pulled self-meds with 100% accuracy.

## 2022-01-26 NOTE — PROGRESS NOTES
[facility] - Transplant Stepdown  Adult Nutrition  Progress Note    SUMMARY       Recommendations    1. Continue Diabetic 2000 kcal diet     2. Add Boost Glucose Control TID to increase intake     3. Intensify bowel regimen prn (LBM 1/22)     4. Post-transplant diet education provided 1/27    Goals: Pt to meet % EEN/EPN over the course of 7 days  Nutrition Goal Status: progressing towards goal    Communication of RD Recs: reviewed with RN    Assessment and Plan    Nutrition Problem  Increased nutrient needs, protein     Related to (etiology):   Physiological demands, healing     Signs and Symptoms (as evidenced by):   S/p OLTx 1/23      Interventions (treatment strategy):  Collaboration with other providers  Nutrition education     Nutrition Diagnosis Status:   Continues     Malnutrition Assessment       Orbital Region (Subcutaneous Fat Loss): well nourished  Upper Arm Region (Subcutaneous Fat Loss): well nourished   [place] Region (Muscle Loss): well nourished  Clavicle Bone Region (Muscle Loss): well nourished  Clavicle and Acromion Bone Region (Muscle Loss): well nourished  Scapular Bone Region (Muscle Loss): well nourished  Dorsal Hand (Muscle Loss): well nourished   Edema (Fluid Accumulation): 1-->trace   Subcutaneous Fat Loss (Final Summary): well nourished  Muscle Loss Evaluation (Final Summary): well nourished         Reason for Assessment    Reason For Assessment: RD follow-up  Diagnosis:  (S/p OLTx)  Relevant Medical History: HCC, hep C, DM2  Interdisciplinary Rounds: attended    General Information Comments: S/p OLTx 1/23. Pt reports fair appetite now. Good appetite PTA following general/healthy diet at home. Denies n/v/d/c or difficulties chewing/swallowing. UBW ~157#; wt gain likely fluid related as pt with trace generalized edema. NFPE completed 1/27; pt appears nourished with no s/s of malnutrition.    Nutrition Discharge Planning: Post-transplant nutrition education provided on 1/27. Food  "safety/drug interactions emphasized. General heatlhy/low salt diet recommended. Education material left. No other needs identified.    Nutrition Risk Screen    Nutrition Risk Screen: no indicators present    Nutrition/Diet History    Patient Reported Diet/Restrictions/Preferences: general  Spiritual, Cultural Beliefs, Sabianism Practices, Values that Affect Care: no  Food Allergies: NKFA  Factors Affecting Nutritional Intake: None identified at this time    Anthropometrics    Temp: 97.6 °F (36.4 °C)  Height Method: Stated  Height: 5' 5" (165.1 cm)  Height (inches): 65 in  Weight Method: Bed Scale  Weight: 78.5 kg (173 lb 1 oz)  Weight (lb): 173.06 lb  Ideal Body Weight (IBW), Male: 136 lb  % Ideal Body Weight, Male (lb): 112.99 %  BMI (Calculated): 28.8  BMI Grade: 25 - 29.9 - overweight       Lab/Procedures/Meds    Pertinent Labs Reviewed: reviewed  Pertinent Labs Comments: BUN 48, Alk phos 139, Alb 2.8, , ALT 1679  Pertinent Medications Reviewed: reviewed  Pertinent Medications Comments: famotidine, insulin, prednisone, mycophenolate, tacrolimus    Estimated/Assessed Needs    Weight Used For Calorie Calculations: 76.5 kg (168 lb 10.4 oz)  Energy Calorie Requirements (kcal): 1784 kcal/day  Energy Need Method: [place] (PAL 1.2)  Protein Requirements: 77-92 g/day (1.0-1.2 g/kg)  Weight Used For Protein Calculations: 76.5 kg (168 lb 10.4 oz)  Fluid Requirements (mL): 1 mL/kcal or per MD  Estimated Fluid Requirement Method: RDA Method  RDA Method (mL): 1784  CHO Requirement: 223 gm    Nutrition Prescription Ordered    Current Diet Order: Diabetic 2000 kcal    Evaluation of Received Nutrient/Fluid Intake    I/O: +6.5L since admit  Energy Calories Required: not meeting needs  Protein Required: not meeting needs  Fluid Required: not meeting needs  Comments: LBM 1/22  Tolerance: tolerating  % Intake of Estimated Energy Needs: 50 - 75 %  % Meal Intake: 50 - 75 %    Nutrition Risk    Level of Risk/Frequency of " Follow-up: low     Monitor and Evaluation    Food and Nutrient Intake: energy intake,food and beverage intake  Food and Nutrient Adminstration: diet order  Knowledge/Beliefs/Attitudes: food and nutrition knowledge/skill  Physical Activity and Function: nutrition-related ADLs and IADLs  Anthropometric Measurements: weight,weight change  Biochemical Data, Medical Tests and Procedures: electrolyte and renal panel,gastrointestinal profile,glucose/endocrine profile,inflammatory profile,lipid profile  Nutrition-Focused Physical Findings: overall appearance     Nutrition Follow-Up    RD Follow-up?: Yes

## 2022-01-26 NOTE — ASSESSMENT & PLAN NOTE
"- S/p DCD OLTx 1/23 d/t HCV/HCC. Per op note, "After reperfusion and hemostasis break, there was minimal but persistent oozing from some surfaces and suture holes. Additional products were given. A patch of Evarrest was placed over the adrenal gland which was friable. After a period of packing there was good hemostasis".   - POD1 US satisfactory. Stepped down 1/25.  - Lateral drain removed 1/25.   - Plan for medial drain removal 1/26  - LFTs trending down  - tolerating diet  - remove CVC  "

## 2022-01-26 NOTE — HOSPITAL COURSE
"Patient now s/p DCD OLTx 1/23 d/t HCV/HCC. Per op note, "After reperfusion and hemostasis break, there was minimal but persistent oozing from some surfaces and suture holes. Additional products were given. A patch of Evarrest was placed over the adrenal gland which was friable. After a period of packing there was good hemostasis". POD1 US satisfactory. Stepped down 1/25.     Interval Hx: No acute events overnight.  Pt cont to progress well.  LFTS elevated but trending down.  CBC stable. He is toleratinfg diet, passing flatus and had BM 1/27.  He is ambulating in room w/o difficulty.  Pulling meds.  Central line removed 1/26.  Lateral PRASANNA drain removed 1/25. Medial drain removed 1/26.  Cr 1.  Making good UOP. UA 1/24 with many bacteria; >100 wbc. Cx ngtd. Asymptomatic. No abx started. Endocrine following for BG management, weaned off insulin gtt. Tentative plan for routine Liver US and possible d/c Friday.  VSS. Monitor.  "

## 2022-01-26 NOTE — PROGRESS NOTES
"[facility] - Transplant Stepdown  Endocrinology  Progress Note    Admit Date: 2022     Reason for Consult: Management of T2DM, Hyperglycemia     Surgical Procedure and Date: Liver Transplant 22    Diabetes diagnosis year:     Home Diabetes Medications:  Toujo (unsure of dose- variable 10-30 units) and  ozempic 1 mg weekly   Lab Results   Component Value Date    HGBA1C 7.6 (H) 2022           How often checking glucose at home? 1-3   BG readings on regimen: 100-200  Hypoglycemia on the regimen?  no  Missed doses on regimen?  no    Diabetes Complications include:     Hyperglycemia    Complicating diabetes co morbidities:   CIRRHOSIS and Glucocorticoid use       HPI:   Patient is a 61 y.o. 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 Endocrinology consulted for management of T2DM.       Lab Results   Component Value Date    HGBA1C 7.6 (H) 2022           Interval HPI:   Overnight events: Remains in TSU. NAEON. BG reasonably well controlled on IV insulin infusion at 1.5 u/hr with scheduled insulin and prn correction scale. 3 Days Post-Op. Solumedrol 120 mg; steroids per primary.   Eatin% Diet diabetic [facility]; 2000 Calorie  Nausea: No  Hypoglycemia and intervention: No  Fever: No  TPN and/or TF: No    /71 (Patient Position: Lying)   Pulse 72   Temp 98 °F (36.7 °C) (Oral)   Resp 16   Ht 5' 5" (1.651 m)   Wt 78.5 kg (173 lb 1 oz)   SpO2 96%   BMI 28.80 kg/m²     Labs Reviewed and Include    Recent Labs   Lab 22  0531   *   CALCIUM 7.5*   ALBUMIN 2.8*   PROT 4.6*      K 4.0   CO2 22*   *   BUN 48*   CREATININE 0.9   ALKPHOS 139*   ALT 1,679*   *   BILITOT 2.8*     Lab Results   Component Value Date    WBC 11.21 2022    HGB 9.0 (L) 2022    HCT 27.8 (L) 2022    MCV 93 2022    PLT 73 (L) 2022     No results for input(s): TSH, FREET4 in the last 168 " "hours.  Lab Results   Component Value Date    HGBA1C 7.6 (H) 01/22/2022       Nutritional status:   Body mass index is 28.8 kg/m².  Lab Results   Component Value Date    ALBUMIN 2.8 (L) 01/26/2022    ALBUMIN 3.3 (L) 01/25/2022    ALBUMIN 3.1 (L) 01/24/2022     No results found for: PREALBUMIN    Estimated Creatinine Clearance: 81.2 mL/min (based on SCr of 0.9 mg/dL).    Accu-Checks  Recent Labs     01/25/22  0722 01/25/22  0832 01/25/22  0916 01/25/22  1022 01/25/22  1151 01/25/22  1319 01/25/22  1738 01/25/22  2103 01/26/22  0211 01/26/22  0840   POCTGLUCOSE 194* 200* 194* 173* 150* 169* 199* 288* 185* 256*       Current Medications and/or Treatments Impacting Glycemic Control  Immunotherapy:    Immunosuppressants         Stop Route Frequency     mycophenolate capsule 1,000 mg         -- Oral 2 times daily     tacrolimus capsule 2 mg         -- Oral 2 times daily        Steroids:   Hormones (From admission, onward)            Start     Stop Route Frequency Ordered    01/29/22 0900  predniSONE tablet 20 mg  (methylprednisolone taper panel)        "Followed by" Linked Group Details    -- Oral Daily 01/23/22 1058    01/28/22 0900  methylPREDNISolone sodium succinate injection 40 mg  (methylprednisolone taper panel)        "Followed by" Linked Group Details    01/29 0859 IV Daily 01/23/22 1058    01/27/22 0900  methylPREDNISolone sodium succinate injection 80 mg  (methylprednisolone taper panel)        "Followed by" Linked Group Details    01/28 0859 IV Daily 01/23/22 1058        Pressors:    Autonomic Drugs (From admission, onward)            None        Hyperglycemia/Diabetes Medications:   Antihyperglycemics (From admission, onward)            Start     Stop Route Frequency Ordered    01/26/22 1130  insulin aspart U-100 pen 6 Units         -- SubQ 3 times daily with meals 01/26/22 0945    01/26/22 1100  insulin detemir U-100 pen 28 Units         -- SubQ Daily 01/26/22 0945    01/26/22 1045  insulin aspart U-100 pen " 1-10 Units         -- SubQ Before meals & nightly PRN 01/26/22 0945          ASSESSMENT and PLAN    * S/P liver transplant  Managed per primary team  Optimize BG control    Type 2 diabetes mellitus with hyperglycemia  BG goal 140-180    Discontinue IV insulin infusion. Start levemir 28 units daily (20% reduction of insulin infusion dose)   BG monitoring ac/hs and moderate dose correction scale.   continue novolog 6 units with meals    Discharge planning: TBD        Prophylactic immunotherapy  May increase insulin resistance.         Adverse effect of corticosteroids  Glucocorticoids markedly increase glucoses. Expect steroid taper to help with glucose control.          Aster Ha, NP  Endocrinology  [facility] - Transplant Stepdown

## 2022-01-26 NOTE — PROGRESS NOTES
"[facility]  Liver Transplant  Progress Note    Patient Name: Eder Kong  MRN: [id number]  Admission Date: 1/22/2022  Hospital Length of Stay: 4 days  Code Status: Full Code  Primary Care Provider: Martin Mcdaniel MD  Post-Operative Day: 3    ORGAN:   LIVER  Disease Etiology: Primary Liver Malignancy: Hepatoma (HCC) and Cirrhosis  Donor Type:   Donation after Circulatory Death   CDC High Risk:   Yes  Donor CMV Status:   Donor CMV Status: Negative  Donor HBcAB:   Negative  Donor HCV Status:   Negative  Donor HBV SHIVANI: Negative  Donor HCV SHIVANI: Negative  Whole or Partial: Whole Liver  Biliary Anastomosis: End to End  Arterial Anatomy: Standard  Subjective:     History of Present Illness:  Eder Kong is a 61 y.o. 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  Patient presents for liver transplant.  He reports in usual state of health- Patient denies any recent hospitalizations or ED visits.  COVID vaccine completed. The primary surgeon will be Dr. Austin.  OR times times are being set for 0400 and 0530.  Patient has been NPO since 4pm.  All pre-op labs and imaging have been ordered and will be reviewed prior to surgery. Consents to be signed prior to transplant.             Hospital Course:  Patient now s/p DCD OLTx 1/23 d/t HCV/HCC. Per op note, "After reperfusion and hemostasis break, there was minimal but persistent oozing from some surfaces and suture holes. Additional products were given. A patch of Evarrest was placed over the adrenal gland which was friable. After a period of packing there was good hemostasis". POD1 US satisfactory. Stepped down 1/25.     Interval Hx: Patient stepped down yesterday afternoon. Progressing well. LFTs trending down. Lateral drain removed 1/25 prior/ PRASANNA x 1 in place, SS drainage. Plan for removal today.  UA 1/24 with many bacteria; >100 wbc. Cx ngtd. Asymptomatic. No abx started. Endocrine following for BG management, currently on insulin gtt. Wean per " endo recs. Encouraged ambulation; Denies any other issues at this time. Tentative d/c Friday pending continued improvement.      Scheduled Meds:   aspirin  81 mg Oral Daily    carvediloL  12.5 mg Oral BID    famotidine (PF)  20 mg Intravenous Q12H    heparin (porcine)  5,000 Units Subcutaneous Q8H    insulin aspart U-100  6 Units Subcutaneous TIDWM    insulin detemir U-100  28 Units Subcutaneous Daily    LIDOcaine HCL 10 mg/ml (1%)  10 mL Other Once    [START ON 1/27/2022] methylPREDNISolone sodium succinate injection  80 mg Intravenous Daily    Followed by    [START ON 1/28/2022] methylPREDNISolone sodium succinate injection  40 mg Intravenous Daily    Followed by    [START ON 1/29/2022] predniSONE  20 mg Oral Daily    mupirocin  1 g Nasal BID    mycophenolate  1,000 mg Oral BID    nystatin  500,000 Units Mouth/Throat TID PC    [START ON 1/30/2022] sulfamethoxazole-trimethoprim 400-80mg  1 tablet Oral Daily AM    tacrolimus  1 mg Oral Once    tacrolimus  3 mg Oral BID    [START ON 2/2/2022] valGANciclovir  450 mg Oral Daily     Continuous Infusions:   sodium chloride 0.9% Stopped (01/25/22 1200)     PRN Meds:sodium chloride, acetaminophen, calcium carbonate, dextrose 50%, dextrose 50%, glucagon (human recombinant), glucose, glucose, hydrALAZINE, insulin aspart U-100, labetalol, oxyCODONE    Review of Systems   Constitutional: Negative for activity change, appetite change, chills and fever.   Respiratory: Negative for cough and shortness of breath.    Cardiovascular: Negative for chest pain and leg swelling.   Gastrointestinal: Positive for abdominal pain (incisional). Negative for abdominal distention, constipation, diarrhea, nausea and vomiting.   Genitourinary: Negative for decreased urine volume, difficulty urinating, dysuria and frequency.   Skin: Positive for wound.   Allergic/Immunologic: Positive for immunocompromised state.   Neurological: Positive for weakness.   Psychiatric/Behavioral:  Negative for agitation, confusion and decreased concentration. The patient is not nervous/anxious.      Objective:     Vital Signs (Most Recent):  Temp: 97.6 °F (36.4 °C) (01/26/22 1108)  Pulse: 72 (01/26/22 0921)  Resp: 16 (01/26/22 0921)  BP: 115/71 (01/26/22 0921)  SpO2: 96 % (01/26/22 0921) Vital Signs (24h Range):  Temp:  [97.6 °F (36.4 °C)-98.1 °F (36.7 °C)] 97.6 °F (36.4 °C)  Pulse:  [68-81] 72  Resp:  [10-37] 16  SpO2:  [95 %-98 %] 96 %  BP: (115-177)/(71-86) 115/71     Weight: 78.5 kg (173 lb 1 oz)  Body mass index is 28.8 kg/m².    Intake/Output - Last 3 Shifts       01/24 0700 01/25 0659 01/25 0700 01/26 0659 01/26 0700  01/27 0659    P.O. 300 1050     I.V. (mL/kg) 1822.5 (23.8) 101.7 (1.3)     Blood 358      NG/GT 80      IV Piggyback       Total Intake(mL/kg) 2560.5 (33.5) 1151.7 (14.7)     Urine (mL/kg/hr) 1166 (0.6) 833 (0.4) 170 (0.5)    Drains 815 460 110    Stool       Total Output 1981 1293 280    Net +579.5 -141.3 -280                 Physical Exam  Vitals and nursing note reviewed.   Constitutional:       General: He is not in acute distress.     Appearance: He is well-developed and well-nourished. He is not diaphoretic.   Cardiovascular:      Rate and Rhythm: Normal rate and regular rhythm.      Heart sounds: No murmur heard.  No friction rub.   Pulmonary:      Breath sounds: Normal breath sounds. No wheezing or rales.   Abdominal:      General: There is no distension.      Palpations: Abdomen is soft.      Tenderness: There is no abdominal tenderness. There is no guarding or rebound.      Comments: Chevron incision with staples- cdi  PRASANNA x 1 - SS drainage    Musculoskeletal:         General: No edema.      Right lower leg: No edema.      Left lower leg: No edema.   Skin:     General: Skin is warm and dry.   Neurological:      Mental Status: He is alert and oriented to person, place, and time.   Psychiatric:         Mood and Affect: Mood and affect and mood normal.         Behavior: Behavior  "normal.         Thought Content: Thought content normal.         Judgment: Judgment normal.         Laboratory:  Immunosuppressants         Stop Route Frequency     tacrolimus capsule 3 mg         -- Oral 2 times daily     tacrolimus capsule 1 mg         -- Oral Once     mycophenolate capsule 1,000 mg         -- Oral 2 times daily        CBC:   Recent Labs   Lab 01/26/22  0531   WBC 11.21   RBC 2.99*   HGB 9.0*   HCT 27.8*   PLT 73*   MCV 93   MCH 30.1   MCHC 32.4     CMP:   Recent Labs   Lab 01/26/22  0531   *   CALCIUM 7.5*   ALBUMIN 2.8*   PROT 4.6*      K 4.0   CO2 22*   *   BUN 48*   CREATININE 0.9   ALKPHOS 139*   ALT 1,679*   *   BILITOT 2.8*     Labs within the past 24 hours have been reviewed.    Diagnostic Results:  I have personally reviewed all pertinent imaging studies.    Debility/Functional status: Patient debilitated by evidence of Muscle wasting and atrophy, Weakness, Limitation of activities due to disability and Other reduced mobility. Physical and occupational therapy ordered daily to evaluate and treat. Debility was: present on admission.    Assessment/Plan:     * S/P liver transplant  - S/p DCD OLTx 1/23 d/t HCV/HCC. Per op note, "After reperfusion and hemostasis break, there was minimal but persistent oozing from some surfaces and suture holes. Additional products were given. A patch of Evarrest was placed over the adrenal gland which was friable. After a period of packing there was good hemostasis".   - POD1 US satisfactory. Stepped down 1/25.  - Lateral drain removed 1/25.   - Plan for medial drain removal 1/26  - LFTs trending down  - tolerating diet  - remove CVC    Anemia of chronic disease  - expected to improve post-op  - H/H stable. No overt signs of bleeding. Cont to monitor      Weakness  - PT consulted for post-op. Appreciate assistance      Long-term use of immunosuppressant medication  - see "prophylactic immunotherapy"      Type 2 diabetes mellitus with " hyperglycemia  - endocrine following. Appreciate assistance. Currently on insulin gtt, wean per endo recs      Prophylactic immunotherapy  - Continue Cellcept and Prograf. Will monitor for signs of toxic side effects, check daily tacrolimus troughs, and change meds accordingly.      At risk for opportunistic infections  - OI prophylaxis per protocol       Liver transplant candidate          Drain removed:  Site cleaned with alcohol, lidocaine 1% used to numb area to place prolene 3.0 suture to site.  Drain intact at time of removal.  Patient tolerated procedure well.  Will continue to monitor for any complications.      VTE Risk Mitigation (From admission, onward)         Ordered     heparin (porcine) injection 5,000 Units  Every 8 hours         01/26/22 1117     IP VTE HIGH RISK PATIENT  Once         01/23/22 2115     Place sequential compression device  Until discontinued         01/23/22 1902                The patients clinical status was discussed at multidisplinary rounds, involving transplant surgery, transplant medicine, pharmacy, nursing, nutrition, and social work    Discharge Planning: tentative d/c Friday  No Patient Care Coordination Note on file.      Mary Landry PA-C  Liver Transplant  [facility]

## 2022-01-26 NOTE — SUBJECTIVE & OBJECTIVE
Scheduled Meds:   aspirin  81 mg Oral Daily    carvediloL  12.5 mg Oral BID    famotidine (PF)  20 mg Intravenous Q12H    heparin (porcine)  5,000 Units Subcutaneous Q8H    insulin aspart U-100  6 Units Subcutaneous TIDWM    insulin detemir U-100  28 Units Subcutaneous Daily    LIDOcaine HCL 10 mg/ml (1%)  10 mL Other Once    [START ON 1/27/2022] methylPREDNISolone sodium succinate injection  80 mg Intravenous Daily    Followed by    [START ON 1/28/2022] methylPREDNISolone sodium succinate injection  40 mg Intravenous Daily    Followed by    [START ON 1/29/2022] predniSONE  20 mg Oral Daily    mupirocin  1 g Nasal BID    mycophenolate  1,000 mg Oral BID    nystatin  500,000 Units Mouth/Throat TID PC    [START ON 1/30/2022] sulfamethoxazole-trimethoprim 400-80mg  1 tablet Oral Daily AM    tacrolimus  1 mg Oral Once    tacrolimus  3 mg Oral BID    [START ON 2/2/2022] valGANciclovir  450 mg Oral Daily     Continuous Infusions:   sodium chloride 0.9% Stopped (01/25/22 1200)     PRN Meds:sodium chloride, acetaminophen, calcium carbonate, dextrose 50%, dextrose 50%, glucagon (human recombinant), glucose, glucose, hydrALAZINE, insulin aspart U-100, labetalol, oxyCODONE    Review of Systems   Constitutional: Negative for activity change, appetite change, chills and fever.   Respiratory: Negative for cough and shortness of breath.    Cardiovascular: Negative for chest pain and leg swelling.   Gastrointestinal: Positive for abdominal pain (incisional). Negative for abdominal distention, constipation, diarrhea, nausea and vomiting.   Genitourinary: Negative for decreased urine volume, difficulty urinating, dysuria and frequency.   Skin: Positive for wound.   Allergic/Immunologic: Positive for immunocompromised state.   Neurological: Positive for weakness.   Psychiatric/Behavioral: Negative for agitation, confusion and decreased concentration. The patient is not nervous/anxious.      Objective:     Vital Signs  (Most Recent):  Temp: 97.6 °F (36.4 °C) (01/26/22 1108)  Pulse: 72 (01/26/22 0921)  Resp: 16 (01/26/22 0921)  BP: 115/71 (01/26/22 0921)  SpO2: 96 % (01/26/22 0921) Vital Signs (24h Range):  Temp:  [97.6 °F (36.4 °C)-98.1 °F (36.7 °C)] 97.6 °F (36.4 °C)  Pulse:  [68-81] 72  Resp:  [10-37] 16  SpO2:  [95 %-98 %] 96 %  BP: (115-177)/(71-86) 115/71     Weight: 78.5 kg (173 lb 1 oz)  Body mass index is 28.8 kg/m².    Intake/Output - Last 3 Shifts       01/24 0700  01/25 0659 01/25 0700 01/26 0659 01/26 0700 01/27 0659    P.O. 300 1050     I.V. (mL/kg) 1822.5 (23.8) 101.7 (1.3)     Blood 358      NG/GT 80      IV Piggyback       Total Intake(mL/kg) 2560.5 (33.5) 1151.7 (14.7)     Urine (mL/kg/hr) 1166 (0.6) 833 (0.4) 170 (0.5)    Drains 815 460 110    Stool       Total Output 1981 1293 280    Net +579.5 -141.3 -280                 Physical Exam  Vitals and nursing note reviewed.   Constitutional:       General: He is not in acute distress.     Appearance: He is well-developed and well-nourished. He is not diaphoretic.   Cardiovascular:      Rate and Rhythm: Normal rate and regular rhythm.      Heart sounds: No murmur heard.  No friction rub.   Pulmonary:      Breath sounds: Normal breath sounds. No wheezing or rales.   Abdominal:      General: There is no distension.      Palpations: Abdomen is soft.      Tenderness: There is no abdominal tenderness. There is no guarding or rebound.      Comments: Chevron incision with staples- cdi  PRASANNA x 1 - SS drainage    Musculoskeletal:         General: No edema.      Right lower leg: No edema.      Left lower leg: No edema.   Skin:     General: Skin is warm and dry.   Neurological:      Mental Status: He is alert and oriented to person, place, and time.   Psychiatric:         Mood and Affect: Mood and affect and mood normal.         Behavior: Behavior normal.         Thought Content: Thought content normal.         Judgment: Judgment normal.         Laboratory:  Immunosuppressants          Stop Route Frequency     tacrolimus capsule 3 mg         -- Oral 2 times daily     tacrolimus capsule 1 mg         -- Oral Once     mycophenolate capsule 1,000 mg         -- Oral 2 times daily        CBC:   Recent Labs   Lab 01/26/22  0531   WBC 11.21   RBC 2.99*   HGB 9.0*   HCT 27.8*   PLT 73*   MCV 93   MCH 30.1   MCHC 32.4     CMP:   Recent Labs   Lab 01/26/22  0531   *   CALCIUM 7.5*   ALBUMIN 2.8*   PROT 4.6*      K 4.0   CO2 22*   *   BUN 48*   CREATININE 0.9   ALKPHOS 139*   ALT 1,679*   *   BILITOT 2.8*     Labs within the past 24 hours have been reviewed.    Diagnostic Results:  I have personally reviewed all pertinent imaging studies.    Debility/Functional status: Patient debilitated by evidence of Muscle wasting and atrophy, Weakness, Limitation of activities due to disability and Other reduced mobility. Physical and occupational therapy ordered daily to evaluate and treat. Debility was: present on admission.

## 2022-01-27 PROBLEM — D69.6 THROMBOCYTOPENIA, UNSPECIFIED: Status: ACTIVE | Noted: 2022-01-27

## 2022-01-27 PROBLEM — E44.1 MALNUTRITION OF MILD DEGREE: Status: ACTIVE | Noted: 2022-01-27

## 2022-01-27 LAB
ALBUMIN SERPL BCP-MCNC: 3.1 G/DL (ref 3.5–5.2)
ALP SERPL-CCNC: 174 U/L (ref 55–135)
ALT SERPL W/O P-5'-P-CCNC: 1259 U/L (ref 10–44)
ANION GAP SERPL CALC-SCNC: 4 MMOL/L (ref 8–16)
AST SERPL-CCNC: 186 U/L (ref 10–40)
BASOPHILS # BLD AUTO: 0 K/UL (ref 0–0.2)
BASOPHILS NFR BLD: 0 % (ref 0–1.9)
BILIRUB SERPL-MCNC: 2.4 MG/DL (ref 0.1–1)
BUN SERPL-MCNC: 56 MG/DL (ref 8–23)
CALCIUM SERPL-MCNC: 8.2 MG/DL (ref 8.7–10.5)
CHLORIDE SERPL-SCNC: 109 MMOL/L (ref 95–110)
CO2 SERPL-SCNC: 26 MMOL/L (ref 23–29)
CREAT SERPL-MCNC: 1 MG/DL (ref 0.5–1.4)
DIFFERENTIAL METHOD: ABNORMAL
EOSINOPHIL # BLD AUTO: 0 K/UL (ref 0–0.5)
EOSINOPHIL NFR BLD: 0 % (ref 0–8)
ERYTHROCYTE [DISTWIDTH] IN BLOOD BY AUTOMATED COUNT: 14.4 % (ref 11.5–14.5)
EST. GFR  (AFRICAN AMERICAN): >60 ML/MIN/1.73 M^2
EST. GFR  (NON AFRICAN AMERICAN): >60 ML/MIN/1.73 M^2
GLUCOSE SERPL-MCNC: 166 MG/DL (ref 70–110)
HCT VFR BLD AUTO: 28.7 % (ref 40–54)
HGB BLD-MCNC: 9.4 G/DL (ref 14–18)
IMM GRANULOCYTES # BLD AUTO: 0.05 K/UL (ref 0–0.04)
IMM GRANULOCYTES NFR BLD AUTO: 0.6 % (ref 0–0.5)
INR PPP: 1.1 (ref 0.8–1.2)
LYMPHOCYTES # BLD AUTO: 0.2 K/UL (ref 1–4.8)
LYMPHOCYTES NFR BLD: 2.3 % (ref 18–48)
MAGNESIUM SERPL-MCNC: 2 MG/DL (ref 1.6–2.6)
MCH RBC QN AUTO: 30.9 PG (ref 27–31)
MCHC RBC AUTO-ENTMCNC: 32.8 G/DL (ref 32–36)
MCV RBC AUTO: 94 FL (ref 82–98)
MONOCYTES # BLD AUTO: 0.5 K/UL (ref 0.3–1)
MONOCYTES NFR BLD: 5.3 % (ref 4–15)
NEUTROPHILS # BLD AUTO: 7.9 K/UL (ref 1.8–7.7)
NEUTROPHILS NFR BLD: 91.8 % (ref 38–73)
NRBC BLD-RTO: 0 /100 WBC
PHOSPHATE SERPL-MCNC: 3.1 MG/DL (ref 2.7–4.5)
PLATELET # BLD AUTO: 90 K/UL (ref 150–450)
PMV BLD AUTO: 11.8 FL (ref 9.2–12.9)
POCT GLUCOSE: 161 MG/DL (ref 70–110)
POCT GLUCOSE: 195 MG/DL (ref 70–110)
POCT GLUCOSE: 198 MG/DL (ref 70–110)
POCT GLUCOSE: 219 MG/DL (ref 70–110)
POTASSIUM SERPL-SCNC: 4.7 MMOL/L (ref 3.5–5.1)
PROT SERPL-MCNC: 5.2 G/DL (ref 6–8.4)
PROTHROMBIN TIME: 12.2 SEC (ref 9–12.5)
RBC # BLD AUTO: 3.04 M/UL (ref 4.6–6.2)
SODIUM SERPL-SCNC: 139 MMOL/L (ref 136–145)
TACROLIMUS BLD-MCNC: 4.7 NG/ML (ref 5–15)
WBC # BLD AUTO: 8.64 K/UL (ref 3.9–12.7)

## 2022-01-27 PROCEDURE — 80053 COMPREHEN METABOLIC PANEL: CPT | Performed by: TRANSPLANT SURGERY

## 2022-01-27 PROCEDURE — 25000003 PHARM REV CODE 250: Performed by: STUDENT IN AN ORGANIZED HEALTH CARE EDUCATION/TRAINING PROGRAM

## 2022-01-27 PROCEDURE — 80197 ASSAY OF TACROLIMUS: CPT | Performed by: TRANSPLANT SURGERY

## 2022-01-27 PROCEDURE — 84100 ASSAY OF PHOSPHORUS: CPT | Performed by: PHYSICIAN ASSISTANT

## 2022-01-27 PROCEDURE — 20600001 HC STEP DOWN PRIVATE ROOM

## 2022-01-27 PROCEDURE — 36415 COLL VENOUS BLD VENIPUNCTURE: CPT | Performed by: PHYSICIAN ASSISTANT

## 2022-01-27 PROCEDURE — 83735 ASSAY OF MAGNESIUM: CPT | Performed by: PHYSICIAN ASSISTANT

## 2022-01-27 PROCEDURE — 85025 COMPLETE CBC W/AUTO DIFF WBC: CPT | Performed by: TRANSPLANT SURGERY

## 2022-01-27 PROCEDURE — 63600175 PHARM REV CODE 636 W HCPCS: Performed by: PHYSICIAN ASSISTANT

## 2022-01-27 PROCEDURE — 99233 PR SUBSEQUENT HOSPITAL CARE,LEVL III: ICD-10-PCS | Mod: 24,,, | Performed by: NURSE PRACTITIONER

## 2022-01-27 PROCEDURE — 94761 N-INVAS EAR/PLS OXIMETRY MLT: CPT

## 2022-01-27 PROCEDURE — 97116 GAIT TRAINING THERAPY: CPT | Mod: CQ

## 2022-01-27 PROCEDURE — 63600175 PHARM REV CODE 636 W HCPCS: Performed by: NURSE PRACTITIONER

## 2022-01-27 PROCEDURE — 25000003 PHARM REV CODE 250: Performed by: PHYSICIAN ASSISTANT

## 2022-01-27 PROCEDURE — 85610 PROTHROMBIN TIME: CPT | Performed by: TRANSPLANT SURGERY

## 2022-01-27 PROCEDURE — 63600175 PHARM REV CODE 636 W HCPCS: Performed by: CLINICAL NURSE SPECIALIST

## 2022-01-27 PROCEDURE — 99233 SBSQ HOSP IP/OBS HIGH 50: CPT | Mod: 24,,, | Performed by: NURSE PRACTITIONER

## 2022-01-27 PROCEDURE — 63600175 PHARM REV CODE 636 W HCPCS: Performed by: STUDENT IN AN ORGANIZED HEALTH CARE EDUCATION/TRAINING PROGRAM

## 2022-01-27 RX ORDER — INSULIN ASPART 100 [IU]/ML
12 INJECTION, SOLUTION INTRAVENOUS; SUBCUTANEOUS
Status: DISCONTINUED | OUTPATIENT
Start: 2022-01-27 | End: 2022-01-28 | Stop reason: HOSPADM

## 2022-01-27 RX ORDER — TACROLIMUS 1 MG/1
4 CAPSULE ORAL 2 TIMES DAILY
Status: DISCONTINUED | OUTPATIENT
Start: 2022-01-27 | End: 2022-01-28

## 2022-01-27 RX ORDER — CARVEDILOL 12.5 MG/1
12.5 TABLET ORAL 2 TIMES DAILY
Qty: 60 TABLET | Refills: 11 | Status: SHIPPED | OUTPATIENT
Start: 2022-01-27 | End: 2023-01-27

## 2022-01-27 RX ORDER — INSULIN ASPART 100 [IU]/ML
12 INJECTION, SOLUTION INTRAVENOUS; SUBCUTANEOUS
Status: DISCONTINUED | OUTPATIENT
Start: 2022-01-27 | End: 2022-01-27

## 2022-01-27 RX ORDER — TACROLIMUS 1 MG/1
1 CAPSULE ORAL ONCE
Status: COMPLETED | OUTPATIENT
Start: 2022-01-27 | End: 2022-01-27

## 2022-01-27 RX ADMIN — FAMOTIDINE 20 MG: 20 TABLET ORAL at 08:01

## 2022-01-27 RX ADMIN — HEPARIN SODIUM 5000 UNITS: 5000 INJECTION INTRAVENOUS; SUBCUTANEOUS at 08:01

## 2022-01-27 RX ADMIN — MYCOPHENOLATE MOFETIL 1000 MG: 250 CAPSULE ORAL at 08:01

## 2022-01-27 RX ADMIN — DOCUSATE SODIUM 100 MG: 100 CAPSULE ORAL at 08:01

## 2022-01-27 RX ADMIN — MUPIROCIN 1 G: 20 OINTMENT TOPICAL at 08:01

## 2022-01-27 RX ADMIN — NYSTATIN 500000 UNITS: 500000 SUSPENSION ORAL at 08:01

## 2022-01-27 RX ADMIN — CARVEDILOL 12.5 MG: 12.5 TABLET, FILM COATED ORAL at 08:01

## 2022-01-27 RX ADMIN — TACROLIMUS 4 MG: 1 CAPSULE ORAL at 05:01

## 2022-01-27 RX ADMIN — INSULIN ASPART 12 UNITS: 100 INJECTION, SOLUTION INTRAVENOUS; SUBCUTANEOUS at 05:01

## 2022-01-27 RX ADMIN — NYSTATIN 500000 UNITS: 500000 SUSPENSION ORAL at 02:01

## 2022-01-27 RX ADMIN — METHYLPREDNISOLONE SODIUM SUCCINATE 80 MG: 40 INJECTION, POWDER, FOR SOLUTION INTRAMUSCULAR; INTRAVENOUS at 12:01

## 2022-01-27 RX ADMIN — ASPIRIN 81 MG CHEWABLE TABLET 81 MG: 81 TABLET CHEWABLE at 08:01

## 2022-01-27 RX ADMIN — INSULIN ASPART 2 UNITS: 100 INJECTION, SOLUTION INTRAVENOUS; SUBCUTANEOUS at 05:01

## 2022-01-27 RX ADMIN — TACROLIMUS 3 MG: 1 CAPSULE ORAL at 08:01

## 2022-01-27 RX ADMIN — OXYCODONE 5 MG: 5 TABLET ORAL at 08:01

## 2022-01-27 RX ADMIN — INSULIN ASPART 2 UNITS: 100 INJECTION, SOLUTION INTRAVENOUS; SUBCUTANEOUS at 08:01

## 2022-01-27 RX ADMIN — POLYETHYLENE GLYCOL 3350 17 G: 17 POWDER, FOR SOLUTION ORAL at 08:01

## 2022-01-27 RX ADMIN — TACROLIMUS 1 MG: 1 CAPSULE ORAL at 12:01

## 2022-01-27 RX ADMIN — INSULIN DETEMIR 28 UNITS: 100 INJECTION, SOLUTION SUBCUTANEOUS at 08:01

## 2022-01-27 RX ADMIN — INSULIN ASPART 6 UNITS: 100 INJECTION, SOLUTION INTRAVENOUS; SUBCUTANEOUS at 08:01

## 2022-01-27 RX ADMIN — INSULIN ASPART 2 UNITS: 100 INJECTION, SOLUTION INTRAVENOUS; SUBCUTANEOUS at 12:01

## 2022-01-27 RX ADMIN — INSULIN ASPART 12 UNITS: 100 INJECTION, SOLUTION INTRAVENOUS; SUBCUTANEOUS at 12:01

## 2022-01-27 RX ADMIN — HEPARIN SODIUM 5000 UNITS: 5000 INJECTION INTRAVENOUS; SUBCUTANEOUS at 05:01

## 2022-01-27 RX ADMIN — ACETAMINOPHEN 650 MG: 325 TABLET ORAL at 05:01

## 2022-01-27 RX ADMIN — HEPARIN SODIUM 5000 UNITS: 5000 INJECTION INTRAVENOUS; SUBCUTANEOUS at 02:01

## 2022-01-27 NOTE — PROGRESS NOTES
"[facility]  Liver Transplant  Progress Note    Patient Name: Eder Kong  MRN: [id number]  Admission Date: 1/22/2022  Hospital Length of Stay: 5 days  Code Status: Full Code  Primary Care Provider: Martin Mcdaniel MD  Post-Operative Day: 4    ORGAN:   LIVER  Disease Etiology: Primary Liver Malignancy: Hepatoma (HCC) and Cirrhosis  Donor Type:   Donation after Circulatory Death   CDC High Risk:   Yes  Donor CMV Status:   Donor CMV Status: Negative  Donor HBcAB:   Negative  Donor HCV Status:   Negative  Donor HBV SHIVANI: Negative  Donor HCV SHIVANI: Negative  Whole or Partial: Whole Liver  Biliary Anastomosis: End to End  Arterial Anatomy: Standard  Subjective:     History of Present Illness:  Eder Kong is a 61 y.o. 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  Patient presents for liver transplant.  He reports in usual state of health- Patient denies any recent hospitalizations or ED visits.  COVID vaccine completed. The primary surgeon will be Dr. Austin.  OR times times are being set for 0400 and 0530.  Patient has been NPO since 4pm.  All pre-op labs and imaging have been ordered and will be reviewed prior to surgery. Consents to be signed prior to transplant.             Hospital Course:  Patient now s/p DCD OLTx 1/23 d/t HCV/HCC. Per op note, "After reperfusion and hemostasis break, there was minimal but persistent oozing from some surfaces and suture holes. Additional products were given. A patch of Evarrest was placed over the adrenal gland which was friable. After a period of packing there was good hemostasis". POD1 US satisfactory. Stepped down 1/25.     Interval Hx: No acute events overnight.  Pt cont to progress well.  LFTS elevated but trending down.  CBC stable. He is toleratinfg diet, passing flatus and had BM 1/27.  He is ambulating in room w/o difficulty.  Pulling meds.  Central line removed 1/26.  Lateral PRASANNA drain removed 1/25. Medial drain removed 1/26.  Cr 1.  Making good " UOP. UA 1/24 with many bacteria; >100 wbc. Cx ngtd. Asymptomatic. No abx started. Endocrine following for BG management, weaned off insulin gtt. Tentative plan for routine Liver US and possible d/c Friday.  VSS. Monitor.      Scheduled Meds:   aspirin  81 mg Oral Daily    carvediloL  12.5 mg Oral BID    docusate sodium  100 mg Oral BID    famotidine  20 mg Oral BID    heparin (porcine)  5,000 Units Subcutaneous Q8H    insulin aspart U-100  6 Units Subcutaneous TIDWM    insulin detemir U-100  28 Units Subcutaneous Daily    LIDOcaine HCL 10 mg/ml (1%)  10 mL Other Once    methylPREDNISolone sodium succinate injection  80 mg Intravenous Daily    Followed by    [START ON 1/28/2022] methylPREDNISolone sodium succinate injection  40 mg Intravenous Daily    Followed by    [START ON 1/29/2022] predniSONE  20 mg Oral Daily    mupirocin  1 g Nasal BID    mycophenolate  1,000 mg Oral BID    nystatin  500,000 Units Mouth/Throat TID PC    polyethylene glycol  17 g Oral Daily    [START ON 1/30/2022] sulfamethoxazole-trimethoprim 400-80mg  1 tablet Oral Daily AM    tacrolimus  3 mg Oral BID    [START ON 2/2/2022] valGANciclovir  450 mg Oral Daily     Continuous Infusions:   sodium chloride 0.9% Stopped (01/25/22 1200)     PRN Meds:sodium chloride, acetaminophen, calcium carbonate, dextrose 50%, dextrose 50%, glucagon (human recombinant), glucose, glucose, hydrALAZINE, insulin aspart U-100, labetalol, oxyCODONE    Review of Systems   Constitutional: Negative for activity change, appetite change, chills and fever.   Respiratory: Negative for cough and shortness of breath.    Cardiovascular: Negative for chest pain and leg swelling.   Gastrointestinal: Positive for abdominal pain (incisional). Negative for abdominal distention, constipation, diarrhea, nausea and vomiting.   Genitourinary: Negative for decreased urine volume, difficulty urinating, dysuria and frequency.   Skin: Positive for wound.    Allergic/Immunologic: Positive for immunocompromised state.   Neurological: Positive for weakness.   Psychiatric/Behavioral: Negative for agitation, confusion and decreased concentration. The patient is not nervous/anxious.      Objective:     Vital Signs (Most Recent):  Temp: 97.9 °F (36.6 °C) (01/27/22 0801)  Pulse: 105 (01/27/22 0801)  Resp: 18 (01/27/22 0801)  BP: 138/62 (01/27/22 0801)  SpO2: 97 % (01/27/22 0801) Vital Signs (24h Range):  Temp:  [97.6 °F (36.4 °C)-97.9 °F (36.6 °C)] 97.9 °F (36.6 °C)  Pulse:  [] 105  Resp:  [10-18] 18  SpO2:  [96 %-97 %] 97 %  BP: (109-138)/(62-74) 138/62     Weight: 80.1 kg (176 lb 9.4 oz)  Body mass index is 29.39 kg/m².    Intake/Output - Last 3 Shifts       01/25 0700  01/26 0659 01/26 0700  01/27 0659 01/27 0700  01/28 0659    P.O. 1050 700     I.V. (mL/kg) 101.7 (1.3)      Blood       NG/GT       Total Intake(mL/kg) 1151.7 (14.7) 700 (8.7)     Urine (mL/kg/hr) 833 (0.4) 945 (0.5) 200 (0.8)    Drains 460 110     Total Output 1293 1055 200    Net -141.3 -355 -200                 Physical Exam  Vitals and nursing note reviewed.   Constitutional:       General: He is not in acute distress.     Appearance: He is well-developed. He is not diaphoretic.      Comments: No hand or temporal muscle wasting noted     HENT:      Head: Normocephalic.   Eyes:      General: Scleral icterus present.   Cardiovascular:      Rate and Rhythm: Normal rate and regular rhythm.      Heart sounds: No murmur heard.  No friction rub.   Pulmonary:      Breath sounds: Normal breath sounds. No wheezing or rales.   Abdominal:      General: Bowel sounds are normal. There is distension.      Palpations: Abdomen is soft.      Tenderness: There is no abdominal tenderness. There is no guarding or rebound.      Comments: Chevron incision with staples- cdi  PRASANNA sites w suture CDI   Musculoskeletal:      Right lower leg: No edema.      Left lower leg: No edema.   Skin:     General: Skin is warm and dry.  "  Neurological:      Mental Status: He is alert and oriented to person, place, and time.   Psychiatric:         Mood and Affect: Mood normal.         Behavior: Behavior normal.         Thought Content: Thought content normal.         Judgment: Judgment normal.         Laboratory:  Immunosuppressants         Stop Route Frequency     tacrolimus capsule 3 mg         -- Oral 2 times daily     mycophenolate capsule 1,000 mg         -- Oral 2 times daily        CBC:   Recent Labs   Lab 01/27/22  0725   WBC 8.64   RBC 3.04*   HGB 9.4*   HCT 28.7*   PLT 90*   MCV 94   MCH 30.9   MCHC 32.8     CMP:   Recent Labs   Lab 01/27/22  0725   *   CALCIUM 8.2*   ALBUMIN 3.1*   PROT 5.2*      K 4.7   CO2 26      BUN 56*   CREATININE 1.0   ALKPHOS 174*   ALT 1,259*   *   BILITOT 2.4*     Labs within the past 24 hours have been reviewed.    Diagnostic Results:  I have personally reviewed all pertinent imaging studies.    Debility/Functional status: Patient debilitated by evidence of Muscle wasting and atrophy, Weakness, Limitation of activities due to disability and Other reduced mobility. Physical and occupational therapy ordered daily to evaluate and treat. Debility was: present on admission.    Assessment/Plan:     * S/P liver transplant  - S/p DCD OLTx 1/23 d/t HCV/HCC. Per op note, "After reperfusion and hemostasis break, there was minimal but persistent oozing from some surfaces and suture holes. Additional products were given. A patch of Evarrest was placed over the adrenal gland which was friable. After a period of packing there was good hemostasis".   - POD1 US satisfactory. Stepped down 1/25.  - Lateral drain removed 1/25.   - Plan for medial drain removal 1/26  - LFTs trending down  - tolerating diet  - remove CVC 1/26  - Plan for routine POD#5 Liver US 1/28 and possible discharge    Malnutrition of mild degree  - r/t HCC/liver disease  - tolerating diet  - Dietician has been consulted.  Apprec " "recs      Thrombocytopenia, unspecified  - cont to improve post liver transplant      Anemia of chronic disease  - expected to improve post-op  - H/H stable. No overt signs of bleeding. Cont to monitor      Weakness  - PT consulted for post-op. Appreciate recs    Long-term use of immunosuppressant medication  - see "prophylactic immunotherapy"      Type 2 diabetes mellitus with hyperglycemia  - endocrine following. Appreciate assistance. Currently on insulin gtt, wean per endo recs      Prophylactic immunotherapy  - Continue Cellcept and Prograf. Will monitor for signs of toxic side effects, check daily tacrolimus troughs, and change meds accordingly.      At risk for opportunistic infections  - OI prophylaxis per protocol       Liver transplant candidate            VTE Risk Mitigation (From admission, onward)         Ordered     heparin (porcine) injection 5,000 Units  Every 8 hours         01/26/22 1117     IP VTE HIGH RISK PATIENT  Once         01/23/22 2115     Place sequential compression device  Until discontinued         01/23/22 1902                The patients clinical status was discussed at multidisplinary rounds, involving transplant surgery, transplant medicine, pharmacy, nursing, nutrition, and social work    Discharge Planning:  Discussed plan of care.  No plan for discharge today.      Olamide Banks, NP  Liver Transplant  [facility]  "

## 2022-01-27 NOTE — PT/OT/SLP PROGRESS
Physical Therapy Treatment    Patient Name:  Eder Kong   MRN:  [id number]    Recommendations:     Discharge Recommendations:  home health PT   Discharge Equipment Recommendations: none   Barriers to discharge: None    Assessment:     Eder Kong is a 61 y.o. male admitted with a medical diagnosis of S/P liver transplant.  He presents with the following impairments/functional limitations:   (weakness; impaired endurance; impaired functional mobilty; gait instability; impaired skin; pain) . Pt Progressing with PT Intervention. Pt Progressing with improving gait distance. Pt would continue to benefit from skilled PT to address overall functional mobility, goals , and to return to functional baseline.  Goals remain appropriate.    Rehab Prognosis: Good; patient would benefit from acute skilled PT services to address these deficits and reach maximum level of function.    Recent Surgery: Procedure(s) (LRB):  TRANSPLANT, LIVER (N/A) 4 Days Post-Op    Plan:     During this hospitalization, patient to be seen 4 x/week to address the identified rehab impairments via gait training,therapeutic activities,therapeutic exercises and progress toward the following goals:    · Plan of Care Expires:  02/24/22    Subjective   This is more than I was able to walk yesterday    Pain/Comfort:  · Pain Rating 1: 5/10  · Location 1: abdomen  · Pain Addressed 1: Reposition,Distraction,Cessation of Activity      Objective:     Communicated with RN prior to session.  Patient found up in chair with  (pulse ox (continuous); telemetry) upon PT entry to room.     General Precautions: Standard, fall   Orthopedic Precautions:N/A   Braces: N/A  Respiratory Status: Room air     Functional Mobility:  · Transfers:     · Sit to Stand:  SBA with no AD  · Gait: patient ambulated 380 feet SBA/CGA with no AD.  Patient demonstrated mildly unsteady gait and lateral drifting , pt able to self correct. One standing rest break taken    AM-PAC 6 CLICK MOBILITY  Turning  over in bed (including adjusting bedclothes, sheets and blankets)?: 3  Sitting down on and standing up from a chair with arms (e.g., wheelchair, bedside commode, etc.): 3  Moving from lying on back to sitting on the side of the bed?: 3  Moving to and from a bed to a chair (including a wheelchair)?: 3  Need to walk in hospital room?: 3  Climbing 3-5 steps with a railing?: 2  Basic Mobility Total Score: 17       Therapeutic Activities and Exercises:   Therapist provided instruction and educated of  patient on progress, safety,d/c,PT POC,   proper body mechanics, energy conservation, and fall prevention strategies during tasks listed above, on the effects of prolonged immobility and the importance of performing OOB activity and exercises to promote healing and reduce recovery time    Patient  facilitated therex  seated in bedside chair B LE AROM AP, LAQ, Hip Flexion, Hip Abd/Add with facilitation for correct performance and sequencing. Exercises performed to develop and maintain pt's strength, endurance and flexibility.    Updated white board with appropriate PT mobility information for medical team notification     Donned an extra gown   Pt encouraged to ambulate in hallways 3x/day with nursing or family assistance to improve endurance.     Pt safe to ambulate in hallway with RN or PCT assistance      Call nursing/pct to transfer to chair/use bathroom. Pt stated understanding      Bedside table in front of patient and area set up for function, convenience, and safety. RN aware of patient's mobility needs and status. Questions/concerns addressed within PTA scope of practice; patient  with no further questions. Time was provided for active listening, discussion of health disposition, and discussion of safe discharge. Pt?verbalized?agreement .    Patient left up in chair with all lines intact, call button in reach and nsg notified..    GOALS:   Multidisciplinary Problems     Physical Therapy Goals        Problem:  Physical Therapy Goal    Goal Priority Disciplines Outcome Goal Variances Interventions   Physical Therapy Goal     PT, PT/OT Ongoing, Progressing     Description: Goals to be met by: 2022     Patient will increase functional independence with mobility by performin. Supine to sit with Modified [place]  2. Sit to supine with Modified [place]  3. Sit to stand transfer with Supervision  4. Gait  x 150 feet with Stand-by Assistance using No Assistive Device.   5. Lower extremity exercise program x15 reps per handout, with assistance as needed                     Time Tracking:     PT Received On: 22  PT Start Time: 816     PT Stop Time: 826  PT Total Time (min): 10 min     Billable Minutes: Gait Training 10    Treatment Type: Treatment  PT/PTA: PTA     PTA Visit Number: 1     2022

## 2022-01-27 NOTE — SUBJECTIVE & OBJECTIVE
Scheduled Meds:   aspirin  81 mg Oral Daily    carvediloL  12.5 mg Oral BID    docusate sodium  100 mg Oral BID    famotidine  20 mg Oral BID    heparin (porcine)  5,000 Units Subcutaneous Q8H    insulin aspart U-100  6 Units Subcutaneous TIDWM    insulin detemir U-100  28 Units Subcutaneous Daily    LIDOcaine HCL 10 mg/ml (1%)  10 mL Other Once    methylPREDNISolone sodium succinate injection  80 mg Intravenous Daily    Followed by    [START ON 1/28/2022] methylPREDNISolone sodium succinate injection  40 mg Intravenous Daily    Followed by    [START ON 1/29/2022] predniSONE  20 mg Oral Daily    mupirocin  1 g Nasal BID    mycophenolate  1,000 mg Oral BID    nystatin  500,000 Units Mouth/Throat TID PC    polyethylene glycol  17 g Oral Daily    [START ON 1/30/2022] sulfamethoxazole-trimethoprim 400-80mg  1 tablet Oral Daily AM    tacrolimus  3 mg Oral BID    [START ON 2/2/2022] valGANciclovir  450 mg Oral Daily     Continuous Infusions:   sodium chloride 0.9% Stopped (01/25/22 1200)     PRN Meds:sodium chloride, acetaminophen, calcium carbonate, dextrose 50%, dextrose 50%, glucagon (human recombinant), glucose, glucose, hydrALAZINE, insulin aspart U-100, labetalol, oxyCODONE    Review of Systems   Constitutional: Negative for activity change, appetite change, chills and fever.   Respiratory: Negative for cough and shortness of breath.    Cardiovascular: Negative for chest pain and leg swelling.   Gastrointestinal: Positive for abdominal pain (incisional). Negative for abdominal distention, constipation, diarrhea, nausea and vomiting.   Genitourinary: Negative for decreased urine volume, difficulty urinating, dysuria and frequency.   Skin: Positive for wound.   Allergic/Immunologic: Positive for immunocompromised state.   Neurological: Positive for weakness.   Psychiatric/Behavioral: Negative for agitation, confusion and decreased concentration. The patient is not nervous/anxious.      Objective:      Vital Signs (Most Recent):  Temp: 97.9 °F (36.6 °C) (01/27/22 0801)  Pulse: 105 (01/27/22 0801)  Resp: 18 (01/27/22 0801)  BP: 138/62 (01/27/22 0801)  SpO2: 97 % (01/27/22 0801) Vital Signs (24h Range):  Temp:  [97.6 °F (36.4 °C)-97.9 °F (36.6 °C)] 97.9 °F (36.6 °C)  Pulse:  [] 105  Resp:  [10-18] 18  SpO2:  [96 %-97 %] 97 %  BP: (109-138)/(62-74) 138/62     Weight: 80.1 kg (176 lb 9.4 oz)  Body mass index is 29.39 kg/m².    Intake/Output - Last 3 Shifts       01/25 0700  01/26 0659 01/26 0700 01/27 0659 01/27 0700 01/28 0659    P.O. 1050 700     I.V. (mL/kg) 101.7 (1.3)      Blood       NG/GT       Total Intake(mL/kg) 1151.7 (14.7) 700 (8.7)     Urine (mL/kg/hr) 833 (0.4) 945 (0.5) 200 (0.8)    Drains 460 110     Total Output 1293 1055 200    Net -141.3 -355 -200                 Physical Exam  Vitals and nursing note reviewed.   Constitutional:       General: He is not in acute distress.     Appearance: He is well-developed. He is not diaphoretic.      Comments: No hand or temporal muscle wasting noted     HENT:      Head: Normocephalic.   Eyes:      General: Scleral icterus present.   Cardiovascular:      Rate and Rhythm: Normal rate and regular rhythm.      Heart sounds: No murmur heard.  No friction rub.   Pulmonary:      Breath sounds: Normal breath sounds. No wheezing or rales.   Abdominal:      General: Bowel sounds are normal. There is distension.      Palpations: Abdomen is soft.      Tenderness: There is no abdominal tenderness. There is no guarding or rebound.      Comments: Chevron incision with staples- cdi  PRASANNA sites w suture CDI   Musculoskeletal:      Right lower leg: No edema.      Left lower leg: No edema.   Skin:     General: Skin is warm and dry.   Neurological:      Mental Status: He is alert and oriented to person, place, and time.   Psychiatric:         Mood and Affect: Mood normal.         Behavior: Behavior normal.         Thought Content: Thought content normal.         Judgment:  Judgment normal.         Laboratory:  Immunosuppressants         Stop Route Frequency     tacrolimus capsule 3 mg         -- Oral 2 times daily     mycophenolate capsule 1,000 mg         -- Oral 2 times daily        CBC:   Recent Labs   Lab 01/27/22  0725   WBC 8.64   RBC 3.04*   HGB 9.4*   HCT 28.7*   PLT 90*   MCV 94   MCH 30.9   MCHC 32.8     CMP:   Recent Labs   Lab 01/27/22  0725   *   CALCIUM 8.2*   ALBUMIN 3.1*   PROT 5.2*      K 4.7   CO2 26      BUN 56*   CREATININE 1.0   ALKPHOS 174*   ALT 1,259*   *   BILITOT 2.4*     Labs within the past 24 hours have been reviewed.    Diagnostic Results:  I have personally reviewed all pertinent imaging studies.    Debility/Functional status: Patient debilitated by evidence of Muscle wasting and atrophy, Weakness, Limitation of activities due to disability and Other reduced mobility. Physical and occupational therapy ordered daily to evaluate and treat. Debility was: present on admission.

## 2022-01-27 NOTE — ASSESSMENT & PLAN NOTE
"- S/p DCD OLTx 1/23 d/t HCV/HCC. Per op note, "After reperfusion and hemostasis break, there was minimal but persistent oozing from some surfaces and suture holes. Additional products were given. A patch of Evarrest was placed over the adrenal gland which was friable. After a period of packing there was good hemostasis".   - POD1 US satisfactory. Stepped down 1/25.  - Lateral drain removed 1/25.   - Plan for medial drain removal 1/26  - LFTs trending down  - tolerating diet  - remove CVC 1/26  - Plan for routine POD#5 Liver US 1/28 and possible discharge  "

## 2022-01-27 NOTE — CARE UPDATE
BG goal 140-180    -A1C   Lab Results   Component Value Date    HGBA1C 7.6 (H) 01/22/2022         -HOME REGIMEN: Toujo (unsure of dose- variable 10-30 units) and  ozempic 1 mg weekly     -INPATIENT REGIMEN: levemir 28 units daily and novolog 6 units with meals plus correction scale     -GLUCOSE TREND FOR THE PAST 24HRS: 161-327    -NO HYPOGYCEMIAS NOTED     -TOLERATING 100% OF PO DIET     -Diet: Diet diabetic [facility]; 2000 Calorie    -Steroids - solumedrol 80 mg       Remains in TSU, NAEON. BG at or above goal with scheduled insulin and prn correction scale.       Plan:  Continue levemir 28 units daily; FBG at goal  Increase to novolog 12 units with meals (mealtime plus correction yesterday and still with prandial excursions).   BG monitoring ac/hs and moderate dose correction scale.     Discharge planning:  tbd     Endocrine to continue to follow    ** Please call Endocrine for any BG related issues **

## 2022-01-27 NOTE — PLAN OF CARE
Pt has call bell in reach, non slip socks on, and bedrails up x2.  Pt on visi monitoring.   Pt has ac/hs and 2am with ins gtt.  Pt on strict I&Os with red tinged urine and 's are aware.  Pt enzymes being monitored daily.  Pt self meds to start tonight.   Pt has prn labetolol for high bp.

## 2022-01-27 NOTE — PLAN OF CARE
Patient POD #5 - progressing well. OOB to chair all day, ambulated in hallway with PT, took shower today. BG better controlled - Novolog adjusted per endocrine, BG <200. Patient had BM. Patient's wife pulled self-meds for today prior to leaving hospital. Discharge tentatively planned for tomorrow.

## 2022-01-28 ENCOUNTER — TELEPHONE (OUTPATIENT)
Dept: ENDOCRINOLOGY | Facility: CLINIC | Age: 62
End: 2022-01-28
Payer: COMMERCIAL

## 2022-01-28 ENCOUNTER — PATIENT MESSAGE (OUTPATIENT)
Dept: ENDOCRINOLOGY | Facility: HOSPITAL | Age: 62
End: 2022-01-28
Payer: COMMERCIAL

## 2022-01-28 VITALS
HEART RATE: 72 BPM | OXYGEN SATURATION: 94 % | WEIGHT: 171.94 LBS | HEIGHT: 65 IN | RESPIRATION RATE: 18 BRPM | BODY MASS INDEX: 28.65 KG/M2 | DIASTOLIC BLOOD PRESSURE: 73 MMHG | SYSTOLIC BLOOD PRESSURE: 141 MMHG | TEMPERATURE: 98 F

## 2022-01-28 DIAGNOSIS — Z94.4 LIVER REPLACED BY TRANSPLANT: Primary | ICD-10-CM

## 2022-01-28 LAB
ALBUMIN SERPL BCP-MCNC: 3 G/DL (ref 3.5–5.2)
ALP SERPL-CCNC: 166 U/L (ref 55–135)
ALT SERPL W/O P-5'-P-CCNC: 819 U/L (ref 10–44)
ANION GAP SERPL CALC-SCNC: 7 MMOL/L (ref 8–16)
AST SERPL-CCNC: 81 U/L (ref 10–40)
BASOPHILS # BLD AUTO: 0.01 K/UL (ref 0–0.2)
BASOPHILS NFR BLD: 0.1 % (ref 0–1.9)
BILIRUB SERPL-MCNC: 2 MG/DL (ref 0.1–1)
BUN SERPL-MCNC: 41 MG/DL (ref 8–23)
CALCIUM SERPL-MCNC: 8 MG/DL (ref 8.7–10.5)
CHLORIDE SERPL-SCNC: 110 MMOL/L (ref 95–110)
CO2 SERPL-SCNC: 22 MMOL/L (ref 23–29)
CREAT SERPL-MCNC: 0.8 MG/DL (ref 0.5–1.4)
DIFFERENTIAL METHOD: ABNORMAL
EOSINOPHIL # BLD AUTO: 0 K/UL (ref 0–0.5)
EOSINOPHIL NFR BLD: 0 % (ref 0–8)
ERYTHROCYTE [DISTWIDTH] IN BLOOD BY AUTOMATED COUNT: 14.4 % (ref 11.5–14.5)
EST. GFR  (AFRICAN AMERICAN): >60 ML/MIN/1.73 M^2
EST. GFR  (NON AFRICAN AMERICAN): >60 ML/MIN/1.73 M^2
GLUCOSE SERPL-MCNC: 108 MG/DL (ref 70–110)
HCT VFR BLD AUTO: 28.4 % (ref 40–54)
HGB BLD-MCNC: 9.3 G/DL (ref 14–18)
IMM GRANULOCYTES # BLD AUTO: 0.05 K/UL (ref 0–0.04)
IMM GRANULOCYTES NFR BLD AUTO: 0.7 % (ref 0–0.5)
INR PPP: 1.1 (ref 0.8–1.2)
LYMPHOCYTES # BLD AUTO: 0.3 K/UL (ref 1–4.8)
LYMPHOCYTES NFR BLD: 4.6 % (ref 18–48)
MAGNESIUM SERPL-MCNC: 1.8 MG/DL (ref 1.6–2.6)
MCH RBC QN AUTO: 30.9 PG (ref 27–31)
MCHC RBC AUTO-ENTMCNC: 32.7 G/DL (ref 32–36)
MCV RBC AUTO: 94 FL (ref 82–98)
MONOCYTES # BLD AUTO: 0.5 K/UL (ref 0.3–1)
MONOCYTES NFR BLD: 7.1 % (ref 4–15)
NEUTROPHILS # BLD AUTO: 6.1 K/UL (ref 1.8–7.7)
NEUTROPHILS NFR BLD: 87.5 % (ref 38–73)
NRBC BLD-RTO: 0 /100 WBC
PHOSPHATE SERPL-MCNC: 3.4 MG/DL (ref 2.7–4.5)
PLATELET # BLD AUTO: 94 K/UL (ref 150–450)
PMV BLD AUTO: 12.1 FL (ref 9.2–12.9)
POCT GLUCOSE: 195 MG/DL (ref 70–110)
POCT GLUCOSE: 95 MG/DL (ref 70–110)
POTASSIUM SERPL-SCNC: 4.4 MMOL/L (ref 3.5–5.1)
PROT SERPL-MCNC: 5.1 G/DL (ref 6–8.4)
PROTHROMBIN TIME: 12.1 SEC (ref 9–12.5)
RBC # BLD AUTO: 3.01 M/UL (ref 4.6–6.2)
SODIUM SERPL-SCNC: 139 MMOL/L (ref 136–145)
TACROLIMUS BLD-MCNC: 4.5 NG/ML (ref 5–15)
WBC # BLD AUTO: 6.93 K/UL (ref 3.9–12.7)

## 2022-01-28 PROCEDURE — 84100 ASSAY OF PHOSPHORUS: CPT | Performed by: PHYSICIAN ASSISTANT

## 2022-01-28 PROCEDURE — 63600175 PHARM REV CODE 636 W HCPCS: Performed by: STUDENT IN AN ORGANIZED HEALTH CARE EDUCATION/TRAINING PROGRAM

## 2022-01-28 PROCEDURE — 80053 COMPREHEN METABOLIC PANEL: CPT | Performed by: TRANSPLANT SURGERY

## 2022-01-28 PROCEDURE — 25000003 PHARM REV CODE 250: Performed by: PHYSICIAN ASSISTANT

## 2022-01-28 PROCEDURE — 25000003 PHARM REV CODE 250: Performed by: STUDENT IN AN ORGANIZED HEALTH CARE EDUCATION/TRAINING PROGRAM

## 2022-01-28 PROCEDURE — 99232 PR SUBSEQUENT HOSPITAL CARE,LEVL II: ICD-10-PCS | Mod: ,,, | Performed by: NURSE PRACTITIONER

## 2022-01-28 PROCEDURE — 99239 HOSP IP/OBS DSCHRG MGMT >30: CPT | Mod: 24,,, | Performed by: NURSE PRACTITIONER

## 2022-01-28 PROCEDURE — 85025 COMPLETE CBC W/AUTO DIFF WBC: CPT | Performed by: TRANSPLANT SURGERY

## 2022-01-28 PROCEDURE — 85610 PROTHROMBIN TIME: CPT | Performed by: TRANSPLANT SURGERY

## 2022-01-28 PROCEDURE — 63600175 PHARM REV CODE 636 W HCPCS: Performed by: PHYSICIAN ASSISTANT

## 2022-01-28 PROCEDURE — 63600175 PHARM REV CODE 636 W HCPCS: Performed by: NURSE PRACTITIONER

## 2022-01-28 PROCEDURE — 63600175 PHARM REV CODE 636 W HCPCS: Performed by: CLINICAL NURSE SPECIALIST

## 2022-01-28 PROCEDURE — 99239 PR HOSPITAL DISCHARGE DAY,>30 MIN: ICD-10-PCS | Mod: 24,,, | Performed by: NURSE PRACTITIONER

## 2022-01-28 PROCEDURE — 99232 SBSQ HOSP IP/OBS MODERATE 35: CPT | Mod: ,,, | Performed by: NURSE PRACTITIONER

## 2022-01-28 PROCEDURE — 83735 ASSAY OF MAGNESIUM: CPT | Performed by: PHYSICIAN ASSISTANT

## 2022-01-28 PROCEDURE — 80197 ASSAY OF TACROLIMUS: CPT | Performed by: TRANSPLANT SURGERY

## 2022-01-28 PROCEDURE — 36415 COLL VENOUS BLD VENIPUNCTURE: CPT | Performed by: PHYSICIAN ASSISTANT

## 2022-01-28 RX ORDER — TACROLIMUS 1 MG/1
5 CAPSULE ORAL EVERY 12 HOURS
Qty: 300 CAPSULE | Refills: 11 | Status: SHIPPED | OUTPATIENT
Start: 2022-01-28 | End: 2022-02-23 | Stop reason: DRUGHIGH

## 2022-01-28 RX ORDER — FUROSEMIDE 20 MG/1
20 TABLET ORAL DAILY
Qty: 3 TABLET | Refills: 0 | Status: SHIPPED | OUTPATIENT
Start: 2022-01-28 | End: 2022-02-01 | Stop reason: ALTCHOICE

## 2022-01-28 RX ORDER — INSULIN GLARGINE 300 U/ML
18 INJECTION, SOLUTION SUBCUTANEOUS DAILY
Qty: 3 PEN | Refills: 11 | Status: ON HOLD | OUTPATIENT
Start: 2022-01-28 | End: 2022-08-19 | Stop reason: SDUPTHER

## 2022-01-28 RX ORDER — INSULIN ASPART 100 [IU]/ML
6 INJECTION, SOLUTION INTRAVENOUS; SUBCUTANEOUS 3 TIMES DAILY
Qty: 15 ML | Refills: 11 | Status: SHIPPED | OUTPATIENT
Start: 2022-01-28 | End: 2022-08-19

## 2022-01-28 RX ORDER — TACROLIMUS 1 MG/1
5 CAPSULE ORAL 2 TIMES DAILY
Status: DISCONTINUED | OUTPATIENT
Start: 2022-01-28 | End: 2022-01-28 | Stop reason: HOSPADM

## 2022-01-28 RX ORDER — OXYCODONE HYDROCHLORIDE 5 MG/1
5 CAPSULE ORAL EVERY 4 HOURS PRN
Qty: 40 CAPSULE | Refills: 0 | Status: ON HOLD | OUTPATIENT
Start: 2022-01-28 | End: 2022-08-19 | Stop reason: HOSPADM

## 2022-01-28 RX ORDER — FUROSEMIDE 10 MG/ML
40 INJECTION INTRAMUSCULAR; INTRAVENOUS ONCE
Status: COMPLETED | OUTPATIENT
Start: 2022-01-28 | End: 2022-01-28

## 2022-01-28 RX ORDER — TACROLIMUS 1 MG/1
1 CAPSULE ORAL ONCE
Status: COMPLETED | OUTPATIENT
Start: 2022-01-28 | End: 2022-01-28

## 2022-01-28 RX ORDER — SEMAGLUTIDE 1.34 MG/ML
1 INJECTION, SOLUTION SUBCUTANEOUS WEEKLY
COMMUNITY
Start: 2022-01-06

## 2022-01-28 RX ORDER — INSULIN GLARGINE 300 U/ML
30 INJECTION, SOLUTION SUBCUTANEOUS DAILY
Status: ON HOLD | COMMUNITY
Start: 2021-10-23 | End: 2022-01-28 | Stop reason: SDUPTHER

## 2022-01-28 RX ADMIN — INSULIN ASPART 12 UNITS: 100 INJECTION, SOLUTION INTRAVENOUS; SUBCUTANEOUS at 12:01

## 2022-01-28 RX ADMIN — POLYETHYLENE GLYCOL 3350 17 G: 17 POWDER, FOR SOLUTION ORAL at 08:01

## 2022-01-28 RX ADMIN — TACROLIMUS 4 MG: 1 CAPSULE ORAL at 08:01

## 2022-01-28 RX ADMIN — CARVEDILOL 12.5 MG: 12.5 TABLET, FILM COATED ORAL at 08:01

## 2022-01-28 RX ADMIN — OXYCODONE 5 MG: 5 TABLET ORAL at 10:01

## 2022-01-28 RX ADMIN — TACROLIMUS 1 MG: 1 CAPSULE ORAL at 11:01

## 2022-01-28 RX ADMIN — FUROSEMIDE 40 MG: 10 INJECTION INTRAMUSCULAR; INTRAVENOUS at 11:01

## 2022-01-28 RX ADMIN — NYSTATIN 500000 UNITS: 500000 SUSPENSION ORAL at 08:01

## 2022-01-28 RX ADMIN — FAMOTIDINE 20 MG: 20 TABLET ORAL at 08:01

## 2022-01-28 RX ADMIN — HEPARIN SODIUM 5000 UNITS: 5000 INJECTION INTRAVENOUS; SUBCUTANEOUS at 05:01

## 2022-01-28 RX ADMIN — MYCOPHENOLATE MOFETIL 1000 MG: 250 CAPSULE ORAL at 08:01

## 2022-01-28 RX ADMIN — ASPIRIN 81 MG CHEWABLE TABLET 81 MG: 81 TABLET CHEWABLE at 08:01

## 2022-01-28 RX ADMIN — INSULIN DETEMIR 28 UNITS: 100 INJECTION, SOLUTION SUBCUTANEOUS at 08:01

## 2022-01-28 RX ADMIN — DOCUSATE SODIUM 100 MG: 100 CAPSULE ORAL at 08:01

## 2022-01-28 RX ADMIN — NYSTATIN 500000 UNITS: 500000 SUSPENSION ORAL at 02:01

## 2022-01-28 RX ADMIN — METHYLPREDNISOLONE SODIUM SUCCINATE 40 MG: 40 INJECTION, POWDER, FOR SOLUTION INTRAMUSCULAR; INTRAVENOUS at 09:01

## 2022-01-28 RX ADMIN — INSULIN ASPART 2 UNITS: 100 INJECTION, SOLUTION INTRAVENOUS; SUBCUTANEOUS at 12:01

## 2022-01-28 NOTE — DISCHARGE SUMMARY
"[facility]  Liver Transplant  Discharge Summary      Patient Name: Eder Kong  MRN: [id number]  Admission Date: 1/22/2022  Hospital Length of Stay: 6 days  Discharge Date and Time:  01/28/2022 10:55 AM  Attending Physician: Sterling Tom MD   Discharging Provider: Olamide Banks NP  Primary Care Provider: Martin Mcdaniel MD  Post-Operative Day: 5     ORGAN:   LIVER  Disease Etiology: Primary Liver Malignancy: Hepatoma (HCC) and Cirrhosis  Donor Type:   Donation after Circulatory Death   CDC High Risk:   Yes  Donor CMV Status:   Donor CMV Status: Negative  Donor HBcAB:   Negative  Donor HCV Status:   Negative  Whole or Partial: Whole Liver  Biliary Anastomosis: End to End  Arterial Anatomy: Standard    HPI:   Eder Kong is a 61 y.o. 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  Patient presents for liver transplant 1/22/22.  He reports in usual state of health. Patient denies any recent hospitalizations or ED visits.  COVID vaccine completed. The primary surgeon will be Dr. Austin.  OR times times are being set for 0400 and 0530.  Patient has been NPO since 4pm.  All pre-op labs and imaging have been ordered and will be reviewed prior to surgery. Consents to be signed prior to transplant.       Procedure(s) (LRB):  TRANSPLANT, LIVER (N/A)     Hospital Course:    Patient now s/p DCD OLTxp 1/23 d/t HCV/HCC. Per op note, "After reperfusion and hemostasis break, there was minimal but persistent oozing from some surfaces and suture holes. Additional products were given. A patch of Evarrest was placed over the adrenal gland which was friable. After a period of packing there was good hemostasis". POD1 US satisfactory. Stepped down 1/25.     Patient has progressed very well.  LFTS > normal but trending down nicely.  Routine DCD POD#5 Liver US satisfactory w 3 collections, will have repeat US per DCD protocol.  CBC remains stable. He is toleratinfg diet, passing flatus and had BM 1/27.  He is " ambulating in room w/o difficulty. PT consulted and recommending [facility] w PT.   Pulling meds.  Central line removed 1/26.  Lateral PRASANNA drain removed 1/25. Medial drain removed 1/26. Chevron incision w staples CDI w the exception of small amount of serosang drainage to right lateral portion of incision.  Drain sites clean. Patient also has 2 skin tears right lateral, applying telfa during day and off at hs. Weight is up and pt reports feeling generalized edema.  Lasix 40 mg IV x1 given, plan to discharge on Lasix 20 mg PO x3 days. Can reassess need for additional Lasix when pt comes to clinic on Monday. Cr improving daily- 0.8 on day of discharge. Making good UOP. UA 1/24 with many bacteria; >100 wbc. Cx ngtd. Asymptomatic. No abx started. Endocrine following for BG management, weaned off insulin gtt. Will d/c on Levemir 28u daily and Novolog 12u w melas.  VSS.     Discharge instructions reviewed by Pharmacist, Nursing and Transplant coordinator.  Patient and CG verbalize understanding and are in agreement with discharge from hospital today.  Patient is medically stable for discharge.  Patient will f/u w labs in am and clinic w transplant coordinator & pharmacist on Monday.  Surgeon appt on Tuesday.        Goals of Care Treatment Preferences:  Code Status: Full Code      Final Active Diagnoses:    Diagnosis Date Noted POA    PRINCIPAL PROBLEM:  S/P liver transplant [Z94.4] 01/23/2022 Not Applicable    Thrombocytopenia, unspecified [D69.6] 01/27/2022 Yes    Malnutrition of mild degree [E44.1] 01/27/2022 Yes    Long-term use of immunosuppressant medication [Z79.899] 01/26/2022 Not Applicable    Weakness [R53.1] 01/26/2022 Yes    Anemia of chronic disease [D63.8] 01/26/2022 Yes    At risk for opportunistic infections [Z91.89] 01/23/2022 No    Prophylactic immunotherapy [Z29.8] 01/23/2022 Not Applicable    Type 2 diabetes mellitus with hyperglycemia [E11.65] 01/23/2022 Yes    Adverse effect of corticosteroids  [T38.0X5A] 01/23/2022 No      Problems Resolved During this Admission:       Consults (From admission, onward)        Status Ordering Provider     Inpatient consult to Endocrinology  Once        Provider:  (Not yet assigned)    Completed VIC PRESTON     Inpatient consult to Registered Dietitian/Nutritionist  Once        Provider:  (Not yet assigned)    Completed VIC PRESTON          Pending Diagnostic Studies:     Procedure Component Value Units Date/Time    AST (SGOT) [[id number]] Collected: 01/23/22 1529    Order Status: Sent Lab Status: In process Updated: 01/23/22 1531    Specimen: Blood     CBC auto differential [[id number]] Collected: 01/23/22 1106    Order Status: Sent Lab Status: In process Updated: 01/23/22 1108    Specimen: Blood     Protime-INR [[id number]] Collected: 01/23/22 1529    Order Status: Sent Lab Status: In process Updated: 01/23/22 1531    Specimen: Blood     Specimen to Pathology, Surgery Liver [[id number]] Collected: 01/23/22 0707    Order Status: Sent Lab Status: In process Updated: 01/24/22 0044        Significant Diagnostic Studies: Labs:   CMP   Recent Labs   Lab 01/27/22  0725 01/28/22  0646    139   K 4.7 4.4    110   CO2 26 22*   * 108   BUN 56* 41*   CREATININE 1.0 0.8   CALCIUM 8.2* 8.0*   PROT 5.2* 5.1*   ALBUMIN 3.1* 3.0*   BILITOT 2.4* 2.0*   ALKPHOS 174* 166*   * 81*   ALT 1,259* 819*   ANIONGAP 4* 7*   ESTGFRAFRICA >60.0 >60.0   EGFRNONAA >60.0 >60.0     CBC   Recent Labs   Lab 01/27/22  0725 01/27/22  0725 01/28/22  0646   WBC 8.64  --  6.93   HGB 9.4*  --  9.3*   HCT 28.7*   < > 28.4*   PLT 90*  --  94*    < > = values in this interval not displayed.     INR   Lab Results   Component Value Date    INR 1.1 01/28/2022    INR 1.1 01/27/2022    INR 1.3 (H) 01/26/2022     All labs within the past 24 hours have been reviewed    Microbiology:   Blood Culture No results found for: ALEXIS   Urine Culture    Lab Results   Component Value Date    LABURIN No  growth 01/25/2022     Radiology: X-Ray: CXR: X-Ray Chest 1 View (CXR): No results found for this visit on 01/22/22.  Liver Ultrasound: routine POD#5     The patients clinical status was discussed at multidisplinary rounds, involving transplant surgery, transplant medicine, pharmacy, nursing, nutrition, and social work    Discharged Condition: fair    Disposition: to local address accompanied by spouse    Follow Up: labs in am, Clinic on Monday    Patient Instructions:      Ambulatory referral/consult to Home Health   Standing Status: Future   Referral Priority: Routine Referral Type: Home Health Care   Referral Reason: Specialty Services Required   Requested Specialty: Home Health Services   Number of Visits Requested: 1     Medications:  Reconciled Home Medications:      Medication List      START taking these medications    calcium carbonate-vitamin D3 600 mg-20 mcg (800 unit) Tab  Take 1 tablet by mouth once daily.     famotidine 20 MG tablet  Commonly known as: PEPCID  Take 1 tablet (20 mg total) by mouth once daily. Stop 2/22/22     mycophenolate 250 mg Cap  Commonly known as: CELLCEPT  Take 4 capsules (1,000 mg total) by mouth 2 (two) times daily.     nystatin 100,000 unit/mL suspension  Commonly known as: MYCOSTATIN  Take 5 mLs (500,000 Units total) by mouth 3 (three) times daily after meals. Stop 2/12/22 for 19 days     predniSONE 5 MG tablet  Commonly known as: DELTASONE  Take by mouth daily: 20mg 1/29-2/4, 15mg 2/5-2/11, 10mg 2/12-2/18, 5mg 2/19-2/25, Stop 2/26/22     sulfamethoxazole-trimethoprim 400-80mg 400-80 mg per tablet  Commonly known as: BACTRIM,SEPTRA  Take 1 tablet by mouth every morning. Stop 7/22/22     tacrolimus 1 MG Cap  Commonly known as: PROGRAF  Take 5 capsules (5 mg total) by mouth every 12 (twelve) hours.     valGANciclovir 450 mg Tab  Commonly known as: VALCYTE  Take 1 tablet (450 mg total) by mouth once daily. Stop 4/23/22        CHANGE how you take these medications    aspirin 81  "MG EC tablet  Commonly known as: ECOTRIN  Take 1 tablet (81 mg total) by mouth once daily.  What changed:   · how much to take  · how to take this  · when to take this     carvediloL 12.5 MG tablet  Commonly known as: COREG  Take 1 tablet (12.5 mg total) by mouth 2 (two) times daily.  What changed: how much to take        CONTINUE taking these medications    EScitalopram oxalate 5 MG Tab  Commonly known as: LEXAPRO  Take 1 tablet (5 mg total) by mouth once daily.        STOP taking these medications    benazepriL 5 MG tablet  Commonly known as: LOTENSIN     calcium carbonate 600 mg calcium (1,500 mg) Tab  Commonly known as: OS-EVELYN     ferrous sulfate 325 mg (65 mg iron) Tab tablet  Commonly known as: FEOSOL     ondansetron 4 MG tablet  Commonly known as: ZOFRAN     oxyCODONE 5 MG immediate release tablet  Commonly known as: ROXICODONE     oxyCODONE-acetaminophen 5-325 mg per tablet  Commonly known as: PERCOCET     rifAXIMin 550 mg Tab  Commonly known as: XIFAXAN     VITAMIN D3 ORAL        ASK your doctor about these medications    BD ULTRA-FINE MINI PEN NEEDLE 31 gauge x 3/16" Ndle  Generic drug: pen needle, diabetic  USE AS DIRECTED EVERY DAY     CONTOUR NEXT TEST STRIPS Strp  Generic drug: blood sugar diagnostic  USE TO TEST BLOOD GLUCOSE TWICE DAILY     JANUVIA 50 MG Tab  Generic drug: SITagliptin  Take 50 mg by mouth once daily.     OZEMPIC 0.25 mg or 0.5 mg(2 mg/1.5 mL) pen injector  Generic drug: semaglutide  INJECT 0.25 MG INTO THE SKIN EVERY WEEK FOR 4 WEEKS THEN INCREASE TO 0.5 MG INTO THE SKIN EVERY WEEK     TOUJEO SOLOSTAR U-300 INSULIN 300 unit/mL (1.5 mL) Inpn pen  Generic drug: insulin glargine (TOUJEO)  as needed.          Time spent caring for patient (Greater than 1/2 spent in direct face-to-face contact): > 30 minutes    Olamide Banks NP  Liver Transplant  [facility]  "

## 2022-01-28 NOTE — PROGRESS NOTES
DISCHARGE NOTE:    Eder Kong is a 61 y.o. 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      Past Medical History:   Diagnosis Date    Anemia     Arthritis     Diabetes     Dizziness     Dyslipidemia     General anesthetics causing adverse effect in therapeutic use     Hep C w/o coma, chronic     failed 2 rounds of interferon-based medication. no protease inhibitorrs used    Hypertension     not on medication    Kidney stone     Liver cancer        Hospital Course: Uncomplicated hospital admission.  Blood sugar regimen adjusted, discharged on aspirin and calcium-vit d.     Allergies: Review of patient's allergies indicates:  No Known Allergies    Patient Pharmacy: [facility]    Discharge Medications:     Medication List      START taking these medications    calcium carbonate-vitamin D3 600 mg-20 mcg (800 unit) Tab  Take 1 tablet by mouth once daily.     famotidine 20 MG tablet  Commonly known as: PEPCID  Take 1 tablet (20 mg total) by mouth once daily. Stop 2/22/22     furosemide 20 MG tablet  Commonly known as: LASIX  Take 1 tablet (20 mg total) by mouth once daily. for 3 days     insulin aspart U-100 100 unit/mL (3 mL) Inpn pen  Commonly known as: NovoLOG  Inject 6 Units into the skin 3 (three) times daily. Plus sliding scale: TDD: 33 units     mycophenolate 250 mg Cap  Commonly known as: CELLCEPT  Take 4 capsules (1,000 mg total) by mouth 2 (two) times daily.     nystatin 100,000 unit/mL suspension  Commonly known as: MYCOSTATIN  Take 5 mLs (500,000 Units total) by mouth 3 (three) times daily after meals. Stop 2/12/22 for 19 days     oxyCODONE 5 mg Cap  Commonly known as: OXY-IR  Take 1 capsule (5 mg total) by mouth every 4 (four) hours as needed for Pain.  Replaces: oxyCODONE 5 MG immediate release tablet     predniSONE 5 MG tablet  Commonly known as: DELTASONE  Take by mouth daily: 20mg  "1/29-2/4, 15mg 2/5-2/11, 10mg 2/12-2/18, 5mg 2/19-2/25, Stop 2/26/22     sulfamethoxazole-trimethoprim 400-80mg 400-80 mg per tablet  Commonly known as: BACTRIM,SEPTRA  Take 1 tablet by mouth every morning. Stop 7/22/22     tacrolimus 1 MG Cap  Commonly known as: PROGRAF  Take 5 capsules (5 mg total) by mouth every 12 (twelve) hours.     valGANciclovir 450 mg Tab  Commonly known as: VALCYTE  Take 1 tablet (450 mg total) by mouth once daily. Stop 4/23/22        CHANGE how you take these medications    aspirin 81 MG EC tablet  Commonly known as: ECOTRIN  Take 1 tablet (81 mg total) by mouth once daily.  What changed:   · how much to take  · how to take this  · when to take this     carvediloL 12.5 MG tablet  Commonly known as: COREG  Take 1 tablet (12.5 mg total) by mouth 2 (two) times daily.  What changed: how much to take     TOUJEO MAX U-300 SOLOSTAR 300 unit/mL (3 mL) insulin pen  Generic drug: insulin glargine U-300 conc  Inject 18 Units into the skin once daily.  What changed:   · how much to take  · Another medication with the same name was removed. Continue taking this medication, and follow the directions you see here.        CONTINUE taking these medications    BD ULTRA-FINE MINI PEN NEEDLE 31 gauge x 3/16" Ndle  Generic drug: pen needle, diabetic     CONTOUR NEXT TEST STRIPS Strp  Generic drug: blood sugar diagnostic     EScitalopram oxalate 5 MG Tab  Commonly known as: LEXAPRO  Take 1 tablet (5 mg total) by mouth once daily.     OZEMPIC 1 mg/dose (4 mg/3 mL)  Generic drug: semaglutide        STOP taking these medications    benazepriL 5 MG tablet  Commonly known as: LOTENSIN     calcium carbonate 600 mg calcium (1,500 mg) Tab  Commonly known as: OS-EVELYN     ferrous sulfate 325 mg (65 mg iron) Tab tablet  Commonly known as: FEOSOL     JANUVIA 50 MG Tab  Generic drug: SITagliptin     ondansetron 4 MG tablet  Commonly known as: ZOFRAN     oxyCODONE 5 MG immediate release tablet  Commonly known as: " ROXICODONE  Replaced by: oxyCODONE 5 mg Cap     oxyCODONE-acetaminophen 5-325 mg per tablet  Commonly known as: PERCOCET     rifAXIMin 550 mg Tab  Commonly known as: XIFAXAN     VITAMIN D3 ORAL           Where to Get Your Medications      These medications were sent to [facility]  [address] [facility] [address]    Hours: Mon-Fri 7a-7p, Sat-Sun 10a-4p Phone: [phone]   · aspirin 81 MG EC tablet  · calcium carbonate-vitamin D3 600 mg-20 mcg (800 unit) Tab  · carvediloL 12.5 MG tablet  · EScitalopram oxalate 5 MG Tab  · famotidine 20 MG tablet  · furosemide 20 MG tablet  · insulin aspart U-100 100 unit/mL (3 mL) Inpn pen  · mycophenolate 250 mg Cap  · nystatin 100,000 unit/mL suspension  · oxyCODONE 5 mg Cap  · predniSONE 5 MG tablet  · sulfamethoxazole-trimethoprim 400-80mg 400-80 mg per tablet  · tacrolimus 1 MG Cap  · TOUJEO MAX U-300 SOLOSTAR 300 unit/mL (3 mL) insulin pen  · valGANciclovir 450 mg Tab          Pharmacy Interventions/Recommendations:  1) Transplant Immunosuppression: Induction Methylpred, and maintenance tac/MMF/pred    2) Opportunistic Infection prophylaxis: bactrim x6 mo, valcyte x3 mo, nystatin x1 mo    3) Osteoporosis Prevention measures (liver txp): calcium-vit d, DEXA WNL    4) Patient Counseling/Education:    Demonstrated the use of the BP cuff, thermometer.    5) Follow-Up/Discharge Needs:  none    6) Patient Assistance Information: paid for discharge medications, but PAPs completed for future.     7) The following medications have been placed on HOLD and should be restarted in the outpatient setting (when appropriate): none    Eder and his caregiver verbalized their understanding and had the opportunity to ask questions.

## 2022-01-28 NOTE — ASSESSMENT & PLAN NOTE
BG goal 140-180    Decrease to levemir 20 units daily   BG monitoring ac/hs and moderate dose correction scale.   continue novolog 12 units with meals    Discharge planning: Recommend patient discharge on touejo 18 units daily, ozempic 1 mg weekly (first dose today), novolog 6 units with meals plus correction scale 150/50.  BG logs with dosing instructions provided at bedside.  Instructed patient to submit BG logs for review in one week or sooner if experiencing persistent high (>200) or low (<100) blood sugars.  Patient prescriptions sent, patient has all necessary diabetic supplies.  Will send patient portal message to allow ongoing communication for all blood glucose related issues.  Will setup hospital discharge follow up appointment and DM education.  Reviewed topics related to DM including: the need for insulin, how insulin works, what makes it a high risk medication, the importance of follow up with either PCP or endocrine provider, importance of and how to check BG, how to record BG on logs, how to administer insulin, appropriate insulin administration sites, importance of rotating injection sites, hyper/hypoglycemia, how and when to treat hypoglycemia, when to hold insulin, how the correction scale works, importance of storing unused insulin in the refrigerator, and when to seek medical attention.  Patient verbalized understanding, answered all questions to patient's satisfaction.

## 2022-01-28 NOTE — SUBJECTIVE & OBJECTIVE
"Interval HPI:   Overnight events: Remains in TSU. JADA. BG reasonably well controlled with scheduled insulin and prn correction scale. FBG below goal this AM. Tentative discharge today.   Eatin% Diet diabetic [facility];  Calorie  Nausea: No  Hypoglycemia and intervention: No  Fever: No  TPN and/or TF: No    BP (!) 172/84   Pulse 73   Temp 97.8 °F (36.6 °C)   Resp 18   Ht 5' 5" (1.651 m)   Wt 78 kg (171 lb 15.3 oz)   SpO2 98%   BMI 28.62 kg/m²     Labs Reviewed and Include    Recent Labs   Lab 22  0646      CALCIUM 8.0*   ALBUMIN 3.0*   PROT 5.1*      K 4.4   CO2 22*      BUN 41*   CREATININE 0.8   ALKPHOS 166*   *   AST 81*   BILITOT 2.0*     Lab Results   Component Value Date    WBC 6.93 2022    HGB 9.3 (L) 2022    HCT 28.4 (L) 2022    MCV 94 2022    PLT 94 (L) 2022     No results for input(s): TSH, FREET4 in the last 168 hours.  Lab Results   Component Value Date    HGBA1C 7.6 (H) 2022       Nutritional status:   Body mass index is 28.62 kg/m².  Lab Results   Component Value Date    ALBUMIN 3.0 (L) 2022    ALBUMIN 3.1 (L) 2022    ALBUMIN 2.8 (L) 2022     No results found for: PREALBUMIN    Estimated Creatinine Clearance: 91 mL/min (based on SCr of 0.8 mg/dL).    Accu-Checks  Recent Labs     22  0211 22  0840 22  1236 22  1729 22  0832 22  1249 22  1744 22  1959 22  0803   POCTGLUCOSE 185* 256* 277* 285* 327* 161* 198* 195* 219* 95       Current Medications and/or Treatments Impacting Glycemic Control  Immunotherapy:    Immunosuppressants         Stop Route Frequency     tacrolimus capsule 4 mg         -- Oral 2 times daily     mycophenolate capsule 1,000 mg         -- Oral 2 times daily        Steroids:   Hormones (From admission, onward)            Start     Stop Route Frequency Ordered    22 0900  predniSONE tablet 20 mg  " "(methylprednisolone taper panel)        "Followed by" Linked Group Details    -- Oral Daily 01/23/22 1058    01/28/22 0900  methylPREDNISolone sodium succinate injection 40 mg  (methylprednisolone taper panel)        "Followed by" Linked Group Details    01/29 0859 IV Daily 01/23/22 1058        Pressors:    Autonomic Drugs (From admission, onward)            None        Hyperglycemia/Diabetes Medications:   Antihyperglycemics (From admission, onward)            Start     Stop Route Frequency Ordered    01/29/22 0900  insulin detemir U-100 pen 20 Units         -- SubQ Daily 01/28/22 0833    01/27/22 1230  insulin aspart U-100 pen 12 Units         -- SubQ 3 times daily with meals 01/27/22 1224    01/26/22 1045  insulin aspart U-100 pen 1-10 Units         -- SubQ Before meals & nightly PRN 01/26/22 0945        "

## 2022-01-28 NOTE — PROGRESS NOTES
Discharge instructions and education given to pt. Pt verbalized understanding. Reviewed medication changes, new medications, future appointments and medication compliance. Pt informed to use Care Coordinator or call 24/7 [facility] on-call nurse for any further questions or concerns. Reviewed worsening signs or symptoms of liver disease/infection. Peripheral IV removed, catheter intact. Telemetry monitors removed. Personal items sent with pt. Pt waiting for wheelchair services to provide transport. Pharmacy came to bedside and provided updated blue card. Vitals signs stable at time of discharge.

## 2022-01-28 NOTE — PLAN OF CARE
61 year-old male admitted 1/22/21 for liver txp secondary to HCC and Hep C. Pt has hx of ESLD, Anemia, HTN, aortic valve repair  -AAOx4, ambulates independently  -22 G FA saline locked  -Chevron KATEY with staples, cleansed with Betadine  -Accuchecks AC/HS sliding scale provided   -self meds pulled 100%  -Liver US complete  -Liver enzymes trending downward  -Lasix IVP 40mg  -PRN Oxy 5 mg  -pt sitting up in bed, spouse at bedside, bed in lowest position, wheels locked, non-skid socks in place, call light within reach  -pending discharge, pt seen by coordinator, pharmacy, and Endocrine, awaiting team

## 2022-01-28 NOTE — PROGRESS NOTES
Met with patient and wife in preparation for discharge today.  Reviewed their answers to the questions in My New Journey.  Patient and wife answered two questions incorrectly.  Rationale for correct answers reviewed.  These questions cover: post op care, medication management, infection prevention and emergency contacts.  Reviewed outpatient appointments.  Outpatient coordinator assigned.  Allowed time for questions and answers.

## 2022-01-28 NOTE — PHYSICIAN QUERY
PT Name: Eder Kong  MR #: [id number]    DOCUMENTATION CLARIFICATION     CDS/: Liv Ruano RN, CCDS             Contact information: [email or web address]    This form is a permanent document in the medical record.     Query Date: January 28, 2022    By submitting this query, we are merely seeking further clarification of documentation.. Please utilize your independent clinical judgment when addressing the question(s) below.    The medical record contains the following:   Indicators  Supporting Clinical Findings Location in Medical Record   X % of Estimated Energy Intake over a time frame from p.o., TF, or TPN  Pt reports fair appetite now. Good appetite PTA   % Intake of Estimated Energy Needs: 50 - 75 %  Add Boost Glucose Control TID to increase intake  1/27 RD note    Weight Status over a time frame     X Subcutaneous Fat and/or Muscle Loss Well nourished 1/27 RD note    Fluid Accumulation or Edema     X Wt/BMI/Usual Body Weight BMI 28.8 1/27 RD note    Delayed Wound Healing/Failure to Thrive     X Acute or Chronic Illness S/p Liver Transplant 1/23  Malnutrition of Mild Degree r/t HCC/Liver disease 1/27 prog note    Medication      Treatment     X Other  NFPE completed 1/27; pt appears nourished with no s/s of malnutrition. 1/27 RD note     AND / ASPEN Clinical Characteristics (October 2011)  A minimum of two characteristics is recommended for diagnosing either moderate or severe malnutrition   Mild Malnutrition Moderate Malnutrition Severe Malnutrition   Energy Intake from p.o., TF or TPN. < 75% intake of estimated energy needs for less than 7 days < 75% intake of estimated energy needs for greater than 7 days < 50% intake of estimated energy needs for > 5 days   Weight Loss 1-2% in 1 month  5% in 3 months  7.5% in 6 months  10% in 1 year 1-2 % in 1 week  5% in 1 month  7.5% in 3 months  10% in 6 months  20% in 1 year > 2% in 1 week  > 5% in 1 month  > 7.5% in 3 months  > 10% in 6 months  > 20% in 1 year    Physical Findings     None *Mild subcutaneous fat and/or muscle loss  *Mild fluid accumulation  *Stage II decubitus  *Surgical wound or non-healing wound *Mod/severe subcutaneous fat and/or muscle loss  *Mod/severe fluid accumulation  *Stage III or IV decubitus  *Non-healing surgical wound     Provider, please clarify conflicting nutrition documentation.    [ x ] Mild Protein-Calorie Malnutrition   [  ] Malnutrition ruled out   [  ] Other Nutritional Diagnosis (please specify): _____________   [  ] Clinically Undetermined       Please document in your progress notes daily for the duration of treatment until resolved and include in your discharge summary.

## 2022-01-28 NOTE — PHYSICIAN QUERY
PT Name: Eder Kong  MR #: [id number]    DOCUMENTATION CLARIFICATION      CDS/: Liv Ruano RN, CCDS              Contact information: [email or web address]    This form is a permanent document in the medical record.      Query Date: January 28, 2022    By submitting this query, we are merely seeking further clarification of documentation. Please utilize your independent clinical judgment when addressing the question(s) below.    The Medical Record contains the following:   Indicators  Supporting Clinical Findings Location in Medical Record    Anemia documented     X H&H 10.5/31.6-->6.8/21.0 1/23 lab    BP                    HR      GI bleeding documented      Acute bleeding (Non GI site)     X Transfusion(s) Cell saver 286 cc 1/23 op note   X Acute/Chronic illness ESLD due to Primary Liver Malignancy: Hepatoma (HCC) and Cirrhosis, Cirrhosis: Type C and Liver cell carcinoma   Orthotopic Liver Transplant  EBL 1,500 ml 1/23 op note   X Treatments Trend CBC q4hr 1/23 cc h/p    Other       Provider, please specify diagnosis or diagnoses associated with above clinical findings.   [ x] Acute blood loss anemia expected post-operatively    [   ] Other Hematological Diagnosis (please specify): _________________   [   ] Clinically Undetermined       Please document in your progress notes daily for the duration of treatment, until resolved, and include in your discharge summary.    Form No. [id number]

## 2022-01-28 NOTE — PROGRESS NOTES
"[facility] - Transplant Stepdown  Endocrinology  Progress Note    Admit Date: 2022     Reason for Consult: Management of T2DM, Hyperglycemia     Surgical Procedure and Date: Liver Transplant 22    Diabetes diagnosis year:     Home Diabetes Medications:  Toujo (unsure of dose- variable 10-30 units) and  ozempic 1 mg weekly   Lab Results   Component Value Date    HGBA1C 7.6 (H) 2022           How often checking glucose at home? 1-3   BG readings on regimen: 100-200  Hypoglycemia on the regimen?  no  Missed doses on regimen?  no    Diabetes Complications include:     Hyperglycemia    Complicating diabetes co morbidities:   CIRRHOSIS and Glucocorticoid use       HPI:   Patient is a 61 y.o. 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 Endocrinology consulted for management of T2DM.       Lab Results   Component Value Date    HGBA1C 7.6 (H) 2022           Interval HPI:   Overnight events: Remains in TSU. NAEON. BG reasonably well controlled with scheduled insulin and prn correction scale. FBG below goal this AM. Tentative discharge today.   Eatin% Diet diabetic [facility];  Calorie  Nausea: No  Hypoglycemia and intervention: No  Fever: No  TPN and/or TF: No    BP (!) 172/84   Pulse 73   Temp 97.8 °F (36.6 °C)   Resp 18   Ht 5' 5" (1.651 m)   Wt 78 kg (171 lb 15.3 oz)   SpO2 98%   BMI 28.62 kg/m²     Labs Reviewed and Include    Recent Labs   Lab 22  0646      CALCIUM 8.0*   ALBUMIN 3.0*   PROT 5.1*      K 4.4   CO2 22*      BUN 41*   CREATININE 0.8   ALKPHOS 166*   *   AST 81*   BILITOT 2.0*     Lab Results   Component Value Date    WBC 6.93 2022    HGB 9.3 (L) 2022    HCT 28.4 (L) 2022    MCV 94 2022    PLT 94 (L) 2022     No results for input(s): TSH, FREET4 in the last 168 hours.  Lab Results   Component Value Date    HGBA1C 7.6 (H) 2022 " "      Nutritional status:   Body mass index is 28.62 kg/m².  Lab Results   Component Value Date    ALBUMIN 3.0 (L) 01/28/2022    ALBUMIN 3.1 (L) 01/27/2022    ALBUMIN 2.8 (L) 01/26/2022     No results found for: PREALBUMIN    Estimated Creatinine Clearance: 91 mL/min (based on SCr of 0.8 mg/dL).    Accu-Checks  Recent Labs     01/26/22  0211 01/26/22  0840 01/26/22  1236 01/26/22  1729 01/26/22 2011 01/27/22  0832 01/27/22  1249 01/27/22  1744 01/27/22  1959 01/28/22  0803   POCTGLUCOSE 185* 256* 277* 285* 327* 161* 198* 195* 219* 95       Current Medications and/or Treatments Impacting Glycemic Control  Immunotherapy:    Immunosuppressants         Stop Route Frequency     tacrolimus capsule 4 mg         -- Oral 2 times daily     mycophenolate capsule 1,000 mg         -- Oral 2 times daily        Steroids:   Hormones (From admission, onward)            Start     Stop Route Frequency Ordered    01/29/22 0900  predniSONE tablet 20 mg  (methylprednisolone taper panel)        "Followed by" Linked Group Details    -- Oral Daily 01/23/22 1058    01/28/22 0900  methylPREDNISolone sodium succinate injection 40 mg  (methylprednisolone taper panel)        "Followed by" Linked Group Details    01/29 0859 IV Daily 01/23/22 1058        Pressors:    Autonomic Drugs (From admission, onward)            None        Hyperglycemia/Diabetes Medications:   Antihyperglycemics (From admission, onward)            Start     Stop Route Frequency Ordered    01/29/22 0900  insulin detemir U-100 pen 20 Units         -- SubQ Daily 01/28/22 0833    01/27/22 1230  insulin aspart U-100 pen 12 Units         -- SubQ 3 times daily with meals 01/27/22 1224    01/26/22 1045  insulin aspart U-100 pen 1-10 Units         -- SubQ Before meals & nightly PRN 01/26/22 0945          ASSESSMENT and PLAN    * S/P liver transplant  Managed per primary team  Optimize BG control    Type 2 diabetes mellitus with hyperglycemia  BG goal 140-180    Decrease to levemir " 20 units daily   BG monitoring ac/hs and moderate dose correction scale.   continue novolog 12 units with meals    Discharge planning: Recommend patient discharge on touejo 18 units daily, ozempic 1 mg weekly (first dose today), novolog 6 units with meals plus correction scale 150/50.  BG logs with dosing instructions provided at bedside.  Instructed patient to submit BG logs for review in one week or sooner if experiencing persistent high (>200) or low (<100) blood sugars.  Patient prescriptions sent, patient has all necessary diabetic supplies.  Will send patient portal message to allow ongoing communication for all blood glucose related issues.  Will setup hospital discharge follow up appointment and DM education.  Reviewed topics related to DM including: the need for insulin, how insulin works, what makes it a high risk medication, the importance of follow up with either PCP or endocrine provider, importance of and how to check BG, how to record BG on logs, how to administer insulin, appropriate insulin administration sites, importance of rotating injection sites, hyper/hypoglycemia, how and when to treat hypoglycemia, when to hold insulin, how the correction scale works, importance of storing unused insulin in the refrigerator, and when to seek medical attention.  Patient verbalized understanding, answered all questions to patient's satisfaction.            Prophylactic immunotherapy  May increase insulin resistance.         Adverse effect of corticosteroids  Glucocorticoids markedly increase glucoses. Expect steroid taper to help with glucose control.          Aster Ha NP  Endocrinology  [facility] - Transplant Stepdown

## 2022-01-29 ENCOUNTER — TELEPHONE (OUTPATIENT)
Dept: TRANSPLANT | Facility: CLINIC | Age: 62
End: 2022-01-29
Payer: COMMERCIAL

## 2022-01-29 ENCOUNTER — LAB VISIT (OUTPATIENT)
Dept: LAB | Facility: HOSPITAL | Age: 62
End: 2022-01-29
Attending: SURGERY
Payer: COMMERCIAL

## 2022-01-29 DIAGNOSIS — Z94.4 LIVER REPLACED BY TRANSPLANT: ICD-10-CM

## 2022-01-29 LAB
ALBUMIN SERPL BCP-MCNC: 3 G/DL (ref 3.5–5.2)
ALP SERPL-CCNC: 201 U/L (ref 55–135)
ALT SERPL W/O P-5'-P-CCNC: 575 U/L (ref 10–44)
ANION GAP SERPL CALC-SCNC: 8 MMOL/L (ref 8–16)
AST SERPL-CCNC: 64 U/L (ref 10–40)
BASOPHILS # BLD AUTO: 0.01 K/UL (ref 0–0.2)
BASOPHILS NFR BLD: 0.1 % (ref 0–1.9)
BILIRUB DIRECT SERPL-MCNC: 1.2 MG/DL (ref 0.1–0.3)
BILIRUB SERPL-MCNC: 2 MG/DL (ref 0.1–1)
BUN SERPL-MCNC: 38 MG/DL (ref 8–23)
CALCIUM SERPL-MCNC: 7.9 MG/DL (ref 8.7–10.5)
CHLORIDE SERPL-SCNC: 108 MMOL/L (ref 95–110)
CO2 SERPL-SCNC: 24 MMOL/L (ref 23–29)
CREAT SERPL-MCNC: 0.9 MG/DL (ref 0.5–1.4)
DIFFERENTIAL METHOD: ABNORMAL
EOSINOPHIL # BLD AUTO: 0.2 K/UL (ref 0–0.5)
EOSINOPHIL NFR BLD: 2.8 % (ref 0–8)
ERYTHROCYTE [DISTWIDTH] IN BLOOD BY AUTOMATED COUNT: 14.6 % (ref 11.5–14.5)
EST. GFR  (AFRICAN AMERICAN): >60 ML/MIN/1.73 M^2
EST. GFR  (NON AFRICAN AMERICAN): >60 ML/MIN/1.73 M^2
GLUCOSE SERPL-MCNC: 196 MG/DL (ref 70–110)
HCT VFR BLD AUTO: 29.1 % (ref 40–54)
HGB BLD-MCNC: 9.3 G/DL (ref 14–18)
IMM GRANULOCYTES # BLD AUTO: 0.11 K/UL (ref 0–0.04)
IMM GRANULOCYTES NFR BLD AUTO: 1.4 % (ref 0–0.5)
LYMPHOCYTES # BLD AUTO: 0.5 K/UL (ref 1–4.8)
LYMPHOCYTES NFR BLD: 6.2 % (ref 18–48)
MCH RBC QN AUTO: 30.7 PG (ref 27–31)
MCHC RBC AUTO-ENTMCNC: 32 G/DL (ref 32–36)
MCV RBC AUTO: 96 FL (ref 82–98)
MONOCYTES # BLD AUTO: 0.6 K/UL (ref 0.3–1)
MONOCYTES NFR BLD: 7.6 % (ref 4–15)
NEUTROPHILS # BLD AUTO: 6.4 K/UL (ref 1.8–7.7)
NEUTROPHILS NFR BLD: 81.9 % (ref 38–73)
NRBC BLD-RTO: 0 /100 WBC
PLATELET # BLD AUTO: 122 K/UL (ref 150–450)
PMV BLD AUTO: 12.3 FL (ref 9.2–12.9)
POTASSIUM SERPL-SCNC: 4.4 MMOL/L (ref 3.5–5.1)
PROT SERPL-MCNC: 5.2 G/DL (ref 6–8.4)
RBC # BLD AUTO: 3.03 M/UL (ref 4.6–6.2)
SODIUM SERPL-SCNC: 140 MMOL/L (ref 136–145)
TACROLIMUS BLD-MCNC: 5.5 NG/ML (ref 5–15)
WBC # BLD AUTO: 7.76 K/UL (ref 3.9–12.7)

## 2022-01-29 PROCEDURE — 80197 ASSAY OF TACROLIMUS: CPT | Performed by: SURGERY

## 2022-01-29 PROCEDURE — G0180 MD CERTIFICATION HHA PATIENT: HCPCS | Mod: ,,, | Performed by: TRANSPLANT SURGERY

## 2022-01-29 PROCEDURE — 36415 COLL VENOUS BLD VENIPUNCTURE: CPT | Performed by: SURGERY

## 2022-01-29 PROCEDURE — 82248 BILIRUBIN DIRECT: CPT | Performed by: SURGERY

## 2022-01-29 PROCEDURE — G0180 PR HOME HEALTH MD CERTIFICATION: ICD-10-PCS | Mod: ,,, | Performed by: TRANSPLANT SURGERY

## 2022-01-29 PROCEDURE — 80053 COMPREHEN METABOLIC PANEL: CPT | Performed by: SURGERY

## 2022-01-29 PROCEDURE — 85025 COMPLETE CBC W/AUTO DIFF WBC: CPT | Performed by: SURGERY

## 2022-01-29 NOTE — TELEPHONE ENCOUNTER
ON CALL NOTE  Lab Review    Labs drawn today reviewed w/ Dr. Leyva.  Plan/ Recommendations:  Labs stable.  No changes in medication regimen.  Patient to repeat labs on Monday.  Called pt/ pt's wife to inform them of above.  Understanding voiced.  Reminded them of appts scheduled next week.  No questions noted at this time.  Lab appt previously scheduled on tomorrow, cancelled.

## 2022-01-31 ENCOUNTER — CLINICAL SUPPORT (OUTPATIENT)
Dept: TRANSPLANT | Facility: CLINIC | Age: 62
End: 2022-01-31
Payer: COMMERCIAL

## 2022-01-31 ENCOUNTER — LAB VISIT (OUTPATIENT)
Dept: LAB | Facility: HOSPITAL | Age: 62
End: 2022-01-31
Attending: SURGERY
Payer: COMMERCIAL

## 2022-01-31 ENCOUNTER — PATIENT OUTREACH (OUTPATIENT)
Dept: ADMINISTRATIVE | Facility: CLINIC | Age: 62
End: 2022-01-31
Payer: COMMERCIAL

## 2022-01-31 ENCOUNTER — TELEPHONE (OUTPATIENT)
Dept: TRANSPLANT | Facility: CLINIC | Age: 62
End: 2022-01-31

## 2022-01-31 VITALS
OXYGEN SATURATION: 96 % | DIASTOLIC BLOOD PRESSURE: 77 MMHG | HEIGHT: 65 IN | BODY MASS INDEX: 26.81 KG/M2 | OXYGEN SATURATION: 96 % | SYSTOLIC BLOOD PRESSURE: 153 MMHG | HEART RATE: 80 BPM | RESPIRATION RATE: 20 BRPM | SYSTOLIC BLOOD PRESSURE: 153 MMHG | BODY MASS INDEX: 26.81 KG/M2 | WEIGHT: 160.94 LBS | HEART RATE: 80 BPM | HEIGHT: 65 IN | RESPIRATION RATE: 20 BRPM | TEMPERATURE: 97 F | WEIGHT: 160.94 LBS | DIASTOLIC BLOOD PRESSURE: 77 MMHG | TEMPERATURE: 97 F

## 2022-01-31 DIAGNOSIS — Z94.4 LIVER REPLACED BY TRANSPLANT: ICD-10-CM

## 2022-01-31 LAB
ALBUMIN SERPL BCP-MCNC: 3.1 G/DL (ref 3.5–5.2)
ALP SERPL-CCNC: 167 U/L (ref 55–135)
ALT SERPL W/O P-5'-P-CCNC: 288 U/L (ref 10–44)
ANION GAP SERPL CALC-SCNC: 6 MMOL/L (ref 8–16)
AST SERPL-CCNC: 27 U/L (ref 10–40)
BASOPHILS # BLD AUTO: 0.03 K/UL (ref 0–0.2)
BASOPHILS NFR BLD: 0.3 % (ref 0–1.9)
BILIRUB DIRECT SERPL-MCNC: 0.9 MG/DL (ref 0.1–0.3)
BILIRUB SERPL-MCNC: 1.7 MG/DL (ref 0.1–1)
BUN SERPL-MCNC: 24 MG/DL (ref 8–23)
CALCIUM SERPL-MCNC: 8.6 MG/DL (ref 8.7–10.5)
CHLORIDE SERPL-SCNC: 106 MMOL/L (ref 95–110)
CO2 SERPL-SCNC: 25 MMOL/L (ref 23–29)
CREAT SERPL-MCNC: 0.8 MG/DL (ref 0.5–1.4)
DIFFERENTIAL METHOD: ABNORMAL
EOSINOPHIL # BLD AUTO: 0.4 K/UL (ref 0–0.5)
EOSINOPHIL NFR BLD: 3.6 % (ref 0–8)
ERYTHROCYTE [DISTWIDTH] IN BLOOD BY AUTOMATED COUNT: 14.3 % (ref 11.5–14.5)
EST. GFR  (AFRICAN AMERICAN): >60 ML/MIN/1.73 M^2
EST. GFR  (NON AFRICAN AMERICAN): >60 ML/MIN/1.73 M^2
GLUCOSE SERPL-MCNC: 186 MG/DL (ref 70–110)
HCT VFR BLD AUTO: 29.4 % (ref 40–54)
HGB BLD-MCNC: 9.7 G/DL (ref 14–18)
IMM GRANULOCYTES # BLD AUTO: 0.28 K/UL (ref 0–0.04)
IMM GRANULOCYTES NFR BLD AUTO: 2.6 % (ref 0–0.5)
LYMPHOCYTES # BLD AUTO: 0.8 K/UL (ref 1–4.8)
LYMPHOCYTES NFR BLD: 7.1 % (ref 18–48)
MCH RBC QN AUTO: 30.5 PG (ref 27–31)
MCHC RBC AUTO-ENTMCNC: 33 G/DL (ref 32–36)
MCV RBC AUTO: 93 FL (ref 82–98)
MONOCYTES # BLD AUTO: 0.8 K/UL (ref 0.3–1)
MONOCYTES NFR BLD: 7.2 % (ref 4–15)
NEUTROPHILS # BLD AUTO: 8.4 K/UL (ref 1.8–7.7)
NEUTROPHILS NFR BLD: 79.2 % (ref 38–73)
NRBC BLD-RTO: 0 /100 WBC
PLATELET # BLD AUTO: 171 K/UL (ref 150–450)
PMV BLD AUTO: 11.7 FL (ref 9.2–12.9)
POTASSIUM SERPL-SCNC: 5.2 MMOL/L (ref 3.5–5.1)
PROT SERPL-MCNC: 5.5 G/DL (ref 6–8.4)
RBC # BLD AUTO: 3.18 M/UL (ref 4.6–6.2)
SODIUM SERPL-SCNC: 137 MMOL/L (ref 136–145)
TACROLIMUS BLD-MCNC: 7.8 NG/ML (ref 5–15)
WBC # BLD AUTO: 10.58 K/UL (ref 3.9–12.7)

## 2022-01-31 PROCEDURE — 99999 PR PBB SHADOW E&M-EST. PATIENT-LVL IV: CPT | Mod: PBBFAC,,,

## 2022-01-31 PROCEDURE — 80197 ASSAY OF TACROLIMUS: CPT | Performed by: SURGERY

## 2022-01-31 PROCEDURE — 82248 BILIRUBIN DIRECT: CPT | Performed by: SURGERY

## 2022-01-31 PROCEDURE — 99999 PR PBB SHADOW E&M-EST. PATIENT-LVL IV: ICD-10-PCS | Mod: PBBFAC,,,

## 2022-01-31 PROCEDURE — 85025 COMPLETE CBC W/AUTO DIFF WBC: CPT | Performed by: SURGERY

## 2022-01-31 PROCEDURE — 80053 COMPREHEN METABOLIC PANEL: CPT | Performed by: SURGERY

## 2022-01-31 PROCEDURE — 99999 PR PBB SHADOW E&M-EST. PATIENT-LVL III: ICD-10-PCS | Mod: PBBFAC,,,

## 2022-01-31 PROCEDURE — 99999 PR PBB SHADOW E&M-EST. PATIENT-LVL III: CPT | Mod: PBBFAC,,,

## 2022-01-31 PROCEDURE — 36415 COLL VENOUS BLD VENIPUNCTURE: CPT | Performed by: SURGERY

## 2022-01-31 NOTE — TELEPHONE ENCOUNTER
----- Message from Darrin Leyva MD sent at 1/31/2022  1:30 PM CST -----  Results ok. No action needed

## 2022-01-31 NOTE — PROGRESS NOTES
1ST NURSING VISIT POST DISCHARGE NOTE    1st RN appointment with Eder Kong post discharge 1/28/22 s/p liver transplant 1/23/22.  Patient's spouse accompanied him.  Upon assessment, patient complains of none.  Incision intact with staples.  Patient that he is able to explain daily incision care and showering instructions.  Medication list and rationale were reviewed.  Patient states  did bring blue medication card and medication bottles for review.  Self-assessment reviewed with patient and spouse; time allowed for questions.  Patient expressed understanding of daily care including BID VS and medications.  Patient aware that nurse will review today's labs with a transplant physician and call with any does changes indicated.  Next labs and first post-operative transplant team appointment with labs scheduled for 2/1/22.

## 2022-01-31 NOTE — TELEPHONE ENCOUNTER
Patient notified and instructed: labs reviewed by ; no medicine changes made. Results ok.  RTC tomorrow afternoon as planned and will decide if any more labs needed this week. Patient able to voice understanding.

## 2022-01-31 NOTE — PROGRESS NOTES
"Clinic Note: First Return to Clinic Post- Liver Transplant    Eder Kong  is a 61 y.o. male  S/p   LIVER transplant on 1/23/2022 (Liver) for Primary Liver Malignancy: Hepatoma (HCC) and Cirrhosis.      Discharge Course (Issues/Concerns): none    Objective:   Vitals:    01/31/22 0910   BP: (!) 153/77   Pulse: 80   Resp: 20   Temp: 97.3 °F (36.3 °C)       Met with patient and his caregiver in the clinic to review current medication list.     Current Outpatient Medications   Medication Sig Dispense Refill    aspirin (ECOTRIN) 81 MG EC tablet Take 1 tablet (81 mg total) by mouth once daily. 30 tablet 5    BD ULTRA-FINE MINI PEN NEEDLE 31 gauge x 3/16" Ndle USE AS DIRECTED EVERY DAY      calcium carbonate-vitamin D3 600 mg-20 mcg (800 unit) Tab Take 1 tablet by mouth once daily. 30 tablet 5    carvediloL (COREG) 12.5 MG tablet Take 1 tablet (12.5 mg total) by mouth 2 (two) times daily. 60 tablet 11    CONTOUR NEXT TEST STRIPS Strp USE TO TEST BLOOD GLUCOSE TWICE DAILY      EScitalopram oxalate (LEXAPRO) 5 MG Tab Take 1 tablet (5 mg total) by mouth once daily. 30 tablet 5    famotidine (PEPCID) 20 MG tablet Take 1 tablet (20 mg total) by mouth once daily. Stop 2/22/22 30 tablet 0    furosemide (LASIX) 20 MG tablet Take 1 tablet (20 mg total) by mouth once daily. for 3 days 3 tablet 0    insulin aspart U-100 (NOVOLOG) 100 unit/mL (3 mL) InPn pen Inject 6 Units into the skin 3 (three) times daily. Plus sliding scale: TDD: 33 units 15 mL 11    mycophenolate (CELLCEPT) 250 mg Cap Take 4 capsules (1,000 mg total) by mouth 2 (two) times daily. 240 capsule 2    nystatin (MYCOSTATIN) 100,000 unit/mL suspension Take 5 mLs (500,000 Units total) by mouth 3 (three) times daily after meals. Stop 2/12/22 for 19 days 285 mL 0    oxyCODONE (OXY-IR) 5 mg Cap Take 1 capsule (5 mg total) by mouth every 4 (four) hours as needed for Pain. 40 capsule 0    OZEMPIC 1 mg/dose (4 mg/3 mL) Inject 1 mg into the skin once a week.      " predniSONE (DELTASONE) 5 MG tablet Take by mouth daily: 20mg 1/29-2/4, 15mg 2/5-2/11, 10mg 2/12-2/18, 5mg 2/19-2/25, Stop 2/26/22 75 tablet 1    sulfamethoxazole-trimethoprim 400-80mg (BACTRIM,SEPTRA) 400-80 mg per tablet Take 1 tablet by mouth every morning. Stop 7/22/22 30 tablet 5    tacrolimus (PROGRAF) 1 MG Cap Take 5 capsules (5 mg total) by mouth every 12 (twelve) hours. 300 capsule 11    TOUJEO MAX U-300 SOLOSTAR 300 unit/mL (3 mL) insulin pen Inject 18 Units into the skin once daily. 3 pen 11    valGANciclovir (VALCYTE) 450 mg Tab Take 1 tablet (450 mg total) by mouth once daily. Stop 4/23/22 30 tablet 2     No current facility-administered medications for this visit.       Pharmacy Interventions/Recommendations:     1) Graft Function & Immunosuppression Issues:  LFTS continue to trend down; K 5.2-- tacrolimus level is pending    2) Opportunistic Infection prophylaxis:   PCP ppx: Bactrim until 7/22/22  CMV ppx: Valcyte unbtil 4/23/22  Fungal ppx: Nystatin    3) Donor Serologies & Monitoring:     Donor CMV Status: Negative  Donor HCV Status: Negative  Donor HBcAb: Negative  Donor HBV SHIVANI: Negative  Donor HCV SHIVANI: Negative     Recipient treated for HCV with Mavyret in 2021    4) Pain Management & Bowel Regimen: Oxycodone    5) Blood Pressure Management: Coreg 12.5 BID    6) Blood Sugar Management & Follow-up: Toujeo 18 units daily, Novolog 6 units with meals, Ozempic q week    7) Electrolyte Management: last day of furosemide today, K = 5.2    8) Osteoporosis Prevention Strategy (Liver Transplant): n.a    8) OTHER medication follow-up (patient assistance, held medications, etc): high copays, completed PAP applications    9) Reinforced medication education conducted in the hospital, including medication indications, dosing, administration, side effects, monitoring-- including timing of immunosuppressant levels.     Patient received their FIRST fill of medications from [facility].  Discussed the process for  obtaining refills of medications, including verifying the dose and mailing address to have medications delivered.     Eder and his caregivers verbalized understanding and had the opportunity to ask questions.

## 2022-02-01 ENCOUNTER — OFFICE VISIT (OUTPATIENT)
Dept: TRANSPLANT | Facility: CLINIC | Age: 62
End: 2022-02-01
Payer: COMMERCIAL

## 2022-02-01 ENCOUNTER — TELEPHONE (OUTPATIENT)
Dept: TRANSPLANT | Facility: CLINIC | Age: 62
End: 2022-02-01

## 2022-02-01 VITALS
RESPIRATION RATE: 16 BRPM | HEART RATE: 80 BPM | DIASTOLIC BLOOD PRESSURE: 65 MMHG | HEIGHT: 65 IN | SYSTOLIC BLOOD PRESSURE: 138 MMHG | WEIGHT: 160.5 LBS | BODY MASS INDEX: 26.74 KG/M2 | TEMPERATURE: 97 F | OXYGEN SATURATION: 95 %

## 2022-02-01 DIAGNOSIS — Z94.4 S/P LIVER TRANSPLANT: ICD-10-CM

## 2022-02-01 DIAGNOSIS — Z51.81 ENCOUNTER FOR THERAPEUTIC DRUG MONITORING: ICD-10-CM

## 2022-02-01 DIAGNOSIS — Z94.4 LIVER REPLACED BY TRANSPLANT: ICD-10-CM

## 2022-02-01 DIAGNOSIS — Z79.899 ENCOUNTER FOR LONG-TERM (CURRENT) USE OF OTHER MEDICATIONS: ICD-10-CM

## 2022-02-01 DIAGNOSIS — Z79.60 LONG-TERM USE OF IMMUNOSUPPRESSANT MEDICATION: ICD-10-CM

## 2022-02-01 DIAGNOSIS — Z94.4 LIVER REPLACED BY TRANSPLANT: Primary | ICD-10-CM

## 2022-02-01 DIAGNOSIS — Z29.89 PROPHYLACTIC IMMUNOTHERAPY: ICD-10-CM

## 2022-02-01 DIAGNOSIS — Z91.89 AT RISK FOR OPPORTUNISTIC INFECTIONS: Primary | ICD-10-CM

## 2022-02-01 PROCEDURE — 99215 OFFICE O/P EST HI 40 MIN: CPT | Mod: 24,S$GLB,, | Performed by: TRANSPLANT SURGERY

## 2022-02-01 PROCEDURE — 3078F PR MOST RECENT DIASTOLIC BLOOD PRESSURE < 80 MM HG: ICD-10-PCS | Mod: CPTII,S$GLB,, | Performed by: TRANSPLANT SURGERY

## 2022-02-01 PROCEDURE — 1160F RVW MEDS BY RX/DR IN RCRD: CPT | Mod: CPTII,S$GLB,, | Performed by: TRANSPLANT SURGERY

## 2022-02-01 PROCEDURE — 3008F BODY MASS INDEX DOCD: CPT | Mod: CPTII,S$GLB,, | Performed by: TRANSPLANT SURGERY

## 2022-02-01 PROCEDURE — 99999 PR PBB SHADOW E&M-EST. PATIENT-LVL IV: ICD-10-PCS | Mod: PBBFAC,,,

## 2022-02-01 PROCEDURE — 3075F PR MOST RECENT SYSTOLIC BLOOD PRESS GE 130-139MM HG: ICD-10-PCS | Mod: CPTII,S$GLB,, | Performed by: TRANSPLANT SURGERY

## 2022-02-01 PROCEDURE — 3075F SYST BP GE 130 - 139MM HG: CPT | Mod: CPTII,S$GLB,, | Performed by: TRANSPLANT SURGERY

## 2022-02-01 PROCEDURE — 1160F PR REVIEW ALL MEDS BY PRESCRIBER/CLIN PHARMACIST DOCUMENTED: ICD-10-PCS | Mod: CPTII,S$GLB,, | Performed by: TRANSPLANT SURGERY

## 2022-02-01 PROCEDURE — 3051F HG A1C>EQUAL 7.0%<8.0%: CPT | Mod: CPTII,S$GLB,, | Performed by: TRANSPLANT SURGERY

## 2022-02-01 PROCEDURE — 99999 PR PBB SHADOW E&M-EST. PATIENT-LVL IV: CPT | Mod: PBBFAC,,,

## 2022-02-01 PROCEDURE — 1111F PR DISCHARGE MEDS RECONCILED W/ CURRENT OUTPATIENT MED LIST: ICD-10-PCS | Mod: CPTII,S$GLB,, | Performed by: TRANSPLANT SURGERY

## 2022-02-01 PROCEDURE — 1159F PR MEDICATION LIST DOCUMENTED IN MEDICAL RECORD: ICD-10-PCS | Mod: CPTII,S$GLB,, | Performed by: TRANSPLANT SURGERY

## 2022-02-01 PROCEDURE — 3078F DIAST BP <80 MM HG: CPT | Mod: CPTII,S$GLB,, | Performed by: TRANSPLANT SURGERY

## 2022-02-01 PROCEDURE — 3051F PR MOST RECENT HEMOGLOBIN A1C LEVEL 7.0 - < 8.0%: ICD-10-PCS | Mod: CPTII,S$GLB,, | Performed by: TRANSPLANT SURGERY

## 2022-02-01 PROCEDURE — 1111F DSCHRG MED/CURRENT MED MERGE: CPT | Mod: CPTII,S$GLB,, | Performed by: TRANSPLANT SURGERY

## 2022-02-01 PROCEDURE — 99215 PR OFFICE/OUTPT VISIT, EST, LEVL V, 40-54 MIN: ICD-10-PCS | Mod: 24,S$GLB,, | Performed by: TRANSPLANT SURGERY

## 2022-02-01 PROCEDURE — 1159F MED LIST DOCD IN RCRD: CPT | Mod: CPTII,S$GLB,, | Performed by: TRANSPLANT SURGERY

## 2022-02-01 PROCEDURE — 3008F PR BODY MASS INDEX (BMI) DOCUMENTED: ICD-10-PCS | Mod: CPTII,S$GLB,, | Performed by: TRANSPLANT SURGERY

## 2022-02-01 NOTE — PROGRESS NOTES
Transplant Surgery  Liver Transplant Recipient Follow-up    Original Referring Physician: Nitin Farmer  Current Corresponding Physician: Nitin Farmer    Chief Complaint: Eder is here for follow up of his liver transplant performed 1/23/2022 for the primary diagnosis (UNOS) of Primary Liver Malignancy: Hepatoma (HCC) and Cirrhosis    ORGAN: LIVER  Whole or Partial: whole liver  Donor Type: donation after circulatory death   PHS Increased Risk: yes  Donor CMV Status: Negative  Donor HCV Status: Negative  Donor HBcAb: Negative  Donor HBV SHIVANI: Negative  Donor HCV SHIVANI: Negative    Biliary Anastomosis: end to end  Arterial Anatomy: standard  IVC reconstruction: end to end ivc  Portal vein status: patent    Subjective:     History of Present Illness: He has had the following complications since transplant: none.  The noted complications are well controlled.    Interval History: Currently, he is doing well.  Current complaints include none.  Eder is here for management of his immunosuppression medication.    External provider notes reviewed: Yes    Review of Systems   Constitutional: Negative for activity change, appetite change, chills, diaphoresis, fatigue, fever and unexpected weight change.   HENT: Positive for hearing loss (unable to hear the beeping of the thermometer). Negative for congestion, dental problem, ear pain, facial swelling, mouth sores, nosebleeds, sore throat, tinnitus, trouble swallowing and voice change.    Eyes: Negative for photophobia, pain and visual disturbance.   Respiratory: Negative for apnea, cough, choking, chest tightness and shortness of breath.    Cardiovascular: Negative for chest pain, palpitations and leg swelling.   Gastrointestinal: Negative for abdominal distention, abdominal pain, blood in stool, constipation, diarrhea, nausea and vomiting.   Endocrine: Negative for cold intolerance and heat intolerance.   Genitourinary: Negative for difficulty urinating, dysuria, flank pain,  hematuria and urgency.   Musculoskeletal: Negative for arthralgias and gait problem.   Skin: Negative for color change, pallor and rash.   Neurological: Negative for dizziness, tremors, seizures, syncope and light-headedness.   Hematological: Negative for adenopathy. Does not bruise/bleed easily.   Psychiatric/Behavioral: Negative for agitation and confusion.     Objective:     Physical Exam  Constitutional:       General: He is not in acute distress.     Appearance: He is well-developed and well-nourished.   HENT:      Head: Normocephalic and atraumatic.      Mouth/Throat:      Mouth: Oropharynx is clear and moist.      Pharynx: No oropharyngeal exudate.   Eyes:      General: No scleral icterus.     Conjunctiva/sclera: Conjunctivae normal.      Pupils: Pupils are equal, round, and reactive to light.   Cardiovascular:      Rate and Rhythm: Normal rate and regular rhythm.      Heart sounds: Normal heart sounds.   Pulmonary:      Effort: Pulmonary effort is normal. No respiratory distress.      Breath sounds: Normal breath sounds.   Abdominal:      General: Bowel sounds are normal. There is no distension.      Palpations: Abdomen is soft.      Tenderness: There is no abdominal tenderness. There is no guarding or rebound.   Musculoskeletal:         General: No edema. Normal range of motion.      Cervical back: Normal range of motion and neck supple.   Lymphadenopathy:      Cervical: No cervical adenopathy.   Skin:     General: Skin is warm and dry.      Findings: No erythema or rash.   Neurological:      Mental Status: He is alert and oriented to person, place, and time.   Psychiatric:         Mood and Affect: Mood and affect normal.         Behavior: Behavior normal.       Lab Results   Component Value Date    BILITOT 1.7 (H) 01/31/2022    AST 27 01/31/2022     (H) 01/31/2022    ALKPHOS 167 (H) 01/31/2022    CREATININE 0.8 01/31/2022    ALBUMIN 3.1 (L) 01/31/2022     Lab Results   Component Value Date    WBC  10.58 01/31/2022    HGB 9.7 (L) 01/31/2022    HCT 29.4 (L) 01/31/2022    HCT 24 (L) 01/23/2022     01/31/2022     Lab Results   Component Value Date    TACROLIMUS 7.8 01/31/2022       Diagnostics:  The following labs have been reviewed: CBC  CMP  TACROLIMUS LEVEL  The following radiology images have been independently reviewed and interpreted: Liver US    Assessment/Plan:          · S/P liver transplant.  · Chronic immunosuppressive medications for rejection prophylaxis at target.  Plan: no adjustment needed.  · Continue monitoring symptoms, labs and drug levels for drug-related toxicity and side effects.  · Incision: staples in place; wound clean, dry, and intact  · Femoral arterial line site: no complications evident   · Discontinue lasix  · If still unable to hear certain pitches, will send to audiology    Additional testing to be completed according to Written Order Guidelines for Post-Liver and Combined Liver/Kidney Transplant Follow-up (LI-09)    Interpretation of tests and discussion of patient management involves all members of the multidisciplinary transplant team  Patient advised that it is recommended that all transplanted patients, and their close contacts and household members receive Covid vaccination.  Aster Gay MD       [facility] Patient Status  Functional Status: 60% - Requires occasional assistance but is able to care for needs  Physical Capacity: No Limitations

## 2022-02-02 ENCOUNTER — TELEPHONE (OUTPATIENT)
Dept: TRANSPLANT | Facility: CLINIC | Age: 62
End: 2022-02-02
Payer: COMMERCIAL

## 2022-02-02 LAB
COMMENT: NORMAL
FINAL PATHOLOGIC DIAGNOSIS: NORMAL
GROSS: NORMAL
Lab: NORMAL

## 2022-02-02 NOTE — TELEPHONE ENCOUNTER
----- Message from Orville Poe sent at 2/2/2022  9:09 AM CST -----  Regarding: call back  Pt's wife call to speak with Keily in regards to the pt missing some of his 8 pm meds    Call

## 2022-02-02 NOTE — TELEPHONE ENCOUNTER
"Returned call to patient/wife; she reported that patient forgot to take his 8pm meds last night (except for the insulin and diabetic meds and nystatin); she reported he "came up with a plan not to let this happen again, has set his phone at 8am and 8pm as reminder", she said. She said he is back on track this morning.   "

## 2022-02-03 ENCOUNTER — TELEPHONE (OUTPATIENT)
Dept: TRANSPLANT | Facility: CLINIC | Age: 62
End: 2022-02-03
Payer: COMMERCIAL

## 2022-02-03 ENCOUNTER — CONFERENCE (OUTPATIENT)
Dept: TRANSPLANT | Facility: CLINIC | Age: 62
End: 2022-02-03
Payer: COMMERCIAL

## 2022-02-03 ENCOUNTER — HOSPITAL ENCOUNTER (OUTPATIENT)
Dept: RADIOLOGY | Facility: HOSPITAL | Age: 62
Discharge: HOME | End: 2022-02-03
Attending: SURGERY
Payer: COMMERCIAL

## 2022-02-03 DIAGNOSIS — D72.829 LEUKOCYTOSIS, UNSPECIFIED TYPE: Primary | ICD-10-CM

## 2022-02-03 DIAGNOSIS — Z94.4 LIVER REPLACED BY TRANSPLANT: ICD-10-CM

## 2022-02-03 DIAGNOSIS — Z94.4 LIVER REPLACED BY TRANSPLANT: Primary | ICD-10-CM

## 2022-02-03 PROCEDURE — 93976 VASCULAR STUDY: CPT | Mod: 26,,, | Performed by: STUDENT IN AN ORGANIZED HEALTH CARE EDUCATION/TRAINING PROGRAM

## 2022-02-03 PROCEDURE — 93976 US DOPPLER LIVER TRANSPLANT POST (XPD): ICD-10-PCS | Mod: 26,,, | Performed by: STUDENT IN AN ORGANIZED HEALTH CARE EDUCATION/TRAINING PROGRAM

## 2022-02-03 PROCEDURE — 76705 ECHO EXAM OF ABDOMEN: CPT | Mod: TC

## 2022-02-03 PROCEDURE — 76705 ECHO EXAM OF ABDOMEN: CPT | Mod: 26,59,, | Performed by: STUDENT IN AN ORGANIZED HEALTH CARE EDUCATION/TRAINING PROGRAM

## 2022-02-03 PROCEDURE — 93976 VASCULAR STUDY: CPT | Mod: TC

## 2022-02-03 PROCEDURE — 76705 US DOPPLER LIVER TRANSPLANT POST (XPD): ICD-10-PCS | Mod: 26,59,, | Performed by: STUDENT IN AN ORGANIZED HEALTH CARE EDUCATION/TRAINING PROGRAM

## 2022-02-03 NOTE — TELEPHONE ENCOUNTER
Liver pathology conference:     Explant: HCC 7 tumors, no vascular invasion.   HCC Surveillance stg 4 due to number of tumors.

## 2022-02-03 NOTE — TELEPHONE ENCOUNTER
Spoke to patient/wife (Charo) instructed that patient will need to go to lab tomorrow morning and to turn in urine sample due to elevated white blood cell count. She voiced understanding, patient reports to her that he is feeling well , she stated.

## 2022-02-03 NOTE — TELEPHONE ENCOUNTER
----- Message from Darrin Leyva MD sent at 2/3/2022  1:18 PM CST -----  Results ok. No action needed

## 2022-02-04 ENCOUNTER — TELEPHONE (OUTPATIENT)
Dept: TRANSPLANT | Facility: CLINIC | Age: 62
End: 2022-02-04
Payer: COMMERCIAL

## 2022-02-04 ENCOUNTER — LAB VISIT (OUTPATIENT)
Dept: LAB | Facility: HOSPITAL | Age: 62
End: 2022-02-04
Attending: SURGERY
Payer: COMMERCIAL

## 2022-02-04 DIAGNOSIS — Z94.4 LIVER REPLACED BY TRANSPLANT: ICD-10-CM

## 2022-02-04 LAB
BASOPHILS # BLD AUTO: 0.05 K/UL (ref 0–0.2)
BASOPHILS NFR BLD: 0.4 % (ref 0–1.9)
DIFFERENTIAL METHOD: ABNORMAL
EOSINOPHIL # BLD AUTO: 0.1 K/UL (ref 0–0.5)
EOSINOPHIL NFR BLD: 0.7 % (ref 0–8)
ERYTHROCYTE [DISTWIDTH] IN BLOOD BY AUTOMATED COUNT: 15.1 % (ref 11.5–14.5)
HCT VFR BLD AUTO: 28.3 % (ref 40–54)
HGB BLD-MCNC: 9.2 G/DL (ref 14–18)
IMM GRANULOCYTES # BLD AUTO: 0.18 K/UL (ref 0–0.04)
IMM GRANULOCYTES NFR BLD AUTO: 1.3 % (ref 0–0.5)
LYMPHOCYTES # BLD AUTO: 1.1 K/UL (ref 1–4.8)
LYMPHOCYTES NFR BLD: 8.2 % (ref 18–48)
MCH RBC QN AUTO: 30.3 PG (ref 27–31)
MCHC RBC AUTO-ENTMCNC: 32.5 G/DL (ref 32–36)
MCV RBC AUTO: 93 FL (ref 82–98)
MONOCYTES # BLD AUTO: 0.7 K/UL (ref 0.3–1)
MONOCYTES NFR BLD: 5.2 % (ref 4–15)
NEUTROPHILS # BLD AUTO: 11.5 K/UL (ref 1.8–7.7)
NEUTROPHILS NFR BLD: 84.2 % (ref 38–73)
NRBC BLD-RTO: 0 /100 WBC
PLATELET # BLD AUTO: 239 K/UL (ref 150–450)
PMV BLD AUTO: 10.7 FL (ref 9.2–12.9)
RBC # BLD AUTO: 3.04 M/UL (ref 4.6–6.2)
WBC # BLD AUTO: 13.59 K/UL (ref 3.9–12.7)

## 2022-02-04 PROCEDURE — 36415 COLL VENOUS BLD VENIPUNCTURE: CPT | Performed by: SURGERY

## 2022-02-04 PROCEDURE — 85025 COMPLETE CBC W/AUTO DIFF WBC: CPT | Performed by: SURGERY

## 2022-02-04 NOTE — TELEPHONE ENCOUNTER
Patient notified and instructed: Labs reviewed by  (CBC and Urine test U/A). Results ok, no changes made. Repeat labs on Monday 2/7/22. Patient able to voice understanding.

## 2022-02-04 NOTE — TELEPHONE ENCOUNTER
----- Message from Darrin Leyva MD sent at 2/4/2022  1:57 PM CST -----  Results ok. No action needed

## 2022-02-07 ENCOUNTER — TELEPHONE (OUTPATIENT)
Dept: TRANSPLANT | Facility: CLINIC | Age: 62
End: 2022-02-07
Payer: COMMERCIAL

## 2022-02-07 ENCOUNTER — LAB VISIT (OUTPATIENT)
Dept: LAB | Facility: HOSPITAL | Age: 62
End: 2022-02-07
Attending: SURGERY
Payer: COMMERCIAL

## 2022-02-07 DIAGNOSIS — Z94.4 LIVER REPLACED BY TRANSPLANT: ICD-10-CM

## 2022-02-07 DIAGNOSIS — D72.829 LEUKOCYTOSIS, UNSPECIFIED TYPE: ICD-10-CM

## 2022-02-07 LAB
ALBUMIN SERPL BCP-MCNC: 3.1 G/DL (ref 3.5–5.2)
ALP SERPL-CCNC: 178 U/L (ref 55–135)
ALT SERPL W/O P-5'-P-CCNC: 62 U/L (ref 10–44)
ANION GAP SERPL CALC-SCNC: 7 MMOL/L (ref 8–16)
AST SERPL-CCNC: 13 U/L (ref 10–40)
BASOPHILS # BLD AUTO: 0.07 K/UL (ref 0–0.2)
BASOPHILS NFR BLD: 0.6 % (ref 0–1.9)
BILIRUB SERPL-MCNC: 0.6 MG/DL (ref 0.1–1)
BUN SERPL-MCNC: 26 MG/DL (ref 8–23)
CALCIUM SERPL-MCNC: 9 MG/DL (ref 8.7–10.5)
CHLORIDE SERPL-SCNC: 105 MMOL/L (ref 95–110)
CO2 SERPL-SCNC: 22 MMOL/L (ref 23–29)
CREAT SERPL-MCNC: 0.8 MG/DL (ref 0.5–1.4)
DIFFERENTIAL METHOD: ABNORMAL
EOSINOPHIL # BLD AUTO: 0.1 K/UL (ref 0–0.5)
EOSINOPHIL NFR BLD: 0.9 % (ref 0–8)
ERYTHROCYTE [DISTWIDTH] IN BLOOD BY AUTOMATED COUNT: 15.6 % (ref 11.5–14.5)
EST. GFR  (AFRICAN AMERICAN): >60 ML/MIN/1.73 M^2
EST. GFR  (NON AFRICAN AMERICAN): >60 ML/MIN/1.73 M^2
GLUCOSE SERPL-MCNC: 235 MG/DL (ref 70–110)
HCT VFR BLD AUTO: 29.4 % (ref 40–54)
HGB BLD-MCNC: 9.3 G/DL (ref 14–18)
IMM GRANULOCYTES # BLD AUTO: 0.06 K/UL (ref 0–0.04)
IMM GRANULOCYTES NFR BLD AUTO: 0.6 % (ref 0–0.5)
LYMPHOCYTES # BLD AUTO: 0.8 K/UL (ref 1–4.8)
LYMPHOCYTES NFR BLD: 7.7 % (ref 18–48)
MCH RBC QN AUTO: 30.4 PG (ref 27–31)
MCHC RBC AUTO-ENTMCNC: 31.6 G/DL (ref 32–36)
MCV RBC AUTO: 96 FL (ref 82–98)
MONOCYTES # BLD AUTO: 0.4 K/UL (ref 0.3–1)
MONOCYTES NFR BLD: 4 % (ref 4–15)
NEUTROPHILS # BLD AUTO: 9.4 K/UL (ref 1.8–7.7)
NEUTROPHILS NFR BLD: 86.2 % (ref 38–73)
NRBC BLD-RTO: 0 /100 WBC
PLATELET # BLD AUTO: 257 K/UL (ref 150–450)
PMV BLD AUTO: 10.5 FL (ref 9.2–12.9)
POTASSIUM SERPL-SCNC: 5.4 MMOL/L (ref 3.5–5.1)
PROT SERPL-MCNC: 5.8 G/DL (ref 6–8.4)
RBC # BLD AUTO: 3.06 M/UL (ref 4.6–6.2)
SODIUM SERPL-SCNC: 134 MMOL/L (ref 136–145)
TACROLIMUS BLD-MCNC: 8.3 NG/ML (ref 5–15)
WBC # BLD AUTO: 10.89 K/UL (ref 3.9–12.7)

## 2022-02-07 PROCEDURE — 36415 COLL VENOUS BLD VENIPUNCTURE: CPT | Performed by: SURGERY

## 2022-02-07 PROCEDURE — 80197 ASSAY OF TACROLIMUS: CPT | Performed by: SURGERY

## 2022-02-07 PROCEDURE — 85025 COMPLETE CBC W/AUTO DIFF WBC: CPT | Performed by: SURGERY

## 2022-02-07 PROCEDURE — 80053 COMPREHEN METABOLIC PANEL: CPT | Performed by: SURGERY

## 2022-02-07 NOTE — TELEPHONE ENCOUNTER
Called patient with stable labs.  WE will see him in clinic tomorrow and repeat labs 2/14/22----- Message from Darrin Leyva MD sent at 2/7/2022  1:25 PM CST -----  Results ok. No action needed

## 2022-02-08 ENCOUNTER — OFFICE VISIT (OUTPATIENT)
Dept: TRANSPLANT | Facility: CLINIC | Age: 62
End: 2022-02-08
Payer: COMMERCIAL

## 2022-02-08 VITALS
TEMPERATURE: 98 F | BODY MASS INDEX: 25.46 KG/M2 | RESPIRATION RATE: 16 BRPM | HEART RATE: 90 BPM | DIASTOLIC BLOOD PRESSURE: 66 MMHG | SYSTOLIC BLOOD PRESSURE: 135 MMHG | WEIGHT: 153 LBS | OXYGEN SATURATION: 98 %

## 2022-02-08 DIAGNOSIS — Z51.81 ENCOUNTER FOR THERAPEUTIC DRUG MONITORING: Primary | ICD-10-CM

## 2022-02-08 DIAGNOSIS — Z94.4 LIVER REPLACED BY TRANSPLANT: Primary | ICD-10-CM

## 2022-02-08 DIAGNOSIS — Z94.4 LIVER REPLACED BY TRANSPLANT: ICD-10-CM

## 2022-02-08 PROCEDURE — 3008F BODY MASS INDEX DOCD: CPT | Mod: CPTII,S$GLB,, | Performed by: SURGERY

## 2022-02-08 PROCEDURE — 3008F PR BODY MASS INDEX (BMI) DOCUMENTED: ICD-10-PCS | Mod: CPTII,S$GLB,, | Performed by: SURGERY

## 2022-02-08 PROCEDURE — 99999 PR PBB SHADOW E&M-EST. PATIENT-LVL III: ICD-10-PCS | Mod: PBBFAC,,, | Performed by: SURGERY

## 2022-02-08 PROCEDURE — 1111F DSCHRG MED/CURRENT MED MERGE: CPT | Mod: CPTII,S$GLB,, | Performed by: SURGERY

## 2022-02-08 PROCEDURE — 99999 PR PBB SHADOW E&M-EST. PATIENT-LVL III: CPT | Mod: PBBFAC,,, | Performed by: SURGERY

## 2022-02-08 PROCEDURE — 1111F PR DISCHARGE MEDS RECONCILED W/ CURRENT OUTPATIENT MED LIST: ICD-10-PCS | Mod: CPTII,S$GLB,, | Performed by: SURGERY

## 2022-02-08 PROCEDURE — 3051F HG A1C>EQUAL 7.0%<8.0%: CPT | Mod: CPTII,S$GLB,, | Performed by: SURGERY

## 2022-02-08 PROCEDURE — 99215 PR OFFICE/OUTPT VISIT, EST, LEVL V, 40-54 MIN: ICD-10-PCS | Mod: 24,S$GLB,, | Performed by: SURGERY

## 2022-02-08 PROCEDURE — 3075F SYST BP GE 130 - 139MM HG: CPT | Mod: CPTII,S$GLB,, | Performed by: SURGERY

## 2022-02-08 PROCEDURE — 3051F PR MOST RECENT HEMOGLOBIN A1C LEVEL 7.0 - < 8.0%: ICD-10-PCS | Mod: CPTII,S$GLB,, | Performed by: SURGERY

## 2022-02-08 PROCEDURE — 3075F PR MOST RECENT SYSTOLIC BLOOD PRESS GE 130-139MM HG: ICD-10-PCS | Mod: CPTII,S$GLB,, | Performed by: SURGERY

## 2022-02-08 PROCEDURE — 3078F DIAST BP <80 MM HG: CPT | Mod: CPTII,S$GLB,, | Performed by: SURGERY

## 2022-02-08 PROCEDURE — 1160F PR REVIEW ALL MEDS BY PRESCRIBER/CLIN PHARMACIST DOCUMENTED: ICD-10-PCS | Mod: CPTII,S$GLB,, | Performed by: SURGERY

## 2022-02-08 PROCEDURE — 1159F MED LIST DOCD IN RCRD: CPT | Mod: CPTII,S$GLB,, | Performed by: SURGERY

## 2022-02-08 PROCEDURE — 3078F PR MOST RECENT DIASTOLIC BLOOD PRESSURE < 80 MM HG: ICD-10-PCS | Mod: CPTII,S$GLB,, | Performed by: SURGERY

## 2022-02-08 PROCEDURE — 1159F PR MEDICATION LIST DOCUMENTED IN MEDICAL RECORD: ICD-10-PCS | Mod: CPTII,S$GLB,, | Performed by: SURGERY

## 2022-02-08 PROCEDURE — 99215 OFFICE O/P EST HI 40 MIN: CPT | Mod: 24,S$GLB,, | Performed by: SURGERY

## 2022-02-08 PROCEDURE — 1160F RVW MEDS BY RX/DR IN RCRD: CPT | Mod: CPTII,S$GLB,, | Performed by: SURGERY

## 2022-02-08 NOTE — PROGRESS NOTES
Transplant Surgery  Liver Transplant Recipient Follow-up    Original Referring Physician: Nitin Farmer  Current Corresponding Physician: Nitin Farmer    Chief Complaint: Eder is here for follow up of his liver transplant performed 1/23/2022 for the primary diagnosis (UNOS) of Primary Liver Malignancy: Hepatoma (HCC) and Cirrhosis    ORGAN: LIVER  Whole or Partial: whole liver  Donor Type: donation after circulatory death   PHS Increased Risk: yes  Donor CMV Status: Negative  Donor HCV Status: Negative  Donor HBcAb: Negative  Donor HBV SHIVANI: Negative  Donor HCV SHIVANI: Negative    Biliary Anastomosis: end to end  Arterial Anatomy: standard  IVC reconstruction: end to end ivc  Portal vein status: patent    Subjective:     History of Present Illness: He has had the following complications since transplant: none.  The noted complications are well controlled.    Interval History: Currently, he is doing well.  Current complaints include none.  Eder is here for management of his immunosuppression medication.    External provider notes reviewed: No    Review of Systems   Constitutional: Negative for fatigue.   HENT: Negative for drooling, postnasal drip and sore throat.    Eyes: Negative for discharge and itching.   Respiratory: Negative for choking and stridor.    Gastrointestinal: Negative for rectal pain.   Endocrine: Negative for polydipsia.   Genitourinary: Negative for enuresis and genital sores.   Musculoskeletal: Negative for back pain, neck pain and neck stiffness.   Allergic/Immunologic: Positive for immunocompromised state.   Neurological: Negative for facial asymmetry and numbness.   Hematological: Negative for adenopathy.   Psychiatric/Behavioral: Negative for behavioral problems, self-injury and suicidal ideas.     Objective:     Physical Exam  Abdominal:          Comments: Healing well       Lab Results   Component Value Date    BILITOT 0.6 02/07/2022    AST 13 02/07/2022    ALT 62 (H) 02/07/2022     ALKPHOS 178 (H) 02/07/2022    CREATININE 0.8 02/07/2022    ALBUMIN 3.1 (L) 02/07/2022     Lab Results   Component Value Date    WBC 10.89 02/07/2022    HGB 9.3 (L) 02/07/2022    HCT 29.4 (L) 02/07/2022    HCT 24 (L) 01/23/2022     02/07/2022     Lab Results   Component Value Date    TACROLIMUS 8.3 02/07/2022       Diagnostics:  The following labs have been reviewed: CBC  CMP  TACROLIMUS LEVEL      Assessment/Plan:          · S/P liver transplant.  · Chronic immunosuppressive medications for rejection prophylaxis at target.  Plan: no adjustment needed.  · Continue monitoring symptoms, labs and drug levels for drug-related toxicity and side effects.  · Incision: staples in place; wound clean, dry, and intact  · Femoral arterial line site: no complications evident   · Reviewed pathology - will have accelerated sruveillance    Additional testing to be completed according to Written Order Guidelines for Post-Liver and Combined Liver/Kidney Transplant Follow-up (LI-09)    Interpretation of tests and discussion of patient management involves all members of the multidisciplinary transplant team  Patient advised that it is recommended that all transplanted patients, and their close contacts and household members receive Covid vaccination.  Ramírez Dawson MD       [facility] Patient Status  Functional Status: 90% - Able to carry on normal activity: minor symptoms of disease  Physical Capacity: No Limitations

## 2022-02-08 NOTE — LETTER
February 8, 2022        Nitin Farmer  [address]  [address]  [place] [address]  Phone: [phone]  Fax: [phone]             [facility]  [address]  [facility] [address]  Phone: [phone]   Patient: Eder Kong   MR Number: [id number]   YOB: 1960   Date of Visit: 2/8/2022       Dear Dr. Nitin Farmer    Thank you for referring Eder Kong to me for evaluation. Attached you will find relevant portions of my assessment and plan of care.    If you have questions, please do not hesitate to call me. I look forward to following Eder Kong along with you.    Sincerely,    Ramírez Dawson MD    Enclosure    If you would like to receive this communication electronically, please contact [email or web address] or [phone] to request [employer] Link access.    [employer] Link is a tool which provides read-only access to select patient information with whom you have a relationship. Its easy to use and provides real time access to review your patients record including encounter summaries, notes, results, and demographic information.    If you feel you have received this communication in error or would no longer like to receive these types of communications, please e-mail [email or web address]

## 2022-02-09 ENCOUNTER — PATIENT MESSAGE (OUTPATIENT)
Dept: ENDOCRINOLOGY | Facility: CLINIC | Age: 62
End: 2022-02-09
Payer: COMMERCIAL

## 2022-02-09 ENCOUNTER — TELEPHONE (OUTPATIENT)
Dept: TRANSPLANT | Facility: CLINIC | Age: 62
End: 2022-02-09
Payer: COMMERCIAL

## 2022-02-09 NOTE — TELEPHONE ENCOUNTER
Patient notified and instructed: Spoke to Earnestine in  Ultrasound dept. U/S reschedued from 2/11 to tomorrow 2/10 at 2pm per patient request.

## 2022-02-10 ENCOUNTER — HOSPITAL ENCOUNTER (OUTPATIENT)
Dept: RADIOLOGY | Facility: HOSPITAL | Age: 62
Discharge: HOME | End: 2022-02-10
Attending: SURGERY
Payer: COMMERCIAL

## 2022-02-10 DIAGNOSIS — Z94.4 LIVER REPLACED BY TRANSPLANT: ICD-10-CM

## 2022-02-10 PROCEDURE — 93976 VASCULAR STUDY: CPT | Mod: TC

## 2022-02-10 PROCEDURE — 93976 US DOPPLER LIVER TRANSPLANT POST (XPD): ICD-10-PCS | Mod: 26,,, | Performed by: INTERNAL MEDICINE

## 2022-02-10 PROCEDURE — 76705 US DOPPLER LIVER TRANSPLANT POST (XPD): ICD-10-PCS | Mod: 26,XS,, | Performed by: INTERNAL MEDICINE

## 2022-02-10 PROCEDURE — 93976 VASCULAR STUDY: CPT | Mod: 26,,, | Performed by: INTERNAL MEDICINE

## 2022-02-10 PROCEDURE — 76705 ECHO EXAM OF ABDOMEN: CPT | Mod: 26,XS,, | Performed by: INTERNAL MEDICINE

## 2022-02-10 PROCEDURE — 76705 ECHO EXAM OF ABDOMEN: CPT | Mod: 59,TC

## 2022-02-11 ENCOUNTER — TELEPHONE (OUTPATIENT)
Dept: TRANSPLANT | Facility: CLINIC | Age: 62
End: 2022-02-11
Payer: COMMERCIAL

## 2022-02-11 NOTE — TELEPHONE ENCOUNTER
Patient notified and instructed : U/S reviewed by ; results ok. He was able to voice understanding.

## 2022-02-11 NOTE — TELEPHONE ENCOUNTER
----- Message from Darrin Leyva MD sent at 2/11/2022  1:51 PM CST -----  Results ok. No action needed

## 2022-02-15 ENCOUNTER — OFFICE VISIT (OUTPATIENT)
Dept: TRANSPLANT | Facility: CLINIC | Age: 62
End: 2022-02-15
Payer: COMMERCIAL

## 2022-02-15 ENCOUNTER — LAB VISIT (OUTPATIENT)
Dept: LAB | Facility: HOSPITAL | Age: 62
End: 2022-02-15
Attending: SURGERY
Payer: COMMERCIAL

## 2022-02-15 ENCOUNTER — PATIENT MESSAGE (OUTPATIENT)
Dept: TRANSPLANT | Facility: CLINIC | Age: 62
End: 2022-02-15

## 2022-02-15 VITALS
WEIGHT: 148.81 LBS | TEMPERATURE: 97 F | HEART RATE: 88 BPM | SYSTOLIC BLOOD PRESSURE: 123 MMHG | HEIGHT: 65 IN | RESPIRATION RATE: 16 BRPM | BODY MASS INDEX: 24.79 KG/M2 | DIASTOLIC BLOOD PRESSURE: 65 MMHG | OXYGEN SATURATION: 97 %

## 2022-02-15 DIAGNOSIS — Z51.81 ENCOUNTER FOR THERAPEUTIC DRUG MONITORING: ICD-10-CM

## 2022-02-15 DIAGNOSIS — Z94.4 LIVER REPLACED BY TRANSPLANT: Primary | ICD-10-CM

## 2022-02-15 DIAGNOSIS — Z79.60 LONG-TERM USE OF IMMUNOSUPPRESSANT MEDICATION: ICD-10-CM

## 2022-02-15 DIAGNOSIS — Z94.4 LIVER REPLACED BY TRANSPLANT: ICD-10-CM

## 2022-02-15 DIAGNOSIS — C22.0 HCC (HEPATOCELLULAR CARCINOMA): Primary | ICD-10-CM

## 2022-02-15 DIAGNOSIS — Z94.4 S/P LIVER TRANSPLANT: Primary | ICD-10-CM

## 2022-02-15 LAB
ALBUMIN SERPL BCP-MCNC: 3.4 G/DL (ref 3.5–5.2)
ALP SERPL-CCNC: 119 U/L (ref 55–135)
ALT SERPL W/O P-5'-P-CCNC: 24 U/L (ref 10–44)
ANION GAP SERPL CALC-SCNC: 8 MMOL/L (ref 8–16)
AST SERPL-CCNC: 12 U/L (ref 10–40)
BASOPHILS # BLD AUTO: 0.08 K/UL (ref 0–0.2)
BASOPHILS NFR BLD: 1.2 % (ref 0–1.9)
BILIRUB SERPL-MCNC: 0.5 MG/DL (ref 0.1–1)
BUN SERPL-MCNC: 27 MG/DL (ref 8–23)
CALCIUM SERPL-MCNC: 9.2 MG/DL (ref 8.7–10.5)
CHLORIDE SERPL-SCNC: 105 MMOL/L (ref 95–110)
CO2 SERPL-SCNC: 25 MMOL/L (ref 23–29)
CREAT SERPL-MCNC: 1 MG/DL (ref 0.5–1.4)
DIFFERENTIAL METHOD: ABNORMAL
EOSINOPHIL # BLD AUTO: 0.1 K/UL (ref 0–0.5)
EOSINOPHIL NFR BLD: 1.2 % (ref 0–8)
ERYTHROCYTE [DISTWIDTH] IN BLOOD BY AUTOMATED COUNT: 15.4 % (ref 11.5–14.5)
EST. GFR  (AFRICAN AMERICAN): >60 ML/MIN/1.73 M^2
EST. GFR  (NON AFRICAN AMERICAN): >60 ML/MIN/1.73 M^2
GLUCOSE SERPL-MCNC: 191 MG/DL (ref 70–110)
HCT VFR BLD AUTO: 30.5 % (ref 40–54)
HGB BLD-MCNC: 9.4 G/DL (ref 14–18)
IMM GRANULOCYTES # BLD AUTO: 0.05 K/UL (ref 0–0.04)
IMM GRANULOCYTES NFR BLD AUTO: 0.7 % (ref 0–0.5)
LYMPHOCYTES # BLD AUTO: 1.2 K/UL (ref 1–4.8)
LYMPHOCYTES NFR BLD: 17.3 % (ref 18–48)
MCH RBC QN AUTO: 30.2 PG (ref 27–31)
MCHC RBC AUTO-ENTMCNC: 30.8 G/DL (ref 32–36)
MCV RBC AUTO: 98 FL (ref 82–98)
MONOCYTES # BLD AUTO: 0.4 K/UL (ref 0.3–1)
MONOCYTES NFR BLD: 6.1 % (ref 4–15)
NEUTROPHILS # BLD AUTO: 5.1 K/UL (ref 1.8–7.7)
NEUTROPHILS NFR BLD: 73.5 % (ref 38–73)
NRBC BLD-RTO: 0 /100 WBC
PLATELET # BLD AUTO: 304 K/UL (ref 150–450)
PMV BLD AUTO: 10.2 FL (ref 9.2–12.9)
POTASSIUM SERPL-SCNC: 5 MMOL/L (ref 3.5–5.1)
PROT SERPL-MCNC: 6.2 G/DL (ref 6–8.4)
RBC # BLD AUTO: 3.11 M/UL (ref 4.6–6.2)
SODIUM SERPL-SCNC: 138 MMOL/L (ref 136–145)
TACROLIMUS BLD-MCNC: 8.4 NG/ML (ref 5–15)
WBC # BLD AUTO: 6.87 K/UL (ref 3.9–12.7)

## 2022-02-15 PROCEDURE — 85025 COMPLETE CBC W/AUTO DIFF WBC: CPT | Performed by: SURGERY

## 2022-02-15 PROCEDURE — 3078F DIAST BP <80 MM HG: CPT | Mod: CPTII,S$GLB,, | Performed by: TRANSPLANT SURGERY

## 2022-02-15 PROCEDURE — 3008F BODY MASS INDEX DOCD: CPT | Mod: CPTII,S$GLB,, | Performed by: TRANSPLANT SURGERY

## 2022-02-15 PROCEDURE — 3074F PR MOST RECENT SYSTOLIC BLOOD PRESSURE < 130 MM HG: ICD-10-PCS | Mod: CPTII,S$GLB,, | Performed by: TRANSPLANT SURGERY

## 2022-02-15 PROCEDURE — 1111F DSCHRG MED/CURRENT MED MERGE: CPT | Mod: CPTII,S$GLB,, | Performed by: TRANSPLANT SURGERY

## 2022-02-15 PROCEDURE — 80197 ASSAY OF TACROLIMUS: CPT | Performed by: SURGERY

## 2022-02-15 PROCEDURE — 3008F PR BODY MASS INDEX (BMI) DOCUMENTED: ICD-10-PCS | Mod: CPTII,S$GLB,, | Performed by: TRANSPLANT SURGERY

## 2022-02-15 PROCEDURE — 99999 PR PBB SHADOW E&M-EST. PATIENT-LVL IV: CPT | Mod: PBBFAC,,,

## 2022-02-15 PROCEDURE — 3078F PR MOST RECENT DIASTOLIC BLOOD PRESSURE < 80 MM HG: ICD-10-PCS | Mod: CPTII,S$GLB,, | Performed by: TRANSPLANT SURGERY

## 2022-02-15 PROCEDURE — 36415 COLL VENOUS BLD VENIPUNCTURE: CPT | Performed by: SURGERY

## 2022-02-15 PROCEDURE — 3051F PR MOST RECENT HEMOGLOBIN A1C LEVEL 7.0 - < 8.0%: ICD-10-PCS | Mod: CPTII,S$GLB,, | Performed by: TRANSPLANT SURGERY

## 2022-02-15 PROCEDURE — 3074F SYST BP LT 130 MM HG: CPT | Mod: CPTII,S$GLB,, | Performed by: TRANSPLANT SURGERY

## 2022-02-15 PROCEDURE — 1111F PR DISCHARGE MEDS RECONCILED W/ CURRENT OUTPATIENT MED LIST: ICD-10-PCS | Mod: CPTII,S$GLB,, | Performed by: TRANSPLANT SURGERY

## 2022-02-15 PROCEDURE — 99215 OFFICE O/P EST HI 40 MIN: CPT | Mod: 24,S$GLB,, | Performed by: TRANSPLANT SURGERY

## 2022-02-15 PROCEDURE — 3051F HG A1C>EQUAL 7.0%<8.0%: CPT | Mod: CPTII,S$GLB,, | Performed by: TRANSPLANT SURGERY

## 2022-02-15 PROCEDURE — 99999 PR PBB SHADOW E&M-EST. PATIENT-LVL IV: ICD-10-PCS | Mod: PBBFAC,,,

## 2022-02-15 PROCEDURE — 1159F PR MEDICATION LIST DOCUMENTED IN MEDICAL RECORD: ICD-10-PCS | Mod: CPTII,S$GLB,, | Performed by: TRANSPLANT SURGERY

## 2022-02-15 PROCEDURE — 99215 PR OFFICE/OUTPT VISIT, EST, LEVL V, 40-54 MIN: ICD-10-PCS | Mod: 24,S$GLB,, | Performed by: TRANSPLANT SURGERY

## 2022-02-15 PROCEDURE — 80053 COMPREHEN METABOLIC PANEL: CPT | Performed by: SURGERY

## 2022-02-15 PROCEDURE — 1159F MED LIST DOCD IN RCRD: CPT | Mod: CPTII,S$GLB,, | Performed by: TRANSPLANT SURGERY

## 2022-02-15 NOTE — PROGRESS NOTES
STAPLE REMOVAL NOTE    Staples removed from liver transplant incision, steri-strips applied with Benzoin per Dr. Khan's order.  Patient tolerated procedure well.  Skin dry and intact.  Patient instructed to shower with back to water spray and to pat dry incision and to let the steri-strips wear off on their own.  Patient instructed to report any redness, warmth, or drainage from incision to transplant coordinators.  All questions answered.

## 2022-02-15 NOTE — PROGRESS NOTES
Transplant Surgery  Liver Transplant Recipient Follow-up    Original Referring Physician: Nitin Farmer  Current Corresponding Physician: Nitin Farmer    Chief Complaint: Eder is here for follow up of his liver transplant performed 1/23/2022 for the primary diagnosis (UNOS) of Primary Liver Malignancy: Hepatoma (HCC) and Cirrhosis    ORGAN: LIVER  Whole or Partial: whole liver  Donor Type: donation after circulatory death   PHS Increased Risk: yes  Donor CMV Status: Negative  Donor HCV Status: Negative  Donor HBcAb: Negative  Donor HBV SHIVANI: Negative  Donor HCV SHIVANI: Negative    Biliary Anastomosis: end to end  Arterial Anatomy: standard  IVC reconstruction: end to end ivc  Portal vein status: patent    Subjective:     History of Present Illness: He has had the following complications since transplant: none.  The noted complications are well controlled.    Interval History: Currently, he is doing well.  Current complaints include none.  Eder is here for management of his immunosuppression medication.    External provider notes reviewed: Yes    Review of Systems  Objective:     Physical Exam  Constitutional:       Appearance: He is well-developed and well-nourished.   HENT:      Head: Normocephalic and atraumatic.   Eyes:      Extraocular Movements: EOM normal.      Pupils: Pupils are equal, round, and reactive to light.   Neck:      Vascular: No JVD.   Cardiovascular:      Rate and Rhythm: Normal rate and regular rhythm.      Heart sounds: Normal heart sounds.   Pulmonary:      Effort: Pulmonary effort is normal.      Breath sounds: Normal breath sounds. No stridor.   Abdominal:      General: There is no distension.      Palpations: Abdomen is soft.      Tenderness: There is no abdominal tenderness.      Comments: Incision c/d/i w staples in place.    Musculoskeletal:         General: No edema. Normal range of motion.   Skin:     General: Skin is warm and dry.   Neurological:      Mental Status: He is alert and  oriented to person, place, and time.   Psychiatric:         Mood and Affect: Mood and affect normal.         Behavior: Behavior normal.       Lab Results   Component Value Date    BILITOT 0.5 02/15/2022    AST 12 02/15/2022    ALT 24 02/15/2022    ALKPHOS 119 02/15/2022    CREATININE 1.0 02/15/2022    ALBUMIN 3.4 (L) 02/15/2022     Lab Results   Component Value Date    WBC 6.87 02/15/2022    HGB 9.4 (L) 02/15/2022    HCT 30.5 (L) 02/15/2022    HCT 24 (L) 01/23/2022     02/15/2022     Lab Results   Component Value Date    TACROLIMUS 8.4 02/15/2022       Diagnostics:  The following labs have been reviewed: CBC  CMP  INR  TACROLIMUS LEVEL  The following radiology images have been independently reviewed and interpreted: Liver US    Assessment/Plan:          · S/P liver transplant.  · Chronic immunosuppressive medications for rejection prophylaxis at target.  Plan: no adjustment needed.  · Continue monitoring symptoms, labs and drug levels for drug-related toxicity and side effects.  · Incision: staples in place; wound clean, dry, and intact  · Femoral arterial line site: no complications evident    Additional testing to be completed according to Written Order Guidelines for Post-Liver and Combined Liver/Kidney Transplant Follow-up (LI-09)    Interpretation of tests and discussion of patient management involves all members of the multidisciplinary transplant team  Patient advised that it is recommended that all transplanted patients, and their close contacts and household members receive Covid vaccination.  Zackary Khan Jr, MD       [facility] Patient Status  Functional Status: 80% - Normal activity with effort: some symptoms of disease  Physical Capacity: No Limitations

## 2022-02-16 ENCOUNTER — TELEPHONE (OUTPATIENT)
Dept: TRANSPLANT | Facility: CLINIC | Age: 62
End: 2022-02-16
Payer: COMMERCIAL

## 2022-02-16 ENCOUNTER — CLINICAL SUPPORT (OUTPATIENT)
Dept: ENDOCRINOLOGY | Facility: CLINIC | Age: 62
End: 2022-02-16
Payer: COMMERCIAL

## 2022-02-16 ENCOUNTER — CLINICAL SUPPORT (OUTPATIENT)
Dept: DIABETES | Facility: CLINIC | Age: 62
End: 2022-02-16
Payer: COMMERCIAL

## 2022-02-16 ENCOUNTER — PATIENT MESSAGE (OUTPATIENT)
Dept: TRANSPLANT | Facility: CLINIC | Age: 62
End: 2022-02-16
Payer: COMMERCIAL

## 2022-02-16 ENCOUNTER — PATIENT MESSAGE (OUTPATIENT)
Dept: ENDOCRINOLOGY | Facility: CLINIC | Age: 62
End: 2022-02-16

## 2022-02-16 DIAGNOSIS — E11.65 TYPE 2 DIABETES MELLITUS WITH HYPERGLYCEMIA, UNSPECIFIED WHETHER LONG TERM INSULIN USE: ICD-10-CM

## 2022-02-16 DIAGNOSIS — Z94.4 S/P LIVER TRANSPLANT: ICD-10-CM

## 2022-02-16 DIAGNOSIS — T38.0X5S ADVERSE EFFECT OF CORTICOSTEROIDS, SEQUELA: ICD-10-CM

## 2022-02-16 PROCEDURE — 3051F PR MOST RECENT HEMOGLOBIN A1C LEVEL 7.0 - < 8.0%: ICD-10-PCS | Mod: CPTII,95,, | Performed by: INTERNAL MEDICINE

## 2022-02-16 PROCEDURE — 99213 PR OFFICE/OUTPT VISIT, EST, LEVL III, 20-29 MIN: ICD-10-PCS | Mod: 95,,, | Performed by: INTERNAL MEDICINE

## 2022-02-16 PROCEDURE — 99213 OFFICE O/P EST LOW 20 MIN: CPT | Mod: 95,,, | Performed by: INTERNAL MEDICINE

## 2022-02-16 PROCEDURE — G0108 DIAB MANAGE TRN  PER INDIV: HCPCS | Mod: 95,,, | Performed by: INTERNAL MEDICINE

## 2022-02-16 PROCEDURE — 3051F HG A1C>EQUAL 7.0%<8.0%: CPT | Mod: CPTII,95,, | Performed by: INTERNAL MEDICINE

## 2022-02-16 PROCEDURE — G0108 PR DIAB MANAGE TRN  PER INDIV: ICD-10-PCS | Mod: 95,,, | Performed by: INTERNAL MEDICINE

## 2022-02-16 NOTE — ASSESSMENT & PLAN NOTE
On steroid therapy per transplant team; may elevate BG readings  Currently on 10 mg will decrease to 5 mg on 2/19.

## 2022-02-16 NOTE — PROGRESS NOTES
Subjective:      Patient ID: Eder Kong is a 61 y.o. male.    Chief Complaint:    No chief complaint on file.    History of Present Illness  This is a follow up visit after recent hospitalization  Endocrinology was consulted for management of hyperglycemia. Consult was documented as follow:    Admit Date: 1/22/2022      Reason for Consult: Management of T2DM, Hyperglycemia      Surgical Procedure and Date: Liver Transplant 1/23/22     Diabetes diagnosis year:      Home Diabetes Medications:  Toujo (unsure of dose- variable 10-30 units) and  ozempic 1 mg weekly         Lab Results   Component Value Date     HGBA1C 7.6 (H) 01/22/2022               How often checking glucose at home? 1-3   BG readings on regimen: 100-200  Hypoglycemia on the regimen?  no  Missed doses on regimen?  no     Diabetes Complications include:     Hyperglycemia     Complicating diabetes co morbidities:   CIRRHOSIS and Glucocorticoid use         HPI:   Patient is a 61 y.o. 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 Endocrinology consulted for management of T2DM.     Type 2 diabetes mellitus with hyperglycemia  BG goal 140-180     Decrease to levemir 20 units daily   BG monitoring ac/hs and moderate dose correction scale.   continue novolog 12 units with meals     Discharge planning: Recommend patient discharge on touejo 18 units daily, ozempic 1 mg weekly (first dose today), novolog 6 units with meals plus correction scale 150/50.  BG logs with dosing instructions provided at bedside.  Instructed patient to submit BG logs for review in one week or sooner if experiencing persistent high (>200) or low (<100) blood sugars.  Patient prescriptions sent, patient has all necessary diabetic supplies.  Will send patient portal message to allow ongoing communication for all blood glucose related issues.  Will setup hospital discharge follow up appointment and DM education.  Reviewed  topics related to DM including: the need for insulin, how insulin works, what makes it a high risk medication, the importance of follow up with either PCP or endocrine provider, importance of and how to check BG, how to record BG on logs, how to administer insulin, appropriate insulin administration sites, importance of rotating injection sites, hyper/hypoglycemia, how and when to treat hypoglycemia, when to hold insulin, how the correction scale works, importance of storing unused insulin in the refrigerator, and when to seek medical attention.  Patient verbalized understanding, answered all questions to patient's satisfaction.      Etiology of the hyperglycemia: known T2DM, steroid induced hyperglycemia, post transplant hyperglycemia     Discharge Date: 1/28/2022  Home Glucocorticoid Regimen: 20mg 1/29-2/4, 15mg 2/5-2/11, 10mg 2/12-2/18, 5mg 2/19-2/25, Stop 2/26/22  Discharge DM Regimen:  Touejo 20 units qd  Novolog 6 units TIDWM  Novolog LDC /50  Ozempic 1 mg weekly.   Was able to fill prescription for medications and supplies.Yes     Missed doses?  None    Current Home DM management after discharge:    # times a day testing Three times a days  Log reviewed: Yes      Hypoglycemic event at home? Once  If yes, needed assistance? None    Typical meals at home:   Breakfast:Eggs and whole wheat toast; or oatmeal's and berries.   Lunch: Chicken noodle soup; brown rice and quinoa; meat and salad.   Dinner: Beef stew; soup; gumbo  Snacks: raw nuts without salt; apples; no sugar added pears/ peaches.       With regards to reason for admit:  Doing better       Review of Systems     Constitutional: Positive for weight changes (Weight loss).  Eyes: Negative for visual disturbance.  Respiratory: Negative for cough.   Cardiovascular: Negative for chest pain.  Gastrointestinal: Negative for nausea.  Endocrine: Negative for polyuria, polydipsia.  Musculoskeletal: Negative for back pain.  Skin: Negative for  rash.  Neurological: Negative for syncope.  Psychiatric/Behavioral: Negative for depression.        Objective:   Physical Exam     Physical exam differed due virtual visits.   There is no height or weight on file to calculate BMI.    Lab Review:   Lab Results   Component Value Date    HGBA1C 7.6 (H) 01/22/2022     No results found for: CHOL, HDL, LDLCALC, TRIG, CHOLHDL  Lab Results   Component Value Date     02/15/2022    K 5.0 02/15/2022     02/15/2022    CO2 25 02/15/2022     (H) 02/15/2022    BUN 27 (H) 02/15/2022    CREATININE 1.0 02/15/2022    CALCIUM 9.2 02/15/2022    PROT 6.2 02/15/2022    ALBUMIN 3.4 (L) 02/15/2022    BILITOT 0.5 02/15/2022    ALKPHOS 119 02/15/2022    AST 12 02/15/2022    ALT 24 02/15/2022    ANIONGAP 8 02/15/2022    ESTGFRAFRICA >60.0 02/15/2022    EGFRNONAA >60.0 02/15/2022    TSH 1.308 06/17/2021       Assessment and Plan   Reviewed goals of therapy are to get the best control we can without hypoglycemia    Education appointment scheduled for today at 14:30.    Medication changes:  None      Reviewed patient's current insulin regimen. Clarified proper insulin dose and timing in relation to meals, etc. Insulin injection sites and proper rotation instructed.       -Advised frequent self blood glucose monitoring.  Patient encouraged to document glucose results and bring them to every clinic visit      -Hypoglycemia precautions discussed. Instructed on precautions before driving.      -Close adherence to lifestyle changes recommended.        Problem List Items Addressed This Visit        Endocrine    Type 2 diabetes mellitus with hyperglycemia    Current Assessment & Plan     Continue Touejo 20 units qd  Continue Novolog 6 units TIDWM  Continue Novolog LDC /50  Continue Ozempic 1 mg weekly.     Patient will decrease Novolog to 4 units TIDWM when steroid drops to 5 mg qd.   Patient to send BG logs in 3 days after dose change.            GI    S/P liver transplant     Current Assessment & Plan     Optimize BG control to improve wound healing              Other    Adverse effect of corticosteroids    Current Assessment & Plan     On steroid therapy per transplant team; may elevate BG readings  Currently on 10 mg will decrease to 5 mg on 2/19.

## 2022-02-16 NOTE — TELEPHONE ENCOUNTER
ULtrasound due this week (DCD donor) follow up exam.  Order faxed to [facility] as per patient's request.

## 2022-02-16 NOTE — ASSESSMENT & PLAN NOTE
Continue Touejo 20 units qd  Continue Novolog 6 units TIDWM  Continue Novolog LDC /50  Continue Ozempic 1 mg weekly.     Patient will decrease Novolog to 4 units TIDWM when steroid drops to 5 mg qd.   Patient to send BG logs in 3 days after dose change.

## 2022-02-16 NOTE — PROGRESS NOTES
Diabetes Care Specialist Progress Note  Author: Gloria Calixto RD, CDE  Date: 2/17/2022    Program Intake  Reason for Diabetes Program Visit:: Initial Diabetes Assessment  Type of Intervention:: Individual  Individual: Education  Diabetes Care Specialist Virtual Visit Note   The patient location is: home in [place]  The chief complaint leading to consultation is: Diabetes  Visit type: audiovisual  Total time spent with patient: 40 min   Each patient to whom he or she provides medical services by telemedicine is:  (1) informed of the relationship between the physician and patient and the respective role of any other health care provider with respect to management of the patient; and (2) notified that he or she may decline to receive medical services by telemedicine and may withdraw from such care at any time.      Education: Self-Management Skill Review    Current diabetes risk level:: moderate    In the last 12 months, have you:: been admitted to a hospital  Was the ER or hospital admission related to diabetes?: No    Permission to speak with others about care:: yes   -   wife (present during visit)      Lab Results   Component Value Date    HGBA1C 7.6 (H) 01/22/2022         Clinical  Problem Review  Reviewed Problem List with Patient: yes  Active comorbidities affecting diabetes self-care.: yes  Comorbidities: Organ Transplant (liver transplant)  Reviewed health maintenance: yes    Clinical Assessment  Current Diabetes Treatment: Insulin,Injectable   Toujeo 20 units once daily  novoog 6 units + CS (3 times daily)  ozempic weekly      Have you ever experienced hypoglycemia (low blood sugar)?: yes  In the last month, how often have you experienced low blood sugar?:  (none recently)  Are you able to tell when your blood sugar is low?: Yes    Have you ever experienced hyperglycemia (high blood sugar)?: yes  In the last month, how often have you experienced high blood sugar?: more than once a day (BGs mostly >200 since  transplant)    SMBG 3-4 times daily  Recently running >200  Last few days a few numbers in the 100s (since recent prednisone decrease)        Medication Information  How many days a week do you miss your medications?: Never  Do you sometimes have difficulty refilling your medications?: No  Medication adherence impacting ability to self-manage diabetes?: No    Labs  Do you have regular lab work to monitor your medications?: Yes  Lab Compliance Barriers: No        Nutritional Status  Diet: Regular,Diabetic diet (3 meals daily)  Meal Plan 24 Hour Recall: Breakfast,Lunch,Dinner,Snack    Drinks: Diet cranberry juice; crystal light; water; diet coke  Meal Plan 24 Hour Recall - Breakfast: 2 eggs, whole grain toast; OR oatmeal with berries  Meal Plan 24 Hour Recall - Lunch: smoked sausage on whole grain bread, OR protein with brown rice/quinoa, veggies or side salad, OR soup  Meal Plan 24 Hour Recall - Dinner: slimilar to lunch; roasted veggies, red potatoes, salad, protein  Meal Plan 24 Hour Recall - Snack: nuts, fruit (apple, canned pear)    Change in appetite?: No  Recent Changes in Weight: No Recent Weight Change  Current nutritional status an area of need that is impacting patient's ability to self-manage diabetes?: No            Additional Social History  Support  Does anyone support you with your diabetes care?: yes  Who supports you?: self,spouse  Does the current support meet the patient's needs?: Yes  Is Support an area impacting ability to self-manage diabetes?: No    Access to Mass Media & Technology  Does the patient have access to any of the following devices or technologies?: Smart phone,Internet Access  Media or technology needs impacting ability to self-manage diabetes?: No    Cognitive/Behavioral Health  Alert and Oriented: Yes  Difficulty Thinking: No  Requires Prompting: No  Requires assistance for routine expression?: No  Cognitive or behavioral barriers impacting ability to self-manage diabetes?:  No    Culture/Congregational  Culture or Confucianist beliefs that may impact ability to access healthcare: No    Communication  Language preference: English  Hearing Problems: No  Vision Problems: No  Communication needs impacting ability to self-manage diabetes?: No    Health Literacy  Preferred Learning Method: Face to Face,Hands On,Demonstration,Reading Materials  How often do you need to have someone help you read instructions, pamphlets, or written material from your doctor or pharmacy?: Never  Health literacy needs impacting ability to self-manage diabetes?: No            Diabetes Self-Management Skills Assessment  Diabetes Disease Process/Treatment Options  Patient/caregiver able to state what happens when someone has diabetes.: yes  Patient/caregiver knows what type of diabetes they have.: yes  Diabetes Type : Type II  Patient/caregiver able to identify at least three signs and symptoms of diabetes.: yes  Patient able to identify at least three risk factors for diabetes.: yes  Diabetes Disease Process/Treatment Options: Skills Assessment Completed: Yes  Assessment indicates:: Adequate understanding  Area of need?: No    Nutrition/Healthy Eating  Method of carbohydrate measurement:: carb counting/reading labels,plate method  Patient can identify foods that impact blood sugar.: yes  Nutrition/Healthy Eating Skills Assessment Completed:: Yes  Assessment indicates:: Adequate understanding  Area of need?: No    Physical Activity/Exercise  Patient formally exercises outside of work.: no  Reasons for not exercising::  (recent tx)  Physical Activity/Exercise Skills Assessment Completed: : Yes  Assessment indicates:: Adequate understanding  Area of need?: No    Medications  Patient is able to describe current diabetes management routine.: yes  Diabetes management routine:: insulin,injectable medications  Patient is able to identify current diabetes medications, dosages, and appropriate timing of medications.: yes  Patient  understands the purpose of the medications taken for diabetes.: yes  Patient reports problems or concerns with current medication regimen.: no  Medication Skills Assessment Completed:: Yes  Assessment indicates:: Instruction Needed  Area of need?: Yes    Home Blood Glucose Monitoring  Patient states that blood sugar is checked at home daily.: yes  Monitoring Method:: home glucometer  How often do you check your blood sugar?: 4 times a day  When do you check your blood sugar?: Before breakfast,Before lunch,Before dinner,Before bedtime  Home Blood Glucose Monitoring Skills Assessment Completed: : Yes  Assessment indicates:: Adequate understanding  Area of need?: No    Acute Complications  Patient is able to identify types of acute complications: Yes  Patient Identified:: Hypoglycemia,Hyperglycemia  Acute Complications Skills Assessment Completed: : Yes  Assessment indicates:: Adequate understanding  Area of need?: No    Chronic Complications  Patient can identify major chronic complications of diabetes.: yes  Patient can identify ways to prevent or delay diabetes complications.: yes  Chronic Complications Skills Assessment Completed: : Yes  Assessment indicates:: Adequate understanding  Area of need?: No    Psychosocial/Coping  Patient can identify ways of coping with chronic disease.: yes  Psychosocial/Coping Skills Assessment Completed: : Yes  Area of need?: No              Diabetes Self Support Plan     Assessment Summary and Plan    Based on today's diabetes care assessment, the following areas of need were identified:      Social 2/16/2022   Support No   Access to Mass Media/Tech No   Cognitive/Behavioral Health No   Culture/Temple No   Communication No   Health Literacy No        Clinical 2/16/2022   Medication Adherence No   Lab Compliance No   Nutritional Status No        Diabetes Self-Management Skills 2/16/2022   Diabetes Disease Process/Treatment Options No   Nutrition/Healthy Eating No   Physical  Activity/Exercise No   Medication Yes  See Care Plan     Home Blood Glucose Monitoring No   Acute Complications No   Chronic Complications No   Psychosocial/Coping No      Today's interventions were provided through individual discussion, instruction, and written materials were provided.      Patient verbalized understanding of instruction and written materials.  Pt was able to return back demonstration of instructions today. Patient understood key points, needs reinforcement and further instruction.                   Diabetes Self-Management Care Plan:    Today's Diabetes Self-Management Care Plan was developed with Eder's input. Eder has agreed to work toward the following goal(s) to improve his/her overall diabetes control.      Care Plan: Diabetes Management   Updates made since 1/18/2022 12:00 AM      Problem: Medications       Long-Range Goal: Patient Agrees to take Diabetes Medication(s) as prescribed.    Start Date: 2/16/2022   Priority: High   Barriers: No Barriers Identified   Note:      -Discussed timing and MOA of long vs rapid acting insulin.   -Reviewed need for rotation of injection sites, appropriate insulin storage, and insulin pen use.   -Emphasized need to take Toujeo at same time daily and need to take Novolog >4 hours apart.   -Emphasized need to take Novolog injections 15 min prior to eating meals.  -Reviewed appropriate use of correction scale.       Task: Reviewed with patient all current diabetes medications and provided basic review of the purpose, dosage, frequency, side effects, and storage of both oral and injectable diabetes medications. Completed 2/17/2022      Task: Reviewed injection technique and appropriate rotation of injection sites Completed 2/17/2022      Task: Discussed guidelines for preventing, detecting and treating hypoglycemia and hyperglycemia and reviewed the importance of meal and medication timing with diabetes mediations for prevention of hypoglycemia and maximum  drug benefit. Completed 2/17/2022                  Follow Up Plan     F/u in 6 months          Today's care plan and follow up schedule was discussed with patient.  Eder verbalized understanding of the care plan, goals, and agrees to follow up plan.        The patient was encouraged to communicate with his/her health care provider/physician and care team regarding his/her condition(s) and treatment.  I provided the patient with my contact information today and encouraged to contact me via phone or [facility] Patient Portal as needed.             Length of Visit   Total Time: 45 Minutes

## 2022-02-17 ENCOUNTER — HISTORICAL (OUTPATIENT)
Dept: ADMINISTRATIVE | Facility: HOSPITAL | Age: 62
End: 2022-02-17

## 2022-02-17 ENCOUNTER — HISTORICAL (OUTPATIENT)
Dept: RADIOLOGY | Facility: HOSPITAL | Age: 62
End: 2022-02-17

## 2022-02-18 ENCOUNTER — TELEPHONE (OUTPATIENT)
Dept: TRANSPLANT | Facility: CLINIC | Age: 62
End: 2022-02-18
Payer: COMMERCIAL

## 2022-02-22 ENCOUNTER — PATIENT MESSAGE (OUTPATIENT)
Dept: TRANSPLANT | Facility: CLINIC | Age: 62
End: 2022-02-22
Payer: COMMERCIAL

## 2022-02-22 ENCOUNTER — TELEPHONE (OUTPATIENT)
Dept: TRANSPLANT | Facility: CLINIC | Age: 62
End: 2022-02-22
Payer: COMMERCIAL

## 2022-02-22 NOTE — TELEPHONE ENCOUNTER
Message sent to patient via [facility]:    Labs were due yesterday, [facility] said you have not gone. Please let me know when you will go . Thanks.

## 2022-02-23 ENCOUNTER — PATIENT MESSAGE (OUTPATIENT)
Dept: TRANSPLANT | Facility: CLINIC | Age: 62
End: 2022-02-23
Payer: COMMERCIAL

## 2022-02-23 ENCOUNTER — TELEPHONE (OUTPATIENT)
Dept: TRANSPLANT | Facility: CLINIC | Age: 62
End: 2022-02-23
Payer: COMMERCIAL

## 2022-02-23 LAB
EXT AFP: 2.45
EXT ALBUMIN: 3.7
EXT ALKALINE PHOSPHATASE: 104
EXT ALT: 19
EXT AST: 14
EXT BILIRUBIN TOTAL: 0.4
EXT BUN: 22
EXT CALCIUM: 9.4
EXT CHLORIDE: 105
EXT CO2: 24
EXT CREATININE: 0.94 MG/DL
EXT EOSINOPHIL%: 1.5
EXT GFR MDRD NON AF AMER: 91
EXT GLUCOSE: 180
EXT HEMATOCRIT: 30.4
EXT HEMOGLOBIN: 10.2
EXT LYMPH%: 13.2
EXT MONOCYTES%: 6.3
EXT PLATELETS: 217
EXT POTASSIUM: 5.7
EXT PROTEIN TOTAL: 6
EXT SEGS%: 77.1
EXT SODIUM: 139 MMOL/L
EXT TACROLIMUS LVL: 10.2
EXT WBC: 5.9

## 2022-02-23 NOTE — TELEPHONE ENCOUNTER
Spoke to Corina at [facility]: requested labs drawn yesterday. She will fax results she stated.

## 2022-02-23 NOTE — TELEPHONE ENCOUNTER
RENO Guzmán: labs of today reviewed over the phone with : RENO : reduce Prograf dose to 4mg bid and follow K+ protocol.      Patient and wife notified and instructed : reduce Prograf dose to 4mg twice daily; will need to take Kayexelate today (Rx. To be sent to [facility] at [place] [address] Per patient request); repeat lab for potassium level tomorrow morning (lab order scanned to patient wife to take to the lab with him).  Patient and his wife able to repeat instructions and voiced understanding.

## 2022-02-24 RX ORDER — TACROLIMUS 1 MG/1
4 CAPSULE ORAL EVERY 12 HOURS
Qty: 240 CAPSULE | Refills: 11 | Status: SHIPPED | OUTPATIENT
Start: 2022-02-24 | End: 2022-05-10

## 2022-02-25 ENCOUNTER — PATIENT MESSAGE (OUTPATIENT)
Dept: TRANSPLANT | Facility: CLINIC | Age: 62
End: 2022-02-25
Payer: COMMERCIAL

## 2022-02-28 ENCOUNTER — TELEPHONE (OUTPATIENT)
Dept: TRANSPLANT | Facility: CLINIC | Age: 62
End: 2022-02-28
Payer: COMMERCIAL

## 2022-02-28 ENCOUNTER — PATIENT MESSAGE (OUTPATIENT)
Dept: TRANSPLANT | Facility: CLINIC | Age: 62
End: 2022-02-28
Payer: COMMERCIAL

## 2022-02-28 LAB — EXT POTASSIUM: 4.7

## 2022-02-28 NOTE — TELEPHONE ENCOUNTER
----- Message from Darrin Leyva MD sent at 2/28/2022  2:07 PM CST -----  Results ok. No action needed

## 2022-03-02 ENCOUNTER — PATIENT MESSAGE (OUTPATIENT)
Dept: TRANSPLANT | Facility: CLINIC | Age: 62
End: 2022-03-02
Payer: COMMERCIAL

## 2022-03-02 ENCOUNTER — TELEPHONE (OUTPATIENT)
Dept: TRANSPLANT | Facility: CLINIC | Age: 62
End: 2022-03-02
Payer: COMMERCIAL

## 2022-03-02 LAB
EXT AFP: 2.5
EXT ALBUMIN: 4
EXT ALKALINE PHOSPHATASE: 92
EXT ALT: 18
EXT AST: 15
EXT BILIRUBIN TOTAL: 0.3
EXT BUN: 18
EXT CALCIUM: 9.2
EXT CHLORIDE: 105
EXT CO2: 25
EXT CREATININE: 0.87 MG/DL
EXT EOSINOPHIL%: 0.4
EXT GFR MDRD NON AF AMER: 97
EXT GLUCOSE: 126
EXT HEMATOCRIT: 32.7
EXT HEMOGLOBIN: 11
EXT LYMPH%: 9.2
EXT MONOCYTES%: 6.3
EXT PLATELETS: 200
EXT POTASSIUM: 5.4
EXT PROTEIN TOTAL: 6.3
EXT SEGS%: 82.4
EXT SODIUM: 142 MMOL/L
EXT TACROLIMUS LVL: 7
EXT WBC: 7

## 2022-03-02 NOTE — TELEPHONE ENCOUNTER
Patient notified and instructed via [facility]:    Lab results reviewed by ; results stable. No medicine changes made. Repeat labs due on Monday 3/7/22. Thanks.

## 2022-03-07 DIAGNOSIS — C22.0 HCC (HEPATOCELLULAR CARCINOMA): ICD-10-CM

## 2022-03-07 DIAGNOSIS — Z00.6 RESEARCH STUDY PATIENT: Primary | ICD-10-CM

## 2022-03-08 ENCOUNTER — TELEPHONE (OUTPATIENT)
Dept: TRANSPLANT | Facility: CLINIC | Age: 62
End: 2022-03-08
Payer: COMMERCIAL

## 2022-03-08 ENCOUNTER — PATIENT MESSAGE (OUTPATIENT)
Dept: TRANSPLANT | Facility: CLINIC | Age: 62
End: 2022-03-08
Payer: COMMERCIAL

## 2022-03-08 LAB
EXT ALBUMIN: 4.1
EXT ALKALINE PHOSPHATASE: 100
EXT ALT: 14
EXT AST: 14
EXT BILIRUBIN TOTAL: 0.3
EXT BUN: 18
EXT CALCIUM: 9.2
EXT CHLORIDE: 107
EXT CO2: 22
EXT CREATININE: 0.76 MG/DL
EXT EOSINOPHIL%: 1.4
EXT GFR MDRD NON AF AMER: 101
EXT GLUCOSE: 162
EXT HEMATOCRIT: 30.3
EXT HEMOGLOBIN: 10
EXT LYMPH%: 9.2
EXT MONOCYTES%: 7.1
EXT PLATELETS: 184
EXT POTASSIUM: 5.1
EXT PROTEIN TOTAL: 6.3
EXT SEGS%: 79.9
EXT SODIUM: 140 MMOL/L
EXT TACROLIMUS LVL: 6.5
EXT WBC: 5.1

## 2022-03-08 NOTE — TELEPHONE ENCOUNTER
Patient notified and instructed via [facility], as per MARAL Plata:    Your labs have been received and reviewed by ; results were ok. No changes made. Repeat labs due Monday 3/14/22. Thanks.

## 2022-03-15 ENCOUNTER — TELEPHONE (OUTPATIENT)
Dept: TRANSPLANT | Facility: CLINIC | Age: 62
End: 2022-03-15
Payer: COMMERCIAL

## 2022-03-15 ENCOUNTER — PATIENT MESSAGE (OUTPATIENT)
Dept: TRANSPLANT | Facility: CLINIC | Age: 62
End: 2022-03-15
Payer: COMMERCIAL

## 2022-03-15 LAB
EXT ALBUMIN: 4.1
EXT ALKALINE PHOSPHATASE: 98
EXT ALT: 13
EXT AST: 15
EXT BASOPHIL%: 0.9
EXT BILIRUBIN TOTAL: 0.3
EXT BUN: 19
EXT CALCIUM: 9.1
EXT CHLORIDE: 107
EXT CO2: 24
EXT CREATININE: 0.67 MG/DL
EXT EOSINOPHIL%: 2.7
EXT GFR MDRD NON AF AMER: 105
EXT GLUCOSE: 145
EXT HEMATOCRIT: 31.3
EXT HEMOGLOBIN: 10.2
EXT LYMPH%: 28.3
EXT MONOCYTES%: 8.8
EXT PLATELETS: 208
EXT POTASSIUM: 5.4
EXT PROTEIN TOTAL: 6.4
EXT SEGS%: 47.8
EXT SODIUM: 143 MMOL/L
EXT TACROLIMUS LVL: NORMAL
EXT WBC: 4

## 2022-03-16 ENCOUNTER — HOSPITAL ENCOUNTER (OUTPATIENT)
Dept: RADIOLOGY | Facility: HOSPITAL | Age: 62
Discharge: HOME | End: 2022-03-16
Attending: STUDENT IN AN ORGANIZED HEALTH CARE EDUCATION/TRAINING PROGRAM
Payer: COMMERCIAL

## 2022-03-16 ENCOUNTER — OFFICE VISIT (OUTPATIENT)
Dept: TRANSPLANT | Facility: CLINIC | Age: 62
End: 2022-03-16
Payer: COMMERCIAL

## 2022-03-16 VITALS
TEMPERATURE: 98 F | SYSTOLIC BLOOD PRESSURE: 139 MMHG | HEART RATE: 92 BPM | RESPIRATION RATE: 16 BRPM | BODY MASS INDEX: 25.75 KG/M2 | HEIGHT: 65 IN | DIASTOLIC BLOOD PRESSURE: 77 MMHG | WEIGHT: 154.56 LBS | OXYGEN SATURATION: 100 %

## 2022-03-16 DIAGNOSIS — Z86.19 HISTORY OF HEPATITIS C: ICD-10-CM

## 2022-03-16 DIAGNOSIS — Z85.05 HISTORY OF HEPATOCELLULAR CARCINOMA: ICD-10-CM

## 2022-03-16 DIAGNOSIS — Z94.4 LIVER TRANSPLANTED: Primary | ICD-10-CM

## 2022-03-16 DIAGNOSIS — Z79.60 LONG-TERM USE OF IMMUNOSUPPRESSANT MEDICATION: ICD-10-CM

## 2022-03-16 DIAGNOSIS — Z94.4 LIVER REPLACED BY TRANSPLANT: ICD-10-CM

## 2022-03-16 PROCEDURE — 99999 PR PBB SHADOW E&M-EST. PATIENT-LVL IV: CPT | Mod: PBBFAC,,, | Performed by: STUDENT IN AN ORGANIZED HEALTH CARE EDUCATION/TRAINING PROGRAM

## 2022-03-16 PROCEDURE — 76705 ECHO EXAM OF ABDOMEN: CPT | Mod: TC

## 2022-03-16 PROCEDURE — 99214 PR OFFICE/OUTPT VISIT, EST, LEVL IV, 30-39 MIN: ICD-10-PCS | Mod: S$GLB,,, | Performed by: STUDENT IN AN ORGANIZED HEALTH CARE EDUCATION/TRAINING PROGRAM

## 2022-03-16 PROCEDURE — 99214 OFFICE O/P EST MOD 30 MIN: CPT | Mod: S$GLB,,, | Performed by: STUDENT IN AN ORGANIZED HEALTH CARE EDUCATION/TRAINING PROGRAM

## 2022-03-16 PROCEDURE — 1159F PR MEDICATION LIST DOCUMENTED IN MEDICAL RECORD: ICD-10-PCS | Mod: CPTII,S$GLB,, | Performed by: STUDENT IN AN ORGANIZED HEALTH CARE EDUCATION/TRAINING PROGRAM

## 2022-03-16 PROCEDURE — 3078F DIAST BP <80 MM HG: CPT | Mod: CPTII,S$GLB,, | Performed by: STUDENT IN AN ORGANIZED HEALTH CARE EDUCATION/TRAINING PROGRAM

## 2022-03-16 PROCEDURE — 3078F PR MOST RECENT DIASTOLIC BLOOD PRESSURE < 80 MM HG: ICD-10-PCS | Mod: CPTII,S$GLB,, | Performed by: STUDENT IN AN ORGANIZED HEALTH CARE EDUCATION/TRAINING PROGRAM

## 2022-03-16 PROCEDURE — 76705 ECHO EXAM OF ABDOMEN: CPT | Mod: 26,XS,, | Performed by: RADIOLOGY

## 2022-03-16 PROCEDURE — 93976 US DOPPLER LIVER TRANSPLANT POST (XPD): ICD-10-PCS | Mod: 26,,, | Performed by: RADIOLOGY

## 2022-03-16 PROCEDURE — 76705 US DOPPLER LIVER TRANSPLANT POST (XPD): ICD-10-PCS | Mod: 26,XS,, | Performed by: RADIOLOGY

## 2022-03-16 PROCEDURE — 93976 VASCULAR STUDY: CPT | Mod: TC

## 2022-03-16 PROCEDURE — 93976 VASCULAR STUDY: CPT | Mod: 26,,, | Performed by: RADIOLOGY

## 2022-03-16 PROCEDURE — 3051F HG A1C>EQUAL 7.0%<8.0%: CPT | Mod: CPTII,S$GLB,, | Performed by: STUDENT IN AN ORGANIZED HEALTH CARE EDUCATION/TRAINING PROGRAM

## 2022-03-16 PROCEDURE — 3075F PR MOST RECENT SYSTOLIC BLOOD PRESS GE 130-139MM HG: ICD-10-PCS | Mod: CPTII,S$GLB,, | Performed by: STUDENT IN AN ORGANIZED HEALTH CARE EDUCATION/TRAINING PROGRAM

## 2022-03-16 PROCEDURE — 99999 PR PBB SHADOW E&M-EST. PATIENT-LVL IV: ICD-10-PCS | Mod: PBBFAC,,, | Performed by: STUDENT IN AN ORGANIZED HEALTH CARE EDUCATION/TRAINING PROGRAM

## 2022-03-16 PROCEDURE — 1159F MED LIST DOCD IN RCRD: CPT | Mod: CPTII,S$GLB,, | Performed by: STUDENT IN AN ORGANIZED HEALTH CARE EDUCATION/TRAINING PROGRAM

## 2022-03-16 PROCEDURE — 3075F SYST BP GE 130 - 139MM HG: CPT | Mod: CPTII,S$GLB,, | Performed by: STUDENT IN AN ORGANIZED HEALTH CARE EDUCATION/TRAINING PROGRAM

## 2022-03-16 PROCEDURE — 1160F RVW MEDS BY RX/DR IN RCRD: CPT | Mod: CPTII,S$GLB,, | Performed by: STUDENT IN AN ORGANIZED HEALTH CARE EDUCATION/TRAINING PROGRAM

## 2022-03-16 PROCEDURE — 3008F PR BODY MASS INDEX (BMI) DOCUMENTED: ICD-10-PCS | Mod: CPTII,S$GLB,, | Performed by: STUDENT IN AN ORGANIZED HEALTH CARE EDUCATION/TRAINING PROGRAM

## 2022-03-16 PROCEDURE — 3008F BODY MASS INDEX DOCD: CPT | Mod: CPTII,S$GLB,, | Performed by: STUDENT IN AN ORGANIZED HEALTH CARE EDUCATION/TRAINING PROGRAM

## 2022-03-16 PROCEDURE — 1160F PR REVIEW ALL MEDS BY PRESCRIBER/CLIN PHARMACIST DOCUMENTED: ICD-10-PCS | Mod: CPTII,S$GLB,, | Performed by: STUDENT IN AN ORGANIZED HEALTH CARE EDUCATION/TRAINING PROGRAM

## 2022-03-16 PROCEDURE — 3051F PR MOST RECENT HEMOGLOBIN A1C LEVEL 7.0 - < 8.0%: ICD-10-PCS | Mod: CPTII,S$GLB,, | Performed by: STUDENT IN AN ORGANIZED HEALTH CARE EDUCATION/TRAINING PROGRAM

## 2022-03-16 NOTE — LETTER
March 16, 2022        Nitin Farmer MD  [address]  [address]  [place] [address]             [facility] 1st Fl  [address]  [place] [address]  Phone: [phone]   Patient: Eder Kong   MR Number: [id number]   YOB: 1960   Date of Visit: 3/16/2022       Dear Dr. Farmer:    I saw your patient, Eder Kong, today for evaluation. Attached you will find relevant portions of my assessment and plan of care.       If you have questions, please do not hesitate to call me. I look forward to following Eder Kong along with you.    Sincerely,      Jay Abrams MD            CC  Martin Mcdaniel MD    Enclosure

## 2022-03-16 NOTE — Clinical Note
March 16, 2022        Nitin Farmer  [address]  [address]  [place] [address]  Phone: [phone]  Fax: [phone]             [facility] 1st Fl  [address]  [facility] [address]  Phone: [phone]   Patient: Eder Kong   MR Number: [id number]   YOB: 1960   Date of Visit: 3/16/2022       Dear Dr. Nitin Farmer    Thank you for referring Eder Kong to me for evaluation. Attached you will find relevant portions of my assessment and plan of care.    If you have questions, please do not hesitate to call me. I look forward to following Eder Kong along with you.    Sincerely,    Jay Abrams MD    Enclosure    If you would like to receive this communication electronically, please contact [email or web address] or [phone] to request [employer] access.    [employer] is a tool which provides read-only access to select patient information with whom you have a relationship. Its easy to use and provides real time access to review your patients record including encounter summaries, notes, results, and demographic information.    If you feel you have received this communication in error or would no longer like to receive these types of communications, please e-mail [email or web address]

## 2022-03-16 NOTE — PROGRESS NOTES
Transplant Hepatology  Liver Transplant Recipient Follow Up    PCP: Martin Mcdaniel MD    Transplant History  Transplant Date: 1/23/2022  UNOS Native Liver Dx: Primary Liver Malignancy: Hepatoma (HCC) and Cirrhosis    ORGAN: LIVER  Whole or Partial: whole liver  Donor Type: donation after circulatory death   [facility] High Risk: yes  Donor CMV Status: Negative  Donor HCV Status: Negative  Donor HBcAb: Negative  Donor HBV SHIVANI: Negative  Donor HCV SHIVANI: Negative  Biliary Anastomosis: end to end  Arterial Anatomy: standard  IVC reconstruction: end to end ivc  Portal vein status: patent    He has had the following complications since transplant: none    Chief complaint: follow up, history of OLT    HPI:  Eder Kong is a 61 y.o. male with history of OLT in 01/2022 for hepatitis C related cirrhosis and HCC who presents for scheduled follow up. He is without complaints today. He has not had recent hospitalizations or ED visits. He reports compliance with Prograf and CellCept. He denies recent fever, chills, nausea, vomiting, diarrhea, headache, tremors.    Past Medical History:   Diagnosis Date    Anemia     Arthritis     Diabetes     Dizziness     Dyslipidemia     General anesthetics causing adverse effect in therapeutic use     Hep C w/o coma, chronic     failed 2 rounds of interferon-based medication. no protease inhibitorrs used    Hypertension     not on medication    Kidney stone     Liver cancer        Past Surgical History:   Procedure Laterality Date    AORTIC VALVE REPLACEMENT  2018    due to aortic regurgitation    CARDIAC SURGERY      CATHETERIZATION OF BOTH LEFT AND RIGHT HEART N/A 7/8/2021    Procedure: CATHETERIZATION, HEART, BOTH LEFT AND RIGHT;  Surgeon: Eder Beltran MD;  Location: [facility] CATH LAB;  Service: Cardiology;  Laterality: N/A;    COLONOSCOPY W/ POLYPECTOMY      2011    ESOPHAGOGASTRODUODENOSCOPY      x2 with cautery in 2001, 2012    LIVER TRANSPLANT N/A 1/23/2022    Procedure:  "TRANSPLANT, LIVER;  Surgeon: Jesús Austin MD;  Location: [facility] OR [address] [place] [address];  Service: Transplant;  Laterality: N/A;    mastoid bone      right ear       Family History   Problem Relation Age of Onset    Diabetes Sister     Diabetes Brother        Social History     Tobacco Use    Smoking status: Former Smoker     Packs/day: 1.00     Years: 25.00     Pack years: 25.00     Quit date: 1999     Years since quittin.7    Smokeless tobacco: Never Used   Substance Use Topics    Alcohol use: No     Comment: quit     Drug use: No       Current Outpatient Medications   Medication Sig Dispense Refill    aspirin (ECOTRIN) 81 MG EC tablet Take 1 tablet (81 mg total) by mouth once daily. 30 tablet 5    BD ULTRA-FINE MINI PEN NEEDLE 31 gauge x 3/16" Ndle USE AS DIRECTED EVERY DAY      calcium carbonate-vitamin D3 600 mg-20 mcg (800 unit) Tab Take 1 tablet by mouth once daily. 30 tablet 5    carvediloL (COREG) 12.5 MG tablet Take 1 tablet (12.5 mg total) by mouth 2 (two) times daily. 60 tablet 11    CONTOUR NEXT TEST STRIPS Strp USE TO TEST BLOOD GLUCOSE TWICE DAILY      EScitalopram oxalate (LEXAPRO) 5 MG Tab Take 1 tablet (5 mg total) by mouth once daily. 30 tablet 5    insulin aspart U-100 (NOVOLOG) 100 unit/mL (3 mL) InPn pen Inject 6 Units into the skin 3 (three) times daily. Plus sliding scale: TDD: 33 units 15 mL 11    mycophenolate (CELLCEPT) 250 mg Cap Take 4 capsules (1,000 mg total) by mouth 2 (two) times daily. 240 capsule 2    OZEMPIC 1 mg/dose (4 mg/3 mL) Inject 1 mg into the skin once a week.      predniSONE (DELTASONE) 5 MG tablet Take by mouth daily: 20mg -2/4, 15mg 2/-, 10mg /-, 5mg -, Stop 22 (Patient taking differently: Take by mouth daily: 20mg -2/4, 15mg 2/-, 10mg /-, 5mg -, Stop 22) 75 tablet 1    sulfamethoxazole-trimethoprim 400-80mg (BACTRIM,SEPTRA) 400-80 mg per tablet Take 1 tablet by mouth every morning. Stop " "7/22/22 30 tablet 5    tacrolimus (PROGRAF) 1 MG Cap Take 4 capsules (4 mg total) by mouth every 12 (twelve) hours. 240 capsule 11    TOUJEO MAX U-300 SOLOSTAR 300 unit/mL (3 mL) insulin pen Inject 18 Units into the skin once daily. 3 pen 11    valGANciclovir (VALCYTE) 450 mg Tab Take 1 tablet (450 mg total) by mouth once daily. Stop 4/23/22 30 tablet 2    famotidine (PEPCID) 20 MG tablet Take 1 tablet (20 mg total) by mouth once daily. Stop 2/22/22 30 tablet 0    oxyCODONE (OXY-IR) 5 mg Cap Take 1 capsule (5 mg total) by mouth every 4 (four) hours as needed for Pain. 40 capsule 0     No current facility-administered medications for this visit.       Review of patient's allergies indicates:  No Known Allergies    Review of Systems   Constitutional: Negative for fever and weight loss.   Gastrointestinal: Negative for abdominal pain, blood in stool, constipation, diarrhea, heartburn, melena, nausea and vomiting.   Neurological: Negative for tremors and headaches.       Vitals:    03/16/22 0831   BP: 139/77   Pulse: 92   Resp: 16   Temp: 97.7 °F (36.5 °C)   TempSrc: Temporal   SpO2: 100%   Weight: 70.1 kg (154 lb 8.7 oz)   Height: 5' 5" (1.651 m)       Physical Exam  Vitals reviewed.   Constitutional:       General: He is not in acute distress.  Eyes:      General: No scleral icterus.  Cardiovascular:      Rate and Rhythm: Normal rate and regular rhythm.   Pulmonary:      Effort: Pulmonary effort is normal. No respiratory distress.   Abdominal:      General: Bowel sounds are normal. There is no distension.      Palpations: Abdomen is soft.      Tenderness: There is no abdominal tenderness. There is no guarding or rebound.   Musculoskeletal:      Right lower leg: No edema.      Left lower leg: No edema.   Skin:     Coloration: Skin is not jaundiced.         LABS:  Lab Results   Component Value Date    WBC 4.46 03/16/2022    HGB 10.2 (L) 03/16/2022    HCT 31.9 (L) 03/16/2022    MCV 93 03/16/2022     " 03/16/2022       Lab Results   Component Value Date     03/16/2022    K 5.2 (H) 03/16/2022     03/16/2022    CO2 25 03/16/2022    BUN 23 03/16/2022    CREATININE 0.8 03/16/2022    CALCIUM 9.2 03/16/2022    ANIONGAP 8 03/16/2022    ESTGFRAFRICA >60.0 03/16/2022    EGFRNONAA >60.0 03/16/2022       Lab Results   Component Value Date    ALT 16 03/16/2022    AST 19 03/16/2022    GGT 50 06/17/2021    ALKPHOS 81 03/16/2022    BILITOT 0.6 03/16/2022       Lab Results   Component Value Date    TACROLIMUS 8.4 02/15/2022       Assessment:  61 y.o. male with history of OLT in 01/2022 for hepatitis C related cirrhosis and HCC who presents for scheduled follow up.    1. Liver transplanted    2. Long-term use of immunosuppressant medication    3. History of hepatocellular carcinoma    4. History of hepatitis C        Recommendations:  1. Allograft Function  - Excellent graft function with normal LFTs in 03/2022    2. Immunosuppression   - Continue Prograf 4mg twice daily  - Continue CellCept 1000mg twice daily    3. History of HCC: Imaging surveillance per protocol.    4. History of HCV: He has completed treatment with SVR.    5. Kidney function   - Creatinine 0.8 in 03/2022  - Avoid NSAIDs as able and maintain adequate hydration    6. Health Maintenance/Screening:  - Recommend age appropriate cancer screening  - Skin cancer: Recommend use of sunscreen SPF 30 or higher, hat and sunglasses while outside, dermatologist visit annually or sooner if any concerning lesions.  - Osteoporosis: Recommend bone density testing every 2-3 years if previously normal or annually if previously abnormal.    Return to clinic in 1 month.    UNOS Patient Status  Functional Status: 80% - Normal activity with effort: some symptoms of disease  Physical Capacity: No Limitations    I spent a total of 30 minutes on the day of the visit. This includes face to face time and non-face to face time preparing to see the patient (eg, review of tests),  obtaining and/or reviewing separately obtained history, documenting clinical information in the electronic or other health record, independently interpreting results, and communicating results to the patient/family/caregiver, or care coordination.    Jay Abrams MD  Staff Physician  Hepatology and Liver Transplant  [facility]  [facility] [place]

## 2022-03-17 ENCOUNTER — RESEARCH ENCOUNTER (OUTPATIENT)
Dept: RESEARCH | Facility: HOSPITAL | Age: 62
End: 2022-03-17
Payer: COMMERCIAL

## 2022-03-17 NOTE — PROGRESS NOTES
RESEARCH STUDY FOLLOW-UP SPECIMEN COLLECTION ENCOUNTER  ORGAN TRANSPLANT  [facility] [address]    Study Title: Role of Tumor-Induced Immune Tolerance in the Patient Response to Locoregional Therapy: Implications in Assessment Risk of Hepatocellular Carcinoma Recurrence Following Liver Transplantation    IRB #: [id number]    IRB Approval Date: 6/8/2016    : Darrin Leyva MD  Sub-investigator: Pranav Nieto, PhD    Patient Number: [id number]    In accordance with the study protocol, Research Lab orders were placed and follow-up specimens were collected on (date: 03/16/2022) in:  [facility] LAB VNP: YES/NO: no  [facility] LABTX: YES/NO: yes  [facility] LAB IM: YES/NO: no    Sarah Renteria  Admin Research- Liver Transplant

## 2022-03-22 ENCOUNTER — TELEPHONE (OUTPATIENT)
Dept: TRANSPLANT | Facility: CLINIC | Age: 62
End: 2022-03-22
Payer: COMMERCIAL

## 2022-03-22 ENCOUNTER — PATIENT MESSAGE (OUTPATIENT)
Dept: TRANSPLANT | Facility: CLINIC | Age: 62
End: 2022-03-22
Payer: COMMERCIAL

## 2022-03-22 DIAGNOSIS — Z94.4 LIVER REPLACED BY TRANSPLANT: Primary | ICD-10-CM

## 2022-03-22 DIAGNOSIS — R93.5 ABNORMAL FINDINGS ON DIAGNOSTIC IMAGING OF ABDOMEN: ICD-10-CM

## 2022-03-22 NOTE — TELEPHONE ENCOUNTER
----- Message from Jay Abrams MD sent at 3/21/2022  9:09 PM CDT -----  Slight abnormal blood flow. Please repeat US in 2-4 weeks.

## 2022-03-22 NOTE — TELEPHONE ENCOUNTER
Patient was notified and instructed via [facility]:    Your ultrasound showed slight abnormal blood flow, this will be repeated in 2-4 weeks. Thanks.

## 2022-03-22 NOTE — TELEPHONE ENCOUNTER
Patient notified and instructed via [facility]:    Your labs were reviewed by Dr. Abrams; meggan.  Repeat labs due on Monday 3/28/22.  Your potassium level was slightly high, please avoid foods such as oranges/orange juice/ bananas/ potatoes/ tomatoes/ beans/ cabbage/ sports drinks(gatorade, Powerade, etc..), chocolate, avacados.   Thanks.

## 2022-03-22 NOTE — TELEPHONE ENCOUNTER
----- Message from Jay Abrams MD sent at 3/21/2022  9:10 PM CDT -----  Liver transplant labs reviewed and are stable. No changes in his immunosuppression. Please continue to monitor labs per transplant protocol.    Potassium slightly elevated. Please follow hyperkalemia protocol.

## 2022-03-23 LAB
EXT AFP: 2.1
EXT ALBUMIN: 3.9
EXT ALKALINE PHOSPHATASE: 94
EXT ALT: 16
EXT AST: 19
EXT BILIRUBIN TOTAL: 0.3
EXT BUN: 21
EXT CALCIUM: 9.3
EXT CHLORIDE: 109
EXT CO2: 24
EXT CREATININE: 0.7 MG/DL
EXT EOSINOPHIL%: 3.1
EXT GFR MDRD NON AF AMER: 104
EXT GLUCOSE: 105
EXT HEMATOCRIT: 29.2
EXT HEMOGLOBIN: 9.7
EXT LYMPH%: 13.2
EXT MONOCYTES%: 7.3
EXT PLATELETS: 184
EXT POTASSIUM: 5.2
EXT PROTEIN TOTAL: 6.1
EXT SEGS%: 61.2
EXT SODIUM: 144 MMOL/L
EXT TACROLIMUS LVL: 5.6
EXT WBC: 4.3

## 2022-03-24 ENCOUNTER — PATIENT MESSAGE (OUTPATIENT)
Dept: TRANSPLANT | Facility: CLINIC | Age: 62
End: 2022-03-24
Payer: COMMERCIAL

## 2022-03-24 ENCOUNTER — TELEPHONE (OUTPATIENT)
Dept: TRANSPLANT | Facility: CLINIC | Age: 62
End: 2022-03-24
Payer: COMMERCIAL

## 2022-03-24 NOTE — TELEPHONE ENCOUNTER
----- Message from Jay Abrams MD sent at 3/23/2022  9:21 PM CDT -----  Liver transplant labs reviewed and are stable. No changes in his immunosuppression. Please continue to monitor labs per transplant protocol.

## 2022-03-24 NOTE — TELEPHONE ENCOUNTER
Patient notified and instructed via [facility]:    Labs from this week reviewed and stable. No changes made. Repeat labs due Monday 3/28/22. Thanks.

## 2022-03-29 LAB
EXT AFP: 2.1
EXT ALBUMIN: 4
EXT ALKALINE PHOSPHATASE: 90
EXT ALT: 14
EXT AST: 19
EXT BILIRUBIN TOTAL: 0.4
EXT BUN: 19
EXT CALCIUM: 9.4
EXT CHLORIDE: 106
EXT CO2: 23
EXT CREATININE: 0.77 MG/DL
EXT EOSINOPHIL%: 2.6
EXT GFR MDRD NON AF AMER: 101
EXT GLUCOSE: 111
EXT HEMATOCRIT: 29.8
EXT HEMOGLOBIN: 9.9
EXT LYMPH%: 28.9
EXT MONOCYTES%: 1.8
EXT PLATELETS: 173
EXT POTASSIUM: 5.2
EXT PROTEIN TOTAL: 6.4
EXT SEGS%: 43.9
EXT SODIUM: 142 MMOL/L
EXT TACROLIMUS LVL: 6.1
EXT WBC: 4.4

## 2022-03-30 ENCOUNTER — TELEPHONE (OUTPATIENT)
Dept: TRANSPLANT | Facility: CLINIC | Age: 62
End: 2022-03-30
Payer: COMMERCIAL

## 2022-03-30 ENCOUNTER — PATIENT MESSAGE (OUTPATIENT)
Dept: TRANSPLANT | Facility: CLINIC | Age: 62
End: 2022-03-30
Payer: COMMERCIAL

## 2022-03-30 NOTE — TELEPHONE ENCOUNTER
----- Message from Jay Abrams MD sent at 3/29/2022  6:23 PM CDT -----  Liver transplant labs reviewed and are stable. No changes in his immunosuppression. Please continue to monitor labs per transplant protocol.    Potassium slightly elevated. Please follow hyperkalemia protocol.

## 2022-03-30 NOTE — TELEPHONE ENCOUNTER
Patient was notified and instructed via [facility]:    Your labs were reviewed by ; results stable. Potassium level was at upper level of normal; please follow low potassium diet (avoid foods such as bananas/oranges/ potatoes/ tomatoes/ beans/ cabbage/ avacados/ nuts/ sports drinks such as gatorade/powerade).  Repeat labs due on Monday 4/4/22. Thanks.

## 2022-04-02 ENCOUNTER — PATIENT MESSAGE (OUTPATIENT)
Dept: TRANSPLANT | Facility: CLINIC | Age: 62
End: 2022-04-02
Payer: COMMERCIAL

## 2022-04-05 ENCOUNTER — TELEPHONE (OUTPATIENT)
Dept: TRANSPLANT | Facility: CLINIC | Age: 62
End: 2022-04-05
Payer: COMMERCIAL

## 2022-04-05 ENCOUNTER — PATIENT MESSAGE (OUTPATIENT)
Dept: TRANSPLANT | Facility: CLINIC | Age: 62
End: 2022-04-05
Payer: COMMERCIAL

## 2022-04-05 LAB
EXT AFP: 2.1
EXT ALBUMIN: 4.1
EXT ALKALINE PHOSPHATASE: 87
EXT ALT: 10
EXT AST: 15
EXT BILIRUBIN TOTAL: 0.2
EXT BUN: 20
EXT CALCIUM: 9.2
EXT CHLORIDE: 106
EXT CO2: 21
EXT CREATININE: 0.79 MG/DL
EXT EOSINOPHIL%: 1.5
EXT GFR MDRD NON AF AMER: 100
EXT GLUCOSE: 119
EXT HEMATOCRIT: 32.6
EXT HEMOGLOBIN: 10.5
EXT LYMPH%: 13
EXT MONOCYTES%: 7.2
EXT PLATELETS: 191
EXT POTASSIUM: 5.6
EXT PROTEIN TOTAL: 6.5
EXT SEGS%: 73.1
EXT SODIUM: 141 MMOL/L
EXT TACROLIMUS LVL: 7.9
EXT WBC: 4.6

## 2022-04-05 NOTE — TELEPHONE ENCOUNTER
Patient notified and instructed via [facility]:    Your potassium level was elevated at 5.6.   has sent in a prescription for Kayexelate liquid that you will need to take tonight and then go to lab again tomorrow for a repeat potassium level. He sent the prescription to your local [facility] in [place]. Thanks. I will fax a lab order to your local lab. Thanks.

## 2022-04-06 ENCOUNTER — TELEPHONE (OUTPATIENT)
Dept: TRANSPLANT | Facility: CLINIC | Age: 62
End: 2022-04-06
Payer: COMMERCIAL

## 2022-04-06 ENCOUNTER — PATIENT MESSAGE (OUTPATIENT)
Dept: TRANSPLANT | Facility: CLINIC | Age: 62
End: 2022-04-06
Payer: COMMERCIAL

## 2022-04-06 NOTE — TELEPHONE ENCOUNTER
----- Message from Keily Rodriguez sent at 4/5/2022 10:00 AM CDT -----  Patient has upcoming clinic appointment with : it is not his first time visit; so he would like it to be a video visit, thanks. Can you change it and let him know? Thanks.

## 2022-04-06 NOTE — TELEPHONE ENCOUNTER
----- Message from Jay Abrams MD sent at 4/5/2022 10:05 PM CDT -----  Liver transplant labs reviewed and are stable. No changes in his immunosuppression. Please continue to monitor labs per transplant protocol.    Potassium elevated. Please follow hyperkalemia protocol.

## 2022-04-06 NOTE — TELEPHONE ENCOUNTER
Patient notified and instructed via [facility]:    Your labs from Monday have been reviewed, liver function stable. No changes made. Repeat labs due on Monday 4/11/22. Thanks.

## 2022-04-07 ENCOUNTER — EXTERNAL HOME HEALTH (OUTPATIENT)
Dept: HOME HEALTH SERVICES | Facility: HOSPITAL | Age: 62
End: 2022-04-07
Payer: COMMERCIAL

## 2022-04-08 ENCOUNTER — PATIENT MESSAGE (OUTPATIENT)
Dept: TRANSPLANT | Facility: CLINIC | Age: 62
End: 2022-04-08
Payer: COMMERCIAL

## 2022-04-08 LAB — EXT POTASSIUM: 4.6

## 2022-04-12 LAB
EXT ALBUMIN: 4.2
EXT ALKALINE PHOSPHATASE: 96
EXT ALT: 15
EXT AST: 17
EXT BILIRUBIN TOTAL: 0.2
EXT BUN: 20
EXT CALCIUM: 9.5
EXT CHLORIDE: 108
EXT CO2: 23
EXT CREATININE: 0.8 MG/DL
EXT EOSINOPHIL%: 0.9
EXT GFR MDRD NON AF AMER: 99
EXT GLUCOSE: 110
EXT HEMATOCRIT: 32.4
EXT HEMOGLOBIN: 11
EXT LYMPH%: 10.8
EXT MONOCYTES%: 7.1
EXT PLATELETS: 186
EXT POTASSIUM: 5.2
EXT PROTEIN TOTAL: 6.6
EXT SEGS%: 78.1
EXT SODIUM: 142 MMOL/L
EXT TACROLIMUS LVL: 7.2
EXT WBC: 4.3

## 2022-04-18 ENCOUNTER — HOSPITAL ENCOUNTER (OUTPATIENT)
Dept: RADIOLOGY | Facility: HOSPITAL | Age: 62
Discharge: HOME | End: 2022-04-18
Attending: STUDENT IN AN ORGANIZED HEALTH CARE EDUCATION/TRAINING PROGRAM
Payer: COMMERCIAL

## 2022-04-18 ENCOUNTER — LAB VISIT (OUTPATIENT)
Dept: LAB | Facility: HOSPITAL | Age: 62
End: 2022-04-18
Attending: RADIOLOGY
Payer: COMMERCIAL

## 2022-04-18 DIAGNOSIS — Z94.4 LIVER REPLACED BY TRANSPLANT: ICD-10-CM

## 2022-04-18 DIAGNOSIS — C22.0 HCC (HEPATOCELLULAR CARCINOMA): ICD-10-CM

## 2022-04-18 DIAGNOSIS — R91.1 LUNG NODULE: Primary | ICD-10-CM

## 2022-04-18 DIAGNOSIS — R91.1 LUNG NODULE: ICD-10-CM

## 2022-04-18 LAB
CREAT SERPL-MCNC: 0.9 MG/DL (ref 0.5–1.4)
EST. GFR  (AFRICAN AMERICAN): >60 ML/MIN/1.73 M^2
EST. GFR  (NON AFRICAN AMERICAN): >60 ML/MIN/1.73 M^2

## 2022-04-18 PROCEDURE — 74160 CT ABDOMEN W/CONTRAST: CPT | Mod: TC

## 2022-04-18 PROCEDURE — 76705 ECHO EXAM OF ABDOMEN: CPT | Mod: 26,XS,, | Performed by: RADIOLOGY

## 2022-04-18 PROCEDURE — 93976 VASCULAR STUDY: CPT | Mod: 26,,, | Performed by: RADIOLOGY

## 2022-04-18 PROCEDURE — 93976 US DOPPLER LIVER TRANSPLANT POST (XPD): ICD-10-PCS | Mod: 26,,, | Performed by: RADIOLOGY

## 2022-04-18 PROCEDURE — 71260 CT CHEST ABDOMEN WITH CONTRAST (XPD): ICD-10-PCS | Mod: 26,,, | Performed by: RADIOLOGY

## 2022-04-18 PROCEDURE — 25500020 PHARM REV CODE 255: Performed by: STUDENT IN AN ORGANIZED HEALTH CARE EDUCATION/TRAINING PROGRAM

## 2022-04-18 PROCEDURE — 74160 CT CHEST ABDOMEN WITH CONTRAST (XPD): ICD-10-PCS | Mod: 26,,, | Performed by: RADIOLOGY

## 2022-04-18 PROCEDURE — 76705 US DOPPLER LIVER TRANSPLANT POST (XPD): ICD-10-PCS | Mod: 26,XS,, | Performed by: RADIOLOGY

## 2022-04-18 PROCEDURE — 36415 COLL VENOUS BLD VENIPUNCTURE: CPT | Performed by: RADIOLOGY

## 2022-04-18 PROCEDURE — 82565 ASSAY OF CREATININE: CPT | Performed by: RADIOLOGY

## 2022-04-18 PROCEDURE — 74160 CT ABDOMEN W/CONTRAST: CPT | Mod: 26,,, | Performed by: RADIOLOGY

## 2022-04-18 PROCEDURE — 76705 ECHO EXAM OF ABDOMEN: CPT | Mod: 59,TC

## 2022-04-18 PROCEDURE — 71260 CT THORAX DX C+: CPT | Mod: 26,,, | Performed by: RADIOLOGY

## 2022-04-18 PROCEDURE — 93976 VASCULAR STUDY: CPT | Mod: TC

## 2022-04-18 RX ADMIN — IOHEXOL 100 ML: 350 INJECTION, SOLUTION INTRAVENOUS at 12:04

## 2022-04-19 RX ORDER — MYCOPHENOLATE MOFETIL 250 MG/1
1000 CAPSULE ORAL 2 TIMES DAILY
Qty: 240 CAPSULE | Refills: 2 | Status: CANCELLED | OUTPATIENT
Start: 2022-04-19

## 2022-04-20 ENCOUNTER — PATIENT MESSAGE (OUTPATIENT)
Dept: TRANSPLANT | Facility: CLINIC | Age: 62
End: 2022-04-20
Payer: COMMERCIAL

## 2022-04-20 LAB
EXT AFP: 2.8
EXT ALBUMIN: 3.8
EXT ALKALINE PHOSPHATASE: 109
EXT ALT: 13
EXT AST: 15
EXT BILIRUBIN TOTAL: <0.2
EXT BUN: 21
EXT CALCIUM: 9
EXT CHLORIDE: 106
EXT CO2: 22
EXT CREATININE: 0.9 MG/DL
EXT EOSINOPHIL%: 1.2
EXT GFR MDRD NON AF AMER: 95
EXT GLUCOSE: 166
EXT HEMATOCRIT: 30.3
EXT HEMOGLOBIN: 10.2
EXT LYMPH%: 13.9
EXT MONOCYTES%: 9
EXT PLATELETS: 147
EXT POTASSIUM: 5.3
EXT PROTEIN TOTAL: 6
EXT SEGS%: 65.8
EXT SODIUM: 140 MMOL/L
EXT TACROLIMUS LVL: 13.1
EXT WBC: 3.5

## 2022-04-20 NOTE — TELEPHONE ENCOUNTER
----- Message from Jay Abrams MD sent at 4/20/2022  1:40 PM CDT -----  Liver transplant labs reviewed and are stable. Tac level high. Did he take prior to labs? If not, please repeat labs next week.

## 2022-04-20 NOTE — TELEPHONE ENCOUNTER
Patient notified and instructed via [facility];    Per : Liver transplant labs reviewed and are stable. Tacrolimus level high. Did you take your dose prior to labs? If not, please repeat labs next week. Please let me know. Thanks.

## 2022-04-21 ENCOUNTER — TELEPHONE (OUTPATIENT)
Dept: TRANSPLANT | Facility: CLINIC | Age: 62
End: 2022-04-21
Payer: COMMERCIAL

## 2022-04-21 RX ORDER — MYCOPHENOLATE MOFETIL 250 MG/1
1000 CAPSULE ORAL 2 TIMES DAILY
Qty: 240 CAPSULE | Refills: 2 | Status: SHIPPED | OUTPATIENT
Start: 2022-04-21 | End: 2022-04-27

## 2022-04-21 NOTE — TELEPHONE ENCOUNTER
----- Message from Jay Abrams MD sent at 4/21/2022 12:14 PM CDT -----  US concerning for hepatic artery stenosis. Will review with IR.

## 2022-04-21 NOTE — TELEPHONE ENCOUNTER
----- Message from Jay Abrams MD sent at 4/21/2022 12:14 PM CDT -----  Reviewed. No evidence of recurrent liver cancer.    CT concerning for hepatic artery stenosis. Will review with IR.

## 2022-04-21 NOTE — TELEPHONE ENCOUNTER
Patient: Eder Kong       MRN: [id number]      : 1960     Age: 61 y.o.  [address] [facility] [address]    Providers  Hepatologists: Jay Abrams MD  Surgeons:   Radiologists:   Advanced Practice providers:     Priority of review: Transplant related    Patient Transplant Status: Other post OLT    Reason for presentation: Other CT and U/S from 22 concerning for HAS    Clinical Summary: S/P OLT 22 for HCV/ HCC: DCD donor     Imaging to be reviewed: CT scan and U/S of 22    HCC Treatment History: treated pre-transplant with IR embolization    ABO: O POS    Platelets:   Lab Results   Component Value Date/Time     2022 07:40 AM    EXTPLATELETS 147 2022 07:03 AM     Creatinine:   Lab Results   Component Value Date/Time    CREATININE 0.9 2022 11:35 AM    EXTCREATININ 0.9 2022 07:03 AM     Bilirubin:   Lab Results   Component Value Date/Time    BILITOT 0.6 2022 07:40 AM    EXTBILITOTAL <0.2 2022 07:03 AM     AFP Last 3 each if available:   Lab Results   Component Value Date/Time    AFP 5.2 2022 12:29 PM    AFP 5.2 2021 08:02 AM    AFP 4.4 2021 11:58 AM    EXTAFP 2.8 2022 07:03 AM    EXTAFP 2.1 2022 07:04 AM    EXTAFP 2.1 2022 07:04 AM       MELD: MELD-Na score: 10 at 2022  8:20 AM  MELD score: 10 at 2022  8:20 AM  Calculated from:  Serum Creatinine: 0.9 mg/dL (Using min of 1 mg/dL) at 2022  8:20 AM  Serum Sodium: 140 mmol/L (Using max of 137 mmol/L) at 2022  8:20 AM  Total Bilirubin: 2.0 mg/dL at 2022  8:20 AM  INR(ratio): 1.1 at 2022  6:46 AM  Age: 61 years    Plan:     Follow-up Provider:

## 2022-04-22 ENCOUNTER — PATIENT MESSAGE (OUTPATIENT)
Dept: TRANSPLANT | Facility: CLINIC | Age: 62
End: 2022-04-22
Payer: COMMERCIAL

## 2022-04-25 ENCOUNTER — OFFICE VISIT (OUTPATIENT)
Dept: TRANSPLANT | Facility: CLINIC | Age: 62
End: 2022-04-25
Payer: COMMERCIAL

## 2022-04-25 DIAGNOSIS — Z86.19 HISTORY OF HEPATITIS C: ICD-10-CM

## 2022-04-25 DIAGNOSIS — Z79.60 LONG-TERM USE OF IMMUNOSUPPRESSANT MEDICATION: ICD-10-CM

## 2022-04-25 DIAGNOSIS — Z94.4 LIVER TRANSPLANTED: Primary | ICD-10-CM

## 2022-04-25 DIAGNOSIS — Z85.05 HISTORY OF HEPATOCELLULAR CARCINOMA: ICD-10-CM

## 2022-04-25 PROCEDURE — 3051F PR MOST RECENT HEMOGLOBIN A1C LEVEL 7.0 - < 8.0%: ICD-10-PCS | Mod: CPTII,95,, | Performed by: STUDENT IN AN ORGANIZED HEALTH CARE EDUCATION/TRAINING PROGRAM

## 2022-04-25 PROCEDURE — 1159F MED LIST DOCD IN RCRD: CPT | Mod: CPTII,95,, | Performed by: STUDENT IN AN ORGANIZED HEALTH CARE EDUCATION/TRAINING PROGRAM

## 2022-04-25 PROCEDURE — 1160F PR REVIEW ALL MEDS BY PRESCRIBER/CLIN PHARMACIST DOCUMENTED: ICD-10-PCS | Mod: CPTII,95,, | Performed by: STUDENT IN AN ORGANIZED HEALTH CARE EDUCATION/TRAINING PROGRAM

## 2022-04-25 PROCEDURE — 1160F RVW MEDS BY RX/DR IN RCRD: CPT | Mod: CPTII,95,, | Performed by: STUDENT IN AN ORGANIZED HEALTH CARE EDUCATION/TRAINING PROGRAM

## 2022-04-25 PROCEDURE — 1159F PR MEDICATION LIST DOCUMENTED IN MEDICAL RECORD: ICD-10-PCS | Mod: CPTII,95,, | Performed by: STUDENT IN AN ORGANIZED HEALTH CARE EDUCATION/TRAINING PROGRAM

## 2022-04-25 PROCEDURE — 99213 OFFICE O/P EST LOW 20 MIN: CPT | Mod: 95,,, | Performed by: STUDENT IN AN ORGANIZED HEALTH CARE EDUCATION/TRAINING PROGRAM

## 2022-04-25 PROCEDURE — 3051F HG A1C>EQUAL 7.0%<8.0%: CPT | Mod: CPTII,95,, | Performed by: STUDENT IN AN ORGANIZED HEALTH CARE EDUCATION/TRAINING PROGRAM

## 2022-04-25 PROCEDURE — 99213 PR OFFICE/OUTPT VISIT, EST, LEVL III, 20-29 MIN: ICD-10-PCS | Mod: 95,,, | Performed by: STUDENT IN AN ORGANIZED HEALTH CARE EDUCATION/TRAINING PROGRAM

## 2022-04-25 NOTE — LETTER
April 27, 2022        Nitin Farmer  [address]  [address]  [facility] [address]  Phone: [phone]  Fax: [phone]             [facility]  [address]  [facility] [address]  Phone: [phone]   Patient: Eder Kong   MR Number: [id number]   YOB: 1960   Date of Visit: 4/25/2022       Dear Dr. Nitin Farmer    Thank you for referring Eder Kong to me for evaluation. Attached you will find relevant portions of my assessment and plan of care.    If you have questions, please do not hesitate to call me. I look forward to following Eder Kong along with you.    Sincerely,    Jay Abrams MD    Enclosure    If you would like to receive this communication electronically, please contact [email or web address] or [phone] to request [employer] Link access.    [employer] is a tool which provides read-only access to select patient information with whom you have a relationship. Its easy to use and provides real time access to review your patients record including encounter summaries, notes, results, and demographic information.    If you feel you have received this communication in error or would no longer like to receive these types of communications, please e-mail [email or web address]

## 2022-04-25 NOTE — PROGRESS NOTES
Transplant Hepatology  Liver Transplant Recipient Follow Up    The patient location is: home ([place])  The chief complaint leading to consultation is: follow up, OLT    Visit type: audiovisual    Face to Face time with patient: 10  20 minutes of total time spent on the encounter, which includes face to face time and non-face to face time preparing to see the patient (eg, review of tests), Obtaining and/or reviewing separately obtained history, Documenting clinical information in the electronic or other health record, Independently interpreting results (not separately reported) and communicating results to the patient/family/caregiver, or Care coordination (not separately reported).     Each patient to whom he or she provides medical services by telemedicine is:  (1) informed of the relationship between the physician and patient and the respective role of any other health care provider with respect to management of the patient; and (2) notified that he or she may decline to receive medical services by telemedicine and may withdraw from such care at any time.      PCP: Martin Mcdaniel MD    Transplant History  Transplant Date: 1/23/2022  UNOS Native Liver Dx: Primary Liver Malignancy: Hepatoma (HCC) and Cirrhosis    ORGAN: LIVER  Whole or Partial: whole liver  Donor Type: donation after circulatory death   CDC High Risk: yes  Donor CMV Status: Negative  Donor HCV Status: Negative  Donor HBcAb: Negative  Donor HBV SHIVANI: Negative  Donor HCV SHIVANI: Negative  Biliary Anastomosis: end to end  Arterial Anatomy: standard  IVC reconstruction: end to end ivc  Portal vein status: patent    He has had the following complications since transplant: none    Chief complaint: follow up, history of OLT    HPI:  Eder Kong is a 61 y.o. male with history of OLT in 01/2022 for hepatitis C related cirrhosis and HCC who presents for scheduled follow up. He reports diarrhea sometimes with up to 6 bowel movements per day. He is otherwise without  "complaints today. He has not had recent hospitalizations or ED visits. He reports compliance with Prograf and CellCept. He denies recent fever, chills, nausea, vomiting, headache, tremors.    Past Medical History:   Diagnosis Date    Anemia     Arthritis     Diabetes     Dizziness     Dyslipidemia     General anesthetics causing adverse effect in therapeutic use     Hep C w/o coma, chronic     failed 2 rounds of interferon-based medication. no protease inhibitorrs used    Hypertension     not on medication    Kidney stone     Liver cancer        Past Surgical History:   Procedure Laterality Date    AORTIC VALVE REPLACEMENT      due to aortic regurgitation    CARDIAC SURGERY      CATHETERIZATION OF BOTH LEFT AND RIGHT HEART N/A 2021    Procedure: CATHETERIZATION, HEART, BOTH LEFT AND RIGHT;  Surgeon: Eder Beltran MD;  Location: [facility] CATH LAB;  Service: Cardiology;  Laterality: N/A;    COLONOSCOPY W/ POLYPECTOMY          ESOPHAGOGASTRODUODENOSCOPY      x2 with cautery in ,     LIVER TRANSPLANT N/A 2022    Procedure: TRANSPLANT, LIVER;  Surgeon: Jesús Austin MD;  Location: [facility] OR [address] [place] [address];  Service: Transplant;  Laterality: N/A;    mastoid bone      right ear       Family History   Problem Relation Age of Onset    Diabetes Sister     Diabetes Brother        Social History     Tobacco Use    Smoking status: Former Smoker     Packs/day: 1.00     Years: 25.00     Pack years: 25.00     Quit date: 1999     Years since quittin.8    Smokeless tobacco: Never Used   Substance Use Topics    Alcohol use: No     Comment: quit     Drug use: No       Current Outpatient Medications   Medication Sig Dispense Refill    aspirin (ECOTRIN) 81 MG EC tablet Take 1 tablet (81 mg total) by mouth once daily. 30 tablet 5    BD ULTRA-FINE MINI PEN NEEDLE 31 gauge x 3/16" Ndle USE AS DIRECTED EVERY DAY      calcium carbonate-vitamin D3 600 mg-20 mcg (800 unit) Tab Take 1 " tablet by mouth once daily. 30 tablet 5    carvediloL (COREG) 12.5 MG tablet Take 1 tablet (12.5 mg total) by mouth 2 (two) times daily. 60 tablet 11    CONTOUR NEXT TEST STRIPS Strp USE TO TEST BLOOD GLUCOSE TWICE DAILY      EScitalopram oxalate (LEXAPRO) 5 MG Tab Take 1 tablet (5 mg total) by mouth once daily. 30 tablet 5    famotidine (PEPCID) 20 MG tablet Take 1 tablet (20 mg total) by mouth once daily. Stop 2/22/22 30 tablet 0    insulin aspart U-100 (NOVOLOG) 100 unit/mL (3 mL) InPn pen Inject 6 Units into the skin 3 (three) times daily. Plus sliding scale: TDD: 33 units 15 mL 11    mycophenolate (CELLCEPT) 250 mg Cap Take 4 capsules (1,000 mg total) by mouth 2 (two) times daily. 240 capsule 2    oxyCODONE (OXY-IR) 5 mg Cap Take 1 capsule (5 mg total) by mouth every 4 (four) hours as needed for Pain. 40 capsule 0    OZEMPIC 1 mg/dose (4 mg/3 mL) Inject 1 mg into the skin once a week.      predniSONE (DELTASONE) 5 MG tablet Take by mouth daily: 20mg 1/29-2/4, 15mg 2/5-2/11, 10mg 2/12-2/18, 5mg 2/19-2/25, Stop 2/26/22 (Patient taking differently: Take by mouth daily: 20mg 1/29-2/4, 15mg 2/5-2/11, 10mg 2/12-2/18, 5mg 2/19-2/25, Stop 2/26/22) 75 tablet 1    sulfamethoxazole-trimethoprim 400-80mg (BACTRIM,SEPTRA) 400-80 mg per tablet Take 1 tablet by mouth every morning. Stop 7/22/22 30 tablet 5    tacrolimus (PROGRAF) 1 MG Cap Take 4 capsules (4 mg total) by mouth every 12 (twelve) hours. 240 capsule 11    TOUJEO MAX U-300 SOLOSTAR 300 unit/mL (3 mL) insulin pen Inject 18 Units into the skin once daily. 3 pen 11    valGANciclovir (VALCYTE) 450 mg Tab Take 1 tablet (450 mg total) by mouth once daily. Stop 4/23/22 30 tablet 2     No current facility-administered medications for this visit.       Review of patient's allergies indicates:  No Known Allergies    Review of Systems   Constitutional: Negative for fever and weight loss.   Gastrointestinal: Positive for diarrhea. Negative for abdominal pain,  blood in stool, constipation, heartburn, melena, nausea and vomiting.   Neurological: Negative for tremors and headaches.       There were no vitals filed for this visit.    Physical Exam  Constitutional:       General: He is not in acute distress.     Appearance: He is not ill-appearing.   HENT:      Head: Normocephalic and atraumatic.   Eyes:      General: No scleral icterus.     Extraocular Movements: Extraocular movements intact.   Pulmonary:      Effort: No respiratory distress.   Skin:     Coloration: Skin is not jaundiced.   Neurological:      Mental Status: He is alert.         LABS:  Lab Results   Component Value Date    WBC 4.46 03/16/2022    HGB 10.2 (L) 03/16/2022    HCT 31.9 (L) 03/16/2022    MCV 93 03/16/2022     03/16/2022       Lab Results   Component Value Date     03/16/2022    K 5.2 (H) 03/16/2022     03/16/2022    CO2 25 03/16/2022    BUN 23 03/16/2022    CREATININE 0.9 04/18/2022    CALCIUM 9.2 03/16/2022    ANIONGAP 8 03/16/2022    ESTGFRAFRICA >60 04/18/2022    EGFRNONAA >60 04/18/2022       Lab Results   Component Value Date    ALT 16 03/16/2022    AST 19 03/16/2022    GGT 50 06/17/2021    ALKPHOS 81 03/16/2022    BILITOT 0.6 03/16/2022       Lab Results   Component Value Date    TACROLIMUS 6.7 03/16/2022       Assessment:  61 y.o. male with history of OLT in 01/2022 for hepatitis C related cirrhosis and HCC who presents for scheduled follow up.    1. Liver transplanted    2. Long-term use of immunosuppressant medication    3. History of hepatocellular carcinoma    4. History of hepatitis C        Recommendations:  1. Allograft Function  - Excellent graft function with normal LFTs in 04/2022    2. Immunosuppression   - Continue Prograf 4mg twice daily   - Decrease CellCept to 500mg twice daily given diarrhea.    3. History of HCC: Cross-sectional imaging in 04/2022 without evidence of recurrence. Continue imaging surveillance per protocol.    4. ?Hepatic artery stenosis: US  and CT 04/2022 concerning for hepatic artery stenosis. Reviewed with transplant surgery and IR. Will plan for angiogram and possible angioplasty with IR.    5. History of HCV: He has completed treatment with SVR.    6. Kidney function   - Creatinine 0.9 in 03/2022  - Avoid NSAIDs as able and maintain adequate hydration    7. Health Maintenance/Screening:  - Recommend age appropriate cancer screening  - Skin cancer: Recommend use of sunscreen SPF 30 or higher, hat and sunglasses while outside, dermatologist visit annually or sooner if any concerning lesions.  - Osteoporosis: Recommend bone density testing every 2-3 years if previously normal or annually if previously abnormal.    Return to clinic in 3 months.    UNOS Patient Status  Functional Status: 90% - Able to carry on normal activity: minor symptoms of disease  Physical Capacity: No Limitations    Jay Abrams MD  Staff Physician  Hepatology and Liver Transplant  [facility]  [facility] [place]

## 2022-04-25 NOTE — Clinical Note
Decreased MMF to 500mg BID. Continue tac at current dose. Needs angiogram for possible hepatic artery stenosis which IR should be arranging. Return 3 months.

## 2022-04-26 ENCOUNTER — TELEPHONE (OUTPATIENT)
Dept: TRANSPLANT | Facility: CLINIC | Age: 62
End: 2022-04-26
Payer: COMMERCIAL

## 2022-04-26 LAB
EXT ALBUMIN: 3.9
EXT ALKALINE PHOSPHATASE: 93
EXT ALT: 8
EXT AST: 16
EXT BILIRUBIN TOTAL: 0.2
EXT BUN: 20
EXT CALCIUM: 9.1
EXT CHLORIDE: 105
EXT CO2: 22
EXT CREATININE: 0.79 MG/DL
EXT EOSINOPHIL%: 1.6
EXT GFR MDRD NON AF AMER: 100
EXT GLUCOSE: 120
EXT HEMATOCRIT: 30.5
EXT HEMOGLOBIN: 10.2
EXT LYMPH%: 23.3
EXT MONOCYTES%: 9.4
EXT PLATELETS: 173
EXT POTASSIUM: 5.6
EXT PROTEIN TOTAL: 6.1
EXT SEGS%: 61.2
EXT SODIUM: 141 MMOL/L
EXT TACROLIMUS LVL: 9.2
EXT WBC: 2.5

## 2022-04-26 NOTE — TELEPHONE ENCOUNTER
"----- Message from Daryl Rodriguez MA sent at 4/26/2022  1:54 PM CDT -----  Regarding: FW: Orders  Contact: Kristyn KELLOGG    ----- Message -----  From: Anni Villatoro  Sent: 4/26/2022  12:08 PM CDT  To: [facility] Post-Liver Transplant Non-Clinical  Subject: Orders                                           Consult/Advisory:           Name Of Caller: Kristyn KAMARA"     Contact Preference?: [phone] opt 2         Coordinator Name:  Keily Rodriguez           What is the nature of the call?:    Kristyn is calling in regards to some add ons that were signed by Keily and would like a call to discuss. Kristyn said they don't draw blood at customer service but she says they did draw labs yesterday.       Additional Notes:  "Thank you for all that you do for our patients'"          "

## 2022-04-26 NOTE — TELEPHONE ENCOUNTER
Spoke to [facility] , they will credit patient for AFP that was drawn on 4/25/22, she said.  as it was not ordered by our office

## 2022-04-27 DIAGNOSIS — I77.1 HEPATIC ARTERY STENOSIS: Primary | ICD-10-CM

## 2022-04-27 DIAGNOSIS — Z94.4 LIVER REPLACED BY TRANSPLANT: Primary | ICD-10-CM

## 2022-04-27 RX ORDER — MYCOPHENOLATE MOFETIL 250 MG/1
500 CAPSULE ORAL 2 TIMES DAILY
Qty: 240 CAPSULE | Refills: 2 | Status: SHIPPED | OUTPATIENT
Start: 2022-04-27 | End: 2022-08-19

## 2022-04-28 DIAGNOSIS — Z00.6 RESEARCH STUDY PATIENT: Primary | ICD-10-CM

## 2022-04-28 DIAGNOSIS — C22.0 HCC (HEPATOCELLULAR CARCINOMA): ICD-10-CM

## 2022-05-03 ENCOUNTER — HOSPITAL ENCOUNTER (OUTPATIENT)
Dept: INTERVENTIONAL RADIOLOGY/VASCULAR | Facility: HOSPITAL | Age: 62
Discharge: HOME | End: 2022-05-03
Attending: FAMILY MEDICINE
Payer: COMMERCIAL

## 2022-05-03 ENCOUNTER — ANESTHESIA (OUTPATIENT)
Dept: INTERVENTIONAL RADIOLOGY/VASCULAR | Facility: HOSPITAL | Age: 62
End: 2022-05-03
Payer: COMMERCIAL

## 2022-05-03 ENCOUNTER — RESEARCH ENCOUNTER (OUTPATIENT)
Dept: RESEARCH | Facility: HOSPITAL | Age: 62
End: 2022-05-03
Payer: COMMERCIAL

## 2022-05-03 VITALS
SYSTOLIC BLOOD PRESSURE: 152 MMHG | OXYGEN SATURATION: 100 % | RESPIRATION RATE: 18 BRPM | WEIGHT: 157 LBS | HEIGHT: 65 IN | BODY MASS INDEX: 26.16 KG/M2 | DIASTOLIC BLOOD PRESSURE: 80 MMHG | TEMPERATURE: 99 F | HEART RATE: 85 BPM

## 2022-05-03 DIAGNOSIS — I77.1 HEPATIC ARTERY STENOSIS: ICD-10-CM

## 2022-05-03 DIAGNOSIS — Z94.4 LIVER REPLACED BY TRANSPLANT: Primary | ICD-10-CM

## 2022-05-03 LAB
GLUCOSE SERPL-MCNC: 78 MG/DL (ref 70–110)
POCT GLUCOSE: 124 MG/DL (ref 70–110)
POCT GLUCOSE: 78 MG/DL (ref 70–110)

## 2022-05-03 PROCEDURE — 82962 GLUCOSE BLOOD TEST: CPT

## 2022-05-03 PROCEDURE — 63600175 PHARM REV CODE 636 W HCPCS: Performed by: NURSE ANESTHETIST, CERTIFIED REGISTERED

## 2022-05-03 PROCEDURE — 27201059 IR ANGIOGRAM VISCERAL

## 2022-05-03 PROCEDURE — D9220A PRA ANESTHESIA: Mod: CRNA,,, | Performed by: NURSE ANESTHETIST, CERTIFIED REGISTERED

## 2022-05-03 PROCEDURE — 75774 ARTERY X-RAY EACH VESSEL: CPT | Mod: 26,59,, | Performed by: RADIOLOGY

## 2022-05-03 PROCEDURE — 37236: ICD-10-PCS | Mod: ,,, | Performed by: RADIOLOGY

## 2022-05-03 PROCEDURE — D9220A PRA ANESTHESIA: Mod: ANES,,, | Performed by: ANESTHESIOLOGY

## 2022-05-03 PROCEDURE — 37236 OPEN/PERQ PLACE STENT 1ST: CPT | Performed by: RADIOLOGY

## 2022-05-03 PROCEDURE — D9220A PRA ANESTHESIA: ICD-10-PCS | Mod: CRNA,,, | Performed by: NURSE ANESTHETIST, CERTIFIED REGISTERED

## 2022-05-03 PROCEDURE — 37000008 HC ANESTHESIA 1ST 15 MINUTES

## 2022-05-03 PROCEDURE — C1760 CLOSURE DEV, VASC: HCPCS

## 2022-05-03 PROCEDURE — D9220A PRA ANESTHESIA: ICD-10-PCS | Mod: ANES,,, | Performed by: ANESTHESIOLOGY

## 2022-05-03 PROCEDURE — 75898 PR  ANGIOGRAM,F/U STUDY,CATH THER/EMBOL/INF: ICD-10-PCS | Mod: 26,,, | Performed by: RADIOLOGY

## 2022-05-03 PROCEDURE — 75898 FOLLOW-UP ANGIOGRAPHY: CPT | Mod: 26,,, | Performed by: RADIOLOGY

## 2022-05-03 PROCEDURE — C1877 STENT, NON-COAT/COV W/O DEL: HCPCS

## 2022-05-03 PROCEDURE — 75774 PR  ANGIO EA ADDNL SELECTV VESSEL: ICD-10-PCS | Mod: 26,59,, | Performed by: RADIOLOGY

## 2022-05-03 PROCEDURE — 75887 VEIN X-RAY LIVER W/O HEMODYN: CPT | Mod: 26,59,, | Performed by: RADIOLOGY

## 2022-05-03 PROCEDURE — 76937 US GUIDE VASCULAR ACCESS: CPT | Mod: 26,,, | Performed by: RADIOLOGY

## 2022-05-03 PROCEDURE — 36247 INS CATH ABD/L-EXT ART 3RD: CPT | Mod: 51,,, | Performed by: RADIOLOGY

## 2022-05-03 PROCEDURE — 75726 CHG ANGIO VISCERAL SELECTV/SUBSELEC: ICD-10-PCS | Mod: 26,59,, | Performed by: RADIOLOGY

## 2022-05-03 PROCEDURE — 76937 PR  US GUIDE, VASCULAR ACCESS: ICD-10-PCS | Mod: 26,,, | Performed by: RADIOLOGY

## 2022-05-03 PROCEDURE — 37000009 HC ANESTHESIA EA ADD 15 MINS

## 2022-05-03 PROCEDURE — 75887 PR  PERCUT XHEPATIC PORTOGRAM: ICD-10-PCS | Mod: 26,59,, | Performed by: RADIOLOGY

## 2022-05-03 PROCEDURE — 75898 FOLLOW-UP ANGIOGRAPHY: CPT | Mod: TC | Performed by: RADIOLOGY

## 2022-05-03 PROCEDURE — 25000003 PHARM REV CODE 250: Performed by: NURSE ANESTHETIST, CERTIFIED REGISTERED

## 2022-05-03 PROCEDURE — 75726 ARTERY X-RAYS ABDOMEN: CPT | Mod: TC | Performed by: RADIOLOGY

## 2022-05-03 PROCEDURE — 75774 ARTERY X-RAY EACH VESSEL: CPT | Mod: TC,59 | Performed by: RADIOLOGY

## 2022-05-03 PROCEDURE — 76937 US GUIDE VASCULAR ACCESS: CPT | Mod: TC | Performed by: RADIOLOGY

## 2022-05-03 PROCEDURE — 75887 VEIN X-RAY LIVER W/O HEMODYN: CPT | Mod: TC,59 | Performed by: RADIOLOGY

## 2022-05-03 PROCEDURE — 75726 ARTERY X-RAYS ABDOMEN: CPT | Mod: 26,59,, | Performed by: RADIOLOGY

## 2022-05-03 PROCEDURE — 36247 PR PLACE CATH SUBSUBSELECT ART,ABD/PEL: ICD-10-PCS | Mod: 51,,, | Performed by: RADIOLOGY

## 2022-05-03 RX ORDER — FENTANYL CITRATE 50 UG/ML
25 INJECTION, SOLUTION INTRAMUSCULAR; INTRAVENOUS EVERY 5 MIN PRN
Status: DISCONTINUED | OUTPATIENT
Start: 2022-05-03 | End: 2022-05-04 | Stop reason: HOSPADM

## 2022-05-03 RX ORDER — FENTANYL CITRATE 50 UG/ML
INJECTION, SOLUTION INTRAMUSCULAR; INTRAVENOUS
Status: DISCONTINUED | OUTPATIENT
Start: 2022-05-03 | End: 2022-05-03

## 2022-05-03 RX ORDER — SODIUM CHLORIDE 9 MG/ML
INJECTION, SOLUTION INTRAVENOUS CONTINUOUS PRN
Status: DISCONTINUED | OUTPATIENT
Start: 2022-05-03 | End: 2022-05-03

## 2022-05-03 RX ORDER — CLOPIDOGREL BISULFATE 75 MG/1
75 TABLET ORAL DAILY
Qty: 30 TABLET | Refills: 6 | Status: SHIPPED | OUTPATIENT
Start: 2022-05-04 | End: 2022-12-12

## 2022-05-03 RX ORDER — LIDOCAINE HCL/PF 100 MG/5ML
SYRINGE (ML) INTRAVENOUS
Status: DISCONTINUED | OUTPATIENT
Start: 2022-05-03 | End: 2022-05-03

## 2022-05-03 RX ORDER — ONDANSETRON 2 MG/ML
INJECTION INTRAMUSCULAR; INTRAVENOUS
Status: DISCONTINUED | OUTPATIENT
Start: 2022-05-03 | End: 2022-05-03

## 2022-05-03 RX ORDER — PHENYLEPHRINE HCL IN 0.9% NACL 1 MG/10 ML
SYRINGE (ML) INTRAVENOUS
Status: DISCONTINUED | OUTPATIENT
Start: 2022-05-03 | End: 2022-05-03

## 2022-05-03 RX ORDER — SODIUM CHLORIDE 0.9 % (FLUSH) 0.9 %
10 SYRINGE (ML) INJECTION
Status: DISCONTINUED | OUTPATIENT
Start: 2022-05-03 | End: 2022-05-04 | Stop reason: HOSPADM

## 2022-05-03 RX ORDER — METOCLOPRAMIDE HYDROCHLORIDE 5 MG/ML
10 INJECTION INTRAMUSCULAR; INTRAVENOUS EVERY 10 MIN PRN
Status: DISCONTINUED | OUTPATIENT
Start: 2022-05-03 | End: 2022-05-04 | Stop reason: HOSPADM

## 2022-05-03 RX ORDER — CLOPIDOGREL 300 MG/1
TABLET, FILM COATED ORAL
Status: DISCONTINUED | OUTPATIENT
Start: 2022-05-03 | End: 2022-05-03

## 2022-05-03 RX ORDER — CEFAZOLIN SODIUM 1 G/3ML
INJECTION, POWDER, FOR SOLUTION INTRAMUSCULAR; INTRAVENOUS
Status: DISCONTINUED | OUTPATIENT
Start: 2022-05-03 | End: 2022-05-03

## 2022-05-03 RX ORDER — ROCURONIUM BROMIDE 10 MG/ML
INJECTION, SOLUTION INTRAVENOUS
Status: DISCONTINUED | OUTPATIENT
Start: 2022-05-03 | End: 2022-05-03

## 2022-05-03 RX ORDER — HEPARIN SODIUM 1000 [USP'U]/ML
INJECTION, SOLUTION INTRAVENOUS; SUBCUTANEOUS
Status: DISCONTINUED | OUTPATIENT
Start: 2022-05-03 | End: 2022-05-03

## 2022-05-03 RX ORDER — LIDOCAINE HYDROCHLORIDE 10 MG/ML
1 INJECTION, SOLUTION EPIDURAL; INFILTRATION; INTRACAUDAL; PERINEURAL ONCE
Status: DISCONTINUED | OUTPATIENT
Start: 2022-05-03 | End: 2022-05-04 | Stop reason: HOSPADM

## 2022-05-03 RX ORDER — SODIUM CHLORIDE 9 MG/ML
INJECTION, SOLUTION INTRAVENOUS CONTINUOUS
Status: DISCONTINUED | OUTPATIENT
Start: 2022-05-03 | End: 2022-05-04 | Stop reason: HOSPADM

## 2022-05-03 RX ORDER — PROPOFOL 10 MG/ML
VIAL (ML) INTRAVENOUS
Status: DISCONTINUED | OUTPATIENT
Start: 2022-05-03 | End: 2022-05-03

## 2022-05-03 RX ORDER — MIDAZOLAM HYDROCHLORIDE 1 MG/ML
INJECTION INTRAMUSCULAR; INTRAVENOUS
Status: DISCONTINUED | OUTPATIENT
Start: 2022-05-03 | End: 2022-05-03

## 2022-05-03 RX ORDER — ONDANSETRON 2 MG/ML
4 INJECTION INTRAMUSCULAR; INTRAVENOUS DAILY PRN
Status: DISCONTINUED | OUTPATIENT
Start: 2022-05-03 | End: 2022-05-04 | Stop reason: HOSPADM

## 2022-05-03 RX ADMIN — ROCURONIUM BROMIDE 40 MG: 10 INJECTION, SOLUTION INTRAVENOUS at 10:05

## 2022-05-03 RX ADMIN — ROCURONIUM BROMIDE 10 MG: 10 INJECTION, SOLUTION INTRAVENOUS at 11:05

## 2022-05-03 RX ADMIN — SUGAMMADEX 210 MG: 100 INJECTION, SOLUTION INTRAVENOUS at 12:05

## 2022-05-03 RX ADMIN — FENTANYL CITRATE 100 MCG: 50 INJECTION, SOLUTION INTRAMUSCULAR; INTRAVENOUS at 10:05

## 2022-05-03 RX ADMIN — SODIUM CHLORIDE: 9 INJECTION, SOLUTION INTRAVENOUS at 10:05

## 2022-05-03 RX ADMIN — ROCURONIUM BROMIDE 10 MG: 10 INJECTION, SOLUTION INTRAVENOUS at 12:05

## 2022-05-03 RX ADMIN — HEPARIN SODIUM 1000 UNITS: 1000 INJECTION, SOLUTION INTRAVENOUS; SUBCUTANEOUS at 12:05

## 2022-05-03 RX ADMIN — Medication 100 MCG: at 11:05

## 2022-05-03 RX ADMIN — PROPOFOL 140 MG: 10 INJECTION, EMULSION INTRAVENOUS at 10:05

## 2022-05-03 RX ADMIN — HEPARIN SODIUM 5000 UNITS: 1000 INJECTION, SOLUTION INTRAVENOUS; SUBCUTANEOUS at 11:05

## 2022-05-03 RX ADMIN — Medication 100 MG: at 10:05

## 2022-05-03 RX ADMIN — ONDANSETRON 4 MG: 2 INJECTION, SOLUTION INTRAMUSCULAR; INTRAVENOUS at 12:05

## 2022-05-03 RX ADMIN — CLOPIDOGREL BISULFATE 300 MG: 300 TABLET, FILM COATED ORAL at 12:05

## 2022-05-03 RX ADMIN — HEPARIN SODIUM 2000 UNITS: 1000 INJECTION, SOLUTION INTRAVENOUS; SUBCUTANEOUS at 11:05

## 2022-05-03 RX ADMIN — CEFAZOLIN 2 G: 330 INJECTION, POWDER, FOR SOLUTION INTRAMUSCULAR; INTRAVENOUS at 12:05

## 2022-05-03 RX ADMIN — MIDAZOLAM HYDROCHLORIDE 2 MG: 1 INJECTION, SOLUTION INTRAMUSCULAR; INTRAVENOUS at 10:05

## 2022-05-03 NOTE — ANESTHESIA PROCEDURE NOTES
Intubation  Performed by: Guerrero Hdz CRNA  Authorized by: Salvatore Hogan III, MD     Intubation:     Induction:  Intravenous    Intubated:  Postinduction    Mask Ventilation:  Easy mask    Attempts:  1    Attempted By:  CRNA    Method of Intubation:  Video laryngoscopy    Blade:  Mehta 3    Laryngeal View Grade: Grade I - full view of cords      Difficult Airway Encountered?: No      Complications:  None    Airway Device:  Oral endotracheal tube    Airway Device Size:  7.5    Style/Cuff Inflation:  Cuffed (inflated to minimal occlusive pressure)    Tube secured:  24    Secured at:  The lips    Placement Verified By:  Capnometry    Complicating Factors:  None    Findings Post-Intubation:  BS equal bilateral and atraumatic/condition of teeth unchanged

## 2022-05-03 NOTE — PROGRESS NOTES
RESEARCH STUDY FOLLOW-UP SPECIMEN COLLECTION ENCOUNTER  ORGAN TRANSPLANT  [facility] JESSY LARRY    Study Title: Role of Tumor-Induced Immune Tolerance in the Patient Response to Locoregional Therapy: Implications in Assessment Risk of Hepatocellular Carcinoma Recurrence Following Liver Transplantation    IRB #: [id number].B    IRB Approval Date: 6/8/2016    : Darrin Leyva MD  Sub-investigator: Pranav Nieto, PhD    Patient Number: [id number]    In accordance with the study protocol, [facility] orders were placed and follow-up specimens were collected on (date: 05/03/2022) in:  [facility] VNP: YES/NO: yes  [facility] LABTX: YES/NO: no  [facility] LAB IM: YES/NO: no    Sarah Renteria  Admin Research- Liver Transplant

## 2022-05-03 NOTE — PROGRESS NOTES
Md with Radiology at bedside to evaluate bleeding from groin site. Dressing is saturated. MD put steri strips on incision. Instructed to wait 15 minutes, if bleeding has subsided patient may be discharged.

## 2022-05-03 NOTE — PROGRESS NOTES
Right groin has remained c/d/i with no shadow bleeding noted. Patient turned onto his left side to urinate and small amount of bleeding to right groin noted. Notified IR

## 2022-05-03 NOTE — ANESTHESIA POSTPROCEDURE EVALUATION
Anesthesia Post Evaluation    Patient: Eder Kong    Procedure(s) Performed: * No procedures listed *    Final Anesthesia Type: general      Patient location during evaluation: PACU  Patient participation: Yes- Able to Participate  Level of consciousness: awake and alert  Post-procedure vital signs: reviewed and stable  Pain management: adequate  Airway patency: patent    PONV status at discharge: No PONV  Anesthetic complications: no      Cardiovascular status: blood pressure returned to baseline  Respiratory status: unassisted  Hydration status: euvolemic  Follow-up not needed.          Vitals Value Taken Time   /77 05/03/22 1432   Temp  05/03/22 1446   Pulse 77 05/03/22 1445   Resp 17 05/03/22 1445   SpO2 100 % 05/03/22 1445   Vitals shown include unvalidated device data.      No case tracking events are documented in the log.      Pain/Bibiana Score: Bibiana Score: 8 (5/3/2022  1:00 PM)

## 2022-05-03 NOTE — PLAN OF CARE
Pt arrived to IR room 189 for a hepatic artery embolization with plasty/possible stent. All monitoring/sedation per anesthesia/CRNA.

## 2022-05-03 NOTE — PLAN OF CARE
Pt completed hepatic arterial angiogram with angioplasty and stent placement. 6F Angioseal deployed to right groin successfully with hemostasis achieved at 1234. Pt to remain flat for 2hrs until 1434. Pt to recover in PACU prior to discharge home. Will escort pt to PACU with CRNA and give report at bedside to recovery nurse. Pt received loading dose of 300mg of Plavix immediately following stent placement, to be discharged with daily Plavix 75mg starting tomorrow. To continue with home medication of daily Aspirin 81mg.

## 2022-05-03 NOTE — DISCHARGE SUMMARY
Radiology Discharge Summary      Hospital Course: No complications    Admit Date: 5/3/2022  Discharge Date: 05/03/2022     Instructions Given to Patient: Yes  Diet: Resume prior diet  Activity: activity as tolerated and no driving for today    Description of Condition on Discharge: Stable  Vital Signs (Most Recent): Temp: 98.6 °F (37 °C) (05/03/22 0850)  Pulse: 82 (05/03/22 0850)  Resp: 16 (05/03/22 0850)  BP: (!) 153/81 (05/03/22 0851)  SpO2: 98 % (05/03/22 0850)    Discharge Disposition: Home    Discharge Diagnosis: HAS s/p angioplasty and stenting    Follow up: As scheduled    Kyle Garzon M.D.  Interventional Radiology  Department of Radiology  Pager: [phone]

## 2022-05-03 NOTE — ANESTHESIA PROCEDURE NOTES
Intubation    Date/Time: 5/3/2022 10:56 AM  Performed by: Guerrero Hdz CRNA  Authorized by: Salvatore Hogan III, MD     Intubation:     Induction:  Intravenous    Intubated:  Postinduction    Mask Ventilation:  Easy with oral airway    Attempts:  1    Attempted By:  KEVIN    Blade:  Mehta 3    Laryngeal View Grade: Grade I - full view of cords      Difficult Airway Encountered?: No      Complications:  None    Airway Device:  Oral endotracheal tube    Airway Device Size:  7.5    Style/Cuff Inflation:  Cuffed    Inflation Amount (mL):  7    Tube secured:  21    Secured at:  The teeth    Placement Verified By:  Capnometry    Complicating Factors:  None    Findings Post-Intubation:  BS equal bilateral

## 2022-05-03 NOTE — TRANSFER OF CARE
"Anesthesia Transfer of Care Note    Patient: Eder Kong    Procedure(s) Performed: * No procedures listed *    Patient location: [facility]    Anesthesia Type: general    Transport from OR: Transported from OR on 6-10 L/min O2 by face mask with adequate spontaneous ventilation    Post pain: adequate analgesia    Post assessment: no apparent anesthetic complications and tolerated procedure well    Post vital signs: stable    Level of consciousness: responds to stimulation    Nausea/Vomiting: no vomiting    Complications: none    Transfer of care protocol was followed      Last vitals:   Visit Vitals  /69 (BP Location: Right arm, Patient Position: Lying)   Pulse 70   Temp 37 °C (98.6 °F) (Temporal)   Resp 16   Ht 5' 5" (1.651 m)   Wt 71.2 kg (157 lb)   SpO2 100%   BMI 26.13 kg/m²     "

## 2022-05-03 NOTE — ANESTHESIA PREPROCEDURE EVALUATION
"                                                                                                             05/03/2022  Pre-operative evaluation for IR angiogram    Eder Kong is a 61 y.o. male     Patient Active Problem List   Diagnosis    Anemia    Diabetes mellitus    HCC (hepatocellular carcinoma)    HCV (hepatitis C virus)    History of aortic valve replacement with bioprosthetic valve    Strongyloides stercoralis infection    Need for zoster vaccination    Need for vaccination with 13-polyvalent pneumococcal conjugate vaccine    Need for vaccination with Twinrix    Liver transplant candidate    S/P liver transplant    At risk for opportunistic infections    Prophylactic immunotherapy    Type 2 diabetes mellitus with hyperglycemia    Adverse effect of corticosteroids    Long-term use of immunosuppressant medication    Weakness    Anemia of chronic disease    Thrombocytopenia, unspecified    Malnutrition of mild degree    Encounter for weaning from ventilator       Review of patient's allergies indicates:  No Known Allergies    Current Outpatient Medications on File Prior to Encounter   Medication Sig Dispense Refill    aspirin (ECOTRIN) 81 MG EC tablet Take 1 tablet (81 mg total) by mouth once daily. 30 tablet 5    BD ULTRA-FINE MINI PEN NEEDLE 31 gauge x 3/16" Ndle USE AS DIRECTED EVERY DAY      calcium carbonate-vitamin D3 600 mg-20 mcg (800 unit) Tab Take 1 tablet by mouth once daily. 30 tablet 5    carvediloL (COREG) 12.5 MG tablet Take 1 tablet (12.5 mg total) by mouth 2 (two) times daily. 60 tablet 11    CONTOUR NEXT TEST STRIPS Strp USE TO TEST BLOOD GLUCOSE TWICE DAILY      EScitalopram oxalate (LEXAPRO) 5 MG Tab Take 1 tablet (5 mg total) by mouth once daily. 30 tablet 5    mycophenolate (CELLCEPT) 250 mg Cap Take 2 capsules (500 mg total) by mouth 2 (two) times daily. 240 capsule 2    OZEMPIC 1 mg/dose (4 mg/3 mL) Inject 1 mg into the skin once a week.      " sulfamethoxazole-trimethoprim 400-80mg (BACTRIM,SEPTRA) 400-80 mg per tablet Take 1 tablet by mouth every morning. Stop 7/22/22 30 tablet 5    tacrolimus (PROGRAF) 1 MG Cap Take 4 capsules (4 mg total) by mouth every 12 (twelve) hours. 240 capsule 11    TOUJEO MAX U-300 SOLOSTAR 300 unit/mL (3 mL) insulin pen Inject 18 Units into the skin once daily. (Patient taking differently: Inject 26 Units into the skin once daily.) 3 pen 11    famotidine (PEPCID) 20 MG tablet Take 1 tablet (20 mg total) by mouth once daily. Stop 2/22/22 30 tablet 0    insulin aspart U-100 (NOVOLOG) 100 unit/mL (3 mL) InPn pen Inject 6 Units into the skin 3 (three) times daily. Plus sliding scale: TDD: 33 units 15 mL 11    oxyCODONE (OXY-IR) 5 mg Cap Take 1 capsule (5 mg total) by mouth every 4 (four) hours as needed for Pain. 40 capsule 0    predniSONE (DELTASONE) 5 MG tablet Take by mouth daily: 20mg 1/29-2/4, 15mg 2/5-2/11, 10mg 2/12-2/18, 5mg 2/19-2/25, Stop 2/26/22 (Patient taking differently: Take by mouth daily: 20mg 1/29-2/4, 15mg 2/5-2/11, 10mg 2/12-2/18, 5mg 2/19-2/25, Stop 2/26/22) 75 tablet 1    valGANciclovir (VALCYTE) 450 mg Tab Take 1 tablet (450 mg total) by mouth once daily. Stop 4/23/22 30 tablet 2     No current facility-administered medications on file prior to encounter.       Past Surgical History:   Procedure Laterality Date    AORTIC VALVE REPLACEMENT  2018    due to aortic regurgitation    CARDIAC SURGERY      CATHETERIZATION OF BOTH LEFT AND RIGHT HEART N/A 7/8/2021    Procedure: CATHETERIZATION, HEART, BOTH LEFT AND RIGHT;  Surgeon: Eder Beltran MD;  Location: [facility] CATH LAB;  Service: Cardiology;  Laterality: N/A;    COLONOSCOPY W/ POLYPECTOMY      2011    ESOPHAGOGASTRODUODENOSCOPY      x2 with cautery in 2001, 2012    LIVER TRANSPLANT N/A 1/23/2022    Procedure: TRANSPLANT, LIVER;  Surgeon: Jesús Austin MD;  Location: [facility] OR [address] [place] [address];  Service: Transplant;  Laterality: N/A;    mastoid bone       "right ear       Social History     Socioeconomic History    Marital status:    Tobacco Use    Smoking status: Former Smoker     Packs/day: 1.00     Years: 25.00     Pack years: 25.00     Quit date: 1999     Years since quittin.8    Smokeless tobacco: Never Used   Substance and Sexual Activity    Alcohol use: No     Comment: quit     Drug use: No         CBC:   Recent Labs     22  0728   WBC 3.41*   RBC 3.42*   HGB 10.1*   HCT 32.4*      MCV 95   MCH 29.5   MCHC 31.2*       CMP:   Recent Labs     22  0728      K 5.6*      CO2 26   BUN 34*   CREATININE 0.8   *   CALCIUM 9.7   ALBUMIN 3.8   PROT 6.8   ALKPHOS 86   ALT 9*   AST 17   BILITOT 0.4       INR  Recent Labs     22  0728   INR 1.1           Diagnostic Studies:      EK22  Normal sinus rhythm   Normal ECG   When compared with ECG of 2021 07:28,   No significant change was found     2D Echo:  21  · The left ventricle is normal in size with concentric remodeling and normal systolic function.  · The estimated ejection fraction is 65%.  · Normal left ventricular diastolic function.  · There is abnormal septal wall motion.  · Normal right ventricular size with normal right ventricular systolic function.  · Moderate left atrial enlargement.  · Mild right atrial enlargement.  · Mild tricuspid regurgitation.  · Normal central venous pressure (3 mmHg).  · The estimated PA systolic pressure is 25 mmHg.          Pre-op Assessment    I have reviewed the Patient Summary Reports.     I have reviewed the Nursing Notes. I have reviewed the NPO Status.      Review of Systems  Anesthesia Hx:  Hx of Anesthetic complications ("slow to wake up")  History of prior surgery of interest to airway management or planning: Denies Family Hx of Anesthesia complications.    Social:  Former Smoker    Hematology/Oncology:         -- Anemia: Denies Current/Recent Cancer   Cardiovascular:   Hypertension " Valvular problems/Murmurs (s/p AVR) Denies MI.  Denies CAD.        no hyperlipidemia ECG has been reviewed.    Pulmonary:   Denies COPD.  Denies Shortness of breath.  Denies Sleep Apnea.    Renal/:   Chronic Renal Disease    Hepatic/GI:   Liver Disease, (ESLD) Hepatitis, C OLT in 01/2022 for hepatitis C    Neurological:   Denies Seizures.    Endocrine:   Diabetes    Psych:   Denies Psychiatric History.          Physical Exam  General: Well nourished, Cooperative and Alert    Airway:  Mallampati: III / I  Mouth Opening: Normal  Tongue: Normal    Dental:    Heart:  Rate: Normal  Rhythm: Regular Rhythm        Anesthesia Plan  Type of Anesthesia, risks & benefits discussed:    Anesthesia Type: Gen Natural Airway, Gen ETT  Intra-op Monitoring Plan: Standard ASA Monitors  Post Op Pain Control Plan: multimodal analgesia and IV/PO Opioids PRN  Informed Consent: Informed consent signed with the Patient and all parties understand the risks and agree with anesthesia plan.  All questions answered.   ASA Score: 3    Ready For Surgery From Anesthesia Perspective.     .

## 2022-05-03 NOTE — PROCEDURES
Radiology Post-Procedure Note    Pre Op Diagnosis: HAS    Post Op Diagnosis: Same    Procedure: Visceral angiogram, angioplasty and stenting    Procedure performed by: Kyle Garzon MD    Written Informed Consent Obtained: Yes  Specimen Removed: NO  Estimated Blood Loss: Minimal    Findings:   Via rt cfa, celiac, jenni, and rha branches selected and angiograms obtained. Stenosis and retrograde dissection of pha noted. Serial angioplasty w 3 mm, 4 mm, and 5 mm balloons performed with residual dissection. Case d/w Dr. Leyva, decision made to place uncovered 4.5 mm x 15 mm stent which was post dilated to 4.5 mm. Excellent flow on post stent/angioplasty angiogram. Angioseal to rt cfa. No complications. See dictation.    Pt will need liver doppler later this week and will need daily 81 mg asa and 75 mg plavix. Loading dose of plavix given today.     Patient tolerated procedure well.    Kyle Garzon M.D.  Interventional Radiology  Department of Radiology  Pager: [phone]

## 2022-05-03 NOTE — H&P
Vascular and Interventional Radiology History & Physical    Date:  5/3/2022      History of Present Illness:  Eder Kong is a 61 y.o. male w/ hx OLT for cirrhosis and HCC complicated by hepatic artery stenosis who presents for hepatic artery angiogram, angioplasty, and possible stent.     Past Medical History:  Past Medical History:   Diagnosis Date    Anemia     Arthritis     Diabetes     Dizziness     Dyslipidemia     General anesthetics causing adverse effect in therapeutic use     Hep C w/o coma, chronic     failed 2 rounds of interferon-based medication. no protease inhibitorrs used    Hypertension     not on medication    Kidney stone     Liver cancer        Past Surgical History:  Past Surgical History:   Procedure Laterality Date    AORTIC VALVE REPLACEMENT      due to aortic regurgitation    CARDIAC SURGERY      CATHETERIZATION OF BOTH LEFT AND RIGHT HEART N/A 2021    Procedure: CATHETERIZATION, HEART, BOTH LEFT AND RIGHT;  Surgeon: Eder Beltran MD;  Location: [facility] CATH LAB;  Service: Cardiology;  Laterality: N/A;    COLONOSCOPY W/ POLYPECTOMY          ESOPHAGOGASTRODUODENOSCOPY      x2 with cautery in ,     LIVER TRANSPLANT N/A 2022    Procedure: TRANSPLANT, LIVER;  Surgeon: Jesús Austin MD;  Location: [facility] OR [address] [place] [address];  Service: Transplant;  Laterality: N/A;    mastoid bone      right ear        Sedation History:    Denies any adverse reactions.  Denies problems laying flat.    Social History:  Social History     Tobacco Use    Smoking status: Former Smoker     Packs/day: 1.00     Years: 25.00     Pack years: 25.00     Quit date: 1999     Years since quittin.8    Smokeless tobacco: Never Used   Substance Use Topics    Alcohol use: No     Comment: quit     Drug use: No        Home Medications:   Prior to Admission medications    Medication Sig Start Date End Date Taking? Authorizing Provider   aspirin (ECOTRIN) 81 MG EC tablet Take 1  "tablet (81 mg total) by mouth once daily. 1/24/22  Yes Sterling Tom MD   BD ULTRA-FINE MINI PEN NEEDLE 31 gauge x 3/16" Ndle USE AS DIRECTED EVERY DAY 2/24/21  Yes Historical Provider   calcium carbonate-vitamin D3 600 mg-20 mcg (800 unit) Tab Take 1 tablet by mouth once daily. 1/24/22  Yes Sterling Tom MD   carvediloL (COREG) 12.5 MG tablet Take 1 tablet (12.5 mg total) by mouth 2 (two) times daily. 1/27/22 1/27/23 Yes Zackary Khan Jr., MD   CONTOUR NEXT TEST STRIPS Strp USE TO TEST BLOOD GLUCOSE TWICE DAILY 12/17/20  Yes Historical Provider   EScitalopram oxalate (LEXAPRO) 5 MG Tab Take 1 tablet (5 mg total) by mouth once daily. 1/24/22  Yes Sterling Tom MD   mycophenolate (CELLCEPT) 250 mg Cap Take 2 capsules (500 mg total) by mouth 2 (two) times daily. 4/27/22  Yes Jay Abrams MD   OZEMPIC 1 mg/dose (4 mg/3 mL) Inject 1 mg into the skin once a week. 1/6/22  Yes Historical Provider   sulfamethoxazole-trimethoprim 400-80mg (BACTRIM,SEPTRA) 400-80 mg per tablet Take 1 tablet by mouth every morning. Stop 7/22/22 1/23/22 7/22/22 Yes Sterling Tom MD   tacrolimus (PROGRAF) 1 MG Cap Take 4 capsules (4 mg total) by mouth every 12 (twelve) hours. 2/24/22 2/24/23 Yes Darrin Leyva MD   TOUJEO MAX U-300 SOLOSTAR 300 unit/mL (3 mL) insulin pen Inject 18 Units into the skin once daily.  Patient taking differently: Inject 26 Units into the skin once daily. 1/28/22  Yes Zackary Khan Jr., MD   famotidine (PEPCID) 20 MG tablet Take 1 tablet (20 mg total) by mouth once daily. Stop 2/22/22 1/23/22 2/28/22  Sterling Tom MD   insulin aspart U-100 (NOVOLOG) 100 unit/mL (3 mL) InPn pen Inject 6 Units into the skin 3 (three) times daily. Plus sliding scale: TDD: 33 units 1/28/22 1/28/23  Zackary Khan Jr., MD   oxyCODONE (OXY-IR) 5 mg Cap Take 1 capsule (5 mg total) by mouth every 4 (four) hours as needed for Pain. 1/28/22   Olamide Banks NP   predniSONE (DELTASONE) 5 MG tablet Take by mouth daily: 20mg " "1/29-2/4, 15mg 2/5-2/11, 10mg 2/12-2/18, 5mg 2/19-2/25, Stop 2/26/22  Patient taking differently: Take by mouth daily: 20mg 1/29-2/4, 15mg 2/5-2/11, 10mg 2/12-2/18, 5mg 2/19-2/25, Stop 2/26/22 1/24/22   Sterling Tom MD   valGANciclovir (VALCYTE) 450 mg Tab Take 1 tablet (450 mg total) by mouth once daily. Stop 4/23/22 1/23/22 4/23/22  Sterling Tom MD       Inpatient Medications:    Current Outpatient Medications:     aspirin (ECOTRIN) 81 MG EC tablet, Take 1 tablet (81 mg total) by mouth once daily., Disp: 30 tablet, Rfl: 5    BD ULTRA-FINE MINI PEN NEEDLE 31 gauge x 3/16" Ndle, USE AS DIRECTED EVERY DAY, Disp: , Rfl:     calcium carbonate-vitamin D3 600 mg-20 mcg (800 unit) Tab, Take 1 tablet by mouth once daily., Disp: 30 tablet, Rfl: 5    carvediloL (COREG) 12.5 MG tablet, Take 1 tablet (12.5 mg total) by mouth 2 (two) times daily., Disp: 60 tablet, Rfl: 11    CONTOUR NEXT TEST STRIPS Strp, USE TO TEST BLOOD GLUCOSE TWICE DAILY, Disp: , Rfl:     EScitalopram oxalate (LEXAPRO) 5 MG Tab, Take 1 tablet (5 mg total) by mouth once daily., Disp: 30 tablet, Rfl: 5    mycophenolate (CELLCEPT) 250 mg Cap, Take 2 capsules (500 mg total) by mouth 2 (two) times daily., Disp: 240 capsule, Rfl: 2    OZEMPIC 1 mg/dose (4 mg/3 mL), Inject 1 mg into the skin once a week., Disp: , Rfl:     sulfamethoxazole-trimethoprim 400-80mg (BACTRIM,SEPTRA) 400-80 mg per tablet, Take 1 tablet by mouth every morning. Stop 7/22/22, Disp: 30 tablet, Rfl: 5    tacrolimus (PROGRAF) 1 MG Cap, Take 4 capsules (4 mg total) by mouth every 12 (twelve) hours., Disp: 240 capsule, Rfl: 11    TOUJEO MAX U-300 SOLOSTAR 300 unit/mL (3 mL) insulin pen, Inject 18 Units into the skin once daily. (Patient taking differently: Inject 26 Units into the skin once daily.), Disp: 3 pen, Rfl: 11    famotidine (PEPCID) 20 MG tablet, Take 1 tablet (20 mg total) by mouth once daily. Stop 2/22/22, Disp: 30 tablet, Rfl: 0    insulin aspart U-100 (NOVOLOG) " 100 unit/mL (3 mL) InPn pen, Inject 6 Units into the skin 3 (three) times daily. Plus sliding scale: TDD: 33 units, Disp: 15 mL, Rfl: 11    oxyCODONE (OXY-IR) 5 mg Cap, Take 1 capsule (5 mg total) by mouth every 4 (four) hours as needed for Pain., Disp: 40 capsule, Rfl: 0    predniSONE (DELTASONE) 5 MG tablet, Take by mouth daily: 20mg 1/29-2/4, 15mg 2/5-2/11, 10mg 2/12-2/18, 5mg 2/19-2/25, Stop 2/26/22 (Patient taking differently: Take by mouth daily: 20mg 1/29-2/4, 15mg 2/5-2/11, 10mg 2/12-2/18, 5mg 2/19-2/25, Stop 2/26/22), Disp: 75 tablet, Rfl: 1    valGANciclovir (VALCYTE) 450 mg Tab, Take 1 tablet (450 mg total) by mouth once daily. Stop 4/23/22, Disp: 30 tablet, Rfl: 2    Current Facility-Administered Medications:     0.9%  NaCl infusion, , Intravenous, Continuous, Betty Mazariegos NP    LIDOcaine (PF) 10 mg/ml (1%) injection 10 mg, 1 mL, Other, Once, Betty Mazariegos NP     Anticoagulants/Antiplatelets:   no anticoagulation    Allergies:   Review of patient's allergies indicates:  No Known Allergies    Review of Systems:   As documented in primary provider H&P.    Vital Signs (Most Recent):  Temp: 98.6 °F (37 °C) (05/03/22 0850)  Pulse: 82 (05/03/22 0850)  Resp: 16 (05/03/22 0850)  BP: (!) 153/81 (05/03/22 0851)  SpO2: 98 % (05/03/22 0850)    Physical Exam:  No acute distress, laying comfortably in bed, pleasant and cooperative  Regular rate and rhythm  Breathing unlabored  Abdomen benign  Extremities warm and well perfused    Sedation Exam:  ASA: III - Patient appears to have severe systemic disease not posing a constant threat to life   Mallampati: II (hard and soft palate, upper portion of tonsils anduvula visible)     Laboratory:  Lab Results   Component Value Date    INR 1.1 05/03/2022       Lab Results   Component Value Date    WBC 3.41 (L) 05/03/2022    HGB 10.1 (L) 05/03/2022    HCT 32.4 (L) 05/03/2022    MCV 95 05/03/2022     05/03/2022      Lab Results   Component Value Date      (H) 05/03/2022     05/03/2022    K 5.6 (H) 05/03/2022     05/03/2022    CO2 26 05/03/2022    BUN 34 (H) 05/03/2022    CREATININE 0.8 05/03/2022    CALCIUM 9.7 05/03/2022    MG 1.8 01/28/2022    ALT 9 (L) 05/03/2022    AST 17 05/03/2022    ALBUMIN 3.8 05/03/2022    BILITOT 0.4 05/03/2022    BILIDIR 0.5 (H) 02/03/2022       Imaging:  Reviewed.      ASSESSMENT/PLAN:   Pt is 61yoM w/ hx liver transplant for cirrhosis and HCC c/b hepatic artery stenosis. Pt presents for hepatic artery angiogram, angioplasty, and possible stent.                       Sedation Plan: pre anesthesia  Patient will undergo: hepatic artery angiogram, angioplasty, and possible stent      Anabel Koenig MD  [facility] Interventional/Diagnostic Radiology

## 2022-05-04 ENCOUNTER — OFFICE VISIT (OUTPATIENT)
Dept: INTERVENTIONAL RADIOLOGY/VASCULAR | Facility: CLINIC | Age: 62
End: 2022-05-04
Payer: COMMERCIAL

## 2022-05-04 ENCOUNTER — HOSPITAL ENCOUNTER (OUTPATIENT)
Dept: RADIOLOGY | Facility: HOSPITAL | Age: 62
Discharge: HOME | End: 2022-05-04
Attending: PHYSICIAN ASSISTANT
Payer: COMMERCIAL

## 2022-05-04 DIAGNOSIS — I72.9 PSEUDOANEURYSM: Primary | ICD-10-CM

## 2022-05-04 DIAGNOSIS — I72.9 PSEUDOANEURYSM: ICD-10-CM

## 2022-05-04 DIAGNOSIS — I77.1 HEPATIC ARTERY STENOSIS: Primary | ICD-10-CM

## 2022-05-04 PROCEDURE — 99214 OFFICE O/P EST MOD 30 MIN: CPT | Mod: S$GLB,,, | Performed by: PHYSICIAN ASSISTANT

## 2022-05-04 PROCEDURE — 93926 LOWER EXTREMITY STUDY: CPT | Mod: TC,RT

## 2022-05-04 PROCEDURE — 99999 PR PBB SHADOW E&M-EST. PATIENT-LVL I: CPT | Mod: PBBFAC,,, | Performed by: PHYSICIAN ASSISTANT

## 2022-05-04 PROCEDURE — 93926 LOWER EXTREMITY STUDY: CPT | Mod: 26,RT,, | Performed by: RADIOLOGY

## 2022-05-04 PROCEDURE — 3051F PR MOST RECENT HEMOGLOBIN A1C LEVEL 7.0 - < 8.0%: ICD-10-PCS | Mod: CPTII,S$GLB,, | Performed by: PHYSICIAN ASSISTANT

## 2022-05-04 PROCEDURE — 3051F HG A1C>EQUAL 7.0%<8.0%: CPT | Mod: CPTII,S$GLB,, | Performed by: PHYSICIAN ASSISTANT

## 2022-05-04 PROCEDURE — 99214 PR OFFICE/OUTPT VISIT, EST, LEVL IV, 30-39 MIN: ICD-10-PCS | Mod: S$GLB,,, | Performed by: PHYSICIAN ASSISTANT

## 2022-05-04 PROCEDURE — 99999 PR PBB SHADOW E&M-EST. PATIENT-LVL I: ICD-10-PCS | Mod: PBBFAC,,, | Performed by: PHYSICIAN ASSISTANT

## 2022-05-04 PROCEDURE — 93926 US LOWER EXTREMITY ARTERIES RIGHT: ICD-10-PCS | Mod: 26,RT,, | Performed by: RADIOLOGY

## 2022-05-04 NOTE — PROGRESS NOTES
Subjective:       Patient ID: Eder Kong is a 61 y.o. male.    Chief Complaint: Wound Check    60 y/o presents with wife after IR procedure yesterday.  C/o oozing from groin site requiring pressure on/off for most of the night last night.  They report there has been no oozing this morning. They changed the dressing this morning.    Endorses groin pain.  Denies thigh pain.  Denies thigh bruising.  Denies foot being cold or numb.  Endorses fatigue.      Both he and his wife have several questions regarding procedure and next steps.      Review of Systems as above      Objective:      Physical Exam  Constitutional:       General: He is not in acute distress.     Appearance: Normal appearance. He is normal weight.   HENT:      Head: Normocephalic and atraumatic.   Eyes:      Extraocular Movements: Extraocular movements intact.   Cardiovascular:      Pulses: Normal pulses.   Skin:     General: Skin is warm and dry.      Capillary Refill: Capillary refill takes less than 2 seconds.      Comments: Groin dressing C/D/I.  + tender at site  No thigh bruising or tenderness noted   Neurological:      Mental Status: He is alert.   Psychiatric:         Mood and Affect: Mood normal.         Behavior: Behavior normal.         Thought Content: Thought content normal.         Judgment: Judgment normal.       Pulses cont'd:  Palpable DP pulses bilateral.  Left bounding.  Right 1+  Assessment:       Problem List Items Addressed This Visit    None     Visit Diagnoses     Hepatic artery stenosis    -  Primary    Pseudoaneurysm              Plan:       Patient status reviewed with Dr. Garzon who also spoke with patient and wife by phone to answer questions.      US right groin ordered to assess for pseudoaneurysm or other groin complication.  Will be done today before patient goes home to [place].    Patient is to follow up Friday for US.  Discussed transplant coordinator and transplant team will arrange and hopefully this can be done  close to home.  Discussed next steps and timing of next US will depend upon US results.      Discussed activity modifications for 7-10 days following yesterdays procedure.       Yvrose Dennison PA-C  Interventional Radiology  Clinic [phone]    Addendum     US LOWER EXTREMITY ARTERIES RIGHT     CLINICAL HISTORY:  s/p femoral access;  assess for pseudoaneurysm;  Aneurysm of unspecified site.     COMPARISON:  None     FINDINGS:  Limited grayscale and color Doppler sonographic images were acquired right common and superficial femoral arteries.     Normal triphasic waveform within the common femoral artery.  No evidence of adjacent fluid collections or pseudoaneurysm noted.  Small branching vessel is present arising from the right common femoral artery.     Impression:     No evidence of pseudoaneurysm as clinically questioned.     Electronically signed by resident: Km Toledo  Date:                                            05/04/2022  Time:                                           10:08     Electronically signed by: Joce Wilkinson MD  Date:                                            05/04/2022  Time:                                           10:21               Additionally, imaging reviewed by Kyle Garzon MD.  No apparent complication of groin site at this time.  Patient called and results discussed.  No change to above plan.     Yvrose Dennison PA-C  Interventional Radiology  Clinic [phone]

## 2022-05-05 ENCOUNTER — TELEPHONE (OUTPATIENT)
Dept: TRANSPLANT | Facility: CLINIC | Age: 62
End: 2022-05-05
Payer: COMMERCIAL

## 2022-05-05 NOTE — TELEPHONE ENCOUNTER
----- Message from Tasha Wall RN sent at 5/5/2022  9:03 AM CDT -----  Regarding: FW: Medical Assistance  Contact: Patient Spouse Charo  See PA note    ----- Message -----  From: Mary Delacruz  Sent: 5/5/2022   8:57 AM CDT  To: Jose Alberto HANNON Staff, Gordon MAGALLANES Staff  Subject: Medical Assistance                               Spouse is requesting a call back regarding US orders   Spouse stated she was advised they would possibly have to come in on 05/06/2022 to have completed but no one has called yet   Spouse would like to know how to proceed regarding ultrasound   Please Assist     Patient Spouse Charo can be reached at [phone]

## 2022-05-05 NOTE — TELEPHONE ENCOUNTER
Report received from Dio ARMANDO(transplant coordinator); she reported  ordered repeat liver doppler u/s on Friday.  Orders were entered, she said.      Card given to  who reported she has booked appt. And has spoken to patient's wife.

## 2022-05-06 ENCOUNTER — HOSPITAL ENCOUNTER (OUTPATIENT)
Dept: RADIOLOGY | Facility: HOSPITAL | Age: 62
Discharge: HOME | End: 2022-05-06
Attending: SURGERY
Payer: COMMERCIAL

## 2022-05-06 DIAGNOSIS — Z94.4 LIVER REPLACED BY TRANSPLANT: ICD-10-CM

## 2022-05-06 PROCEDURE — 76705 US DOPPLER LIVER TRANSPLANT POST (XPD): ICD-10-PCS | Mod: 26,XS,, | Performed by: STUDENT IN AN ORGANIZED HEALTH CARE EDUCATION/TRAINING PROGRAM

## 2022-05-06 PROCEDURE — 93976 VASCULAR STUDY: CPT | Mod: TC

## 2022-05-06 PROCEDURE — 76705 ECHO EXAM OF ABDOMEN: CPT | Mod: 26,XS,, | Performed by: STUDENT IN AN ORGANIZED HEALTH CARE EDUCATION/TRAINING PROGRAM

## 2022-05-06 PROCEDURE — 93976 VASCULAR STUDY: CPT | Mod: 26,,, | Performed by: STUDENT IN AN ORGANIZED HEALTH CARE EDUCATION/TRAINING PROGRAM

## 2022-05-06 PROCEDURE — 93976 US DOPPLER LIVER TRANSPLANT POST (XPD): ICD-10-PCS | Mod: 26,,, | Performed by: STUDENT IN AN ORGANIZED HEALTH CARE EDUCATION/TRAINING PROGRAM

## 2022-05-06 PROCEDURE — 76705 ECHO EXAM OF ABDOMEN: CPT | Mod: TC

## 2022-05-10 ENCOUNTER — TELEPHONE (OUTPATIENT)
Dept: TRANSPLANT | Facility: CLINIC | Age: 62
End: 2022-05-10
Payer: COMMERCIAL

## 2022-05-10 ENCOUNTER — PATIENT MESSAGE (OUTPATIENT)
Dept: TRANSPLANT | Facility: CLINIC | Age: 62
End: 2022-05-10
Payer: COMMERCIAL

## 2022-05-10 LAB
EXT ALBUMIN: 4.1
EXT ALKALINE PHOSPHATASE: 79
EXT ALT: 10
EXT AST: 15
EXT BASOPHIL%: 0.7
EXT BILIRUBIN TOTAL: 0.3
EXT BUN: 17
EXT CALCIUM: 9.1
EXT CHLORIDE: 6
EXT CO2: 23
EXT CREATININE: 0.82 MG/DL
EXT EOSINOPHIL%: 1.7
EXT GFR MDRD NON AF AMER: 99
EXT GLUCOSE: 105
EXT HEMATOCRIT: 30.4
EXT HEMOGLOBIN: 9.8
EXT LYMPH%: 14.2
EXT MONOCYTES%: 1.3
EXT PLATELETS: 195
EXT POTASSIUM: 4.9
EXT PROTEIN TOTAL: 6.3
EXT SEGS%: 68.8
EXT SODIUM: 141 MMOL/L
EXT TACROLIMUS LVL: 7.5
EXT WBC: 4.2

## 2022-05-10 NOTE — TELEPHONE ENCOUNTER
Called patient left voicemail and sent portal message to have him repeat potassium tomorrow, since the labs are a week old.  He will also need to decrease Tacrolimus to 4 mg in the morning and 3 mg in the evening.  Full labs on 05/23/22    ----- Message from Jay Abrams MD sent at 5/9/2022  9:06 PM CDT -----  Liver transplant labs reviewed and are stable. Please decrease tac to 4/3.    Potassium elevated. Please follow hyperkalemia protocol.

## 2022-05-10 NOTE — TELEPHONE ENCOUNTER
I sent a message back to patient via my chart.    ----- Message from Gina Courtney sent at 5/10/2022  1:16 PM CDT -----  Regarding: return call  Patient returning call. Patient is asking to respond on My Chart if necessary due to issues with phone.    Call: [phone] (Mobile)

## 2022-05-10 NOTE — TELEPHONE ENCOUNTER
Patient had repeat labs on 05/09/22 potassium was 4.9.  They were sent to Dr Abrams for review.  I did sent message with medication change.    ----- Message from Jay Abrams MD sent at 5/9/2022  9:06 PM CDT -----  Liver transplant labs reviewed and are stable. Please decrease tac to 4/3.    Potassium elevated. Please follow hyperkalemia protocol.

## 2022-05-11 ENCOUNTER — PATIENT MESSAGE (OUTPATIENT)
Dept: TRANSPLANT | Facility: CLINIC | Age: 62
End: 2022-05-11
Payer: COMMERCIAL

## 2022-05-11 ENCOUNTER — PATIENT MESSAGE (OUTPATIENT)
Dept: RESEARCH | Facility: CLINIC | Age: 62
End: 2022-05-11
Payer: COMMERCIAL

## 2022-05-11 RX ORDER — TACROLIMUS 1 MG/1
CAPSULE ORAL
Qty: 210 CAPSULE | Refills: 11 | Status: SHIPPED | OUTPATIENT
Start: 2022-05-11 | End: 2022-07-07 | Stop reason: DRUGHIGH

## 2022-05-12 ENCOUNTER — PATIENT MESSAGE (OUTPATIENT)
Dept: TRANSPLANT | Facility: CLINIC | Age: 62
End: 2022-05-12
Payer: COMMERCIAL

## 2022-05-12 ENCOUNTER — TELEPHONE (OUTPATIENT)
Dept: TRANSPLANT | Facility: CLINIC | Age: 62
End: 2022-05-12
Payer: COMMERCIAL

## 2022-05-12 DIAGNOSIS — R93.5 ABNORMAL FINDINGS ON DIAGNOSTIC IMAGING OF ABDOMEN: Primary | ICD-10-CM

## 2022-05-12 NOTE — TELEPHONE ENCOUNTER
Patient was notified and instructed via [facility] , to reduce Prograf dose to 4 in AM and 3 in PM.

## 2022-05-12 NOTE — TELEPHONE ENCOUNTER
----- Message from Jay Abrams MD sent at 5/11/2022 11:57 AM CDT -----  Liver transplant labs reviewed and are stable. No changes in his immunosuppression. Please continue to monitor labs per transplant protocol.

## 2022-05-12 NOTE — TELEPHONE ENCOUNTER
----- Message from Darrin Leyva MD sent at 5/9/2022  2:20 PM CDT -----  Results ok. No action needed

## 2022-05-12 NOTE — TELEPHONE ENCOUNTER
Patient notified and instructed via [facility]:    Your next labs are due on 5/23/22(every 2 weeks now by our guidelines) thanks.

## 2022-05-12 NOTE — TELEPHONE ENCOUNTER
----- Message from Jay Abrams MD sent at 5/9/2022  9:06 PM CDT -----  Liver transplant labs reviewed and are stable. Please decrease tac to 4/3.    Potassium elevated. Please follow hyperkalemia protocol.

## 2022-05-24 LAB
EXT AFP: <1.82
EXT ALBUMIN: 4.1
EXT ALKALINE PHOSPHATASE: 77
EXT ALT: 11
EXT AST: 16
EXT BILIRUBIN TOTAL: 0.3
EXT BUN: 19
EXT CALCIUM: 9.2
EXT CHLORIDE: 110
EXT CO2: 22
EXT CREATININE: 0.74 MG/DL
EXT EOSINOPHIL%: 1.4
EXT GFR MDRD NON AF AMER: 102
EXT GLUCOSE: 92
EXT HEMATOCRIT: 30.3
EXT HEMOGLOBIN: 9.8
EXT LYMPH%: 14.6
EXT MONOCYTES%: 9.5
EXT PLATELETS: 151
EXT POTASSIUM: 4.9
EXT PROTEIN TOTAL: 6.3
EXT SEGS%: 72.6
EXT SODIUM: 143 MMOL/L
EXT TACROLIMUS LVL: 5.7
EXT WBC: 3.7

## 2022-05-26 ENCOUNTER — PATIENT MESSAGE (OUTPATIENT)
Dept: TRANSPLANT | Facility: CLINIC | Age: 62
End: 2022-05-26
Payer: COMMERCIAL

## 2022-05-26 ENCOUNTER — TELEPHONE (OUTPATIENT)
Dept: TRANSPLANT | Facility: CLINIC | Age: 62
End: 2022-05-26
Payer: COMMERCIAL

## 2022-05-26 NOTE — TELEPHONE ENCOUNTER
Patient notified and instructed via [facility]:    Per : Liver transplant labs reviewed and are stable. No changes in his immunosuppression. Will continue to monitor labs per transplant protocol(next labs due 6/6/22). Thanks.

## 2022-05-26 NOTE — TELEPHONE ENCOUNTER
----- Message from Jay Abrams MD sent at 5/25/2022 12:38 PM CDT -----  Liver transplant labs reviewed and are stable. No changes in his immunosuppression. Please continue to monitor labs per transplant protocol.

## 2022-05-27 ENCOUNTER — HOSPITAL ENCOUNTER (OUTPATIENT)
Dept: RADIOLOGY | Facility: HOSPITAL | Age: 62
Discharge: HOME | End: 2022-05-27
Attending: SURGERY
Payer: COMMERCIAL

## 2022-05-27 DIAGNOSIS — R93.5 ABNORMAL FINDINGS ON DIAGNOSTIC IMAGING OF ABDOMEN: ICD-10-CM

## 2022-05-27 PROCEDURE — 76705 ECHO EXAM OF ABDOMEN: CPT | Mod: 26,XS,, | Performed by: STUDENT IN AN ORGANIZED HEALTH CARE EDUCATION/TRAINING PROGRAM

## 2022-05-27 PROCEDURE — 93976 US DOPPLER LIVER TRANSPLANT POST (XPD): ICD-10-PCS | Mod: 26,,, | Performed by: STUDENT IN AN ORGANIZED HEALTH CARE EDUCATION/TRAINING PROGRAM

## 2022-05-27 PROCEDURE — 93976 VASCULAR STUDY: CPT | Mod: TC

## 2022-05-27 PROCEDURE — 93976 VASCULAR STUDY: CPT | Mod: 26,,, | Performed by: STUDENT IN AN ORGANIZED HEALTH CARE EDUCATION/TRAINING PROGRAM

## 2022-05-27 PROCEDURE — 76705 US DOPPLER LIVER TRANSPLANT POST (XPD): ICD-10-PCS | Mod: 26,XS,, | Performed by: STUDENT IN AN ORGANIZED HEALTH CARE EDUCATION/TRAINING PROGRAM

## 2022-05-27 PROCEDURE — 76705 ECHO EXAM OF ABDOMEN: CPT | Mod: TC

## 2022-05-30 ENCOUNTER — PATIENT MESSAGE (OUTPATIENT)
Dept: TRANSPLANT | Facility: CLINIC | Age: 62
End: 2022-05-30
Payer: COMMERCIAL

## 2022-06-01 ENCOUNTER — PATIENT MESSAGE (OUTPATIENT)
Dept: TRANSPLANT | Facility: CLINIC | Age: 62
End: 2022-06-01
Payer: COMMERCIAL

## 2022-06-01 ENCOUNTER — TELEPHONE (OUTPATIENT)
Dept: TRANSPLANT | Facility: CLINIC | Age: 62
End: 2022-06-01
Payer: COMMERCIAL

## 2022-06-01 NOTE — TELEPHONE ENCOUNTER
Patient notified and instructed via [facility]:    Your ultrasound from 5/27/22 was reviewed by ; results were ok, no changes made. Thanks.

## 2022-06-01 NOTE — TELEPHONE ENCOUNTER
----- Message from Darrin Leyva MD sent at 6/1/2022  2:05 PM CDT -----  Results ok. No action needed

## 2022-06-07 LAB
EXT AFP: 1.95
EXT ALBUMIN: 4.1
EXT ALKALINE PHOSPHATASE: 76
EXT ALT: 11
EXT AST: 16
EXT BILIRUBIN TOTAL: 0.4
EXT BUN: 20
EXT CALCIUM: 9.1
EXT CHLORIDE: 105
EXT CO2: 24
EXT CREATININE: 0.82 MG/DL
EXT EOSINOPHIL%: 2
EXT GFR MDRD NON AF AMER: 99
EXT GLUCOSE: 91
EXT HEMATOCRIT: 31.8
EXT HEMOGLOBIN: 10.3
EXT LYMPH%: 15.6
EXT MONOCYTES%: 9.2
EXT PLATELETS: 158
EXT POTASSIUM: 4.7
EXT PROTEIN TOTAL: 6.3
EXT SEGS%: 71.8
EXT SODIUM: 140 MMOL/L
EXT TACROLIMUS LVL: 6.3
EXT WBC: 3.6

## 2022-06-20 ENCOUNTER — PATIENT MESSAGE (OUTPATIENT)
Dept: TRANSPLANT | Facility: CLINIC | Age: 62
End: 2022-06-20
Payer: COMMERCIAL

## 2022-06-20 ENCOUNTER — TELEPHONE (OUTPATIENT)
Dept: TRANSPLANT | Facility: CLINIC | Age: 62
End: 2022-06-20
Payer: COMMERCIAL

## 2022-06-20 DIAGNOSIS — Z94.4 LIVER REPLACED BY TRANSPLANT: ICD-10-CM

## 2022-06-20 DIAGNOSIS — C22.0 HCC (HEPATOCELLULAR CARCINOMA): Primary | ICD-10-CM

## 2022-06-20 DIAGNOSIS — R93.5 ABNORMAL FINDINGS ON DIAGNOSTIC IMAGING OF ABDOMEN: ICD-10-CM

## 2022-06-20 NOTE — TELEPHONE ENCOUNTER
----- Message from Jay Abrams MD sent at 6/13/2022  6:43 AM CDT -----  Liver transplant labs reviewed and are stable. No changes in his immunosuppression. Please continue to monitor labs per transplant protocol.

## 2022-06-20 NOTE — TELEPHONE ENCOUNTER
Patient notified and instructed via [facility]:    Last labs reviewed, stable . No medicine changes made . Repeat labs due Monday 6/27/22. Thanks.

## 2022-06-27 ENCOUNTER — LAB VISIT (OUTPATIENT)
Dept: LAB | Facility: HOSPITAL | Age: 62
End: 2022-06-27
Attending: STUDENT IN AN ORGANIZED HEALTH CARE EDUCATION/TRAINING PROGRAM
Payer: COMMERCIAL

## 2022-06-27 DIAGNOSIS — Z94.4 LIVER REPLACED BY TRANSPLANT: ICD-10-CM

## 2022-06-27 DIAGNOSIS — C22.0 HCC (HEPATOCELLULAR CARCINOMA): ICD-10-CM

## 2022-06-27 LAB
AFP-TM SERPL-MCNC: <2 NG/ML
ALBUMIN SERPL-MCNC: 3.8 GM/DL (ref 3.4–4.8)
ALBUMIN/GLOB SERPL: 1.7 RATIO (ref 1.1–2)
ALP SERPL-CCNC: 85 UNIT/L (ref 40–150)
ALT SERPL-CCNC: 12 UNIT/L (ref 0–55)
AST SERPL-CCNC: 12 UNIT/L (ref 5–34)
BASOPHILS # BLD AUTO: 0.04 X10(3)/MCL (ref 0–0.2)
BASOPHILS NFR BLD AUTO: 1 %
BILIRUBIN DIRECT+TOT PNL SERPL-MCNC: 0.5 MG/DL
BUN SERPL-MCNC: 15.1 MG/DL (ref 8.4–25.7)
CALCIUM SERPL-MCNC: 9.2 MG/DL (ref 8.8–10)
CHLORIDE SERPL-SCNC: 109 MMOL/L (ref 98–107)
CO2 SERPL-SCNC: 25 MMOL/L (ref 23–31)
CREAT SERPL-MCNC: 0.74 MG/DL (ref 0.73–1.18)
EOSINOPHIL # BLD AUTO: 0.04 X10(3)/MCL (ref 0–0.9)
EOSINOPHIL NFR BLD AUTO: 1 %
ERYTHROCYTE [DISTWIDTH] IN BLOOD BY AUTOMATED COUNT: 14.3 % (ref 11.5–17)
GLOBULIN SER-MCNC: 2.3 GM/DL (ref 2.4–3.5)
GLUCOSE SERPL-MCNC: 113 MG/DL (ref 82–115)
HCT VFR BLD AUTO: 32.3 % (ref 42–52)
HGB BLD-MCNC: 10.1 GM/DL (ref 14–18)
IMM GRANULOCYTES # BLD AUTO: 0.01 X10(3)/MCL (ref 0–0.02)
IMM GRANULOCYTES NFR BLD AUTO: 0.2 % (ref 0–0.43)
LYMPHOCYTES # BLD AUTO: 0.67 X10(3)/MCL (ref 0.6–4.6)
LYMPHOCYTES NFR BLD AUTO: 16.2 %
MCH RBC QN AUTO: 28.9 PG (ref 27–31)
MCHC RBC AUTO-ENTMCNC: 31.3 MG/DL (ref 33–36)
MCV RBC AUTO: 92.3 FL (ref 80–94)
MONOCYTES # BLD AUTO: 0.39 X10(3)/MCL (ref 0.1–1.3)
MONOCYTES NFR BLD AUTO: 9.4 %
NEUTROPHILS # BLD AUTO: 3 X10(3)/MCL (ref 2.1–9.2)
NEUTROPHILS NFR BLD AUTO: 72.2 %
NRBC BLD AUTO-RTO: 0 %
PLATELET # BLD AUTO: 203 X10(3)/MCL (ref 130–400)
PMV BLD AUTO: 10.7 FL (ref 9.4–12.4)
POTASSIUM SERPL-SCNC: 4.6 MMOL/L (ref 3.5–5.1)
PROT SERPL-MCNC: 6.1 GM/DL (ref 5.8–7.6)
RBC # BLD AUTO: 3.5 X10(6)/MCL (ref 4.7–6.1)
SODIUM SERPL-SCNC: 138 MMOL/L (ref 136–145)
WBC # SPEC AUTO: 4.1 X10(3)/MCL (ref 4.5–11.5)

## 2022-06-27 PROCEDURE — 80197 ASSAY OF TACROLIMUS: CPT | Performed by: STUDENT IN AN ORGANIZED HEALTH CARE EDUCATION/TRAINING PROGRAM

## 2022-06-27 PROCEDURE — 80053 COMPREHEN METABOLIC PANEL: CPT

## 2022-06-27 PROCEDURE — 82105 ALPHA-FETOPROTEIN SERUM: CPT

## 2022-06-27 PROCEDURE — 36415 COLL VENOUS BLD VENIPUNCTURE: CPT

## 2022-06-27 PROCEDURE — 85025 COMPLETE CBC W/AUTO DIFF WBC: CPT

## 2022-06-28 LAB — TACROLIMUS TROUGH BLD-MCNC: 4 NG/ML

## 2022-07-07 ENCOUNTER — PATIENT MESSAGE (OUTPATIENT)
Dept: TRANSPLANT | Facility: CLINIC | Age: 62
End: 2022-07-07
Payer: COMMERCIAL

## 2022-07-07 DIAGNOSIS — Z94.4 LIVER REPLACED BY TRANSPLANT: Primary | ICD-10-CM

## 2022-07-07 RX ORDER — ESCITALOPRAM OXALATE 5 MG/1
5 TABLET ORAL DAILY
Qty: 30 TABLET | Refills: 5 | Status: CANCELLED | OUTPATIENT
Start: 2022-07-07

## 2022-07-07 RX ORDER — TACROLIMUS 1 MG/1
CAPSULE ORAL
Qty: 240 CAPSULE | Refills: 11 | Status: SHIPPED | OUTPATIENT
Start: 2022-07-07 | End: 2022-08-17 | Stop reason: DRUGHIGH

## 2022-07-07 RX ORDER — ACETAMINOPHEN 500 MG
1 TABLET ORAL DAILY
Qty: 30 TABLET | Refills: 5 | Status: ON HOLD | OUTPATIENT
Start: 2022-07-07 | End: 2024-03-02 | Stop reason: HOSPADM

## 2022-07-07 NOTE — TELEPHONE ENCOUNTER
Patient notified and instructed via [facility] , in response to advice request message from patient re: how were labs:    Just got the review note back from , here is what he said:  Liver transplant labs reviewed and are stable. Please increase tacrolimus dose  to 4 mg twice daily  and repeat labs in 1-2 weeks( due 7/18/22). Thanks.

## 2022-07-07 NOTE — TELEPHONE ENCOUNTER
----- Message from Jay Abrams MD sent at 7/6/2022  8:39 PM CDT -----  Liver transplant labs reviewed and are stable. Please increase tac to 4/4 and repeat labs in 1-2 weeks.

## 2022-07-11 RX ORDER — ESCITALOPRAM OXALATE 5 MG/1
5 TABLET ORAL DAILY
Qty: 30 TABLET | Refills: 5 | Status: CANCELLED | OUTPATIENT
Start: 2022-07-07

## 2022-07-18 ENCOUNTER — LAB VISIT (OUTPATIENT)
Dept: LAB | Facility: HOSPITAL | Age: 62
End: 2022-07-18
Attending: STUDENT IN AN ORGANIZED HEALTH CARE EDUCATION/TRAINING PROGRAM
Payer: COMMERCIAL

## 2022-07-18 DIAGNOSIS — Z94.4 LIVER REPLACED BY TRANSPLANT: ICD-10-CM

## 2022-07-18 LAB
ALBUMIN SERPL-MCNC: 3.8 GM/DL (ref 3.4–4.8)
ALBUMIN/GLOB SERPL: 1.7 RATIO (ref 1.1–2)
ALP SERPL-CCNC: 76 UNIT/L (ref 40–150)
ALT SERPL-CCNC: 12 UNIT/L (ref 0–55)
AST SERPL-CCNC: 12 UNIT/L (ref 5–34)
BASOPHILS # BLD AUTO: 0.04 X10(3)/MCL (ref 0–0.2)
BASOPHILS NFR BLD AUTO: 0.8 %
BILIRUBIN DIRECT+TOT PNL SERPL-MCNC: 0.4 MG/DL
BUN SERPL-MCNC: 18.6 MG/DL (ref 8.4–25.7)
CALCIUM SERPL-MCNC: 9.8 MG/DL (ref 8.8–10)
CHLORIDE SERPL-SCNC: 111 MMOL/L (ref 98–107)
CO2 SERPL-SCNC: 23 MMOL/L (ref 23–31)
CREAT SERPL-MCNC: 0.71 MG/DL (ref 0.73–1.18)
EOSINOPHIL # BLD AUTO: 0.05 X10(3)/MCL (ref 0–0.9)
EOSINOPHIL NFR BLD AUTO: 1 %
ERYTHROCYTE [DISTWIDTH] IN BLOOD BY AUTOMATED COUNT: 13.9 % (ref 11.5–17)
GLOBULIN SER-MCNC: 2.3 GM/DL (ref 2.4–3.5)
GLUCOSE SERPL-MCNC: 88 MG/DL (ref 82–115)
HCT VFR BLD AUTO: 33.9 % (ref 42–52)
HGB BLD-MCNC: 10.6 GM/DL (ref 14–18)
IMM GRANULOCYTES # BLD AUTO: 0.01 X10(3)/MCL (ref 0–0.04)
IMM GRANULOCYTES NFR BLD AUTO: 0.2 %
LYMPHOCYTES # BLD AUTO: 0.66 X10(3)/MCL (ref 0.6–4.6)
LYMPHOCYTES NFR BLD AUTO: 13.3 %
MCH RBC QN AUTO: 29.7 PG (ref 27–31)
MCHC RBC AUTO-ENTMCNC: 31.3 MG/DL (ref 33–36)
MCV RBC AUTO: 95 FL (ref 80–94)
MONOCYTES # BLD AUTO: 0.38 X10(3)/MCL (ref 0.1–1.3)
MONOCYTES NFR BLD AUTO: 7.6 %
NEUTROPHILS # BLD AUTO: 3.8 X10(3)/MCL (ref 2.1–9.2)
NEUTROPHILS NFR BLD AUTO: 77.1 %
NRBC BLD AUTO-RTO: 0 %
PLATELET # BLD AUTO: 174 X10(3)/MCL (ref 130–400)
PMV BLD AUTO: 10.9 FL (ref 7.4–10.4)
POTASSIUM SERPL-SCNC: 4.9 MMOL/L (ref 3.5–5.1)
PROT SERPL-MCNC: 6.1 GM/DL (ref 5.8–7.6)
RBC # BLD AUTO: 3.57 X10(6)/MCL (ref 4.7–6.1)
SODIUM SERPL-SCNC: 141 MMOL/L (ref 136–145)
WBC # SPEC AUTO: 5 X10(3)/MCL (ref 4.5–11.5)

## 2022-07-18 PROCEDURE — 85025 COMPLETE CBC W/AUTO DIFF WBC: CPT

## 2022-07-18 PROCEDURE — 36415 COLL VENOUS BLD VENIPUNCTURE: CPT

## 2022-07-18 PROCEDURE — 80053 COMPREHEN METABOLIC PANEL: CPT

## 2022-07-19 LAB — TACROLIMUS TROUGH BLD-MCNC: 5.1 NG/ML

## 2022-07-25 ENCOUNTER — PATIENT MESSAGE (OUTPATIENT)
Dept: TRANSPLANT | Facility: CLINIC | Age: 62
End: 2022-07-25
Payer: COMMERCIAL

## 2022-07-26 ENCOUNTER — TELEPHONE (OUTPATIENT)
Dept: TRANSPLANT | Facility: CLINIC | Age: 62
End: 2022-07-26
Payer: COMMERCIAL

## 2022-07-26 ENCOUNTER — PATIENT MESSAGE (OUTPATIENT)
Dept: TRANSPLANT | Facility: CLINIC | Age: 62
End: 2022-07-26
Payer: COMMERCIAL

## 2022-07-26 DIAGNOSIS — Z94.4 LIVER REPLACED BY TRANSPLANT: Primary | ICD-10-CM

## 2022-07-26 DIAGNOSIS — C22.0 HCC (HEPATOCELLULAR CARCINOMA): ICD-10-CM

## 2022-07-26 NOTE — TELEPHONE ENCOUNTER
Patient notified and instructed via [facility]:    Your labs were stable. No medicine changes made. Repeat labs due 8/8/22. Thanks.

## 2022-07-26 NOTE — TELEPHONE ENCOUNTER
----- Message from Jay Abrams MD sent at 7/22/2022  8:58 AM CDT -----  No changes in his immunosuppression. Please continue to monitor labs per transplant protocol.

## 2022-08-06 ENCOUNTER — PATIENT MESSAGE (OUTPATIENT)
Dept: TRANSPLANT | Facility: CLINIC | Age: 62
End: 2022-08-06
Payer: COMMERCIAL

## 2022-08-08 ENCOUNTER — PATIENT MESSAGE (OUTPATIENT)
Dept: TRANSPLANT | Facility: CLINIC | Age: 62
End: 2022-08-08
Payer: COMMERCIAL

## 2022-08-08 ENCOUNTER — LAB VISIT (OUTPATIENT)
Dept: LAB | Facility: HOSPITAL | Age: 62
End: 2022-08-08
Attending: STUDENT IN AN ORGANIZED HEALTH CARE EDUCATION/TRAINING PROGRAM
Payer: COMMERCIAL

## 2022-08-08 DIAGNOSIS — C22.0 HCC (HEPATOCELLULAR CARCINOMA): ICD-10-CM

## 2022-08-08 DIAGNOSIS — Z94.4 LIVER REPLACED BY TRANSPLANT: ICD-10-CM

## 2022-08-08 LAB
AFP-TM SERPL-MCNC: 2.1 NG/ML
ALBUMIN SERPL-MCNC: 4 GM/DL (ref 3.4–4.8)
ALBUMIN/GLOB SERPL: 1.4 RATIO (ref 1.1–2)
ALP SERPL-CCNC: 78 UNIT/L (ref 40–150)
ALT SERPL-CCNC: 10 UNIT/L (ref 0–55)
AST SERPL-CCNC: 12 UNIT/L (ref 5–34)
BASOPHILS # BLD AUTO: 0.04 X10(3)/MCL (ref 0–0.2)
BASOPHILS NFR BLD AUTO: 0.6 %
BILIRUBIN DIRECT+TOT PNL SERPL-MCNC: 0.5 MG/DL
BUN SERPL-MCNC: 35 MG/DL (ref 8.4–25.7)
CALCIUM SERPL-MCNC: 9.3 MG/DL (ref 8.8–10)
CHLORIDE SERPL-SCNC: 109 MMOL/L (ref 98–107)
CO2 SERPL-SCNC: 25 MMOL/L (ref 23–31)
CREAT SERPL-MCNC: 1.08 MG/DL (ref 0.73–1.18)
EOSINOPHIL # BLD AUTO: 0.06 X10(3)/MCL (ref 0–0.9)
EOSINOPHIL NFR BLD AUTO: 0.9 %
ERYTHROCYTE [DISTWIDTH] IN BLOOD BY AUTOMATED COUNT: 13.6 % (ref 11.5–17)
GFR SERPLBLD CREATININE-BSD FMLA CKD-EPI: >60 MLS/MIN/1.73/M2
GLOBULIN SER-MCNC: 2.8 GM/DL (ref 2.4–3.5)
GLUCOSE SERPL-MCNC: 76 MG/DL (ref 82–115)
HCT VFR BLD AUTO: 36.9 % (ref 42–52)
HGB BLD-MCNC: 11.5 GM/DL (ref 14–18)
IMM GRANULOCYTES # BLD AUTO: 0.02 X10(3)/MCL (ref 0–0.04)
IMM GRANULOCYTES NFR BLD AUTO: 0.3 %
LYMPHOCYTES # BLD AUTO: 0.87 X10(3)/MCL (ref 0.6–4.6)
LYMPHOCYTES NFR BLD AUTO: 13.7 %
MCH RBC QN AUTO: 29.6 PG (ref 27–31)
MCHC RBC AUTO-ENTMCNC: 31.2 MG/DL (ref 33–36)
MCV RBC AUTO: 95.1 FL (ref 80–94)
MONOCYTES # BLD AUTO: 0.48 X10(3)/MCL (ref 0.1–1.3)
MONOCYTES NFR BLD AUTO: 7.6 %
NEUTROPHILS # BLD AUTO: 4.9 X10(3)/MCL (ref 2.1–9.2)
NEUTROPHILS NFR BLD AUTO: 76.9 %
NRBC BLD AUTO-RTO: 0 %
PLATELET # BLD AUTO: 190 X10(3)/MCL (ref 130–400)
PMV BLD AUTO: 10.8 FL (ref 7.4–10.4)
POTASSIUM SERPL-SCNC: 5.9 MMOL/L (ref 3.5–5.1)
PROT SERPL-MCNC: 6.8 GM/DL (ref 5.8–7.6)
RBC # BLD AUTO: 3.88 X10(6)/MCL (ref 4.7–6.1)
SODIUM SERPL-SCNC: 140 MMOL/L (ref 136–145)
WBC # SPEC AUTO: 6.3 X10(3)/MCL (ref 4.5–11.5)

## 2022-08-08 PROCEDURE — 80053 COMPREHEN METABOLIC PANEL: CPT

## 2022-08-08 PROCEDURE — 85025 COMPLETE CBC W/AUTO DIFF WBC: CPT

## 2022-08-08 PROCEDURE — 82105 ALPHA-FETOPROTEIN SERUM: CPT

## 2022-08-08 PROCEDURE — 36415 COLL VENOUS BLD VENIPUNCTURE: CPT

## 2022-08-09 LAB — TACROLIMUS TROUGH BLD-MCNC: 5.7 NG/ML

## 2022-08-10 ENCOUNTER — TELEPHONE (OUTPATIENT)
Dept: TRANSPLANT | Facility: CLINIC | Age: 62
End: 2022-08-10
Payer: COMMERCIAL

## 2022-08-17 ENCOUNTER — PATIENT MESSAGE (OUTPATIENT)
Dept: TRANSPLANT | Facility: CLINIC | Age: 62
End: 2022-08-17
Payer: COMMERCIAL

## 2022-08-17 ENCOUNTER — HOSPITAL ENCOUNTER (INPATIENT)
Facility: HOSPITAL | Age: 62
LOS: 1 days | Discharge: ANOTHER HOSPITAL | DRG: 389 | End: 2022-08-17
Attending: EMERGENCY MEDICINE | Admitting: TRANSPLANT SURGERY
Payer: COMMERCIAL

## 2022-08-17 VITALS
HEIGHT: 65 IN | WEIGHT: 162 LBS | BODY MASS INDEX: 26.99 KG/M2 | DIASTOLIC BLOOD PRESSURE: 89 MMHG | TEMPERATURE: 98 F | OXYGEN SATURATION: 100 % | RESPIRATION RATE: 14 BRPM | SYSTOLIC BLOOD PRESSURE: 156 MMHG | HEART RATE: 80 BPM

## 2022-08-17 DIAGNOSIS — K56.609 INTESTINAL OBSTRUCTION, UNSPECIFIED CAUSE, UNSPECIFIED WHETHER PARTIAL OR COMPLETE: Primary | ICD-10-CM

## 2022-08-17 DIAGNOSIS — K56.609 SMALL BOWEL OBSTRUCTION: ICD-10-CM

## 2022-08-17 DIAGNOSIS — E87.5 HYPERKALEMIA: Primary | ICD-10-CM

## 2022-08-17 DIAGNOSIS — Z94.4 LIVER REPLACED BY TRANSPLANT: Primary | ICD-10-CM

## 2022-08-17 PROBLEM — R10.9 ABDOMINAL PAIN: Status: ACTIVE | Noted: 2022-08-17

## 2022-08-17 PROBLEM — I77.1 STENOSIS OF HEPATIC ARTERY OF TRANSPLANTED LIVER: Status: ACTIVE | Noted: 2022-08-17

## 2022-08-17 PROBLEM — T86.49 STENOSIS OF HEPATIC ARTERY OF TRANSPLANTED LIVER: Status: ACTIVE | Noted: 2022-08-17

## 2022-08-17 LAB
ALBUMIN SERPL-MCNC: 3.8 GM/DL (ref 3.4–4.8)
ALBUMIN/GLOB SERPL: 1.4 RATIO (ref 1.1–2)
ALP SERPL-CCNC: 176 UNIT/L (ref 40–150)
ALT SERPL-CCNC: 171 UNIT/L (ref 0–55)
APAP SERPL-MCNC: <17.4 UG/ML (ref 17.4–30)
APPEARANCE UR: CLEAR
AST SERPL-CCNC: 21 UNIT/L (ref 5–34)
BACTERIA #/AREA URNS AUTO: NORMAL /HPF
BASOPHILS # BLD AUTO: 0.05 X10(3)/MCL (ref 0–0.2)
BASOPHILS NFR BLD AUTO: 0.9 %
BILIRUB UR QL STRIP.AUTO: NEGATIVE MG/DL
BILIRUBIN DIRECT+TOT PNL SERPL-MCNC: 0.6 MG/DL
BUN SERPL-MCNC: 26.3 MG/DL (ref 8.4–25.7)
CALCIUM SERPL-MCNC: 9.1 MG/DL (ref 8.8–10)
CHLORIDE SERPL-SCNC: 109 MMOL/L (ref 98–107)
CO2 SERPL-SCNC: 23 MMOL/L (ref 23–31)
COLOR UR AUTO: YELLOW
CREAT SERPL-MCNC: 0.84 MG/DL (ref 0.73–1.18)
EOSINOPHIL # BLD AUTO: 0.07 X10(3)/MCL (ref 0–0.9)
EOSINOPHIL NFR BLD AUTO: 1.3 %
ERYTHROCYTE [DISTWIDTH] IN BLOOD BY AUTOMATED COUNT: 13.6 % (ref 11.5–17)
EST. AVERAGE GLUCOSE BLD GHB EST-MCNC: 116.9 MG/DL
GFR SERPLBLD CREATININE-BSD FMLA CKD-EPI: >60 MLS/MIN/1.73/M2
GLOBULIN SER-MCNC: 2.7 GM/DL (ref 2.4–3.5)
GLUCOSE SERPL-MCNC: 167 MG/DL (ref 82–115)
GLUCOSE UR QL STRIP.AUTO: NEGATIVE MG/DL
HBA1C MFR BLD: 5.7 %
HCT VFR BLD AUTO: 34.3 % (ref 42–52)
HGB BLD-MCNC: 11.1 GM/DL (ref 14–18)
IMM GRANULOCYTES # BLD AUTO: 0.02 X10(3)/MCL (ref 0–0.04)
IMM GRANULOCYTES NFR BLD AUTO: 0.4 %
KETONES UR QL STRIP.AUTO: NEGATIVE MG/DL
LEUKOCYTE ESTERASE UR QL STRIP.AUTO: NEGATIVE UNIT/L
LIPASE SERPL-CCNC: 22 U/L
LYMPHOCYTES # BLD AUTO: 0.77 X10(3)/MCL (ref 0.6–4.6)
LYMPHOCYTES NFR BLD AUTO: 14.6 %
MCH RBC QN AUTO: 30.1 PG (ref 27–31)
MCHC RBC AUTO-ENTMCNC: 32.4 MG/DL (ref 33–36)
MCV RBC AUTO: 93 FL (ref 80–94)
MONOCYTES # BLD AUTO: 0.47 X10(3)/MCL (ref 0.1–1.3)
MONOCYTES NFR BLD AUTO: 8.9 %
NEUTROPHILS # BLD AUTO: 3.9 X10(3)/MCL (ref 2.1–9.2)
NEUTROPHILS NFR BLD AUTO: 73.9 %
NITRITE UR QL STRIP.AUTO: NEGATIVE
NRBC BLD AUTO-RTO: 0 %
PH UR STRIP.AUTO: 5.5 [PH]
PLATELET # BLD AUTO: 190 X10(3)/MCL (ref 130–400)
PMV BLD AUTO: 10.6 FL (ref 7.4–10.4)
POCT GLUCOSE: 133 MG/DL (ref 70–110)
POCT GLUCOSE: 68 MG/DL (ref 70–110)
POCT GLUCOSE: 69 MG/DL (ref 70–110)
POCT GLUCOSE: 70 MG/DL (ref 70–110)
POCT GLUCOSE: 79 MG/DL (ref 70–110)
POCT GLUCOSE: 83 MG/DL (ref 70–110)
POTASSIUM SERPL-SCNC: 4.3 MMOL/L (ref 3.5–5.1)
PROT SERPL-MCNC: 6.5 GM/DL (ref 5.8–7.6)
PROT UR QL STRIP.AUTO: ABNORMAL MG/DL
RBC # BLD AUTO: 3.69 X10(6)/MCL (ref 4.7–6.1)
RBC #/AREA URNS AUTO: <5 /HPF
RBC UR QL AUTO: NEGATIVE UNIT/L
SARS-COV-2 RDRP RESP QL NAA+PROBE: NEGATIVE
SODIUM SERPL-SCNC: 141 MMOL/L (ref 136–145)
SP GR UR STRIP.AUTO: 1.02 (ref 1–1.03)
SQUAMOUS #/AREA URNS AUTO: <5 /HPF
UROBILINOGEN UR STRIP-ACNC: 0.2 MG/DL
WBC # SPEC AUTO: 5.3 X10(3)/MCL (ref 4.5–11.5)
WBC #/AREA URNS AUTO: <5 /HPF

## 2022-08-17 PROCEDURE — 99285 EMERGENCY DEPT VISIT HI MDM: CPT | Mod: 25

## 2022-08-17 PROCEDURE — 11000001 HC ACUTE MED/SURG PRIVATE ROOM

## 2022-08-17 PROCEDURE — 63600175 PHARM REV CODE 636 W HCPCS: Performed by: STUDENT IN AN ORGANIZED HEALTH CARE EDUCATION/TRAINING PROGRAM

## 2022-08-17 PROCEDURE — 80053 COMPREHEN METABOLIC PANEL: CPT | Performed by: EMERGENCY MEDICINE

## 2022-08-17 PROCEDURE — 63600175 PHARM REV CODE 636 W HCPCS: Performed by: EMERGENCY MEDICINE

## 2022-08-17 PROCEDURE — 87635 SARS-COV-2 COVID-19 AMP PRB: CPT | Performed by: EMERGENCY MEDICINE

## 2022-08-17 PROCEDURE — 96375 TX/PRO/DX INJ NEW DRUG ADDON: CPT

## 2022-08-17 PROCEDURE — 96366 THER/PROPH/DIAG IV INF ADDON: CPT

## 2022-08-17 PROCEDURE — S5010 5% DEXTROSE AND 0.45% SALINE: HCPCS | Performed by: EMERGENCY MEDICINE

## 2022-08-17 PROCEDURE — 80143 DRUG ASSAY ACETAMINOPHEN: CPT | Performed by: EMERGENCY MEDICINE

## 2022-08-17 PROCEDURE — 25000003 PHARM REV CODE 250: Performed by: NURSE PRACTITIONER

## 2022-08-17 PROCEDURE — 25000003 PHARM REV CODE 250: Performed by: EMERGENCY MEDICINE

## 2022-08-17 PROCEDURE — 85025 COMPLETE CBC W/AUTO DIFF WBC: CPT | Performed by: EMERGENCY MEDICINE

## 2022-08-17 PROCEDURE — 36415 COLL VENOUS BLD VENIPUNCTURE: CPT | Performed by: EMERGENCY MEDICINE

## 2022-08-17 PROCEDURE — 82962 GLUCOSE BLOOD TEST: CPT | Mod: 59

## 2022-08-17 PROCEDURE — 83690 ASSAY OF LIPASE: CPT | Performed by: EMERGENCY MEDICINE

## 2022-08-17 PROCEDURE — 96361 HYDRATE IV INFUSION ADD-ON: CPT

## 2022-08-17 PROCEDURE — 81001 URINALYSIS AUTO W/SCOPE: CPT | Performed by: EMERGENCY MEDICINE

## 2022-08-17 PROCEDURE — 83036 HEMOGLOBIN GLYCOSYLATED A1C: CPT | Performed by: EMERGENCY MEDICINE

## 2022-08-17 PROCEDURE — 96365 THER/PROPH/DIAG IV INF INIT: CPT

## 2022-08-17 RX ORDER — TACROLIMUS 1 MG/1
CAPSULE ORAL
Qty: 210 CAPSULE | Refills: 11 | Status: SHIPPED | OUTPATIENT
Start: 2022-08-17 | End: 2022-10-10 | Stop reason: DRUGHIGH

## 2022-08-17 RX ORDER — ONDANSETRON 2 MG/ML
4 INJECTION INTRAMUSCULAR; INTRAVENOUS
Status: COMPLETED | OUTPATIENT
Start: 2022-08-17 | End: 2022-08-17

## 2022-08-17 RX ORDER — INSULIN ASPART 100 [IU]/ML
0-5 INJECTION, SOLUTION INTRAVENOUS; SUBCUTANEOUS
Status: CANCELLED | OUTPATIENT
Start: 2022-08-17

## 2022-08-17 RX ORDER — DEXTROSE MONOHYDRATE AND SODIUM CHLORIDE 5; .45 G/100ML; G/100ML
INJECTION, SOLUTION INTRAVENOUS CONTINUOUS
Status: DISCONTINUED | OUTPATIENT
Start: 2022-08-17 | End: 2022-08-18 | Stop reason: HOSPADM

## 2022-08-17 RX ORDER — SODIUM CHLORIDE 9 MG/ML
INJECTION, SOLUTION INTRAVENOUS CONTINUOUS
Status: DISCONTINUED | OUTPATIENT
Start: 2022-08-17 | End: 2022-08-17

## 2022-08-17 RX ORDER — MORPHINE SULFATE 4 MG/ML
4 INJECTION, SOLUTION INTRAMUSCULAR; INTRAVENOUS
Status: COMPLETED | OUTPATIENT
Start: 2022-08-17 | End: 2022-08-17

## 2022-08-17 RX ORDER — IBUPROFEN 200 MG
16 TABLET ORAL
Status: DISCONTINUED | OUTPATIENT
Start: 2022-08-17 | End: 2022-08-18 | Stop reason: HOSPADM

## 2022-08-17 RX ORDER — GLUCAGON 1 MG
1 KIT INJECTION
Status: CANCELLED | OUTPATIENT
Start: 2022-08-17

## 2022-08-17 RX ORDER — IBUPROFEN 200 MG
24 TABLET ORAL
Status: DISCONTINUED | OUTPATIENT
Start: 2022-08-17 | End: 2022-08-18 | Stop reason: HOSPADM

## 2022-08-17 RX ORDER — IBUPROFEN 200 MG
16 TABLET ORAL
Status: CANCELLED | OUTPATIENT
Start: 2022-08-17

## 2022-08-17 RX ORDER — IBUPROFEN 200 MG
24 TABLET ORAL
Status: CANCELLED | OUTPATIENT
Start: 2022-08-17

## 2022-08-17 RX ORDER — INSULIN ASPART 100 [IU]/ML
0-5 INJECTION, SOLUTION INTRAVENOUS; SUBCUTANEOUS
Status: DISCONTINUED | OUTPATIENT
Start: 2022-08-17 | End: 2022-08-18 | Stop reason: HOSPADM

## 2022-08-17 RX ORDER — GLUCAGON 1 MG
1 KIT INJECTION
Status: DISCONTINUED | OUTPATIENT
Start: 2022-08-17 | End: 2022-08-18 | Stop reason: HOSPADM

## 2022-08-17 RX ADMIN — DEXTROSE AND SODIUM CHLORIDE: 5; 450 INJECTION, SOLUTION INTRAVENOUS at 01:08

## 2022-08-17 RX ADMIN — MORPHINE SULFATE 4 MG: 4 INJECTION INTRAVENOUS at 05:08

## 2022-08-17 RX ADMIN — SODIUM CHLORIDE: 9 INJECTION, SOLUTION INTRAVENOUS at 12:08

## 2022-08-17 RX ADMIN — ONDANSETRON 4 MG: 2 INJECTION INTRAMUSCULAR; INTRAVENOUS at 12:08

## 2022-08-17 RX ADMIN — ONDANSETRON 4 MG: 2 INJECTION INTRAMUSCULAR; INTRAVENOUS at 05:08

## 2022-08-17 RX ADMIN — MORPHINE SULFATE 4 MG: 4 INJECTION INTRAVENOUS at 12:08

## 2022-08-17 RX ADMIN — DEXTROSE 125 ML: 10 SOLUTION INTRAVENOUS at 03:08

## 2022-08-17 NOTE — ASSESSMENT & PLAN NOTE
- hepatic artery stenosis s/p angioplasty and stent placement 5/3/22 (on ASA and plavix).   - Hold ASA and plavix, restart once medically stable

## 2022-08-17 NOTE — TELEPHONE ENCOUNTER
Patient notified and instructed via [facility]:    Per :  Liver transplant labs reviewed and are stable. Please decrease tacrolimus dose  to 4mg in AM and 3mg in PM and repeat labs in 1-2 weeks(due 8/29/22).     Potassium elevated. Please follow hyperkalemia protocol. : Your potassium level was elevated at 5.9: you will need to take a medicine called Kayexelate(or Sodium Polystyrine) today and have a lab tomorrow morning for repeat potassium level.   will send the Rx. To your local pharmacy. Thanks.

## 2022-08-17 NOTE — HPI
Damien Kong is a 61yr M s/p DCD LTX 1/23/22 for HCC and HCC. He progressed well postoperatively. Post op course significant for hepatic artery stenosis s/p angioplasty and stent placement 5/3/22 (on ASA and plavix). Patient presented to OSH ER with L flank and abdominal pain x 2 weeks. He states he is unable to lie down or sit up. Also reports increased abdominal distention. Labs in ER significant for elevated ALT. CT stone study 8/17 with possible early SBO due to mildly dilated loops of SB within left abd/pelvis with air fluid level and suspected transition point in left para-midline anterior abdomen. Decision made to transfer to [facility] for transplant services. On arrival, patient reports he is feeling okay but still with abdominal pain. Abdominal pain is in left upper quadrant and radiates into his back. Pain is worse at rest when he is lying down. He denies any nausea/vomiting. He denies fever, chills, chest pain, SOB. He does report no flatus for past several days. Last bowel movement on 8/16/22. Abdomen is soft and non distended and only minimally painful to touch. Plan for Gastrogaffin challenge. Plan for NPO, IVF, and Liver US.

## 2022-08-17 NOTE — ED PROVIDER NOTES
Encounter Date: 8/17/2022    SCRIBE #1 NOTE: I, Gabrielle Bhat, am scribing for, and in the presence of,  Jt Carvajal MD. I have scribed the following portions of the note - Other sections scribed: HPI, ROS and physical.   SCRIBE #2 NOTE: I, Sunil Yonis, am scribing for, and in the presence of,  Jt Carvajal MD. I have scribed the following portions of the note - Other sections scribed: HPI, ROS and physical.     History     Chief Complaint   Patient presents with    Back Pain     Left sided back pain radiating to abdomen, worse when laying down. Liver transplant in January. Compliant with meds.     61 y.o male with a history of DM, HTN, aortic regurgitation, Hepatitis C, liver cancer, kidney stones, and s/p liver transplant is presenting to the ED with back pain onset a couple weeks ago. The pt stated what he has been experiencing pain from the L flank/back and radiating to the L abdomen. The pt states that he is unable to sleep lying down or sitting up.  He reports to have some distention in his abdomen. Pt denies any difficulty urinating, hematuria, constipation, and nausea. He also denies having a fever. Pt states the pain is not as intense as the pain with previous kidney stones was.The pt's liver transplant was done in January at [facility] in [place]. The pt states he has been taking Tylenol for his back pain. Pt is on plavix.    The pt's PCP is Alexsandra Sandoval MD.     The history is provided by the patient. No  was used.   Back Pain   This is a new problem. The current episode started several weeks ago. The problem occurs constantly. The problem has been unchanged. The pain is associated with no known injury. Pain location: L flank/back. The pain does not radiate. Exacerbated by: laying flat. Associated symptoms include abdominal swelling. Pertinent negatives include no chest pain, no fever, no headaches, no abdominal pain, no bladder incontinence, no dysuria and no  weakness. Treatments tried: tylenol. The treatment provided no relief. Risk factors include a history of cancer (transplant patient).     Review of patient's allergies indicates:  No Known Allergies  Past Medical History:   Diagnosis Date    Anemia     Arthritis     Diabetes     Dizziness     Dyslipidemia     General anesthetics causing adverse effect in therapeutic use     Hep C w/o coma, chronic     failed 2 rounds of interferon-based medication. no protease inhibitorrs used    Hypertension     not on medication    Kidney stone     Liver cancer      Past Surgical History:   Procedure Laterality Date    AORTIC VALVE REPLACEMENT  2018    due to aortic regurgitation    CARDIAC SURGERY      CATHETERIZATION OF BOTH LEFT AND RIGHT HEART N/A 2021    Procedure: CATHETERIZATION, HEART, BOTH LEFT AND RIGHT;  Surgeon: Eder Beltran MD;  Location: [facility] CATH LAB;  Service: Cardiology;  Laterality: N/A;    COLONOSCOPY W/ POLYPECTOMY          ESOPHAGOGASTRODUODENOSCOPY      x2 with cautery in ,     LIVER TRANSPLANT N/A 2022    Procedure: TRANSPLANT, LIVER;  Surgeon: Jesús Austin MD;  Location: [facility] OR [address] [place] [address];  Service: Transplant;  Laterality: N/A;    mastoid bone      right ear     Family History   Problem Relation Age of Onset    Diabetes Sister     Diabetes Brother      Social History     Tobacco Use    Smoking status: Former Smoker     Packs/day: 1.00     Years: 25.00     Pack years: 25.00     Quit date: 1999     Years since quittin.1    Smokeless tobacco: Never Used   Substance Use Topics    Alcohol use: No     Comment: quit     Drug use: No     Review of Systems   Constitutional: Negative for chills and fever.   HENT: Negative for congestion and ear pain.    Eyes: Negative for discharge.   Respiratory: Negative for cough, shortness of breath and wheezing.    Cardiovascular: Negative for chest pain and leg swelling.   Gastrointestinal: Positive for  abdominal distention. Negative for abdominal pain, constipation, diarrhea, nausea and vomiting.   Genitourinary: Negative for bladder incontinence, difficulty urinating, dysuria, flank pain, frequency and hematuria.   Musculoskeletal: Positive for back pain. Negative for joint swelling.   Skin: Negative for rash.   Neurological: Negative for dizziness, weakness and headaches.   Psychiatric/Behavioral: Negative for agitation, confusion and hallucinations.       Physical Exam     Initial Vitals   BP Pulse Resp Temp SpO2   08/17/22 0213 08/17/22 0213 08/17/22 0213 08/17/22 0213 08/17/22 0833   (!) 144/84 84 20 98.1 °F (36.7 °C) 100 %      MAP       --                Physical Exam    Nursing note and vitals reviewed.  Constitutional: He appears well-developed. No distress.   Poor historian   HENT:   Head: Normocephalic and atraumatic.   Mouth/Throat: Oropharynx is clear and moist.   Eyes: Conjunctivae and EOM are normal. Pupils are equal, round, and reactive to light.   Neck: Neck supple.   Cardiovascular: Normal rate and regular rhythm.   No murmur heard.  Pulmonary/Chest: Breath sounds normal. No respiratory distress. He exhibits no tenderness.   Abdominal: Abdomen is soft. Bowel sounds are normal. He exhibits no distension. There is abdominal tenderness in the left upper quadrant and left lower quadrant.   Moderate L flank tenderness There is no rebound and no guarding.   Musculoskeletal:         General: Normal range of motion.      Cervical back: Neck supple.      Lumbar back: Normal. No tenderness. Normal range of motion.     Neurological: He is alert and oriented to person, place, and time. He has normal strength. No cranial nerve deficit or sensory deficit.   Psychiatric: He has a normal mood and affect. Judgment normal.         ED Course   Procedures  Labs Reviewed   COMPREHENSIVE METABOLIC PANEL - Abnormal; Notable for the following components:       Result Value    Chloride 109 (*)     Glucose Level 167 (*)      Blood Urea Nitrogen 26.3 (*)     Alkaline Phosphatase 176 (*)     Alanine Aminotransferase 171 (*)     All other components within normal limits   URINALYSIS, REFLEX TO URINE CULTURE - Abnormal; Notable for the following components:    Protein, UA Trace (*)     All other components within normal limits   CBC WITH DIFFERENTIAL - Abnormal; Notable for the following components:    RBC 3.69 (*)     Hgb 11.1 (*)     Hct 34.3 (*)     MCHC 32.4 (*)     MPV 10.6 (*)     All other components within normal limits   ACETAMINOPHEN LEVEL - Abnormal; Notable for the following components:    Acetaminophen Level <17.4 (*)     All other components within normal limits   LIPASE - Normal   URINALYSIS, MICROSCOPIC - Normal   CBC W/ AUTO DIFFERENTIAL    Narrative:     The following orders were created for panel order CBC auto differential.  Procedure                               Abnormality         Status                     ---------                               -----------         ------                     CBC with Differential[[id number]]        Abnormal            Final result                 Please view results for these tests on the individual orders.   SARS-COV-2 RNA AMPLIFICATION, QUAL          Imaging Results          CT Renal Stone Study ABD Pelvis WO (Final result)  Result time 08/17/22 10:04:51    Final result by Jam Jay MD (08/17/22 10:04:51)                 Impression:      1. There appear to be a few fluid-filled mildly dilated loops of small bowel within the left abdomen and pelvis with air-fluid levels.  There is a suspected transition point to decompressed small bowel in the left paramidline anterior abdomen.  The findings raise suspicion for early small bowel obstruction.  There is persistent retained fecal material and gas within the colon.  The possibility of small bowel ileus or enteritis cannot be entirely excluded.  2. No evidence of nephrolithiasis, hydronephrosis, or hydroureter is seen.  3.  Additional findings and details as above.      Electronically signed by: Jam Jay MD  Date:    08/17/2022  Time:    10:04             Narrative:    EXAMINATION:  CT RENAL STONE STUDY ABD PELVIS WO    CLINICAL HISTORY:  Flank pain, kidney stone suspected;left flank pain; .    TECHNIQUE:  Axial CT slice through the abdomen and pelvis were obtained without the administration of intravenous contrast. Total DLP for the study is approximately 265 mGy-cm. Automated exposure control was utilized.    COMPARISON:  CT abdomen pelvis dated 04/18/2022    FINDINGS:  The visualized lung bases demonstrate mild dependent atelectasis.  The visualized cardiac base is at the upper limit of normal.  Postoperative changes including cardiac valve prosthesis are partially visualized.  The assessment of the intra-abdominal organs and bowel loops is limited by lack of contrast.    The noncontrast liver appears to be normal in size contour.  There appears to be evidence of stent material within the crystal pedis, likely corresponding with reported hepatic artery stent on 05/03/2022.  The noncontrast appearance of the spleen, pancreas, and adrenal glands is within normal limits.  No evidence of nephrolithiasis, hydronephrosis, or hydroureter is visualized.  There is a 12 mm hypodensity at the midpole right kidney which is incompletely evaluated on the current study, but it demonstrates fluid attenuation compatible with a cyst.  There is a subcentimeter exophytic hypodensity at the anterior mid to lower pole left kidney which is too small to adequately characterize, statistically likely reflective of a cyst.  Bilateral mild perinephric stranding is nonspecific, possibly related to medical-renal disease.  The urinary bladder is underdistended which limits assessment.    Portions of the small and large bowel are underdistended which along with lack of contrast limits assessment.  Sigmoid predominant colonic diverticulosis is seen without  adjacent fat stranding to suggest acute diverticulitis by CT.  The appendix appears to be normal in caliber (for example series 2, images 40-52).  There are a few loops of mildly prominent fluid-filled small bowel within the left abdomen and pelvis with a few air-fluid levels.  The small bowel measures up to 3 cm in diameter on image 32 of series 2 for example.  There is some fecalized appearing material within a loop of small bowel in the anterior mid abdomen (for example series 2, image 38) which may indicate some degree of bowel stasis.  There appears to be evidence of a transition point to decompressed small bowel in the anterior left paramidline abdomen on image 33 of series 2.  The findings raise suspicion for possible early small bowel obstruction.  An element of enteritis or ileus cannot be entirely excluded.   The stomach is underdistended which limits assessment.  Abdominal-pelvic atherosclerosis is seen.  Scattered subcentimeter in short axis dimension mesenteric and retroperitoneal lymph nodes are noted.  There is skin thickening with subcutaneous stranding in the anterior bilateral abdominal-pelvic wall which may be related to injection sites.  Please correlate clinically.  Degenerative changes affect the visualized spine.                                 Medications   0.9%  NaCl infusion ( Intravenous New Bag 8/17/22 1231)   morphine injection 4 mg (4 mg Intravenous Given 8/17/22 1230)   ondansetron injection 4 mg (4 mg Intravenous Given 8/17/22 1230)     Medical Decision Making:   Clinical Tests:   Lab Tests: Ordered and Reviewed  ED Management:  Possible sbo, no bm since yesterday, no flatus, pt says abd is distended.  However pt not vomiting.  LT flank and abd ttp on exam, no r/g.  ALT slightly eelvated - spoke w dr teixeira recs pt be xferred to [facility] NO          Scribe Attestation:   Scribe #1: I performed the above scribed service and the documentation accurately describes the services I  performed. I attest to the accuracy of the note.  Scribe #2: I performed the above scribed service and the documentation accurately describes the services I performed. I attest to the accuracy of the note.    Attending Attestation:           Physician Attestation for Scribe:  Physician Attestation Statement for Scribe #1: I, Jt Carvajal MD, reviewed documentation, as scribed by Gabrielle Bhat in my presence, and it is both accurate and complete.   Physician Attestation Statement for Scribe #2: I, Jt Carvajal MD, reviewed documentation, as scribed by Sunil Somers in my presence, and it is both accurate and complete. I also acknowledge and confirm the content of the note done by Scribe #1.          ED Course as of 08/17/22 1234   Wed Aug 17, 2022   1106 Liver transplant coordinator at [facility] [place] paged [KH]   1120 Spoke to liver transplant coordinator, Dr. Marshall. Told to call transplant center and have pt transferred to [facility] [place].  [KH]   1126 Called  to let them know pt is transferring [KH]      ED Course User Index  [KH] Sunil Somers             Clinical Impression:   Final diagnoses:  [K56.609] Intestinal obstruction, unspecified cause, unspecified whether partial or complete (Primary)          ED Disposition Condition    Transfer to Another Facility Stable              Jt Carvajal MD  08/17/22 0755

## 2022-08-17 NOTE — ASSESSMENT & PLAN NOTE
- Presented to OSH with left abdominal pain  - CT 8/17 with possible SBO, suspicious transition point LLQ  - NPO, IVF  - Anti-emetics PRN if needed but currently no nausea/vomiting reported  - Gastrogaffin challenge ordered along with Liver US

## 2022-08-18 ENCOUNTER — HOSPITAL ENCOUNTER (INPATIENT)
Facility: HOSPITAL | Age: 62
LOS: 1 days | Discharge: HOME | DRG: 392 | End: 2022-08-19
Attending: TRANSPLANT SURGERY | Admitting: TRANSPLANT SURGERY
Payer: COMMERCIAL

## 2022-08-18 DIAGNOSIS — Z79.60 LONG-TERM USE OF IMMUNOSUPPRESSANT MEDICATION: ICD-10-CM

## 2022-08-18 DIAGNOSIS — Z91.89 AT RISK FOR OPPORTUNISTIC INFECTIONS: ICD-10-CM

## 2022-08-18 DIAGNOSIS — R10.12 LEFT UPPER QUADRANT ABDOMINAL PAIN: ICD-10-CM

## 2022-08-18 DIAGNOSIS — K56.609 SMALL BOWEL OBSTRUCTION: ICD-10-CM

## 2022-08-18 DIAGNOSIS — Z29.89 PROPHYLACTIC IMMUNOTHERAPY: ICD-10-CM

## 2022-08-18 DIAGNOSIS — Z94.4 S/P LIVER TRANSPLANT: Primary | ICD-10-CM

## 2022-08-18 DIAGNOSIS — R10.9 ABDOMINAL PAIN: ICD-10-CM

## 2022-08-18 DIAGNOSIS — Z79.4 TYPE 2 DIABETES MELLITUS WITH HYPOGLYCEMIA WITHOUT COMA, WITH LONG-TERM CURRENT USE OF INSULIN: ICD-10-CM

## 2022-08-18 DIAGNOSIS — T86.49 STENOSIS OF HEPATIC ARTERY OF TRANSPLANTED LIVER: ICD-10-CM

## 2022-08-18 DIAGNOSIS — E11.649 TYPE 2 DIABETES MELLITUS WITH HYPOGLYCEMIA WITHOUT COMA, WITH LONG-TERM CURRENT USE OF INSULIN: ICD-10-CM

## 2022-08-18 DIAGNOSIS — I77.1 STENOSIS OF HEPATIC ARTERY OF TRANSPLANTED LIVER: ICD-10-CM

## 2022-08-18 PROBLEM — R53.1 WEAKNESS: Status: RESOLVED | Noted: 2022-01-26 | Resolved: 2022-08-18

## 2022-08-18 PROBLEM — K21.9 GERD (GASTROESOPHAGEAL REFLUX DISEASE): Chronic | Status: ACTIVE | Noted: 2022-08-18

## 2022-08-18 PROBLEM — E83.42 HYPOMAGNESEMIA: Status: ACTIVE | Noted: 2022-08-18

## 2022-08-18 LAB
ALBUMIN SERPL BCP-MCNC: 3.6 G/DL (ref 3.5–5.2)
ALP SERPL-CCNC: 124 U/L (ref 55–135)
ALT SERPL W/O P-5'-P-CCNC: 113 U/L (ref 10–44)
AMYLASE SERPL-CCNC: 53 U/L (ref 20–110)
ANION GAP SERPL CALC-SCNC: 8 MMOL/L (ref 8–16)
AST SERPL-CCNC: 17 U/L (ref 10–40)
BASOPHILS # BLD AUTO: 0.02 K/UL (ref 0–0.2)
BASOPHILS NFR BLD: 0.4 % (ref 0–1.9)
BILIRUB SERPL-MCNC: 1 MG/DL (ref 0.1–1)
BILIRUB UR QL STRIP: NEGATIVE
BUN SERPL-MCNC: 11 MG/DL (ref 8–23)
CALCIUM SERPL-MCNC: 8.9 MG/DL (ref 8.7–10.5)
CHLORIDE SERPL-SCNC: 109 MMOL/L (ref 95–110)
CLARITY UR REFRACT.AUTO: CLEAR
CO2 SERPL-SCNC: 24 MMOL/L (ref 23–29)
COLOR UR AUTO: YELLOW
CREAT SERPL-MCNC: 0.7 MG/DL (ref 0.5–1.4)
DIFFERENTIAL METHOD: ABNORMAL
EOSINOPHIL # BLD AUTO: 0 K/UL (ref 0–0.5)
EOSINOPHIL NFR BLD: 0.8 % (ref 0–8)
ERYTHROCYTE [DISTWIDTH] IN BLOOD BY AUTOMATED COUNT: 13.3 % (ref 11.5–14.5)
EST. GFR  (NO RACE VARIABLE): >60 ML/MIN/1.73 M^2
ESTIMATED AVG GLUCOSE: 114 MG/DL (ref 68–131)
GLUCOSE SERPL-MCNC: 75 MG/DL (ref 70–110)
GLUCOSE UR QL STRIP: NEGATIVE
HBA1C MFR BLD: 5.6 % (ref 4–5.6)
HCT VFR BLD AUTO: 33.7 % (ref 40–54)
HGB BLD-MCNC: 11 G/DL (ref 14–18)
HGB UR QL STRIP: NEGATIVE
IMM GRANULOCYTES # BLD AUTO: 0.01 K/UL (ref 0–0.04)
IMM GRANULOCYTES NFR BLD AUTO: 0.2 % (ref 0–0.5)
KETONES UR QL STRIP: NEGATIVE
LEUKOCYTE ESTERASE UR QL STRIP: NEGATIVE
LIPASE SERPL-CCNC: 5 U/L (ref 4–60)
LYMPHOCYTES # BLD AUTO: 0.9 K/UL (ref 1–4.8)
LYMPHOCYTES NFR BLD: 17.6 % (ref 18–48)
MAGNESIUM SERPL-MCNC: 1.4 MG/DL (ref 1.6–2.6)
MCH RBC QN AUTO: 30.5 PG (ref 27–31)
MCHC RBC AUTO-ENTMCNC: 32.6 G/DL (ref 32–36)
MCV RBC AUTO: 93 FL (ref 82–98)
MONOCYTES # BLD AUTO: 0.4 K/UL (ref 0.3–1)
MONOCYTES NFR BLD: 8.5 % (ref 4–15)
NEUTROPHILS # BLD AUTO: 3.6 K/UL (ref 1.8–7.7)
NEUTROPHILS NFR BLD: 72.5 % (ref 38–73)
NITRITE UR QL STRIP: NEGATIVE
NRBC BLD-RTO: 0 /100 WBC
PH UR STRIP: 5 [PH] (ref 5–8)
PHOSPHATE SERPL-MCNC: 3.1 MG/DL (ref 2.7–4.5)
PLATELET # BLD AUTO: 167 K/UL (ref 150–450)
PMV BLD AUTO: 10.4 FL (ref 9.2–12.9)
POCT GLUCOSE: 124 MG/DL (ref 70–110)
POCT GLUCOSE: 152 MG/DL (ref 70–110)
POCT GLUCOSE: 158 MG/DL (ref 70–110)
POCT GLUCOSE: 59 MG/DL (ref 70–110)
POCT GLUCOSE: 93 MG/DL (ref 70–110)
POTASSIUM SERPL-SCNC: 4.1 MMOL/L (ref 3.5–5.1)
PROT SERPL-MCNC: 6.4 G/DL (ref 6–8.4)
PROT UR QL STRIP: ABNORMAL
RBC # BLD AUTO: 3.61 M/UL (ref 4.6–6.2)
SODIUM SERPL-SCNC: 141 MMOL/L (ref 136–145)
SP GR UR STRIP: 1.02 (ref 1–1.03)
TACROLIMUS BLD-MCNC: 3.8 NG/ML (ref 5–15)
URN SPEC COLLECT METH UR: ABNORMAL
WBC # BLD AUTO: 4.95 K/UL (ref 3.9–12.7)

## 2022-08-18 PROCEDURE — 80053 COMPREHEN METABOLIC PANEL: CPT | Performed by: NURSE PRACTITIONER

## 2022-08-18 PROCEDURE — 63600175 PHARM REV CODE 636 W HCPCS: Performed by: NURSE PRACTITIONER

## 2022-08-18 PROCEDURE — 82150 ASSAY OF AMYLASE: CPT | Performed by: PHYSICIAN ASSISTANT

## 2022-08-18 PROCEDURE — 99223 1ST HOSP IP/OBS HIGH 75: CPT | Mod: ,,, | Performed by: PHYSICIAN ASSISTANT

## 2022-08-18 PROCEDURE — 99222 PR INITIAL HOSPITAL CARE,LEVL II: ICD-10-PCS | Mod: ,,, | Performed by: NURSE PRACTITIONER

## 2022-08-18 PROCEDURE — 80197 ASSAY OF TACROLIMUS: CPT | Performed by: NURSE PRACTITIONER

## 2022-08-18 PROCEDURE — 85025 COMPLETE CBC W/AUTO DIFF WBC: CPT | Performed by: NURSE PRACTITIONER

## 2022-08-18 PROCEDURE — 25000003 PHARM REV CODE 250: Performed by: NURSE PRACTITIONER

## 2022-08-18 PROCEDURE — 99222 1ST HOSP IP/OBS MODERATE 55: CPT | Mod: ,,, | Performed by: NURSE PRACTITIONER

## 2022-08-18 PROCEDURE — 63600175 PHARM REV CODE 636 W HCPCS: Performed by: PHYSICIAN ASSISTANT

## 2022-08-18 PROCEDURE — 36415 COLL VENOUS BLD VENIPUNCTURE: CPT | Performed by: NURSE PRACTITIONER

## 2022-08-18 PROCEDURE — 83690 ASSAY OF LIPASE: CPT | Performed by: PHYSICIAN ASSISTANT

## 2022-08-18 PROCEDURE — 99223 PR INITIAL HOSPITAL CARE,LEVL III: ICD-10-PCS | Mod: ,,, | Performed by: PHYSICIAN ASSISTANT

## 2022-08-18 PROCEDURE — 99233 PR SUBSEQUENT HOSPITAL CARE,LEVL III: ICD-10-PCS | Mod: ,,, | Performed by: NURSE PRACTITIONER

## 2022-08-18 PROCEDURE — 83036 HEMOGLOBIN GLYCOSYLATED A1C: CPT | Performed by: NURSE PRACTITIONER

## 2022-08-18 PROCEDURE — 25000003 PHARM REV CODE 250: Performed by: PHYSICIAN ASSISTANT

## 2022-08-18 PROCEDURE — 25500020 PHARM REV CODE 255: Performed by: PHYSICIAN ASSISTANT

## 2022-08-18 PROCEDURE — 81003 URINALYSIS AUTO W/O SCOPE: CPT | Performed by: NURSE PRACTITIONER

## 2022-08-18 PROCEDURE — 83735 ASSAY OF MAGNESIUM: CPT | Performed by: NURSE PRACTITIONER

## 2022-08-18 PROCEDURE — 20600001 HC STEP DOWN PRIVATE ROOM

## 2022-08-18 PROCEDURE — 84100 ASSAY OF PHOSPHORUS: CPT | Performed by: NURSE PRACTITIONER

## 2022-08-18 PROCEDURE — 99233 SBSQ HOSP IP/OBS HIGH 50: CPT | Mod: ,,, | Performed by: NURSE PRACTITIONER

## 2022-08-18 RX ORDER — TALC
9 POWDER (GRAM) TOPICAL NIGHTLY PRN
Status: DISCONTINUED | OUTPATIENT
Start: 2022-08-18 | End: 2022-08-19 | Stop reason: HOSPADM

## 2022-08-18 RX ORDER — GLUCAGON 1 MG
1 KIT INJECTION
Status: DISCONTINUED | OUTPATIENT
Start: 2022-08-18 | End: 2022-08-19 | Stop reason: HOSPADM

## 2022-08-18 RX ORDER — SODIUM CHLORIDE 9 MG/ML
INJECTION, SOLUTION INTRAVENOUS CONTINUOUS
Status: DISCONTINUED | OUTPATIENT
Start: 2022-08-18 | End: 2022-08-18

## 2022-08-18 RX ORDER — CLOPIDOGREL BISULFATE 75 MG/1
75 TABLET ORAL DAILY
Status: DISCONTINUED | OUTPATIENT
Start: 2022-08-18 | End: 2022-08-19 | Stop reason: HOSPADM

## 2022-08-18 RX ORDER — INSULIN ASPART 100 [IU]/ML
0-5 INJECTION, SOLUTION INTRAVENOUS; SUBCUTANEOUS
Status: DISCONTINUED | OUTPATIENT
Start: 2022-08-18 | End: 2022-08-19 | Stop reason: HOSPADM

## 2022-08-18 RX ORDER — INSULIN ASPART 100 [IU]/ML
0-5 INJECTION, SOLUTION INTRAVENOUS; SUBCUTANEOUS EVERY 6 HOURS PRN
Status: DISCONTINUED | OUTPATIENT
Start: 2022-08-18 | End: 2022-08-18

## 2022-08-18 RX ORDER — IBUPROFEN 200 MG
16 TABLET ORAL
Status: DISCONTINUED | OUTPATIENT
Start: 2022-08-18 | End: 2022-08-19 | Stop reason: HOSPADM

## 2022-08-18 RX ORDER — IBUPROFEN 200 MG
24 TABLET ORAL
Status: DISCONTINUED | OUTPATIENT
Start: 2022-08-18 | End: 2022-08-19 | Stop reason: HOSPADM

## 2022-08-18 RX ORDER — SODIUM CHLORIDE 0.9 % (FLUSH) 0.9 %
3 SYRINGE (ML) INJECTION
Status: DISCONTINUED | OUTPATIENT
Start: 2022-08-18 | End: 2022-08-19 | Stop reason: HOSPADM

## 2022-08-18 RX ORDER — FAMOTIDINE 20 MG/1
20 TABLET, FILM COATED ORAL DAILY
Status: DISCONTINUED | OUTPATIENT
Start: 2022-08-18 | End: 2022-08-19 | Stop reason: HOSPADM

## 2022-08-18 RX ORDER — HYDROMORPHONE HYDROCHLORIDE 1 MG/ML
0.5 INJECTION, SOLUTION INTRAMUSCULAR; INTRAVENOUS; SUBCUTANEOUS ONCE
Status: COMPLETED | OUTPATIENT
Start: 2022-08-18 | End: 2022-08-18

## 2022-08-18 RX ORDER — CARVEDILOL 12.5 MG/1
12.5 TABLET ORAL 2 TIMES DAILY
Status: DISCONTINUED | OUTPATIENT
Start: 2022-08-18 | End: 2022-08-19 | Stop reason: HOSPADM

## 2022-08-18 RX ORDER — HEPARIN SODIUM 5000 [USP'U]/ML
5000 INJECTION, SOLUTION INTRAVENOUS; SUBCUTANEOUS EVERY 8 HOURS
Status: DISCONTINUED | OUTPATIENT
Start: 2022-08-18 | End: 2022-08-19 | Stop reason: HOSPADM

## 2022-08-18 RX ORDER — ESCITALOPRAM OXALATE 5 MG/1
5 TABLET ORAL DAILY
Status: DISCONTINUED | OUTPATIENT
Start: 2022-08-18 | End: 2022-08-19 | Stop reason: HOSPADM

## 2022-08-18 RX ORDER — OXYCODONE HYDROCHLORIDE 10 MG/1
10 TABLET ORAL EVERY 6 HOURS PRN
Status: DISCONTINUED | OUTPATIENT
Start: 2022-08-18 | End: 2022-08-19 | Stop reason: HOSPADM

## 2022-08-18 RX ORDER — TACROLIMUS 1 MG/1
4 CAPSULE ORAL 2 TIMES DAILY
Status: DISCONTINUED | OUTPATIENT
Start: 2022-08-18 | End: 2022-08-19 | Stop reason: HOSPADM

## 2022-08-18 RX ORDER — ONDANSETRON 8 MG/1
8 TABLET, ORALLY DISINTEGRATING ORAL EVERY 6 HOURS PRN
Status: DISCONTINUED | OUTPATIENT
Start: 2022-08-18 | End: 2022-08-19 | Stop reason: HOSPADM

## 2022-08-18 RX ORDER — GLUCAGON 1 MG
1 KIT INJECTION
Status: DISCONTINUED | OUTPATIENT
Start: 2022-08-18 | End: 2022-08-18

## 2022-08-18 RX ORDER — ASPIRIN 81 MG/1
81 TABLET ORAL DAILY
Status: DISCONTINUED | OUTPATIENT
Start: 2022-08-18 | End: 2022-08-19 | Stop reason: HOSPADM

## 2022-08-18 RX ORDER — OXYCODONE HYDROCHLORIDE 5 MG/1
5 TABLET ORAL EVERY 6 HOURS PRN
Status: DISCONTINUED | OUTPATIENT
Start: 2022-08-18 | End: 2022-08-19 | Stop reason: HOSPADM

## 2022-08-18 RX ORDER — MAGNESIUM SULFATE HEPTAHYDRATE 40 MG/ML
2 INJECTION, SOLUTION INTRAVENOUS ONCE
Status: COMPLETED | OUTPATIENT
Start: 2022-08-18 | End: 2022-08-18

## 2022-08-18 RX ORDER — ACETAMINOPHEN 325 MG/1
650 TABLET ORAL EVERY 6 HOURS PRN
Status: DISCONTINUED | OUTPATIENT
Start: 2022-08-18 | End: 2022-08-19 | Stop reason: HOSPADM

## 2022-08-18 RX ADMIN — HYDROMORPHONE HYDROCHLORIDE 0.5 MG: 1 INJECTION, SOLUTION INTRAMUSCULAR; INTRAVENOUS; SUBCUTANEOUS at 11:08

## 2022-08-18 RX ADMIN — OXYCODONE HYDROCHLORIDE 10 MG: 10 TABLET ORAL at 01:08

## 2022-08-18 RX ADMIN — TACROLIMUS 4 MG: 1 CAPSULE ORAL at 08:08

## 2022-08-18 RX ADMIN — ACETAMINOPHEN 650 MG: 325 TABLET ORAL at 11:08

## 2022-08-18 RX ADMIN — CLOPIDOGREL 75 MG: 75 TABLET, FILM COATED ORAL at 01:08

## 2022-08-18 RX ADMIN — SODIUM CHLORIDE: 0.9 INJECTION, SOLUTION INTRAVENOUS at 01:08

## 2022-08-18 RX ADMIN — CARVEDILOL 12.5 MG: 12.5 TABLET, FILM COATED ORAL at 08:08

## 2022-08-18 RX ADMIN — TACROLIMUS 4 MG: 1 CAPSULE ORAL at 06:08

## 2022-08-18 RX ADMIN — DIATRIZOATE MEGLUMINE AND DIATRIZOATE SODIUM 100 ML: 660; 100 LIQUID ORAL; RECTAL at 02:08

## 2022-08-18 RX ADMIN — OXYCODONE HYDROCHLORIDE 10 MG: 10 TABLET ORAL at 08:08

## 2022-08-18 RX ADMIN — MAGNESIUM SULFATE 2 G: 2 INJECTION INTRAVENOUS at 11:08

## 2022-08-18 RX ADMIN — ASPIRIN 81 MG: 81 TABLET, COATED ORAL at 01:08

## 2022-08-18 RX ADMIN — FAMOTIDINE 20 MG: 20 TABLET ORAL at 08:08

## 2022-08-18 RX ADMIN — ESCITALOPRAM OXALATE 5 MG: 5 TABLET, FILM COATED ORAL at 08:08

## 2022-08-18 NOTE — CONSULTS
[facility] - Transplant Stepdown  Endocrinology  Diabetes Consult Note    Consult Requested by: Zackary Khan Jr., MD   Reason for admit: Abdominal pain    HISTORY OF PRESENT ILLNESS:  Reason for Consult: Management of T2DM, Hyperglycemia      Surgical Procedure and Date: Liver Transplant 1/23/22     Diabetes diagnosis year: > 10 years      Home Diabetes Medications:  Toujo 26 units QD and Ozempic 0.5 mg weekly.      How often checking glucose at home?  1-2 times per day  BG readings on regimen:   Hypoglycemia on the regimen?  Infrequent.   Missed doses on regimen?  None     Diabetes Complications include:     Hyperglycemia     Complicating diabetes co morbidities:   Hypoglycemia         HPI: Damien Kong is a 61yr M s/p DCD LTX 1/23/22 for HCC and HCC. He progressed well postoperatively. Post op course significant for hepatic artery stenosis s/p angioplasty and stent placement 5/3/22 (on ASA and plavix). Patient presented to OSH ER with L flank and abdominal pain x 2 weeks. He states he is unable to lie down or sit up. Also reports increased abdominal distention. Labs in ER significant for elevated ALT. CT stone study 8/17 with possible early SBO due to mildly dilated loops of SB within left abd/pelvis with air fluid level and suspected transition point in left para-midline anterior abdomen. Decision made to transfer to [facility] for transplant services. On arrival, patient reports he is feeling okay but still with abdominal pain. Abdominal pain is in left upper quadrant and radiates into his back. Pain is worse at rest when he is lying down. He denies any nausea/vomiting. He denies fever, chills, chest pain, SOB. He does report no flatus for past several days. Last bowel movement on 8/16/22. Abdomen is soft and non distended and only minimally painful to touch. Plan for Gastrogaffin challenge. Plan for NPO, IVF, and Liver US. Endocrine consulted to manage hyperglycemia.     Lab Results   Component Value Date     HGBA1C 5.6 08/18/2022                 Interval HPI:   Overnight events: Patient on the TSU in room 20899/19084 A. Blood glucose stable. BG below goal on current insulin regimen (SSI ). Steroid use- None.    Renal function- Normal   Vasopressors-  None       Diet Adult Regular (IDDSI Level 7)     Eating:   <25%  Nausea: No  Hypoglycemia and intervention: Yes; last insulin administration Toujeo 26 units on 8/16 at 0800.   Fever: No  TPN and/or TF: No    PMH, PSH, FH, SH updated and reviewed     ROS:  Review of Systems  Constitutional: Negative for weight changes.  Eyes: Negative for visual disturbance.  Respiratory: Negative for cough.   Cardiovascular: Negative for chest pain.  Gastrointestinal: Negative for nausea.  Endocrine: Negative for polyuria, polydipsia.  Musculoskeletal: Negative for back pain.  Skin: Negative for rash.  Neurological: Negative for syncope.  Psychiatric/Behavioral: Negative for depression.    Current Medications and/or Treatments Impacting Glycemic Control  Immunotherapy:    Immunosuppressants           Stop Route Frequency     tacrolimus capsule 4 mg         -- Oral 2 times daily          Steroids:   Hormones (From admission, onward)                Start     Stop Route Frequency Ordered    08/18/22 0050  melatonin tablet 9 mg         -- Oral Nightly PRN 08/18/22 0050          Pressors:    Autonomic Drugs (From admission, onward)                None          Hyperglycemia/Diabetes Medications:   Antihyperglycemics (From admission, onward)                Start     Stop Route Frequency Ordered    08/18/22 1234  insulin aspart U-100 pen 0-5 Units         -- SubQ Before meals & nightly PRN 08/18/22 1134             PHYSICAL EXAMINATION:  Vitals:    08/18/22 1653   BP:    Pulse:    Resp:    Temp: 98 °F (36.7 °C)     Body mass index is 25.42 kg/m².    Physical Exam  Constitutional: Well developed, well nourished, NAD.  ENT: External ears no masses with nose patent; normal hearing.  Neck: Supple;  trachea midline.  Cardiovascular: Normal heart sounds, no LE edema. DP +2 bilaterally.  Lungs: Normal effort; lungs anterior bilaterally clear to auscultation.  Abdomen: Soft, no masses, no hernias.  MS: No clubbing or cyanosis of nails noted; unable to assess gait.  Skin: No rashes, lesions, or ulcers; no nodules. Injection sites are ok.  Psychiatric: Good judgement and insight; normal mood and affect.  Neurological: Cranial nerves are grossly intact.   Foot: Nails in good condition, no amputations noted.        Labs Reviewed and Include   Recent Labs   Lab 08/18/22  0720   GLU 75   CALCIUM 8.9   ALBUMIN 3.6   PROT 6.4      K 4.1   CO2 24      BUN 11   CREATININE 0.7   ALKPHOS 124   *   AST 17   BILITOT 1.0     Lab Results   Component Value Date    WBC 4.95 08/18/2022    HGB 11.0 (L) 08/18/2022    HCT 33.7 (L) 08/18/2022    MCV 93 08/18/2022     08/18/2022     No results for input(s): TSH, FREET4 in the last 168 hours.  Lab Results   Component Value Date    HGBA1C 5.6 08/18/2022       Nutritional status:   Body mass index is 25.42 kg/m².  Lab Results   Component Value Date    ALBUMIN 3.6 08/18/2022    ALBUMIN 3.8 08/17/2022    ALBUMIN 4.0 08/08/2022     No results found for: PREALBUMIN    Estimated Creatinine Clearance: 94 mL/min (based on SCr of 0.7 mg/dL).    Accu-Checks  Recent Labs     08/17/22  1326 08/17/22  1445 08/17/22  1521 08/17/22  1609 08/17/22  1854 08/17/22  2022 08/18/22  0512 08/18/22  1305 08/18/22  1358 08/18/22  1729   POCTGLUCOSE 70 68* 69* 133* 83 79 93 59* 124* 158*        ASSESSMENT and PLAN    * Abdominal pain  Managed per primary team  Avoid hypoglycemia        Diabetes mellitus  Endocrinology consulted for BG management.   BG goal 140-180    - Patient having hypoglycemia holding off on starting basal insulin.   - Novolog (aspart) insulin LDC (150/50) SSI Units SQ prn for BG excursions.   - BG checks AC/HS/0200  - Hypoglycemia protocol in place    Due to  hypoglycemia checking a C-peptide and Random glucose in the morning. Likely due to prolonged NPO status and Toujeo being an ultra long-acting insulin (36 hour duration of action)    ** Please notify Endocrine for any change and/or advance in diet**  ** Please call Endocrine for any BG related issues **    Discharge Planning:   TBD. Please notify endocrinology prior to discharge.        S/P liver transplant  Managed per primary team  Avoid hypoglycemia            Plan discussed with patient, family, and RN at bedside.        Km Beard, DNP, FNP  Endocrinology  [facility] - Transplant Stepdown

## 2022-08-18 NOTE — ASSESSMENT & PLAN NOTE
- hepatic artery stenosis s/p angioplasty and stent placement 5/3/22 (on ASA and plavix).   - Held ASA and plavix  - tentative plan to restart

## 2022-08-18 NOTE — SUBJECTIVE & OBJECTIVE
Past Medical History:   Diagnosis Date    Anemia     Arthritis     Diabetes     Dizziness     Dyslipidemia     General anesthetics causing adverse effect in therapeutic use     Hep C w/o coma, chronic     failed 2 rounds of interferon-based medication. no protease inhibitorrs used    Hypertension     not on medication    Kidney stone     Liver cancer        Past Surgical History:   Procedure Laterality Date    AORTIC VALVE REPLACEMENT      due to aortic regurgitation    CARDIAC SURGERY      CATHETERIZATION OF BOTH LEFT AND RIGHT HEART N/A 2021    Procedure: CATHETERIZATION, HEART, BOTH LEFT AND RIGHT;  Surgeon: Eder Beltran MD;  Location: [facility] CATH LAB;  Service: Cardiology;  Laterality: N/A;    COLONOSCOPY W/ POLYPECTOMY          ESOPHAGOGASTRODUODENOSCOPY      x2 with cautery in ,     LIVER TRANSPLANT N/A 2022    Procedure: TRANSPLANT, LIVER;  Surgeon: Jesús Austin MD;  Location: [facility] OR [address] [place] [address];  Service: Transplant;  Laterality: N/A;    mastoid bone      right ear       Review of patient's allergies indicates:  No Known Allergies    Family History       Problem Relation (Age of Onset)    Diabetes Sister, Brother          Tobacco Use    Smoking status: Former Smoker     Packs/day: 1.00     Years: 25.00     Pack years: 25.00     Quit date: 1999     Years since quittin.1    Smokeless tobacco: Never Used   Substance and Sexual Activity    Alcohol use: No     Comment: quit     Drug use: No    Sexual activity: Not on file       PTA Medications   Medication Sig    calcium carbonate-vitamin D3 600 mg-20 mcg (800 unit) Tab Take 1 tablet by mouth once daily.    carvediloL (COREG) 12.5 MG tablet Take 1 tablet (12.5 mg total) by mouth 2 (two) times daily.    clopidogreL (PLAVIX) 75 mg tablet Take 1 tablet (75 mg total) by mouth once daily.    EScitalopram oxalate (LEXAPRO) 5 MG Tab Take 1 tablet (5 mg total) by mouth once daily.    EScitalopram oxalate (LEXAPRO) 5 MG Tab  "Take 1 tablet by mouth once daily    insulin aspart U-100 (NOVOLOG) 100 unit/mL (3 mL) InPn pen Inject 6 Units into the skin 3 (three) times daily. Plus sliding scale: TDD: 33 units    mycophenolate (CELLCEPT) 250 mg Cap Take 2 capsules (500 mg total) by mouth 2 (two) times daily.    OZEMPIC 1 mg/dose (4 mg/3 mL) Inject 1 mg into the skin once a week.    predniSONE (DELTASONE) 5 MG tablet Take by mouth daily: 20mg -, 15mg -, 10mg -, 5mg -, Stop 22 (Patient taking differently: Take by mouth daily: 20mg -, 15mg -, 10mg -, 5mg -, Stop 22)    tacrolimus (PROGRAF) 1 MG Cap Take 4 capsules (4 mg total) by mouth every morning AND 3 capsules (3 mg total) every evening. (Patient taking differently: Take 4 capsules (4 mg total) by mouth every morning AND 4 capsules (4 mg total) every evening.)    TOUJEO MAX U-300 SOLOSTAR 300 unit/mL (3 mL) insulin pen Inject 18 Units into the skin once daily. (Patient taking differently: Inject 26 Units into the skin once daily.)    aspirin (ECOTRIN) 81 MG EC tablet Take 1 tablet (81 mg total) by mouth once daily.    BD ULTRA-FINE MINI PEN NEEDLE 31 gauge x 3/16" Ndle USE AS DIRECTED EVERY DAY    CONTOUR NEXT TEST STRIPS Strp USE TO TEST BLOOD GLUCOSE TWICE DAILY    famotidine (PEPCID) 20 MG tablet Take 1 tablet (20 mg total) by mouth once daily. Stop 22    oxyCODONE (OXY-IR) 5 mg Cap Take 1 capsule (5 mg total) by mouth every 4 (four) hours as needed for Pain.    [] sodium polystyrene (KAYEXALATE) 15 gram/60 mL Susp Take 120 mLs (30 g total) by mouth once. for 1 dose    valGANciclovir (VALCYTE) 450 mg Tab Take 1 tablet (450 mg total) by mouth once daily. Stop 22       Review of Systems   Constitutional:  Negative for diaphoresis and fever.   Gastrointestinal:  Positive for abdominal pain and constipation. Negative for diarrhea, nausea and vomiting.   Genitourinary:  Negative for decreased urine volume, " difficulty urinating and scrotal swelling.   Allergic/Immunologic: Positive for immunocompromised state.   Psychiatric/Behavioral:  Negative for agitation and confusion. The patient is not nervous/anxious.    Objective:     Vital Signs (Most Recent):  Temp: 98.5 °F (36.9 °C) (08/18/22 0745)  Pulse: 77 (08/18/22 0745)  Resp: 15 (08/18/22 0745)  BP: 136/80 (08/18/22 0745)  SpO2: 98 % (08/18/22 0745)   Vital Signs (24h Range):  Temp:  [98 °F (36.7 °C)-98.9 °F (37.2 °C)] 98.5 °F (36.9 °C)  Pulse:  [74-80] 77  Resp:  [10-20] 15  SpO2:  [97 %-100 %] 98 %  BP: (132-174)/() 136/80     Weight: 69.3 kg (152 lb 12.5 oz)  Body mass index is 25.42 kg/m².      Intake/Output Summary (Last 24 hours) at 8/18/2022 1152  Last data filed at 8/18/2022 0406  Gross per 24 hour   Intake 220.9 ml   Output 350 ml   Net -129.1 ml         Physical Exam  Vitals and nursing note reviewed.   HENT:      Mouth/Throat:      Mouth: Mucous membranes are dry.   Eyes:      Pupils: Pupils are equal, round, and reactive to light.   Cardiovascular:      Rate and Rhythm: Normal rate and regular rhythm.      Heart sounds: No murmur heard.    No gallop.   Pulmonary:      Effort: Pulmonary effort is normal.      Breath sounds: No wheezing or rales.   Abdominal:      General: Bowel sounds are normal. There is no distension.      Palpations: Abdomen is soft.      Tenderness: There is abdominal tenderness (mildly tender to LUQ). There is no guarding or rebound.      Comments: Healed chevron   Skin:     General: Skin is warm and dry.      Capillary Refill: Capillary refill takes 2 to 3 seconds.   Neurological:      Mental Status: He is alert and oriented to person, place, and time.   Psychiatric:         Mood and Affect: Mood normal.         Behavior: Behavior normal.         Thought Content: Thought content normal.         Judgment: Judgment normal.       Laboratory:  CBC:   Recent Labs   Lab 08/18/22  0720   WBC 4.95   RBC 3.61*   HGB 11.0*   HCT 33.7*       MCV 93   MCH 30.5   MCHC 32.6       CMP:   Recent Labs   Lab 08/18/22  0720   GLU 75   CALCIUM 8.9   ALBUMIN 3.6   PROT 6.4      K 4.1   CO2 24      BUN 11   CREATININE 0.7   ALKPHOS 124   *   AST 17   BILITOT 1.0       Labs within the past 24 hours have been reviewed.    Diagnostic Results:  None

## 2022-08-18 NOTE — PLAN OF CARE
Pt admitted this shift from OSH for liver transplant service with abdominal pain. TESS Vizcarra notified of patients arrival and saw patient at bedside.  Pt remains AAO x 4 with VSS on Visi monitor, afebrile, sats upper 90s on RA throughout shift. No complaints of nausea or vomiting  Chief complaint of abdominal pain and L flank pain - PRN Oxy given as ordered  Liver US ordered 2/2 elevated LFT  Pt NPO for poss SBO on CT scan at OSH - plan for gastrograffin challenge - solution completed, Xray contacted for times - first xray for 0605 (4hrs) and second xray for 1005 (8hrs)  R AC 20g - CDI with NS infusing at 75 cc/hr continuously  Endocrine consulted for BG management when not NPO - CBG q6hrs hours  Pt up independent and ambulatory, voids in urinal, LBM 8/16  NPO  Pt remains free from falls and injuries, call light in reach, bed in lowest position, nonskid socks on when OOB, side rails up x2  Will continue to monitor

## 2022-08-18 NOTE — ASSESSMENT & PLAN NOTE
- Maintenance IS with prograf. cont to check tacrolimus level daily. Assess for toxicity and adjust level as needed  - d/c MMF

## 2022-08-18 NOTE — HOSPITAL COURSE
Hospital course: transferred from OSH for eval of Left flank/abdominal pain and CT scan w suspected transition point to compressed small bowel in the left paramidline anterior to abdomen.     Interval History: no acute events overnight. Gastrograffin administered at 2am. KUB at 6am shows contrast t/o to colon. Plan for repeat KUB in 8hrs. Patient has good bowel sounds. Pancreas enzymes are wnl. Diet ordered. Patient was on MMF, will discontinue as may be contributing to fecal urgency, bloating and GI symptoms. Plan to d/c IV fluids once eating and drinking well. UA ordered to r/o infection, blood. Encourage patient to get OOB and ambulate in hallways. Patient may benefit form outpt EGD/c-scope if pain continues. ALT elevated but trending down. Liver US ordered. Patient has scan per HCC protocol in Sept. VSS. Monitor.

## 2022-08-18 NOTE — PROGRESS NOTES
[facility] - Transplant Stepdown  Liver Transplant  Progress Note    Patient Name: Eder Kong  MRN: [id number]  Admission Date: 8/18/2022  Hospital Length of Stay: 0 days  Code Status: Full Code  Primary Care Provider: Alexsandra Sandoval MD  Post-Operative Day: 207    ORGAN:   LIVER  Disease Etiology: Primary Liver Malignancy: Hepatoma (HCC) and Cirrhosis  Donor Type:   Donation after Circulatory Death   CDC High Risk:   Yes  Donor CMV Status:   Donor CMV Status: Negative  Donor HBcAB:   Negative  Donor HCV Status:   Negative  Donor HBV SHIVANI: Negative  Donor HCV SHIVANI: Negative  Whole or Partial: Whole Liver  Biliary Anastomosis: End to End  Arterial Anatomy: Standard  Subjective:     History of Present Illness:  Damien Kong is a 61yr M s/p DCD LTX 1/23/22 for HCC and HCC. He progressed well postoperatively. Post op course significant for hepatic artery stenosis s/p angioplasty and stent placement 5/3/22 (on ASA and plavix). Patient presented to OSH ER with L flank and abdominal pain x 2 weeks. He states he is unable to lie down or sit up. Also reports increased abdominal distention. Labs in ER significant for elevated ALT. CT stone study 8/17 with possible early SBO due to mildly dilated loops of SB within left abd/pelvis with air fluid level and suspected transition point in left para-midline anterior abdomen. Decision made to transfer to [facility] for transplant services. On arrival, patient reports he is feeling okay but still with abdominal pain. Abdominal pain is in left upper quadrant and radiates into his back. Pain is worse at rest when he is lying down. He denies any nausea/vomiting. He denies fever, chills, chest pain, SOB. He does report no flatus for past several days. Last bowel movement on 8/16/22. Abdomen is soft and non distended and only minimally painful to touch. Plan for Gastrogaffin challenge. Plan for NPO, IVF, and Liver US.       Hospital course: transferred from OSH for eval of Left flank/abdominal pain and CT  scan w suspected transition point to compressed small bowel in the left paramidline anterior to abdomen.     Interval History: no acute events overnight. Gastrograffin administered at 2am. KUB at 6am shows contrast t/o to colon. Plan for repeat KUB in 8hrs. Patient has good bowel sounds. Pancreas enzymes are wnl. Diet ordered. Patient was on MMF, will discontinue as may be contributing to fecal urgency, bloating and GI symptoms. Plan to d/c IV fluids once eating and drinking well. UA ordered to r/o infection, blood. Encourage patient to get OOB and ambulate in hallways. Patient may benefit form outpt EGD/c-scope if pain continues. ALT elevated but trending down. Liver US ordered. Patient has scan per HCC protocol in Sept. VSS. Monitor.      Past Medical History:   Diagnosis Date    Anemia     Arthritis     Diabetes     Dizziness     Dyslipidemia     General anesthetics causing adverse effect in therapeutic use     Hep C w/o coma, chronic     failed 2 rounds of interferon-based medication. no protease inhibitorrs used    Hypertension     not on medication    Kidney stone     Liver cancer        Past Surgical History:   Procedure Laterality Date    AORTIC VALVE REPLACEMENT  2018    due to aortic regurgitation    CARDIAC SURGERY      CATHETERIZATION OF BOTH LEFT AND RIGHT HEART N/A 7/8/2021    Procedure: CATHETERIZATION, HEART, BOTH LEFT AND RIGHT;  Surgeon: Eder Beltran MD;  Location: [facility] CATH LAB;  Service: Cardiology;  Laterality: N/A;    COLONOSCOPY W/ POLYPECTOMY      2011    ESOPHAGOGASTRODUODENOSCOPY      x2 with cautery in 2001, 2012    LIVER TRANSPLANT N/A 1/23/2022    Procedure: TRANSPLANT, LIVER;  Surgeon: Jesús Austin MD;  Location: [facility] OR [address] [place] [address];  Service: Transplant;  Laterality: N/A;    mastoid bone      right ear       Review of patient's allergies indicates:  No Known Allergies    Family History       Problem Relation (Age of Onset)    Diabetes Sister, Brother           Tobacco Use    Smoking status: Former Smoker     Packs/day: 1.00     Years: 25.00     Pack years: 25.00     Quit date: 1999     Years since quittin.1    Smokeless tobacco: Never Used   Substance and Sexual Activity    Alcohol use: No     Comment: quit     Drug use: No    Sexual activity: Not on file       PTA Medications   Medication Sig    calcium carbonate-vitamin D3 600 mg-20 mcg (800 unit) Tab Take 1 tablet by mouth once daily.    carvediloL (COREG) 12.5 MG tablet Take 1 tablet (12.5 mg total) by mouth 2 (two) times daily.    clopidogreL (PLAVIX) 75 mg tablet Take 1 tablet (75 mg total) by mouth once daily.    EScitalopram oxalate (LEXAPRO) 5 MG Tab Take 1 tablet (5 mg total) by mouth once daily.    EScitalopram oxalate (LEXAPRO) 5 MG Tab Take 1 tablet by mouth once daily    insulin aspart U-100 (NOVOLOG) 100 unit/mL (3 mL) InPn pen Inject 6 Units into the skin 3 (three) times daily. Plus sliding scale: TDD: 33 units    mycophenolate (CELLCEPT) 250 mg Cap Take 2 capsules (500 mg total) by mouth 2 (two) times daily.    OZEMPIC 1 mg/dose (4 mg/3 mL) Inject 1 mg into the skin once a week.    predniSONE (DELTASONE) 5 MG tablet Take by mouth daily: 20mg -, 15mg -, 10mg -, 5mg -, Stop 22 (Patient taking differently: Take by mouth daily: 20mg -, 15mg -, 10mg -, 5mg -, Stop 22)    tacrolimus (PROGRAF) 1 MG Cap Take 4 capsules (4 mg total) by mouth every morning AND 3 capsules (3 mg total) every evening. (Patient taking differently: Take 4 capsules (4 mg total) by mouth every morning AND 4 capsules (4 mg total) every evening.)    TOUJEO MAX U-300 SOLOSTAR 300 unit/mL (3 mL) insulin pen Inject 18 Units into the skin once daily. (Patient taking differently: Inject 26 Units into the skin once daily.)    aspirin (ECOTRIN) 81 MG EC tablet Take 1 tablet (81 mg total) by mouth once daily.    BD ULTRA-FINE MINI PEN NEEDLE  "31 gauge x 3/16" Ndle USE AS DIRECTED EVERY DAY    CONTOUR NEXT TEST STRIPS Strp USE TO TEST BLOOD GLUCOSE TWICE DAILY    famotidine (PEPCID) 20 MG tablet Take 1 tablet (20 mg total) by mouth once daily. Stop 22    oxyCODONE (OXY-IR) 5 mg Cap Take 1 capsule (5 mg total) by mouth every 4 (four) hours as needed for Pain.    [] sodium polystyrene (KAYEXALATE) 15 gram/60 mL Susp Take 120 mLs (30 g total) by mouth once. for 1 dose    valGANciclovir (VALCYTE) 450 mg Tab Take 1 tablet (450 mg total) by mouth once daily. Stop 22       Review of Systems   Constitutional:  Negative for diaphoresis and fever.   Gastrointestinal:  Positive for abdominal pain and constipation. Negative for diarrhea, nausea and vomiting.   Genitourinary:  Negative for decreased urine volume, difficulty urinating and scrotal swelling.   Allergic/Immunologic: Positive for immunocompromised state.   Psychiatric/Behavioral:  Negative for agitation and confusion. The patient is not nervous/anxious.    Objective:     Vital Signs (Most Recent):  Temp: 98.5 °F (36.9 °C) (22 0745)  Pulse: 77 (22 0745)  Resp: 15 (22)  BP: 136/80 (2245)  SpO2: 98 % (2245)   Vital Signs (24h Range):  Temp:  [98 °F (36.7 °C)-98.9 °F (37.2 °C)] 98.5 °F (36.9 °C)  Pulse:  [74-80] 77  Resp:  [10-20] 15  SpO2:  [97 %-100 %] 98 %  BP: (132-174)/() 136/80     Weight: 69.3 kg (152 lb 12.5 oz)  Body mass index is 25.42 kg/m².      Intake/Output Summary (Last 24 hours) at 2022 1152  Last data filed at 2022 0406  Gross per 24 hour   Intake 220.9 ml   Output 350 ml   Net -129.1 ml         Physical Exam  Vitals and nursing note reviewed.   HENT:      Mouth/Throat:      Mouth: Mucous membranes are dry.   Eyes:      Pupils: Pupils are equal, round, and reactive to light.   Cardiovascular:      Rate and Rhythm: Normal rate and regular rhythm.      Heart sounds: No murmur heard.    No gallop.   Pulmonary:      " Effort: Pulmonary effort is normal.      Breath sounds: No wheezing or rales.   Abdominal:      General: Bowel sounds are normal. There is no distension.      Palpations: Abdomen is soft.      Tenderness: There is abdominal tenderness (mildly tender to LUQ). There is no guarding or rebound.      Comments: Healed chevron   Skin:     General: Skin is warm and dry.      Capillary Refill: Capillary refill takes 2 to 3 seconds.   Neurological:      Mental Status: He is alert and oriented to person, place, and time.   Psychiatric:         Mood and Affect: Mood normal.         Behavior: Behavior normal.         Thought Content: Thought content normal.         Judgment: Judgment normal.       Laboratory:  CBC:   Recent Labs   Lab 08/18/22  0720   WBC 4.95   RBC 3.61*   HGB 11.0*   HCT 33.7*      MCV 93   MCH 30.5   MCHC 32.6       CMP:   Recent Labs   Lab 08/18/22  0720   GLU 75   CALCIUM 8.9   ALBUMIN 3.6   PROT 6.4      K 4.1   CO2 24      BUN 11   CREATININE 0.7   ALKPHOS 124   *   AST 17   BILITOT 1.0       Labs within the past 24 hours have been reviewed.    Diagnostic Results:  None    Assessment/Plan:     * Abdominal pain  - Presented to OSH with left abdominal pain  - CT 8/17 with possible SBO, suspicious transition point LLQ  - NPO, IVF  - Anti-emetics PRN if needed but currently no nausea/vomiting reported  - Gastrogaffin challenge ordered, adm at 2am, KUB at 6am showed contrast to colon.  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   - Held ASA and plavix  - tentative plan to restart    Long-term use of immunosuppressant  medication  - Maintenance IS with prograf. cont to check tacrolimus level daily. Assess for toxicity and adjust level as needed  - d/c MMF    Prophylactic immunotherapy  - See long-term use of immunosuppressant medication    S/P liver transplant  - s/p DCD LTX 1/23/22 for HCV/HCC  - Alk phos and ALT elevated, ALT trending down. Cont to trend  - Liver US ordered- pending    Need for zoster vaccination        Diabetes mellitus  - endocrine consulted for DM management.       VTE Risk Mitigation (From admission, onward)         Ordered     heparin (porcine) injection 5,000 Units  Every 8 hours         08/18/22 0050     IP VTE HIGH RISK PATIENT  Once         08/18/22 0050     Place sequential compression device  Until discontinued         08/18/22 0050                The patients clinical status was discussed at multidisplinary rounds, involving transplant surgery, transplant medicine, pharmacy, nursing, nutrition, and social work    Discharge Planning: Discussed plan of care.  No plan for discharge today.    PharmD Review: does patient still need MMF (>3 months txp/HCV dx)? Valcyte completed; if no reflux symptoms can d/c pepcid; patient needs endocrine follow up - fasting glucose 166; [UK 4/25/22]      Olamide Banks NP  Liver Transplant  [facility] - Transplant Step[facility]

## 2022-08-18 NOTE — H&P
[facility] - Transplant Stepdown  Liver Transplant  History & Physical    Patient Name: Eder Kong  MRN: [id number]  Admission Date: 8/18/2022  Code Status: Full Code  Primary Care Provider: Alexsandra Sandoval MD  Post-Operative Day: 207     ORGAN:   LIVER  Disease Etiology: Primary Liver Malignancy: Hepatoma (HCC) and Cirrhosis  Donor Type:   Donation after Circulatory Death   [facility] High Risk:   Yes  Donor CMV Status:   Donor CMV Status: Negative  Donor HBcAB:   Negative  Donor HCV Status:   Negative  Donor HBV SHIVANI: Negative  Donor HCV SHIVANI: Negative  Whole or Partial: Whole Liver  Biliary Anastomosis: End to End  Arterial Anatomy: Standard    Subjective:     History of Present Illness:  Damien Kong is a 61yr M s/p DCD LTX 1/23/22 for HCC and HCC. He progressed well postoperatively. Post op course significant for hepatic artery stenosis s/p angioplasty and stent placement 5/3/22 (on ASA and plavix). Patient presented to OSH ER with L flank and abdominal pain x 2 weeks. He states he is unable to lie down or sit up. Also reports increased abdominal distention. Labs in ER significant for elevated ALT. CT stone study 8/17 with possible early SBO due to mildly dilated loops of SB within left abd/pelvis with air fluid level and suspected transition point in left para-midline anterior abdomen. Decision made to transfer to [facility] for transplant services. On arrival, patient reports he is feeling okay but still with abdominal pain. Abdominal pain is in left upper quadrant and radiates into his back. Pain is worse at rest when he is lying down. He denies any nausea/vomiting. He denies fever, chills, chest pain, SOB. He does report no flatus for past several days. Last bowel movement on 8/16/22. Abdomen is soft and non distended and only minimally painful to touch. Plan for Gastrogaffin challenge. Plan for NPO, IVF, and Liver US.       Past Medical History:   Diagnosis Date    Anemia     Arthritis     Diabetes     Dizziness      Dyslipidemia     General anesthetics causing adverse effect in therapeutic use     Hep C w/o coma, chronic     failed 2 rounds of interferon-based medication. no protease inhibitorrs used    Hypertension     not on medication    Kidney stone     Liver cancer        Past Surgical History:   Procedure Laterality Date    AORTIC VALVE REPLACEMENT      due to aortic regurgitation    CARDIAC SURGERY      CATHETERIZATION OF BOTH LEFT AND RIGHT HEART N/A 2021    Procedure: CATHETERIZATION, HEART, BOTH LEFT AND RIGHT;  Surgeon: Eder Beltran MD;  Location: [facility] CATH LAB;  Service: Cardiology;  Laterality: N/A;    COLONOSCOPY W/ POLYPECTOMY          ESOPHAGOGASTRODUODENOSCOPY      x2 with cautery in ,     LIVER TRANSPLANT N/A 2022    Procedure: TRANSPLANT, LIVER;  Surgeon: Jesús Austin MD;  Location: [facility] OR [address] [place] [address];  Service: Transplant;  Laterality: N/A;    mastoid bone      right ear       Review of patient's allergies indicates:  No Known Allergies    Family History       Problem Relation (Age of Onset)    Diabetes Sister, Brother          Tobacco Use    Smoking status: Former Smoker     Packs/day: 1.00     Years: 25.00     Pack years: 25.00     Quit date: 1999     Years since quittin.1    Smokeless tobacco: Never Used   Substance and Sexual Activity    Alcohol use: No     Comment: quit     Drug use: No    Sexual activity: Not on file       PTA Medications   Medication Sig    calcium carbonate-vitamin D3 600 mg-20 mcg (800 unit) Tab Take 1 tablet by mouth once daily.    carvediloL (COREG) 12.5 MG tablet Take 1 tablet (12.5 mg total) by mouth 2 (two) times daily.    clopidogreL (PLAVIX) 75 mg tablet Take 1 tablet (75 mg total) by mouth once daily.    EScitalopram oxalate (LEXAPRO) 5 MG Tab Take 1 tablet (5 mg total) by mouth once daily.    EScitalopram oxalate (LEXAPRO) 5 MG Tab Take 1 tablet by mouth once daily    insulin aspart U-100 (NOVOLOG) 100  "unit/mL (3 mL) InPn pen Inject 6 Units into the skin 3 (three) times daily. Plus sliding scale: TDD: 33 units    mycophenolate (CELLCEPT) 250 mg Cap Take 2 capsules (500 mg total) by mouth 2 (two) times daily.    OZEMPIC 1 mg/dose (4 mg/3 mL) Inject 1 mg into the skin once a week.    predniSONE (DELTASONE) 5 MG tablet Take by mouth daily: 20mg -, 15mg -, 10mg -, 5mg -, Stop 22 (Patient taking differently: Take by mouth daily: 20mg -, 15mg -, 10mg -, 5mg -, Stop 22)    tacrolimus (PROGRAF) 1 MG Cap Take 4 capsules (4 mg total) by mouth every morning AND 3 capsules (3 mg total) every evening. (Patient taking differently: Take 4 capsules (4 mg total) by mouth every morning AND 4 capsules (4 mg total) every evening.)    TOUJEO MAX U-300 SOLOSTAR 300 unit/mL (3 mL) insulin pen Inject 18 Units into the skin once daily. (Patient taking differently: Inject 26 Units into the skin once daily.)    aspirin (ECOTRIN) 81 MG EC tablet Take 1 tablet (81 mg total) by mouth once daily.    BD ULTRA-FINE MINI PEN NEEDLE 31 gauge x 3/16" Ndle USE AS DIRECTED EVERY DAY    CONTOUR NEXT TEST STRIPS Strp USE TO TEST BLOOD GLUCOSE TWICE DAILY    famotidine (PEPCID) 20 MG tablet Take 1 tablet (20 mg total) by mouth once daily. Stop 22    oxyCODONE (OXY-IR) 5 mg Cap Take 1 capsule (5 mg total) by mouth every 4 (four) hours as needed for Pain.    [] sodium polystyrene (KAYEXALATE) 15 gram/60 mL Susp Take 120 mLs (30 g total) by mouth once. for 1 dose    valGANciclovir (VALCYTE) 450 mg Tab Take 1 tablet (450 mg total) by mouth once daily. Stop 22       Review of Systems   Constitutional:  Negative for diaphoresis and fever.   Gastrointestinal:  Positive for abdominal pain and constipation. Negative for diarrhea, nausea and vomiting.   Genitourinary:  Negative for decreased urine volume, difficulty urinating and scrotal swelling. "   Allergic/Immunologic: Positive for immunocompromised state.   Psychiatric/Behavioral:  Negative for agitation and confusion. The patient is not nervous/anxious.    Objective:     Vital Signs (Most Recent):  Temp: 98 °F (36.7 °C) (08/18/22 0052)  Pulse: 80 (08/18/22 0052)  Resp: 15 (08/18/22 0052)  BP: (!) 132/98 (08/18/22 0052)  SpO2: 100 % (08/18/22 0052)   Vital Signs (24h Range):  Temp:  [98 °F (36.7 °C)-98.1 °F (36.7 °C)] 98 °F (36.7 °C)  Pulse:  [74-84] 80  Resp:  [11-20] 15  SpO2:  [98 %-100 %] 100 %  BP: (132-179)/() 132/98     Weight: 69.3 kg (152 lb 12.5 oz)  Body mass index is 25.42 kg/m².      Intake/Output Summary (Last 24 hours) at 8/18/2022 0130  Last data filed at 8/18/2022 0100  Gross per 24 hour   Intake 0 ml   Output 150 ml   Net -150 ml       Physical Exam  Vitals and nursing note reviewed.   Cardiovascular:      Rate and Rhythm: Normal rate and regular rhythm.      Heart sounds: No murmur heard.    No gallop.   Pulmonary:      Effort: Pulmonary effort is normal.      Breath sounds: No wheezing or rales.   Abdominal:      General: Bowel sounds are normal. There is no distension.      Palpations: Abdomen is soft.      Tenderness: There is abdominal tenderness (mildly tender to LUQ). There is no guarding or rebound.   Neurological:      Mental Status: He is alert and oriented to person, place, and time.   Psychiatric:         Mood and Affect: Mood normal.         Behavior: Behavior normal.         Thought Content: Thought content normal.         Judgment: Judgment normal.       Laboratory:  CBC:   Recent Labs   Lab 08/17/22 0222   WBC 5.3   RBC 3.69*   HGB 11.1*   HCT 34.3*      MCV 93.0   MCH 30.1   MCHC 32.4*     CMP:   Recent Labs   Lab 08/17/22 0222   CALCIUM 9.1   ALBUMIN 3.8      K 4.3   CO2 23   BUN 26.3*   CREATININE 0.84   ALKPHOS 176*   *   AST 21   BILITOT 0.6     Labs within the past 24 hours have been reviewed.    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   - Hold ASA and plavix, restart once medically stable    Long-term use of immunosuppressant medication  - Maintenance IS with prograf. cont to check tacrolimus level daily. Assess for toxicity and adjust level as needed    Prophylactic immunotherapy  - See long-term use of immunosuppressant medication    S/P liver transplant  - s/p DCD LTX 1/23/22 for HCV/HCC  - Alk phos and ALT elevated  - Liver US ordered    Diabetes mellitus  - endocrine consulted for DM management.           Discharge Planning: Not a candidate for discharge at this time    Carmita Lei PAVarshaC  Liver Transplant  [facility] - Transplant Stepdown

## 2022-08-18 NOTE — PLAN OF CARE
- Gastrografin challenge complete.  Patient with 4x BM afterward  - Mycophenolate on hold & UA with reflex culture r/t abdominal pain/discomfort  - Restarted aspirin & clopidogrel  - Tolerating regular diet well.  IVF d/c  - CBG prior to lunch was 59.  Endocrine NP aware.  Plan for c-peptide with AM labs. CBG to be done ACHS & 2am to prevent further hypoglycemia  - Liver U/S done at bedside    Problem: Adult Inpatient Plan of Care  Goal: Plan of Care Review  Outcome: Ongoing, Progressing  Goal: Patient-Specific Goal (Individualized)  Outcome: Ongoing, Progressing  Goal: Absence of Hospital-Acquired Illness or Injury  Outcome: Ongoing, Progressing  Goal: Optimal Comfort and Wellbeing  Outcome: Ongoing, Progressing     Problem: Diabetes Comorbidity  Goal: Blood Glucose Level Within Targeted Range  Outcome: Ongoing, Progressing      negative...

## 2022-08-18 NOTE — SUBJECTIVE & OBJECTIVE
Interval HPI:   Overnight events: Patient on the TSU in room 02601/08099 A. Blood glucose stable. BG below goal on current insulin regimen (SSI ). Steroid use- None.    Renal function- Normal   Vasopressors-  None       Diet Adult Regular (IDDSI Level 7)     Eating:   <25%  Nausea: No  Hypoglycemia and intervention: Yes; last insulin administration Toujeo 26 units on 8/16 at 0800.   Fever: No  TPN and/or TF: No    PMH, PSH, FH, SH updated and reviewed     ROS:  Review of Systems  Constitutional: Negative for weight changes.  Eyes: Negative for visual disturbance.  Respiratory: Negative for cough.   Cardiovascular: Negative for chest pain.  Gastrointestinal: Negative for nausea.  Endocrine: Negative for polyuria, polydipsia.  Musculoskeletal: Negative for back pain.  Skin: Negative for rash.  Neurological: Negative for syncope.  Psychiatric/Behavioral: Negative for depression.    Current Medications and/or Treatments Impacting Glycemic Control  Immunotherapy:    Immunosuppressants           Stop Route Frequency     tacrolimus capsule 4 mg         -- Oral 2 times daily          Steroids:   Hormones (From admission, onward)                Start     Stop Route Frequency Ordered    08/18/22 0050  melatonin tablet 9 mg         -- Oral Nightly PRN 08/18/22 0050          Pressors:    Autonomic Drugs (From admission, onward)                None          Hyperglycemia/Diabetes Medications:   Antihyperglycemics (From admission, onward)                Start     Stop Route Frequency Ordered    08/18/22 1234  insulin aspart U-100 pen 0-5 Units         -- SubQ Before meals & nightly PRN 08/18/22 1134             PHYSICAL EXAMINATION:  Vitals:    08/18/22 1653   BP:    Pulse:    Resp:    Temp: 98 °F (36.7 °C)     Body mass index is 25.42 kg/m².    Physical Exam  Constitutional: Well developed, well nourished, NAD.  ENT: External ears no masses with nose patent; normal hearing.  Neck: Supple; trachea midline.  Cardiovascular: Normal  heart sounds, no LE edema. DP +2 bilaterally.  Lungs: Normal effort; lungs anterior bilaterally clear to auscultation.  Abdomen: Soft, no masses, no hernias.  MS: No clubbing or cyanosis of nails noted; unable to assess gait.  Skin: No rashes, lesions, or ulcers; no nodules. Injection sites are ok.  Psychiatric: Good judgement and insight; normal mood and affect.  Neurological: Cranial nerves are grossly intact.   Foot: Nails in good condition, no amputations noted.

## 2022-08-18 NOTE — ASSESSMENT & PLAN NOTE
- Presented to OSH with left abdominal pain  - CT 8/17 with possible SBO, suspicious transition point LLQ  - NPO, IVF  - Anti-emetics PRN if needed but currently no nausea/vomiting reported  - Gastrogaffin challenge ordered, adm at 2am, KUB at 6am showed contrast to colon.  - Liver US pending

## 2022-08-18 NOTE — ASSESSMENT & PLAN NOTE
- gastrografin adm at 2am  - KUB at 6am, contrast to colon  - no signs of obstruction, will let patient eat and follow

## 2022-08-18 NOTE — HPI
Reason for Consult: Management of T2DM, Hyperglycemia      Surgical Procedure and Date: Liver Transplant 1/23/22     Diabetes diagnosis year: > 10 years      Home Diabetes Medications:  Toujo 26 units QD and Ozempic 0.5 mg weekly.      How often checking glucose at home?  1-2 times per day  BG readings on regimen:   Hypoglycemia on the regimen?  Infrequent.   Missed doses on regimen?  None     Diabetes Complications include:     Hyperglycemia     Complicating diabetes co morbidities:   Hypoglycemia         HPI: Damien Knog is a 61yr M s/p DCD LTX 1/23/22 for HCC and HCC. He progressed well postoperatively. Post op course significant for hepatic artery stenosis s/p angioplasty and stent placement 5/3/22 (on ASA and plavix). Patient presented to OSH ER with L flank and abdominal pain x 2 weeks. He states he is unable to lie down or sit up. Also reports increased abdominal distention. Labs in ER significant for elevated ALT. CT stone study 8/17 with possible early SBO due to mildly dilated loops of SB within left abd/pelvis with air fluid level and suspected transition point in left para-midline anterior abdomen. Decision made to transfer to [facility] for transplant services. On arrival, patient reports he is feeling okay but still with abdominal pain. Abdominal pain is in left upper quadrant and radiates into his back. Pain is worse at rest when he is lying down. He denies any nausea/vomiting. He denies fever, chills, chest pain, SOB. He does report no flatus for past several days. Last bowel movement on 8/16/22. Abdomen is soft and non distended and only minimally painful to touch. Plan for Gastrogaffin challenge. Plan for NPO, IVF, and Liver US. Endocrine consulted to manage hyperglycemia.     Lab Results   Component Value Date    HGBA1C 5.6 08/18/2022

## 2022-08-18 NOTE — ASSESSMENT & PLAN NOTE
Endocrinology consulted for BG management.   BG goal 140-180    - Patient having hypoglycemia holding off on starting basal insulin.   - Novolog (aspart) insulin LDC (150/50) SSI Units SQ prn for BG excursions.   - BG checks /HS/0200  - Hypoglycemia protocol in place    ** Please notify Endocrine for any change and/or advance in diet**  ** Please call Endocrine for any BG related issues **    Discharge Planning:   TBD. Please notify endocrinology prior to discharge.

## 2022-08-18 NOTE — ASSESSMENT & PLAN NOTE
- s/p DCD LTX 1/23/22 for HCV/HCC  - Alk phos and ALT elevated, ALT trending down. Cont to trend  - Liver US ordered- pending

## 2022-08-18 NOTE — PROGRESS NOTES
Admit Note     Met with patient to assess needs. Patient is a 61 y.o.  male, admitted for left sided abdominal pain, pt received a liver transplant on 1/23/2022. Pt had a diagnosis of HCC.    Patient admitted from an outside ED on 8/18/2022 .  At this time, patient presents as alert and oriented x 4, pleasant, communicative and asking and answering questions appropriately.  At this time, patients caregiver presents as not present.    Household/Family Systems     Patient resides with patient's spouse and their teenaged granddaughter, at:     [address] [address].      Pt cell: [phone]  Charo Gonsalez, pt wife, c: [phone]    Support system includes wife, mother in law and sister in laws.  Pt's wife is caring for their granddaughter, other family can help if needed.     Patients primary caregiver is self and wife.  Confirmed patients contact information is [phone] (home);   Telephone Information:   Mobile [phone]       During admission, patient's caregiver plans to stay at home.  Confirmed patient and patients caregivers do have access to reliable transportation.    Cognitive Status/Learning     Patient reports reading ability as 12th grade and states patient does not have difficulty with N/A.  Patient reports patient learns best by verbal instruction and hands on.   Needed: No.   Highest education level: High School (9-12) or GED    Vocation/Disability     Working for Income: yes  If yes, working activity level: Working Full Time  Patient is working full time. 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    Adherence     Patient reports a high level of adherence to patients health care regimen.  Adherence counseling and education provided. Patient verbalizes understanding.    Substance Use    Patient reports the following substance usage.    Tobacco: none, patient denies any use.  Alcohol: none, patient denies any use. Pt hasn't drank in over 25yrs, pt stated he was  never a daily drinker, but that when he did drink it was in excess.  Illicit Drugs/Non-prescribed Medications: Pt states he did experiment with IV drug use in his 20s and believes that is when he contracted Hep C. Pt has had Hep C treatment.    Patient states clear understanding of the potential impact of substance use.  Substance abstinence/cessation counseling, education and resources provided and reviewed.     Services Utilizing/ADLS    Infusion Service: Prior to admission, patient utilizing? no  Home Health: Prior to admission, patient utilizing? no  DME: Prior to admission, no  Pulmonary/Cardiac Rehab: Prior to admission, no  Dialysis:  Prior to admission, no  Transplant Specialty Pharmacy:  Prior to admission, yes; [facility].    Prior to admission, patient reports patient was independent with ADLS and was driving.  Patient reports patient is not able to care for self at this time due to compromised medical condition (as documented in medical record) and physical weakness..  Patient indicates a willingness to care for self once medically cleared to do so.    Insurance/Medications    Insured by   Payer/Plan Subscr  Sex Relation Sub. Ins. ID Effective Group Num   1. REID MARQUIS 1960 Male Self [id number]                                    [address] [address]   2. REID MARQUIS 1960 Male Self [id number]                                   [address] [address]      Primary Insurance (for UNOS reporting): Private Insurance  Secondary Insurance (for UNOS reporting): None    Patient reports patient is able to obtain and afford medications at this time and at time of discharge.    Living Will/Healthcare Power of     Patient states patient has a LW and/or HCPA. Forms on file in [facility], pt's wife is his HC POA.    Coping/Mental Health    Patient is coping adequately with the aid of  family members.   Patient denies mental health difficulties.     Discharge  Planning    At time of discharge, patient plans to return to patient's home under the care of self and wife.  Patients wife will transport patient.  Per rounds today, expected discharge date has not been medically determined at this time. Patient and patients caregiver  verbalize understanding and are involved in treatment planning and discharge process.    Additional Concerns    Patient is being followed for needs, education, resources, information, emotional support, supportive counseling, and for supportive and skilled discharge plan of care.  providing ongoing psychosocial support, education, resources and d/c planning as needed.  SW remains available.  remains available.

## 2022-08-18 NOTE — SUBJECTIVE & OBJECTIVE
Past Medical History:   Diagnosis Date    Anemia     Arthritis     Diabetes     Dizziness     Dyslipidemia     General anesthetics causing adverse effect in therapeutic use     Hep C w/o coma, chronic     failed 2 rounds of interferon-based medication. no protease inhibitorrs used    Hypertension     not on medication    Kidney stone     Liver cancer        Past Surgical History:   Procedure Laterality Date    AORTIC VALVE REPLACEMENT      due to aortic regurgitation    CARDIAC SURGERY      CATHETERIZATION OF BOTH LEFT AND RIGHT HEART N/A 2021    Procedure: CATHETERIZATION, HEART, BOTH LEFT AND RIGHT;  Surgeon: Eder Beltran MD;  Location: [facility] CATH LAB;  Service: Cardiology;  Laterality: N/A;    COLONOSCOPY W/ POLYPECTOMY          ESOPHAGOGASTRODUODENOSCOPY      x2 with cautery in ,     LIVER TRANSPLANT N/A 2022    Procedure: TRANSPLANT, LIVER;  Surgeon: Jesús Austin MD;  Location: [facility] OR [address] [place] [address];  Service: Transplant;  Laterality: N/A;    mastoid bone      right ear       Review of patient's allergies indicates:  No Known Allergies    Family History       Problem Relation (Age of Onset)    Diabetes Sister, Brother          Tobacco Use    Smoking status: Former Smoker     Packs/day: 1.00     Years: 25.00     Pack years: 25.00     Quit date: 1999     Years since quittin.1    Smokeless tobacco: Never Used   Substance and Sexual Activity    Alcohol use: No     Comment: quit     Drug use: No    Sexual activity: Not on file       PTA Medications   Medication Sig    calcium carbonate-vitamin D3 600 mg-20 mcg (800 unit) Tab Take 1 tablet by mouth once daily.    carvediloL (COREG) 12.5 MG tablet Take 1 tablet (12.5 mg total) by mouth 2 (two) times daily.    clopidogreL (PLAVIX) 75 mg tablet Take 1 tablet (75 mg total) by mouth once daily.    EScitalopram oxalate (LEXAPRO) 5 MG Tab Take 1 tablet (5 mg total) by mouth once daily.    EScitalopram oxalate (LEXAPRO) 5 MG Tab  "Take 1 tablet by mouth once daily    insulin aspart U-100 (NOVOLOG) 100 unit/mL (3 mL) InPn pen Inject 6 Units into the skin 3 (three) times daily. Plus sliding scale: TDD: 33 units    mycophenolate (CELLCEPT) 250 mg Cap Take 2 capsules (500 mg total) by mouth 2 (two) times daily.    OZEMPIC 1 mg/dose (4 mg/3 mL) Inject 1 mg into the skin once a week.    predniSONE (DELTASONE) 5 MG tablet Take by mouth daily: 20mg -, 15mg -, 10mg -, 5mg -, Stop 22 (Patient taking differently: Take by mouth daily: 20mg -, 15mg -, 10mg -, 5mg -, Stop 22)    tacrolimus (PROGRAF) 1 MG Cap Take 4 capsules (4 mg total) by mouth every morning AND 3 capsules (3 mg total) every evening. (Patient taking differently: Take 4 capsules (4 mg total) by mouth every morning AND 4 capsules (4 mg total) every evening.)    TOUJEO MAX U-300 SOLOSTAR 300 unit/mL (3 mL) insulin pen Inject 18 Units into the skin once daily. (Patient taking differently: Inject 26 Units into the skin once daily.)    aspirin (ECOTRIN) 81 MG EC tablet Take 1 tablet (81 mg total) by mouth once daily.    BD ULTRA-FINE MINI PEN NEEDLE 31 gauge x 3/16" Ndle USE AS DIRECTED EVERY DAY    CONTOUR NEXT TEST STRIPS Strp USE TO TEST BLOOD GLUCOSE TWICE DAILY    famotidine (PEPCID) 20 MG tablet Take 1 tablet (20 mg total) by mouth once daily. Stop 22    oxyCODONE (OXY-IR) 5 mg Cap Take 1 capsule (5 mg total) by mouth every 4 (four) hours as needed for Pain.    [] sodium polystyrene (KAYEXALATE) 15 gram/60 mL Susp Take 120 mLs (30 g total) by mouth once. for 1 dose    valGANciclovir (VALCYTE) 450 mg Tab Take 1 tablet (450 mg total) by mouth once daily. Stop 22       Review of Systems   Constitutional:  Negative for diaphoresis and fever.   Gastrointestinal:  Positive for abdominal pain and constipation. Negative for diarrhea, nausea and vomiting.   Genitourinary:  Negative for decreased urine volume, " difficulty urinating and scrotal swelling.   Allergic/Immunologic: Positive for immunocompromised state.   Psychiatric/Behavioral:  Negative for agitation and confusion. The patient is not nervous/anxious.    Objective:     Vital Signs (Most Recent):  Temp: 98 °F (36.7 °C) (08/18/22 0052)  Pulse: 80 (08/18/22 0052)  Resp: 15 (08/18/22 0052)  BP: (!) 132/98 (08/18/22 0052)  SpO2: 100 % (08/18/22 0052)   Vital Signs (24h Range):  Temp:  [98 °F (36.7 °C)-98.1 °F (36.7 °C)] 98 °F (36.7 °C)  Pulse:  [74-84] 80  Resp:  [11-20] 15  SpO2:  [98 %-100 %] 100 %  BP: (132-179)/() 132/98     Weight: 69.3 kg (152 lb 12.5 oz)  Body mass index is 25.42 kg/m².      Intake/Output Summary (Last 24 hours) at 8/18/2022 0130  Last data filed at 8/18/2022 0100  Gross per 24 hour   Intake 0 ml   Output 150 ml   Net -150 ml       Physical Exam  Vitals and nursing note reviewed.   Cardiovascular:      Rate and Rhythm: Normal rate and regular rhythm.      Heart sounds: No murmur heard.    No gallop.   Pulmonary:      Effort: Pulmonary effort is normal.      Breath sounds: No wheezing or rales.   Abdominal:      General: Bowel sounds are normal. There is no distension.      Palpations: Abdomen is soft.      Tenderness: There is abdominal tenderness (mildly tender to LUQ). There is no guarding or rebound.   Neurological:      Mental Status: He is alert and oriented to person, place, and time.   Psychiatric:         Mood and Affect: Mood normal.         Behavior: Behavior normal.         Thought Content: Thought content normal.         Judgment: Judgment normal.       Laboratory:  CBC:   Recent Labs   Lab 08/17/22 0222   WBC 5.3   RBC 3.69*   HGB 11.1*   HCT 34.3*      MCV 93.0   MCH 30.1   MCHC 32.4*     CMP:   Recent Labs   Lab 08/17/22 0222   CALCIUM 9.1   ALBUMIN 3.8      K 4.3   CO2 23   BUN 26.3*   CREATININE 0.84   ALKPHOS 176*   *   AST 21   BILITOT 0.6     Labs within the past 24 hours have been  reviewed.    Diagnostic Results:  None

## 2022-08-19 ENCOUNTER — TELEPHONE (OUTPATIENT)
Dept: ENDOCRINOLOGY | Facility: HOSPITAL | Age: 62
End: 2022-08-19

## 2022-08-19 ENCOUNTER — PATIENT MESSAGE (OUTPATIENT)
Dept: TRANSPLANT | Facility: CLINIC | Age: 62
End: 2022-08-19
Payer: COMMERCIAL

## 2022-08-19 ENCOUNTER — TELEPHONE (OUTPATIENT)
Dept: TRANSPLANT | Facility: CLINIC | Age: 62
End: 2022-08-19
Payer: COMMERCIAL

## 2022-08-19 ENCOUNTER — PATIENT MESSAGE (OUTPATIENT)
Dept: ENDOCRINOLOGY | Facility: HOSPITAL | Age: 62
End: 2022-08-19
Payer: COMMERCIAL

## 2022-08-19 VITALS
RESPIRATION RATE: 18 BRPM | HEIGHT: 65 IN | WEIGHT: 162.94 LBS | HEART RATE: 80 BPM | DIASTOLIC BLOOD PRESSURE: 82 MMHG | OXYGEN SATURATION: 98 % | SYSTOLIC BLOOD PRESSURE: 153 MMHG | TEMPERATURE: 98 F | BODY MASS INDEX: 27.15 KG/M2

## 2022-08-19 DIAGNOSIS — Z94.4 LIVER REPLACED BY TRANSPLANT: Primary | ICD-10-CM

## 2022-08-19 LAB
ALBUMIN SERPL BCP-MCNC: 3.5 G/DL (ref 3.5–5.2)
ALP SERPL-CCNC: 121 U/L (ref 55–135)
ALT SERPL W/O P-5'-P-CCNC: 78 U/L (ref 10–44)
ANION GAP SERPL CALC-SCNC: 7 MMOL/L (ref 8–16)
AST SERPL-CCNC: 13 U/L (ref 10–40)
BASOPHILS # BLD AUTO: 0.02 K/UL (ref 0–0.2)
BASOPHILS NFR BLD: 0.4 % (ref 0–1.9)
BILIRUB SERPL-MCNC: 0.6 MG/DL (ref 0.1–1)
BUN SERPL-MCNC: 18 MG/DL (ref 8–23)
C PEPTIDE SERPL-MCNC: 1.66 NG/ML (ref 0.78–5.19)
CALCIUM SERPL-MCNC: 8.9 MG/DL (ref 8.7–10.5)
CHLORIDE SERPL-SCNC: 108 MMOL/L (ref 95–110)
CO2 SERPL-SCNC: 24 MMOL/L (ref 23–29)
CREAT SERPL-MCNC: 0.7 MG/DL (ref 0.5–1.4)
DIFFERENTIAL METHOD: ABNORMAL
EOSINOPHIL # BLD AUTO: 0.1 K/UL (ref 0–0.5)
EOSINOPHIL NFR BLD: 1.7 % (ref 0–8)
ERYTHROCYTE [DISTWIDTH] IN BLOOD BY AUTOMATED COUNT: 13.3 % (ref 11.5–14.5)
EST. GFR  (NO RACE VARIABLE): >60 ML/MIN/1.73 M^2
GLUCOSE SERPL-MCNC: 111 MG/DL (ref 70–110)
GLUCOSE SERPL-MCNC: 111 MG/DL (ref 70–110)
HCT VFR BLD AUTO: 34.8 % (ref 40–54)
HGB BLD-MCNC: 11.9 G/DL (ref 14–18)
IMM GRANULOCYTES # BLD AUTO: 0.01 K/UL (ref 0–0.04)
IMM GRANULOCYTES NFR BLD AUTO: 0.2 % (ref 0–0.5)
LIPASE SERPL-CCNC: 11 U/L (ref 4–60)
LYMPHOCYTES # BLD AUTO: 1 K/UL (ref 1–4.8)
LYMPHOCYTES NFR BLD: 20.5 % (ref 18–48)
MAGNESIUM SERPL-MCNC: 1.7 MG/DL (ref 1.6–2.6)
MCH RBC QN AUTO: 31 PG (ref 27–31)
MCHC RBC AUTO-ENTMCNC: 34.2 G/DL (ref 32–36)
MCV RBC AUTO: 91 FL (ref 82–98)
MONOCYTES # BLD AUTO: 0.5 K/UL (ref 0.3–1)
MONOCYTES NFR BLD: 9.5 % (ref 4–15)
NEUTROPHILS # BLD AUTO: 3.2 K/UL (ref 1.8–7.7)
NEUTROPHILS NFR BLD: 67.7 % (ref 38–73)
NRBC BLD-RTO: 0 /100 WBC
PHOSPHATE SERPL-MCNC: 2.9 MG/DL (ref 2.7–4.5)
PLATELET # BLD AUTO: 177 K/UL (ref 150–450)
PMV BLD AUTO: 10.2 FL (ref 9.2–12.9)
POCT GLUCOSE: 101 MG/DL (ref 70–110)
POCT GLUCOSE: 138 MG/DL (ref 70–110)
POCT GLUCOSE: 69 MG/DL (ref 70–110)
POCT GLUCOSE: 89 MG/DL (ref 70–110)
POTASSIUM SERPL-SCNC: 4.3 MMOL/L (ref 3.5–5.1)
PROT SERPL-MCNC: 6.3 G/DL (ref 6–8.4)
RBC # BLD AUTO: 3.84 M/UL (ref 4.6–6.2)
SODIUM SERPL-SCNC: 139 MMOL/L (ref 136–145)
TACROLIMUS BLD-MCNC: 8.5 NG/ML (ref 5–15)
WBC # BLD AUTO: 4.73 K/UL (ref 3.9–12.7)

## 2022-08-19 PROCEDURE — 80197 ASSAY OF TACROLIMUS: CPT | Performed by: NURSE PRACTITIONER

## 2022-08-19 PROCEDURE — 99239 HOSP IP/OBS DSCHRG MGMT >30: CPT | Mod: ,,, | Performed by: NURSE PRACTITIONER

## 2022-08-19 PROCEDURE — 25000003 PHARM REV CODE 250: Performed by: NURSE PRACTITIONER

## 2022-08-19 PROCEDURE — 99232 SBSQ HOSP IP/OBS MODERATE 35: CPT | Mod: ,,, | Performed by: NURSE PRACTITIONER

## 2022-08-19 PROCEDURE — 63600175 PHARM REV CODE 636 W HCPCS: Performed by: NURSE PRACTITIONER

## 2022-08-19 PROCEDURE — 94761 N-INVAS EAR/PLS OXIMETRY MLT: CPT

## 2022-08-19 PROCEDURE — 84681 ASSAY OF C-PEPTIDE: CPT | Performed by: NURSE PRACTITIONER

## 2022-08-19 PROCEDURE — 83690 ASSAY OF LIPASE: CPT | Performed by: NURSE PRACTITIONER

## 2022-08-19 PROCEDURE — 36415 COLL VENOUS BLD VENIPUNCTURE: CPT | Performed by: NURSE PRACTITIONER

## 2022-08-19 PROCEDURE — 80053 COMPREHEN METABOLIC PANEL: CPT | Performed by: NURSE PRACTITIONER

## 2022-08-19 PROCEDURE — 25000003 PHARM REV CODE 250: Performed by: PHYSICIAN ASSISTANT

## 2022-08-19 PROCEDURE — 99239 PR HOSPITAL DISCHARGE DAY,>30 MIN: ICD-10-PCS | Mod: ,,, | Performed by: NURSE PRACTITIONER

## 2022-08-19 PROCEDURE — 99232 PR SUBSEQUENT HOSPITAL CARE,LEVL II: ICD-10-PCS | Mod: ,,, | Performed by: NURSE PRACTITIONER

## 2022-08-19 PROCEDURE — 83735 ASSAY OF MAGNESIUM: CPT | Performed by: NURSE PRACTITIONER

## 2022-08-19 PROCEDURE — 84100 ASSAY OF PHOSPHORUS: CPT | Performed by: NURSE PRACTITIONER

## 2022-08-19 PROCEDURE — 85025 COMPLETE CBC W/AUTO DIFF WBC: CPT | Performed by: NURSE PRACTITIONER

## 2022-08-19 RX ORDER — OXYCODONE HYDROCHLORIDE 5 MG/1
5 TABLET ORAL EVERY 4 HOURS PRN
Qty: 42 TABLET | Refills: 0 | Status: SHIPPED | OUTPATIENT
Start: 2022-08-19 | End: 2022-12-12

## 2022-08-19 RX ORDER — INSULIN GLARGINE 300 U/ML
10 INJECTION, SOLUTION SUBCUTANEOUS DAILY
Qty: 3 PEN | Refills: 11 | Status: SHIPPED | OUTPATIENT
Start: 2022-08-19

## 2022-08-19 RX ADMIN — FAMOTIDINE 20 MG: 20 TABLET ORAL at 08:08

## 2022-08-19 RX ADMIN — OXYCODONE HYDROCHLORIDE 5 MG: 5 TABLET ORAL at 08:08

## 2022-08-19 RX ADMIN — ASPIRIN 81 MG: 81 TABLET, COATED ORAL at 08:08

## 2022-08-19 RX ADMIN — CLOPIDOGREL 75 MG: 75 TABLET, FILM COATED ORAL at 08:08

## 2022-08-19 RX ADMIN — CARVEDILOL 12.5 MG: 12.5 TABLET, FILM COATED ORAL at 08:08

## 2022-08-19 RX ADMIN — ESCITALOPRAM OXALATE 5 MG: 5 TABLET, FILM COATED ORAL at 08:08

## 2022-08-19 RX ADMIN — TACROLIMUS 4 MG: 1 CAPSULE ORAL at 08:08

## 2022-08-19 NOTE — TELEPHONE ENCOUNTER
Patient needs a follow-up appointment in the [facility] in 1-2 weeks with Dr. Bradley         Lab Results   Component Value Date    HGBA1C 5.6 08/18/2022       POCT Glucose   Date Value Ref Range Status   08/19/2022 89 70 - 110 mg/dL Final   08/19/2022 101 70 - 110 mg/dL Final   08/19/2022 69 (L) 70 - 110 mg/dL Final   08/18/2022 152 (H) 70 - 110 mg/dL Final   08/18/2022 158 (H) 70 - 110 mg/dL Final   08/18/2022 124 (H) 70 - 110 mg/dL Final   08/18/2022 59 (L) 70 - 110 mg/dL Final   08/18/2022 93 70 - 110 mg/dL Final   08/17/2022 79 70 - 110 mg/dL Final   08/17/2022 83 70 - 110 mg/dL Final   08/17/2022 133 (H) 70 - 110 mg/dL Final   08/17/2022 69 (L) 70 - 110 mg/dL Final   08/17/2022 68 (L) 70 - 110 mg/dL Final   08/17/2022 70 70 - 110 mg/dL Final       Diabetes Medications             OZEMPIC 1 mg/dose (4 mg/3 mL) Inject 1 mg into the skin once a week.    TOUJEO MAX U-300 SOLOSTAR 300 unit/mL (3 mL) insulin pen Inject 10 Units into the skin once daily. Check BG before meals and at bedtime. If fasting BG is > 120 and no more hypoglycemic event, then start insulin Toujeo 10 units daily.      Hospital Medications             glucagon (human recombinant) injection 1 mg 1 mg, Intramuscular, As needed (PRN), Turn patient on their side, give IM, and NOTIFY MD IMMEDIATELY.<BR><BR>Feed the patient as soon as patient awakens and is able to swallow.    glucose chewable tablet 16 g 16 g, Oral, As needed (PRN), (16 grams = #  four 4gm glucose tablets)    glucose chewable tablet 24 g 24 g, Oral, As needed (PRN), (24 grams = # six 4gm glucose tablets)    insulin aspart U-100 pen 0-5 Units 0-5 Units, Subcutaneous, Before meals &amp; nightly PRN, **LOW CORRECTION DOSE**<BR>Blood Glucose<BR>mg/dL                  Pre-meal                2200<BR>151-200                0 unit                      0 unit<BR>201-250                2 units                    1 unit<BR>251-300                3 units                    1  unit<BR>301-350                4 units                    2 units<BR>&gt;350                     5 units                    3 units<BR>Administer subcutaneously if needed at times designated by monitoring schedule. <BR>DO NOT HOLD correction dose insulin for patients who are  NPO.<BR>&quot;HIGH ALERT MEDICATION&quot; - Administer with meals or TF/TPN.          Thanks,    Km Beard DNP, FNP-C  Department of Endocrinology  Inpatient Glycemic Management

## 2022-08-19 NOTE — NURSING
Pt is AAOx4, independent, and VSS. Pharmacy provided updated bluecard- pt chose to  mediation from pharmacy. Blood glucose monitoring preformed and treated as ordered- diabetic plan review with pt by endocrine. PIV removed prior to DC. DC instructions and handout provided on when to call MD, upcoming appointments, medication list, [facility] on [facility], pt portal, COVID 19 information, PMH, and pain medication information. Pt and pt's family verbalized understanding and all questions answered to satisfaction. Transport offered and refused- pt walked with family to parking garage.

## 2022-08-19 NOTE — PLAN OF CARE
Pt remains AAO x 4 with VSS on Visi monitor, afebrile, sats upper 90s on RA throughout shift. No complaints of nausea or vomiting  Chief complaint of abdominal pain and L flank pain - PRN Oxy given, 1x dose 0.5 cc Dilaudid given for breakthrough pain with relief   Liver US 8/18 - WNL; UA clean. Cellcept on hold for continued abdominal discomfort and GI symptoms  Gastrograffin challenge 8/18 showed no signs of obstruction. Pt with multiple loose BM following  R AC 20g - CDI, saline locked  Endocrine consulted for BG management. CBG ACHS +2 AM - plan to check Cpeptide and random glucose in the AM  Pt up independent and ambulatory, voids in urinal, LBM 8/18  Regular diet  Pt remains free from falls and injuries, call light in reach, bed in lowest position, nonskid socks on when OOB, side rails up x2  Will continue to monitor

## 2022-08-19 NOTE — DISCHARGE SUMMARY
[facility] - Transplant Stepdown  Liver Transplant  Discharge Summary      Patient Name: Eder Kong  MRN: [id number]  Admission Date: 8/18/2022  Hospital Length of Stay: 1 days  Discharge Date and Time:  08/19/2022 10:28 AM  Attending Physician: Zackary Khan Jr., MD   Discharging Provider: Olamide Banks NP  Primary Care Provider: Alexsandra Sandoval MD  Post-Operative Day: 208     ORGAN:   LIVER  Disease Etiology: Primary Liver Malignancy: Hepatoma (HCC) and Cirrhosis  Donor Type:   Donation after Circulatory Death   CDC High Risk:   Yes  Donor CMV Status:   Donor CMV Status: Negative  Donor HBcAB:   Negative  Donor HCV Status:   Negative  Whole or Partial: Whole Liver  Biliary Anastomosis: End to End  Arterial Anatomy: Standard    HPI:   Damien Kong is a 61yr M s/p DCD LTX 1/23/22 for HCC and HCC. He progressed well postoperatively. Post op course significant for hepatic artery stenosis s/p angioplasty and stent placement 5/3/22 (on ASA and plavix). Patient presented to OSH ER with L flank and abdominal pain x 2 weeks. He states he is unable to lie down or sit up. Also reports increased abdominal distention. Labs in ER significant for elevated ALT. CT stone study 8/17 with possible early SBO due to mildly dilated loops of SB within left abd/pelvis with air fluid level and suspected transition point in left para-midline anterior abdomen. Decision made to transfer to [facility] for transplant services. On arrival, patient reports he is feeling okay but still with abdominal pain. Abdominal pain is in left upper quadrant and radiates into his back. Pain is worse at rest when he is lying down. He denies any nausea/vomiting. He denies fever, chills, chest pain, SOB. He does report no flatus for past several days. Last bowel movement on 8/16/22. Abdomen is soft and non distended and only minimally painful to touch. Plan for Gastrogaffin challenge. Plan for NPO, IVF, and Liver US.       * No surgery found *       Hospital course:  transferred from OSH for eval of Left flank/abdominal pain and CT scan w suspected transition point to compressed small bowel in the left paramidline anterior to abdomen.     Gastrograffin administered at 2am. KUB at 6am shows contrast t/o to colon. Plan for repeat KUB in 8hrs showed contrast passed thru colon. Patient has good bowel sounds. He is having BMs (inc given Gastrografin). Pancreas enzymes are wnl. He is tolerating diet.  Patient is POD#208, MMF discontinued while inpt. UA negative, no blood. No signs of lesions to skin. Patient ambulating in hallways w/o issues. ALT elevated but trending down. Liver US ordered and shows previous fluid collection resolved, and hepatic artery RIs almost normal. Point tenderness to left upper quad that goes straight thru to back resolved w Oxy. Could be musculoskeletal. Patient would benefit form outpt EGD/c-scope as outpatient.  Patient will have MRI per HCC protocol as outpatient.     Discharge instructions reviewed by Pharmacist, Nursing and Transplant coordinator(Keily Rodriguez).  Patient and CG verbalize understanding and are in agreement with discharge from hospital today.  Patient is medically stable for discharge.  Patient will f/u w labs next week and per transplant coordinator.  Next visit in clinic w Hepatologist in 1-2 weeks to reassess.      Goals of Care Treatment Preferences:  Code Status: Full Code      Final Active Diagnoses:    Diagnosis Date Noted POA    PRINCIPAL PROBLEM:  Abdominal pain [R10.9] 08/17/2022 Yes    Hypomagnesemia [E83.42] 08/18/2022 Yes    GERD (gastroesophageal reflux disease) [K21.9] 08/18/2022 Unknown     Chronic    Small bowel obstruction [K56.609]  Unknown    Stenosis of hepatic artery of transplanted liver [T86.49, I77.1] 08/17/2022 Yes    Long-term use of immunosuppressant medication [Z79.899] 01/26/2022 Not Applicable    S/P liver transplant [Z94.4] 01/23/2022 Not Applicable    Prophylactic immunotherapy [Z29.8] 01/23/2022 Not  Applicable    Need for zoster vaccination [Z23] 06/24/2021 Not Applicable    Diabetes mellitus [E11.9] 05/25/2021 Yes      Problems Resolved During this Admission:    Diagnosis Date Noted Date Resolved POA    At risk for opportunistic infections [Z91.89] 01/23/2022 08/18/2022 Yes           Pending Diagnostic Studies:     None        Significant Diagnostic Studies: Labs:   CMP   Recent Labs   Lab 08/18/22  0720 08/19/22  0640    139   K 4.1 4.3    108   CO2 24 24   GLU 75 111*  111*   BUN 11 18   CREATININE 0.7 0.7   CALCIUM 8.9 8.9   PROT 6.4 6.3   ALBUMIN 3.6 3.5   BILITOT 1.0 0.6   ALKPHOS 124 121   AST 17 13   * 78*   ANIONGAP 8 7*   CBC   Recent Labs   Lab 08/18/22  0720 08/19/22  0640   WBC 4.95 4.73   HGB 11.0* 11.9*   HCT 33.7* 34.8*    177   INR   Lab Results   Component Value Date    INR 1.1 05/03/2022    INR 1.1 01/28/2022    INR 1.1 01/27/2022    PROTIME 14.5 06/16/2018    PROTIME 14.8 (H) 02/14/2018    and All labs within the past 24 hours have been reviewed  Radiology: X-Ray: CXR: X-Ray Chest 1 View (CXR): No results found for this visit on 08/18/22.    The patients clinical status was discussed at multidisplinary rounds, involving transplant surgery, transplant medicine, pharmacy, nursing, nutrition, and social work    Discharged Condition: fair    Disposition: to home    Follow Up: labs next week, plan for MRI HCC protocol outpatient, GI f/u for EGD/c-scope    Patient Instructions:   No discharge procedures on file.    Medications:  Reconciled Home Medications:      Medication List      START taking these medications    oxyCODONE 5 MG immediate release tablet  Commonly known as: ROXICODONE  Take 1 tablet (5 mg total) by mouth every 4 (four) hours as needed for Pain.  Replaces: oxyCODONE 5 mg Cap        CHANGE how you take these medications    tacrolimus 1 MG Cap  Commonly known as: PROGRAF  Take 4 capsules (4 mg total) by mouth every morning AND 3 capsules (3 mg total)  "every evening.  What changed: See the new instructions.        CONTINUE taking these medications    aspirin 81 MG EC tablet  Commonly known as: ECOTRIN  Take 1 tablet (81 mg total) by mouth once daily.     BD ULTRA-FINE MINI PEN NEEDLE 31 gauge x 3/16" Ndle  Generic drug: pen needle, diabetic  USE AS DIRECTED EVERY DAY     calcium carbonate-vitamin D3 600 mg-20 mcg (800 unit) Tab  Take 1 tablet by mouth once daily.     carvediloL 12.5 MG tablet  Commonly known as: COREG  Take 1 tablet (12.5 mg total) by mouth 2 (two) times daily.     clopidogreL 75 mg tablet  Commonly known as: PLAVIX  Take 1 tablet (75 mg total) by mouth once daily.     CONTOUR NEXT TEST STRIPS Strp  Generic drug: blood sugar diagnostic  USE TO TEST BLOOD GLUCOSE TWICE DAILY     * EScitalopram oxalate 5 MG Tab  Commonly known as: LEXAPRO  Take 1 tablet (5 mg total) by mouth once daily.     * EScitalopram oxalate 5 MG Tab  Commonly known as: LEXAPRO  Take 1 tablet by mouth once daily     famotidine 20 MG tablet  Commonly known as: PEPCID  Take 1 tablet (20 mg total) by mouth once daily. Stop 2/22/22     OZEMPIC 1 mg/dose (4 mg/3 mL)  Generic drug: semaglutide  Inject 1 mg into the skin once a week.         * This list has 2 medication(s) that are the same as other medications prescribed for you. Read the directions carefully, and ask your doctor or other care provider to review them with you.            STOP taking these medications    mycophenolate 250 mg Cap  Commonly known as: CELLCEPT     oxyCODONE 5 mg Cap  Commonly known as: OXY-IR  Replaced by: oxyCODONE 5 MG immediate release tablet     predniSONE 5 MG tablet  Commonly known as: DELTASONE     sodium polystyrene 15 gram/60 mL Susp  Commonly known as: KAYEXALATE     valGANciclovir 450 mg Tab  Commonly known as: VALCYTE        ASK your doctor about these medications    NovoLOG Flexpen U-100 Insulin 100 unit/mL (3 mL) Inpn pen  Generic drug: insulin aspart U-100  Inject 6 Units into the skin 3 " (three) times daily. Plus sliding scale: TDD: 33 units     TOUJEO MAX U-300 SOLOSTAR 300 unit/mL (3 mL) insulin pen  Generic drug: insulin glargine U-300 conc  Inject 18 Units into the skin once daily.            Time spent caring for patient (Greater than 1/2 spent in direct face-to-face contact): > 30 minutes    Olamide Banks NP  Liver Transplant  [facility] - Transplant Stepdown

## 2022-08-19 NOTE — TELEPHONE ENCOUNTER
Report received from inpatient CAYDEN:    so yes, still plan for MRI outpt- if you schedule it in August that would be great. Patient is switching insurance in Sept. He also needs EGD and c-scope. He has a GI doc, DR Leone in his home town but he is more than willing to have scopes down here if needed. Labs next week to make sure ALT normal.  thank rebecca

## 2022-08-19 NOTE — PROGRESS NOTES
[facility] - Transplant Stepdown  Endocrinology  Progress Note    Admit Date: 8/18/2022     Reason for Consult: Management of T2DM, Hyperglycemia      Surgical Procedure and Date: Liver Transplant 1/23/22     Diabetes diagnosis year: > 10 years      Home Diabetes Medications:  Toujo 26 units QD and Ozempic 0.5 mg weekly.      How often checking glucose at home?  1-2 times per day  BG readings on regimen:   Hypoglycemia on the regimen?  Infrequent.   Missed doses on regimen?  None     Diabetes Complications include:     Hyperglycemia     Complicating diabetes co morbidities:   Hypoglycemia         HPI: Damien Kong is a 61yr M s/p DCD LTX 1/23/22 for HCC and HCC. He progressed well postoperatively. Post op course significant for hepatic artery stenosis s/p angioplasty and stent placement 5/3/22 (on ASA and plavix). Patient presented to OSH ER with L flank and abdominal pain x 2 weeks. He states he is unable to lie down or sit up. Also reports increased abdominal distention. Labs in ER significant for elevated ALT. CT stone study 8/17 with possible early SBO due to mildly dilated loops of SB within left abd/pelvis with air fluid level and suspected transition point in left para-midline anterior abdomen. Decision made to transfer to [facility] [place] services. On arrival, patient reports he is feeling okay but still with abdominal pain. Abdominal pain is in left upper quadrant and radiates into his back. Pain is worse at rest when he is lying down. He denies any nausea/vomiting. He denies fever, chills, chest pain, SOB. He does report no flatus for past several days. Last bowel movement on 8/16/22. Abdomen is soft and non distended and only minimally painful to touch. Plan for Gastrogaffin challenge. Plan for NPO, IVF, and Liver US. Endocrine consulted to manage hyperglycemia.     Lab Results   Component Value Date    HGBA1C 5.6 08/18/2022                 Interval HPI:   Overnight events: No acute events overnight.  "Patient on the TSU in room 77276/73771 A. Blood glucose stable. BG at and below goal on current insulin regimen (SSI ). Steroid use- None .    Renal function- Normal   Vasopressors-  None       Diet Adult Regular (IDDSI Level 7)     Eating:   <25%  Nausea: No  Hypoglycemia and intervention: Yes, overnight became hypoglycemic. Responded to hypoglycemia protocol. No exogenous insulin administer in > 48 hours. No S/S of organic causes like adrenal insuffiencey. Of note patient has been asymptomatic during these hypoglycemic episodes. Suggest checking a serum glucose or an alternative site if demonstrating hypoglycemia again on POCT.   Fever: No  TPN and/or TF: No      BP (!) 143/87 (BP Location: Right arm, Patient Position: Lying)   Pulse 80   Temp 98.3 °F (36.8 °C) (Oral)   Resp 18   Ht 5' 5" (1.651 m)   Wt 73.9 kg (162 lb 14.7 oz)   SpO2 98%   BMI 27.11 kg/m²     Labs Reviewed and Include    Recent Labs   Lab 08/19/22  0640   *  111*   CALCIUM 8.9   ALBUMIN 3.5   PROT 6.3      K 4.3   CO2 24      BUN 18   CREATININE 0.7   ALKPHOS 121   ALT 78*   AST 13   BILITOT 0.6     Lab Results   Component Value Date    WBC 4.73 08/19/2022    HGB 11.9 (L) 08/19/2022    HCT 34.8 (L) 08/19/2022    MCV 91 08/19/2022     08/19/2022     No results for input(s): TSH, FREET4 in the last 168 hours.  Lab Results   Component Value Date    HGBA1C 5.6 08/18/2022       Nutritional status:   Body mass index is 27.11 kg/m².  Lab Results   Component Value Date    ALBUMIN 3.5 08/19/2022    ALBUMIN 3.6 08/18/2022    ALBUMIN 3.8 08/17/2022     No results found for: PREALBUMIN    Estimated Creatinine Clearance: 101.6 mL/min (based on SCr of 0.7 mg/dL).    Accu-Checks  Recent Labs     08/17/22  1854 08/17/22  2022 08/18/22  0512 08/18/22  1305 08/18/22  1358 08/18/22  1729 08/18/22 2048 08/19/22  0134 08/19/22  0220 08/19/22  0810   POCTGLUCOSE 83 79 93 59* 124* 158* 152* 69* 101 89       Current Medications and/or " Treatments Impacting Glycemic Control  Immunotherapy:    Immunosuppressants           Stop Route Frequency     tacrolimus capsule 4 mg         -- Oral 2 times daily          Steroids:   Hormones (From admission, onward)                Start     Stop Route Frequency Ordered    08/18/22 0050  melatonin tablet 9 mg         -- Oral Nightly PRN 08/18/22 0050          Pressors:    Autonomic Drugs (From admission, onward)                None          Hyperglycemia/Diabetes Medications:   Antihyperglycemics (From admission, onward)                Start     Stop Route Frequency Ordered    08/18/22 1234  insulin aspart U-100 pen 0-5 Units         -- SubQ Before meals & nightly PRN 08/18/22 1134            ASSESSMENT and PLAN    * Abdominal pain  Managed per primary team  Avoid hypoglycemia        Diabetes mellitus  Endocrinology consulted for BG management.   BG goal 140-180    - Patient having hypoglycemia holding off on starting basal insulin.   - Novolog (aspart) insulin LDC (150/50) SSI Units SQ prn for BG excursions.   - BG checks q4hr  - Hypoglycemia protocol in place    ** Please notify Endocrine for any change and/or advance in diet**  ** Please call Endocrine for any BG related issues **    Discharge Planning:   - Discussed D/C plan with Attending Dr. Jas Pulido   - Decrease Toujeo to 10 units daily once fasting BG > 120 mg/dL and patient has not had any hypoglycemic events  - Continue Ozempic 0.5 mg weekly  - Follow-up in the endocrinology fellow-clinic in 1-2 weeks (possibly hypoglycemia work-up).   - BG checks AC/HS/ PRN   - Treat hypoglycemia (BG < 70 mg/dL) with oral glucose tablets     Blood sugar logs given to patient.     Hypoglycemia (Low Blood Sugar)  Too little glucose (sugar) in your blood is called hypoglycemia or low blood sugar. Diabetes itself doesnt cause low blood sugar. But some of the treatments for diabetes, such as pills or insulin, may increase your risk for it. Low blood sugar may cause  you to lose consciousness or have a seizure. So always treat low blood sugar right away.    Special note: Always carry a source of fast-acting sugar and a snack in case of hypoglycemia     What You May Notice  If you have low blood sugar, you may have these symptoms:   Shakiness or dizziness   Cold, clammy skin or sweating   Feelings of hunger   Headache   Nervousness   A hard, fast heartbeat   Weakness   Confusion or irritability   Blurred vision  What You Should Do   First, check your blood sugar. If it is too low (out of your target range), eat or drink 15 to 20 grams of fast-acting sugar. This may be 3 to 4 glucose tablets, 4 oz (half a cup) fruit juice or regular (non-diet) soda, 8 oz (one cup ) fat-free milk, or 1 tablespoon of honey. Dont take more than this, or your blood sugar may go too high.   Wait 15 minutes. Then recheck your blood sugar if you can.   If your blood sugar is still too low, repeat the steps above and check your blood sugar again. If your blood sugar still has not returned to your target range, contact your healthcare provider or seek emergency care.   Once your blood sugar returns to target range, eat a snack or meal.  Preventing Low Blood Sugar   Eat your meals and snacks at the same times each day. Dont skip meals!   Ask your healthcare provider if it is safe for you to drink alcohol. Never drink on an empty stomach.   Take your medication at the prescribed times.   Always carry a source of fast-acting sugar and a snack when youre away from home.  Other Things to Do   Carry a medical ID card or wear a medical alert bracelet or necklace. It should say that you have diabetes. It should also say what to do if you pass out or have a seizure.   Make sure your family, friends, and coworkers know the signs of low blood sugar. Tell them what to do if your blood sugar falls very low and you cant treat yourself.   Keep a glucagon emergency kit handy. Be sure your family,  friends, and coworkers know how and when to use it. Check it regularly and replace the glucagon before it expires.   Talk to your healthcare team about other things you can do to prevent low blood sugar.     If you experience hypoglycemia several times, call your doctor.   © 9600-3530 [employer], [address] [place] [address], [place] [address]. All rights reserved. This information is not intended as a substitute for professional medical care. Always follow your healthcare professional's instructions.           S/P liver transplant  Managed per primary team  Avoid hypoglycemia             Km Beard DNP, FNP  Endocrinology  [facility] - Transplant Stepdown

## 2022-08-19 NOTE — SUBJECTIVE & OBJECTIVE
"Interval HPI:   Overnight events: No acute events overnight. Patient on the TSU in room 89498/11096 A. Blood glucose stable. BG at and below goal on current insulin regimen (SSI ). Steroid use- None .    Renal function- Normal   Vasopressors-  None       Diet Adult Regular (IDDSI Level 7)     Eating:   <25%  Nausea: No  Hypoglycemia and intervention: Yes, overnight became hypoglycemic. Responded to hypoglycemia protocol. No exogenous insulin administer in > 48 hours.   Fever: No  TPN and/or TF: No      BP (!) 143/87 (BP Location: Right arm, Patient Position: Lying)   Pulse 80   Temp 98.3 °F (36.8 °C) (Oral)   Resp 18   Ht 5' 5" (1.651 m)   Wt 73.9 kg (162 lb 14.7 oz)   SpO2 98%   BMI 27.11 kg/m²     Labs Reviewed and Include    Recent Labs   Lab 08/19/22  0640   *  111*   CALCIUM 8.9   ALBUMIN 3.5   PROT 6.3      K 4.3   CO2 24      BUN 18   CREATININE 0.7   ALKPHOS 121   ALT 78*   AST 13   BILITOT 0.6     Lab Results   Component Value Date    WBC 4.73 08/19/2022    HGB 11.9 (L) 08/19/2022    HCT 34.8 (L) 08/19/2022    MCV 91 08/19/2022     08/19/2022     No results for input(s): TSH, FREET4 in the last 168 hours.  Lab Results   Component Value Date    HGBA1C 5.6 08/18/2022       Nutritional status:   Body mass index is 27.11 kg/m².  Lab Results   Component Value Date    ALBUMIN 3.5 08/19/2022    ALBUMIN 3.6 08/18/2022    ALBUMIN 3.8 08/17/2022     No results found for: PREALBUMIN    Estimated Creatinine Clearance: 101.6 mL/min (based on SCr of 0.7 mg/dL).    Accu-Checks  Recent Labs     08/17/22  1854 08/17/22 2022 08/18/22  0512 08/18/22  1305 08/18/22  1358 08/18/22  1729 08/18/22  2048 08/19/22  0134 08/19/22  0220 08/19/22  0810   POCTGLUCOSE 83 79 93 59* 124* 158* 152* 69* 101 89       Current Medications and/or Treatments Impacting Glycemic Control  Immunotherapy:    Immunosuppressants           Stop Route Frequency     tacrolimus capsule 4 mg         -- Oral 2 times daily "          Steroids:   Hormones (From admission, onward)                Start     Stop Route Frequency Ordered    08/18/22 0050  melatonin tablet 9 mg         -- Oral Nightly PRN 08/18/22 0050          Pressors:    Autonomic Drugs (From admission, onward)                None          Hyperglycemia/Diabetes Medications:   Antihyperglycemics (From admission, onward)                Start     Stop Route Frequency Ordered    08/18/22 1234  insulin aspart U-100 pen 0-5 Units         -- SubQ Before meals & nightly PRN 08/18/22 1134

## 2022-08-19 NOTE — PROGRESS NOTES
"Transplant Social Work- Discharge Note    Transplant SW presented to patient's bedside. Patient presented seated upright in bed, alert and oriented x4, engaging and communicating appropriately with SW and answering questions. SW inquired regarding patient's understanding of discharge status, caregiver, transportation, and needs. Patient reported he will be discharging from the hospital today and will return to his home at [address] [place] [address]. [id number] (same address as in [facility]). Patient reported his wife, Charo Gonsalez will be leaving work, picking up their 16 year old granddaughter from school, and coming to the hospital to pickup patient this afternoon.Patient reported he has no need for HH,PT or DME as he "feels good" and is able to move about and care for himself independently.   Patient reported he discontinued smoking and drinking 25 years ago and has no need for AA or cessation resources. Patient reported he is mentally  "feeling good" and "ready to return home". Patient reported he will be returning to work on Monday. 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   Patient reported he has a "stack of cards" with hospital contact info and phone numbers should he have any questions or needs once discharged. Patient asked SW for update regarding the specific time of discharge today. SW communicated patient's request to nurse outside of hospital room. Patient denied any further questions or needs at this time.  SW remains available and will continue to follow, providing psychosocial support, education and assistance as needed.    Supervised by SJ Matt who was present through the duration of this visit.    "

## 2022-08-19 NOTE — PROGRESS NOTES
"Discharge Medication Note:    Hospital Course: Damien Kong is a 61 yoM s/p DCD LTX 1/23/22 for HCC. He had hepatic artery stenosis s/p angioplasty and stent placement 5/3/22 and was started on aspirin and clopidogrel. Patient admitted for LUQ abdominal pain and work up of suspected early SBO. No obstruction found per Abd X-ray/Gastrografin challenge. Patient's abdominal pain improves with oxycodone.    · Immuno: tacrolimus 4 mg + 3 mg with goal trough ~6  · MMF was discontinued at this admission, as it's no longer needed  · OI ppx: none  · Continue aspirin 81 mg and clopidogrel 75 mg    Met with Eder Kong at discharge to review discharge medications and to update the blue medication card.           Medication List      START taking these medications    oxyCODONE 5 MG immediate release tablet  Commonly known as: ROXICODONE  Take 1 tablet (5 mg total) by mouth every 4 (four) hours as needed for Pain.  Replaces: oxyCODONE 5 mg Cap        CHANGE how you take these medications    tacrolimus 1 MG Cap  Commonly known as: PROGRAF  Take 4 capsules (4 mg total) by mouth every morning AND 3 capsules (3 mg total) every evening.  What changed: See the new instructions.     TOUJEO MAX U-300 SOLOSTAR 300 unit/mL (3 mL) insulin pen  Generic drug: insulin glargine U-300 conc  Inject 10 Units into the skin once daily. Check BG before meals and at bedtime. If fasting BG is > 120 and no more hypoglycemic event, then start insulin Toujeo 10 units daily.  What changed:   · how much to take  · additional instructions        CONTINUE taking these medications    aspirin 81 MG EC tablet  Commonly known as: ECOTRIN  Take 1 tablet (81 mg total) by mouth once daily.     BD ULTRA-FINE MINI PEN NEEDLE 31 gauge x 3/16" Ndle  Generic drug: pen needle, diabetic     calcium carbonate-vitamin D3 600 mg-20 mcg (800 unit) Tab  Take 1 tablet by mouth once daily.     carvediloL 12.5 MG tablet  Commonly known as: COREG  Take 1 tablet (12.5 mg total) by " "mouth 2 (two) times daily.     clopidogreL 75 mg tablet  Commonly known as: PLAVIX  Take 1 tablet (75 mg total) by mouth once daily.     CONTOUR NEXT TEST STRIPS Strp  Generic drug: blood sugar diagnostic     * EScitalopram oxalate 5 MG Tab  Commonly known as: LEXAPRO  Take 1 tablet (5 mg total) by mouth once daily.     * EScitalopram oxalate 5 MG Tab  Commonly known as: LEXAPRO  Take 1 tablet by mouth once daily     famotidine 20 MG tablet  Commonly known as: PEPCID  Take 1 tablet (20 mg total) by mouth once daily. Stop 2/22/22     OZEMPIC 1 mg/dose (4 mg/3 mL)  Generic drug: semaglutide         * This list has 2 medication(s) that are the same as other medications prescribed for you. Read the directions carefully, and ask your doctor or other care provider to review them with you.            STOP taking these medications    mycophenolate 250 mg Cap  Commonly known as: CELLCEPT     NovoLOG Flexpen U-100 Insulin 100 unit/mL (3 mL) Inpn pen  Generic drug: insulin aspart U-100     oxyCODONE 5 mg Cap  Commonly known as: OXY-IR  Replaced by: oxyCODONE 5 MG immediate release tablet     predniSONE 5 MG tablet  Commonly known as: DELTASONE     sodium polystyrene 15 gram/60 mL Susp  Commonly known as: KAYEXALATE     valGANciclovir 450 mg Tab  Commonly known as: VALCYTE           Where to Get Your Medications      These medications were sent to [facility] [address], [place] [address]    Hours: Mon-Fri 7a-7p, Sat-Sun 10a-4p Phone: [phone]   · oxyCODONE 5 MG immediate release tablet  · TOUJEO MAX U-300 SOLOSTAR 300 unit/mL (3 mL) insulin pen            The following medications have been placed on HOLD and should be restarted in the outpatient setting (when appropriate): none    Eder Kong verbalized understanding and had the opportunity to ask questions.    Teresa Stubbs ([place])  PGY2 Solid Organ Transplant Pharmacy Resident    "

## 2022-08-22 ENCOUNTER — HOSPITAL ENCOUNTER (OUTPATIENT)
Dept: RADIOLOGY | Facility: HOSPITAL | Age: 62
Discharge: HOME | End: 2022-08-22
Attending: STUDENT IN AN ORGANIZED HEALTH CARE EDUCATION/TRAINING PROGRAM
Payer: COMMERCIAL

## 2022-08-22 DIAGNOSIS — C22.0 HCC (HEPATOCELLULAR CARCINOMA): ICD-10-CM

## 2022-08-22 PROCEDURE — 71250 CT THORAX DX C-: CPT | Mod: TC

## 2022-08-26 ENCOUNTER — PATIENT MESSAGE (OUTPATIENT)
Dept: TRANSPLANT | Facility: CLINIC | Age: 62
End: 2022-08-26
Payer: COMMERCIAL

## 2022-08-26 ENCOUNTER — TELEPHONE (OUTPATIENT)
Dept: TRANSPLANT | Facility: CLINIC | Age: 62
End: 2022-08-26
Payer: COMMERCIAL

## 2022-08-26 NOTE — TELEPHONE ENCOUNTER
----- Message from Jay Abrams MD sent at 8/25/2022  9:44 PM CDT -----  Reviewed. No evidence of recurrent liver cancer.

## 2022-08-26 NOTE — TELEPHONE ENCOUNTER
Patient notified and instructed via [facility]:    Your MRI was reviewed. No evidence of recurrent liver cancer.

## 2022-08-29 ENCOUNTER — LAB VISIT (OUTPATIENT)
Dept: LAB | Facility: HOSPITAL | Age: 62
End: 2022-08-29
Attending: STUDENT IN AN ORGANIZED HEALTH CARE EDUCATION/TRAINING PROGRAM
Payer: COMMERCIAL

## 2022-08-29 ENCOUNTER — PATIENT MESSAGE (OUTPATIENT)
Dept: TRANSPLANT | Facility: CLINIC | Age: 62
End: 2022-08-29
Payer: COMMERCIAL

## 2022-08-29 DIAGNOSIS — Z94.4 LIVER REPLACED BY TRANSPLANT: ICD-10-CM

## 2022-08-29 LAB
ALBUMIN SERPL-MCNC: 3.8 GM/DL (ref 3.4–4.8)
ALBUMIN/GLOB SERPL: 1.5 RATIO (ref 1.1–2)
ALP SERPL-CCNC: 125 UNIT/L (ref 40–150)
ALT SERPL-CCNC: 14 UNIT/L (ref 0–55)
AST SERPL-CCNC: 10 UNIT/L (ref 5–34)
BASOPHILS # BLD AUTO: 0.02 X10(3)/MCL (ref 0–0.2)
BASOPHILS NFR BLD AUTO: 0.4 %
BILIRUBIN DIRECT+TOT PNL SERPL-MCNC: 0.4 MG/DL
BUN SERPL-MCNC: 20.7 MG/DL (ref 8.4–25.7)
CALCIUM SERPL-MCNC: 9.2 MG/DL (ref 8.8–10)
CHLORIDE SERPL-SCNC: 106 MMOL/L (ref 98–107)
CO2 SERPL-SCNC: 27 MMOL/L (ref 23–31)
CREAT SERPL-MCNC: 0.73 MG/DL (ref 0.73–1.18)
EOSINOPHIL # BLD AUTO: 0.05 X10(3)/MCL (ref 0–0.9)
EOSINOPHIL NFR BLD AUTO: 1 %
ERYTHROCYTE [DISTWIDTH] IN BLOOD BY AUTOMATED COUNT: 13.1 % (ref 11.5–17)
GFR SERPLBLD CREATININE-BSD FMLA CKD-EPI: >60 MLS/MIN/1.73/M2
GLOBULIN SER-MCNC: 2.5 GM/DL (ref 2.4–3.5)
GLUCOSE SERPL-MCNC: 125 MG/DL (ref 82–115)
HCT VFR BLD AUTO: 32.9 % (ref 42–52)
HGB BLD-MCNC: 10.8 GM/DL (ref 14–18)
IMM GRANULOCYTES # BLD AUTO: 0.01 X10(3)/MCL (ref 0–0.04)
IMM GRANULOCYTES NFR BLD AUTO: 0.2 %
LYMPHOCYTES # BLD AUTO: 0.6 X10(3)/MCL (ref 0.6–4.6)
LYMPHOCYTES NFR BLD AUTO: 12.6 %
MCH RBC QN AUTO: 30.3 PG (ref 27–31)
MCHC RBC AUTO-ENTMCNC: 32.8 MG/DL (ref 33–36)
MCV RBC AUTO: 92.4 FL (ref 80–94)
MONOCYTES # BLD AUTO: 0.4 X10(3)/MCL (ref 0.1–1.3)
MONOCYTES NFR BLD AUTO: 8.4 %
NEUTROPHILS # BLD AUTO: 3.7 X10(3)/MCL (ref 2.1–9.2)
NEUTROPHILS NFR BLD AUTO: 77.4 %
NRBC BLD AUTO-RTO: 0 %
PLATELET # BLD AUTO: 194 X10(3)/MCL (ref 130–400)
PMV BLD AUTO: 11.1 FL (ref 7.4–10.4)
POTASSIUM SERPL-SCNC: 5.6 MMOL/L (ref 3.5–5.1)
PROT SERPL-MCNC: 6.3 GM/DL (ref 5.8–7.6)
RBC # BLD AUTO: 3.56 X10(6)/MCL (ref 4.7–6.1)
SODIUM SERPL-SCNC: 138 MMOL/L (ref 136–145)
WBC # SPEC AUTO: 4.8 X10(3)/MCL (ref 4.5–11.5)

## 2022-08-29 PROCEDURE — 36415 COLL VENOUS BLD VENIPUNCTURE: CPT

## 2022-08-29 PROCEDURE — 85025 COMPLETE CBC W/AUTO DIFF WBC: CPT

## 2022-08-29 PROCEDURE — 80053 COMPREHEN METABOLIC PANEL: CPT

## 2022-08-30 LAB — TACROLIMUS TROUGH BLD-MCNC: 8.1 NG/ML

## 2022-09-02 ENCOUNTER — PATIENT MESSAGE (OUTPATIENT)
Dept: TRANSPLANT | Facility: CLINIC | Age: 62
End: 2022-09-02
Payer: COMMERCIAL

## 2022-09-02 DIAGNOSIS — Z94.4 LIVER REPLACED BY TRANSPLANT: Primary | ICD-10-CM

## 2022-09-02 NOTE — TELEPHONE ENCOUNTER
Patient notified and instructed via [facility]:    Per : Liver tests normalized. Please decrease tacrolimus dose  to 3mg twice daily,  and repeat labs in 1-2 weeks.(Due 9/8/22) thanks.     Potassium elevated. Please follow hyperkalemia protocol. - a prescription will be sent to your local pharmacy for a medication called Kayexelate. Thanks.

## 2022-09-02 NOTE — TELEPHONE ENCOUNTER
----- Message from Jay Abrams MD sent at 8/31/2022 10:19 PM CDT -----  Liver tests normalized. Please decrease tac to 3/3 and repeat labs in 1-2 weeks.    Potassium elevated. Please follow hyperkalemia protocol.

## 2022-09-02 NOTE — TELEPHONE ENCOUNTER
Patient notified and instructed via [facility]:    Your CT scan was reviewed by ; no evidence of recurrent cancer.

## 2022-09-02 NOTE — TELEPHONE ENCOUNTER
----- Message from Jay Abrams MD sent at 8/31/2022 10:16 PM CDT -----  Reviewed. No evidence of recurrent liver cancer.

## 2022-09-08 ENCOUNTER — LAB VISIT (OUTPATIENT)
Dept: LAB | Facility: HOSPITAL | Age: 62
End: 2022-09-08
Attending: STUDENT IN AN ORGANIZED HEALTH CARE EDUCATION/TRAINING PROGRAM
Payer: COMMERCIAL

## 2022-09-08 DIAGNOSIS — Z94.4 LIVER REPLACED BY TRANSPLANT: ICD-10-CM

## 2022-09-08 LAB
ALBUMIN SERPL-MCNC: 3.8 GM/DL (ref 3.4–4.8)
ALBUMIN/GLOB SERPL: 1.4 RATIO (ref 1.1–2)
ALP SERPL-CCNC: 91 UNIT/L (ref 40–150)
ALT SERPL-CCNC: 14 UNIT/L (ref 0–55)
AST SERPL-CCNC: 13 UNIT/L (ref 5–34)
BASOPHILS # BLD AUTO: 0.04 X10(3)/MCL (ref 0–0.2)
BASOPHILS NFR BLD AUTO: 0.9 %
BILIRUBIN DIRECT+TOT PNL SERPL-MCNC: 0.5 MG/DL
BUN SERPL-MCNC: 21 MG/DL (ref 8.4–25.7)
CALCIUM SERPL-MCNC: 9.2 MG/DL (ref 8.8–10)
CHLORIDE SERPL-SCNC: 110 MMOL/L (ref 98–107)
CO2 SERPL-SCNC: 26 MMOL/L (ref 23–31)
CREAT SERPL-MCNC: 0.73 MG/DL (ref 0.73–1.18)
EOSINOPHIL # BLD AUTO: 0.06 X10(3)/MCL (ref 0–0.9)
EOSINOPHIL NFR BLD AUTO: 1.3 %
ERYTHROCYTE [DISTWIDTH] IN BLOOD BY AUTOMATED COUNT: 13.2 % (ref 11.5–17)
GFR SERPLBLD CREATININE-BSD FMLA CKD-EPI: >60 MLS/MIN/1.73/M2
GLOBULIN SER-MCNC: 2.8 GM/DL (ref 2.4–3.5)
GLUCOSE SERPL-MCNC: 95 MG/DL (ref 82–115)
HCT VFR BLD AUTO: 33.8 % (ref 42–52)
HGB BLD-MCNC: 10.7 GM/DL (ref 14–18)
IMM GRANULOCYTES # BLD AUTO: 0.01 X10(3)/MCL (ref 0–0.04)
IMM GRANULOCYTES NFR BLD AUTO: 0.2 %
LYMPHOCYTES # BLD AUTO: 0.69 X10(3)/MCL (ref 0.6–4.6)
LYMPHOCYTES NFR BLD AUTO: 14.8 %
MCH RBC QN AUTO: 30.1 PG (ref 27–31)
MCHC RBC AUTO-ENTMCNC: 31.7 MG/DL (ref 33–36)
MCV RBC AUTO: 95.2 FL (ref 80–94)
MONOCYTES # BLD AUTO: 0.42 X10(3)/MCL (ref 0.1–1.3)
MONOCYTES NFR BLD AUTO: 9 %
NEUTROPHILS # BLD AUTO: 3.5 X10(3)/MCL (ref 2.1–9.2)
NEUTROPHILS NFR BLD AUTO: 73.8 %
NRBC BLD AUTO-RTO: 0 %
PLATELET # BLD AUTO: 176 X10(3)/MCL (ref 130–400)
PMV BLD AUTO: 10.9 FL (ref 7.4–10.4)
POTASSIUM SERPL-SCNC: 4.9 MMOL/L (ref 3.5–5.1)
PROT SERPL-MCNC: 6.6 GM/DL (ref 5.8–7.6)
RBC # BLD AUTO: 3.55 X10(6)/MCL (ref 4.7–6.1)
SODIUM SERPL-SCNC: 141 MMOL/L (ref 136–145)
WBC # SPEC AUTO: 4.7 X10(3)/MCL (ref 4.5–11.5)

## 2022-09-08 PROCEDURE — 80053 COMPREHEN METABOLIC PANEL: CPT

## 2022-09-08 PROCEDURE — 85025 COMPLETE CBC W/AUTO DIFF WBC: CPT

## 2022-09-08 PROCEDURE — 80197 ASSAY OF TACROLIMUS: CPT

## 2022-09-08 PROCEDURE — 36415 COLL VENOUS BLD VENIPUNCTURE: CPT

## 2022-09-09 LAB — TACROLIMUS TROUGH BLD-MCNC: 3.7 NG/ML

## 2022-09-18 ENCOUNTER — PATIENT MESSAGE (OUTPATIENT)
Dept: TRANSPLANT | Facility: CLINIC | Age: 62
End: 2022-09-18
Payer: COMMERCIAL

## 2022-09-21 ENCOUNTER — PATIENT MESSAGE (OUTPATIENT)
Dept: TRANSPLANT | Facility: CLINIC | Age: 62
End: 2022-09-21
Payer: COMMERCIAL

## 2022-09-21 ENCOUNTER — TELEPHONE (OUTPATIENT)
Dept: TRANSPLANT | Facility: CLINIC | Age: 62
End: 2022-09-21
Payer: COMMERCIAL

## 2022-09-21 DIAGNOSIS — Z94.4 LIVER REPLACED BY TRANSPLANT: Primary | ICD-10-CM

## 2022-09-21 NOTE — TELEPHONE ENCOUNTER
Patient notified and instructed via [facility]:    Per : Liver transplant labs reviewed and are stable. No changes in his immunosuppression. Please continue to monitor labs per transplant protocol(due on Monday 9/26/22). Thanks.

## 2022-09-21 NOTE — TELEPHONE ENCOUNTER
----- Message from Jay Abrams MD sent at 9/20/2022  6:47 PM CDT -----  Liver transplant labs reviewed and are stable. No changes in his immunosuppression. Please continue to monitor labs per transplant protocol.

## 2022-09-26 ENCOUNTER — LAB VISIT (OUTPATIENT)
Dept: LAB | Facility: HOSPITAL | Age: 62
End: 2022-09-26
Attending: STUDENT IN AN ORGANIZED HEALTH CARE EDUCATION/TRAINING PROGRAM
Payer: COMMERCIAL

## 2022-09-26 DIAGNOSIS — Z94.4 LIVER REPLACED BY TRANSPLANT: ICD-10-CM

## 2022-09-26 LAB
ALBUMIN SERPL-MCNC: 3.6 GM/DL (ref 3.4–4.8)
ALBUMIN/GLOB SERPL: 1.5 RATIO (ref 1.1–2)
ALP SERPL-CCNC: 92 UNIT/L (ref 40–150)
ALT SERPL-CCNC: 10 UNIT/L (ref 0–55)
AST SERPL-CCNC: 10 UNIT/L (ref 5–34)
BASOPHILS # BLD AUTO: 0.04 X10(3)/MCL (ref 0–0.2)
BASOPHILS NFR BLD AUTO: 0.7 %
BILIRUBIN DIRECT+TOT PNL SERPL-MCNC: 0.3 MG/DL
BUN SERPL-MCNC: 21.6 MG/DL (ref 8.4–25.7)
CALCIUM SERPL-MCNC: 8.3 MG/DL (ref 8.8–10)
CHLORIDE SERPL-SCNC: 106 MMOL/L (ref 98–107)
CO2 SERPL-SCNC: 24 MMOL/L (ref 23–31)
CREAT SERPL-MCNC: 1.04 MG/DL (ref 0.73–1.18)
EOSINOPHIL # BLD AUTO: 0.07 X10(3)/MCL (ref 0–0.9)
EOSINOPHIL NFR BLD AUTO: 1.3 %
ERYTHROCYTE [DISTWIDTH] IN BLOOD BY AUTOMATED COUNT: 13.4 % (ref 11.5–17)
GFR SERPLBLD CREATININE-BSD FMLA CKD-EPI: >60 MLS/MIN/1.73/M2
GLOBULIN SER-MCNC: 2.4 GM/DL (ref 2.4–3.5)
GLUCOSE SERPL-MCNC: 134 MG/DL (ref 82–115)
HCT VFR BLD AUTO: 36.6 % (ref 42–52)
HGB BLD-MCNC: 12 GM/DL (ref 14–18)
IMM GRANULOCYTES # BLD AUTO: 0.03 X10(3)/MCL (ref 0–0.04)
IMM GRANULOCYTES NFR BLD AUTO: 0.6 %
LYMPHOCYTES # BLD AUTO: 0.73 X10(3)/MCL (ref 0.6–4.6)
LYMPHOCYTES NFR BLD AUTO: 13.5 %
MCH RBC QN AUTO: 30.2 PG (ref 27–31)
MCHC RBC AUTO-ENTMCNC: 32.8 MG/DL (ref 33–36)
MCV RBC AUTO: 92 FL (ref 80–94)
MONOCYTES # BLD AUTO: 0.35 X10(3)/MCL (ref 0.1–1.3)
MONOCYTES NFR BLD AUTO: 6.5 %
NEUTROPHILS # BLD AUTO: 4.2 X10(3)/MCL (ref 2.1–9.2)
NEUTROPHILS NFR BLD AUTO: 77.4 %
NRBC BLD AUTO-RTO: 0 %
PLATELET # BLD AUTO: 187 X10(3)/MCL (ref 130–400)
PMV BLD AUTO: 10.4 FL (ref 7.4–10.4)
POTASSIUM SERPL-SCNC: 4.8 MMOL/L (ref 3.5–5.1)
PROT SERPL-MCNC: 6 GM/DL (ref 5.8–7.6)
RBC # BLD AUTO: 3.98 X10(6)/MCL (ref 4.7–6.1)
SODIUM SERPL-SCNC: 138 MMOL/L (ref 136–145)
WBC # SPEC AUTO: 5.4 X10(3)/MCL (ref 4.5–11.5)

## 2022-09-26 PROCEDURE — 80053 COMPREHEN METABOLIC PANEL: CPT

## 2022-09-26 PROCEDURE — 85025 COMPLETE CBC W/AUTO DIFF WBC: CPT

## 2022-09-26 PROCEDURE — 36415 COLL VENOUS BLD VENIPUNCTURE: CPT

## 2022-09-26 PROCEDURE — 80197 ASSAY OF TACROLIMUS: CPT

## 2022-09-28 LAB — TACROLIMUS TROUGH BLD-MCNC: 3.8 NG/ML

## 2022-10-10 ENCOUNTER — PATIENT MESSAGE (OUTPATIENT)
Dept: TRANSPLANT | Facility: CLINIC | Age: 62
End: 2022-10-10
Payer: COMMERCIAL

## 2022-10-10 DIAGNOSIS — Z94.4 LIVER REPLACED BY TRANSPLANT: Primary | ICD-10-CM

## 2022-10-10 NOTE — TELEPHONE ENCOUNTER
----- Message from Jay Abrams MD sent at 10/9/2022  8:50 PM CDT -----  Liver transplant labs reviewed and are stable. Please increase tac to 4/4 and repeat labs in 2 weeks.

## 2022-10-10 NOTE — TELEPHONE ENCOUNTER
Patient notified and instructed via [facility]:    Per : Liver transplant labs reviewed and are stable. Please increase tacrolimus dose  to 4mg twice daily, and repeat labs in 2 weeks(due 10/24/22). Thanks..

## 2022-10-11 RX ORDER — TACROLIMUS 1 MG/1
CAPSULE ORAL
Qty: 240 CAPSULE | Refills: 11 | Status: SHIPPED | OUTPATIENT
Start: 2022-10-11 | End: 2023-04-20 | Stop reason: DRUGHIGH

## 2022-10-24 ENCOUNTER — LAB VISIT (OUTPATIENT)
Dept: LAB | Facility: HOSPITAL | Age: 62
End: 2022-10-24
Attending: STUDENT IN AN ORGANIZED HEALTH CARE EDUCATION/TRAINING PROGRAM
Payer: COMMERCIAL

## 2022-10-24 DIAGNOSIS — Z94.4 LIVER REPLACED BY TRANSPLANT: ICD-10-CM

## 2022-10-24 LAB
ALBUMIN SERPL-MCNC: 4.1 GM/DL (ref 3.4–4.8)
ALBUMIN/GLOB SERPL: 1.5 RATIO (ref 1.1–2)
ALP SERPL-CCNC: 93 UNIT/L (ref 40–150)
ALT SERPL-CCNC: 9 UNIT/L (ref 0–55)
AST SERPL-CCNC: 12 UNIT/L (ref 5–34)
BASOPHILS # BLD AUTO: 0.04 X10(3)/MCL (ref 0–0.2)
BASOPHILS NFR BLD AUTO: 0.8 %
BILIRUBIN DIRECT+TOT PNL SERPL-MCNC: 0.4 MG/DL
BUN SERPL-MCNC: 23.6 MG/DL (ref 8.4–25.7)
CALCIUM SERPL-MCNC: 9.4 MG/DL (ref 8.8–10)
CHLORIDE SERPL-SCNC: 108 MMOL/L (ref 98–107)
CO2 SERPL-SCNC: 27 MMOL/L (ref 23–31)
CREAT SERPL-MCNC: 0.82 MG/DL (ref 0.73–1.18)
EOSINOPHIL # BLD AUTO: 0.04 X10(3)/MCL (ref 0–0.9)
EOSINOPHIL NFR BLD AUTO: 0.8 %
ERYTHROCYTE [DISTWIDTH] IN BLOOD BY AUTOMATED COUNT: 13.2 % (ref 11.5–17)
GFR SERPLBLD CREATININE-BSD FMLA CKD-EPI: >60 MLS/MIN/1.73/M2
GLOBULIN SER-MCNC: 2.7 GM/DL (ref 2.4–3.5)
GLUCOSE SERPL-MCNC: 123 MG/DL (ref 82–115)
HCT VFR BLD AUTO: 36.2 % (ref 42–52)
HGB BLD-MCNC: 11.8 GM/DL (ref 14–18)
IMM GRANULOCYTES # BLD AUTO: 0.01 X10(3)/MCL (ref 0–0.04)
IMM GRANULOCYTES NFR BLD AUTO: 0.2 %
LYMPHOCYTES # BLD AUTO: 0.78 X10(3)/MCL (ref 0.6–4.6)
LYMPHOCYTES NFR BLD AUTO: 15.2 %
MCH RBC QN AUTO: 30.3 PG (ref 27–31)
MCHC RBC AUTO-ENTMCNC: 32.6 MG/DL (ref 33–36)
MCV RBC AUTO: 93.1 FL (ref 80–94)
MONOCYTES # BLD AUTO: 0.41 X10(3)/MCL (ref 0.1–1.3)
MONOCYTES NFR BLD AUTO: 8 %
NEUTROPHILS # BLD AUTO: 3.9 X10(3)/MCL (ref 2.1–9.2)
NEUTROPHILS NFR BLD AUTO: 75 %
NRBC BLD AUTO-RTO: 0 %
PLATELET # BLD AUTO: 203 X10(3)/MCL (ref 130–400)
PMV BLD AUTO: 10.6 FL (ref 7.4–10.4)
POTASSIUM SERPL-SCNC: 5.8 MMOL/L (ref 3.5–5.1)
PROT SERPL-MCNC: 6.8 GM/DL (ref 5.8–7.6)
RBC # BLD AUTO: 3.89 X10(6)/MCL (ref 4.7–6.1)
SODIUM SERPL-SCNC: 140 MMOL/L (ref 136–145)
WBC # SPEC AUTO: 5.1 X10(3)/MCL (ref 4.5–11.5)

## 2022-10-24 PROCEDURE — 80197 ASSAY OF TACROLIMUS: CPT

## 2022-10-24 PROCEDURE — 36415 COLL VENOUS BLD VENIPUNCTURE: CPT

## 2022-10-24 PROCEDURE — 85025 COMPLETE CBC W/AUTO DIFF WBC: CPT

## 2022-10-24 PROCEDURE — 80053 COMPREHEN METABOLIC PANEL: CPT

## 2022-10-25 ENCOUNTER — PATIENT MESSAGE (OUTPATIENT)
Dept: TRANSPLANT | Facility: CLINIC | Age: 62
End: 2022-10-25
Payer: COMMERCIAL

## 2022-10-25 ENCOUNTER — TELEPHONE (OUTPATIENT)
Dept: TRANSPLANT | Facility: CLINIC | Age: 62
End: 2022-10-25
Payer: COMMERCIAL

## 2022-10-25 DIAGNOSIS — E87.5 HYPERKALEMIA: Primary | ICD-10-CM

## 2022-10-25 LAB — TACROLIMUS TROUGH BLD-MCNC: 5.9 NG/ML

## 2022-10-25 NOTE — TELEPHONE ENCOUNTER
Patient notified and instructed via [facility] as per guidelines:    A prescription for a medicine called sodium polystyrine (Kayexelate) has been sent to your local pharmacy, you should take it today.  Then repeat a lab for potassium level tomorrow , also avoid foods that are high in potassium (such as oranges/bananas/ tomatoes/ potatoes/ beans/cabbage/ nuts/ chocolate/ sports drinks (such at Gatorade/Powerade, etc);

## 2022-10-28 ENCOUNTER — TELEPHONE (OUTPATIENT)
Dept: TRANSPLANT | Facility: CLINIC | Age: 62
End: 2022-10-28
Payer: COMMERCIAL

## 2022-10-28 ENCOUNTER — PATIENT MESSAGE (OUTPATIENT)
Dept: TRANSPLANT | Facility: CLINIC | Age: 62
End: 2022-10-28
Payer: COMMERCIAL

## 2022-10-28 ENCOUNTER — LAB VISIT (OUTPATIENT)
Dept: LAB | Facility: HOSPITAL | Age: 62
End: 2022-10-28
Attending: STUDENT IN AN ORGANIZED HEALTH CARE EDUCATION/TRAINING PROGRAM
Payer: COMMERCIAL

## 2022-10-28 DIAGNOSIS — E87.5 HYPERKALEMIA: ICD-10-CM

## 2022-10-28 DIAGNOSIS — C22.0 HCC (HEPATOCELLULAR CARCINOMA): ICD-10-CM

## 2022-10-28 DIAGNOSIS — Z94.4 LIVER REPLACED BY TRANSPLANT: Primary | ICD-10-CM

## 2022-10-28 LAB — POTASSIUM SERPL-SCNC: 5 MMOL/L (ref 3.5–5.1)

## 2022-10-28 PROCEDURE — 84132 ASSAY OF SERUM POTASSIUM: CPT

## 2022-10-28 PROCEDURE — 36415 COLL VENOUS BLD VENIPUNCTURE: CPT

## 2022-10-28 NOTE — TELEPHONE ENCOUNTER
----- Message from Jay Abrams MD sent at 10/27/2022  4:51 PM CDT -----  Liver transplant labs reviewed and are stable. No changes in his immunosuppression. Please continue to monitor labs per transplant protocol.    Potassium elevated. Please follow hyperkalemia protocol.

## 2022-10-28 NOTE — TELEPHONE ENCOUNTER
Patient notified and instructed via [facility]:    Your labs have been reviewed by ; results were stable. No medicine changes made. Repeat labs due 11/14/22. Thanks.

## 2022-10-31 ENCOUNTER — TELEPHONE (OUTPATIENT)
Dept: TRANSPLANT | Facility: CLINIC | Age: 62
End: 2022-10-31
Payer: COMMERCIAL

## 2022-10-31 DIAGNOSIS — R19.7 DIARRHEA, UNSPECIFIED TYPE: Primary | ICD-10-CM

## 2022-10-31 NOTE — TELEPHONE ENCOUNTER
Message(see below) received from patient via [facility].  Patient no longer on Cellcept.  Instructed patient to turn in stool sample tomorrow for studies .Will review with MD. Prateek Michaud ,  I have been having diarrhea at least 2 -3 times a week for the past few months , when it comes on me it comes with very little warning and I do not always have the ability to make it to the bathroom. I am hoping there is something that will help me with this situation as I am still working out in the public. It has been extremely difficult to deal with this.   Help !

## 2022-11-11 ENCOUNTER — PATIENT MESSAGE (OUTPATIENT)
Dept: TRANSPLANT | Facility: CLINIC | Age: 62
End: 2022-11-11
Payer: COMMERCIAL

## 2022-11-14 ENCOUNTER — LAB VISIT (OUTPATIENT)
Dept: LAB | Facility: HOSPITAL | Age: 62
End: 2022-11-14
Payer: COMMERCIAL

## 2022-11-14 DIAGNOSIS — C22.0 HCC (HEPATOCELLULAR CARCINOMA): ICD-10-CM

## 2022-11-14 DIAGNOSIS — Z94.4 LIVER REPLACED BY TRANSPLANT: ICD-10-CM

## 2022-11-14 LAB
AFP-TM SERPL-MCNC: <2 NG/ML
ALBUMIN SERPL-MCNC: 3.9 GM/DL (ref 3.4–4.8)
ALBUMIN/GLOB SERPL: 1.4 RATIO (ref 1.1–2)
ALP SERPL-CCNC: 88 UNIT/L (ref 40–150)
ALT SERPL-CCNC: 12 UNIT/L (ref 0–55)
AST SERPL-CCNC: 11 UNIT/L (ref 5–34)
BASOPHILS # BLD AUTO: 0.03 X10(3)/MCL (ref 0–0.2)
BASOPHILS NFR BLD AUTO: 0.6 %
BILIRUBIN DIRECT+TOT PNL SERPL-MCNC: 0.4 MG/DL
BUN SERPL-MCNC: 15.8 MG/DL (ref 8.4–25.7)
CALCIUM SERPL-MCNC: 9.2 MG/DL (ref 8.8–10)
CHLORIDE SERPL-SCNC: 106 MMOL/L (ref 98–107)
CO2 SERPL-SCNC: 28 MMOL/L (ref 23–31)
CREAT SERPL-MCNC: 0.79 MG/DL (ref 0.73–1.18)
EOSINOPHIL # BLD AUTO: 0.05 X10(3)/MCL (ref 0–0.9)
EOSINOPHIL NFR BLD AUTO: 1 %
ERYTHROCYTE [DISTWIDTH] IN BLOOD BY AUTOMATED COUNT: 13 % (ref 11.5–17)
GFR SERPLBLD CREATININE-BSD FMLA CKD-EPI: >60 MLS/MIN/1.73/M2
GLOBULIN SER-MCNC: 2.8 GM/DL (ref 2.4–3.5)
GLUCOSE SERPL-MCNC: 102 MG/DL (ref 82–115)
HCT VFR BLD AUTO: 35.7 % (ref 42–52)
HGB BLD-MCNC: 11.3 GM/DL (ref 14–18)
IMM GRANULOCYTES # BLD AUTO: 0.01 X10(3)/MCL (ref 0–0.04)
IMM GRANULOCYTES NFR BLD AUTO: 0.2 %
LYMPHOCYTES # BLD AUTO: 0.78 X10(3)/MCL (ref 0.6–4.6)
LYMPHOCYTES NFR BLD AUTO: 15.8 %
MCH RBC QN AUTO: 30.1 PG (ref 27–31)
MCHC RBC AUTO-ENTMCNC: 31.7 MG/DL (ref 33–36)
MCV RBC AUTO: 94.9 FL (ref 80–94)
MONOCYTES # BLD AUTO: 0.39 X10(3)/MCL (ref 0.1–1.3)
MONOCYTES NFR BLD AUTO: 7.9 %
NEUTROPHILS # BLD AUTO: 3.7 X10(3)/MCL (ref 2.1–9.2)
NEUTROPHILS NFR BLD AUTO: 74.5 %
NRBC BLD AUTO-RTO: 0 %
PLATELET # BLD AUTO: 190 X10(3)/MCL (ref 130–400)
PMV BLD AUTO: 10.6 FL (ref 7.4–10.4)
POTASSIUM SERPL-SCNC: 5.3 MMOL/L (ref 3.5–5.1)
PROT SERPL-MCNC: 6.7 GM/DL (ref 5.8–7.6)
RBC # BLD AUTO: 3.76 X10(6)/MCL (ref 4.7–6.1)
SODIUM SERPL-SCNC: 138 MMOL/L (ref 136–145)
WBC # SPEC AUTO: 5 X10(3)/MCL (ref 4.5–11.5)

## 2022-11-14 PROCEDURE — 80197 ASSAY OF TACROLIMUS: CPT

## 2022-11-14 PROCEDURE — 82105 ALPHA-FETOPROTEIN SERUM: CPT

## 2022-11-14 PROCEDURE — 36415 COLL VENOUS BLD VENIPUNCTURE: CPT

## 2022-11-14 PROCEDURE — 80053 COMPREHEN METABOLIC PANEL: CPT

## 2022-11-14 PROCEDURE — 85025 COMPLETE CBC W/AUTO DIFF WBC: CPT

## 2022-11-15 LAB — TACROLIMUS TROUGH BLD-MCNC: 16.4 NG/ML

## 2022-11-16 ENCOUNTER — PATIENT MESSAGE (OUTPATIENT)
Dept: TRANSPLANT | Facility: CLINIC | Age: 62
End: 2022-11-16
Payer: COMMERCIAL

## 2022-11-16 ENCOUNTER — TELEPHONE (OUTPATIENT)
Dept: TRANSPLANT | Facility: CLINIC | Age: 62
End: 2022-11-16
Payer: COMMERCIAL

## 2022-11-16 DIAGNOSIS — Z94.4 LIVER REPLACED BY TRANSPLANT: Primary | ICD-10-CM

## 2022-11-16 NOTE — TELEPHONE ENCOUNTER
Portal message sent, repeat lab 11/21/22----- Message from Jay Abrams MD sent at 11/16/2022  2:03 PM CST -----  Thanks. Can he please repeat labs next week?  ----- Message -----  From: Ashlie Kelley RN  Sent: 11/16/2022   1:54 PM CST  To: Jay Abrams MD    He did take his med  ----- Message -----  From: Jay Abrams MD  Sent: 11/15/2022   9:29 PM CST  To: [facility]-Liver Transplant Clinical    Liver transplant labs reviewed and are stable. Tac level very high. Did he take prior to labs?

## 2022-11-16 NOTE — TELEPHONE ENCOUNTER
Returned call, he did take his medication before labs----- Message from Yessenia Bob sent at 11/16/2022  1:19 PM CST -----  Regarding: speak with office  Contact: cata Rothman is returning call to vanessa the coordinator..[phone] (home) [phone] (work)

## 2022-11-21 ENCOUNTER — LAB VISIT (OUTPATIENT)
Dept: LAB | Facility: HOSPITAL | Age: 62
End: 2022-11-21
Attending: STUDENT IN AN ORGANIZED HEALTH CARE EDUCATION/TRAINING PROGRAM
Payer: COMMERCIAL

## 2022-11-21 DIAGNOSIS — Z94.4 LIVER REPLACED BY TRANSPLANT: ICD-10-CM

## 2022-11-21 LAB
ALBUMIN SERPL-MCNC: 3.8 GM/DL (ref 3.4–4.8)
ALBUMIN/GLOB SERPL: 1.4 RATIO (ref 1.1–2)
ALP SERPL-CCNC: 87 UNIT/L (ref 40–150)
ALT SERPL-CCNC: 11 UNIT/L (ref 0–55)
AST SERPL-CCNC: 10 UNIT/L (ref 5–34)
BASOPHILS # BLD AUTO: 0.04 X10(3)/MCL (ref 0–0.2)
BASOPHILS NFR BLD AUTO: 0.7 %
BILIRUBIN DIRECT+TOT PNL SERPL-MCNC: 0.4 MG/DL
BUN SERPL-MCNC: 23.4 MG/DL (ref 8.4–25.7)
CALCIUM SERPL-MCNC: 9.3 MG/DL (ref 8.8–10)
CHLORIDE SERPL-SCNC: 110 MMOL/L (ref 98–107)
CO2 SERPL-SCNC: 25 MMOL/L (ref 23–31)
CREAT SERPL-MCNC: 0.8 MG/DL (ref 0.73–1.18)
EOSINOPHIL # BLD AUTO: 0.07 X10(3)/MCL (ref 0–0.9)
EOSINOPHIL NFR BLD AUTO: 1.3 %
ERYTHROCYTE [DISTWIDTH] IN BLOOD BY AUTOMATED COUNT: 13.1 % (ref 11.5–17)
GFR SERPLBLD CREATININE-BSD FMLA CKD-EPI: >60 MLS/MIN/1.73/M2
GLOBULIN SER-MCNC: 2.7 GM/DL (ref 2.4–3.5)
GLUCOSE SERPL-MCNC: 119 MG/DL (ref 82–115)
HCT VFR BLD AUTO: 35.9 % (ref 42–52)
HGB BLD-MCNC: 11.3 GM/DL (ref 14–18)
IMM GRANULOCYTES # BLD AUTO: 0.01 X10(3)/MCL (ref 0–0.04)
IMM GRANULOCYTES NFR BLD AUTO: 0.2 %
LYMPHOCYTES # BLD AUTO: 0.77 X10(3)/MCL (ref 0.6–4.6)
LYMPHOCYTES NFR BLD AUTO: 14.3 %
MCH RBC QN AUTO: 29.9 PG (ref 27–31)
MCHC RBC AUTO-ENTMCNC: 31.5 MG/DL (ref 33–36)
MCV RBC AUTO: 95 FL (ref 80–94)
MONOCYTES # BLD AUTO: 0.38 X10(3)/MCL (ref 0.1–1.3)
MONOCYTES NFR BLD AUTO: 7.1 %
NEUTROPHILS # BLD AUTO: 4.1 X10(3)/MCL (ref 2.1–9.2)
NEUTROPHILS NFR BLD AUTO: 76.4 %
NRBC BLD AUTO-RTO: 0 %
PLATELET # BLD AUTO: 186 X10(3)/MCL (ref 130–400)
PMV BLD AUTO: 10.7 FL (ref 7.4–10.4)
POTASSIUM SERPL-SCNC: 5.4 MMOL/L (ref 3.5–5.1)
PROT SERPL-MCNC: 6.5 GM/DL (ref 5.8–7.6)
RBC # BLD AUTO: 3.78 X10(6)/MCL (ref 4.7–6.1)
SODIUM SERPL-SCNC: 141 MMOL/L (ref 136–145)
WBC # SPEC AUTO: 5.4 X10(3)/MCL (ref 4.5–11.5)

## 2022-11-21 PROCEDURE — 80197 ASSAY OF TACROLIMUS: CPT

## 2022-11-21 PROCEDURE — 85025 COMPLETE CBC W/AUTO DIFF WBC: CPT

## 2022-11-21 PROCEDURE — 36415 COLL VENOUS BLD VENIPUNCTURE: CPT

## 2022-11-21 PROCEDURE — 80053 COMPREHEN METABOLIC PANEL: CPT

## 2022-11-22 LAB — TACROLIMUS TROUGH BLD-MCNC: 5.5 NG/ML

## 2022-11-28 ENCOUNTER — PATIENT MESSAGE (OUTPATIENT)
Dept: TRANSPLANT | Facility: CLINIC | Age: 62
End: 2022-11-28
Payer: COMMERCIAL

## 2022-11-28 ENCOUNTER — TELEPHONE (OUTPATIENT)
Dept: TRANSPLANT | Facility: CLINIC | Age: 62
End: 2022-11-28
Payer: COMMERCIAL

## 2022-11-28 DIAGNOSIS — C22.0 HCC (HEPATOCELLULAR CARCINOMA): Primary | ICD-10-CM

## 2022-11-28 DIAGNOSIS — Z94.4 LIVER REPLACED BY TRANSPLANT: ICD-10-CM

## 2022-11-28 DIAGNOSIS — Z91.89 AT RISK FOR BONE DISORDER: ICD-10-CM

## 2022-11-28 DIAGNOSIS — Z94.4 LIVER REPLACED BY TRANSPLANT: Primary | ICD-10-CM

## 2022-11-28 RX ORDER — CLOPIDOGREL BISULFATE 75 MG/1
75 TABLET ORAL DAILY
Qty: 30 TABLET | Refills: 6 | Status: CANCELLED | OUTPATIENT
Start: 2022-11-28 | End: 2023-11-28

## 2022-11-28 NOTE — TELEPHONE ENCOUNTER
Patient notified and instructed via [facility]:    Your recent labs have been reviewed by ; results stable. No medicine changes made. Repeat labs due 12/19/22. Thanks.

## 2022-11-28 NOTE — TELEPHONE ENCOUNTER
----- Message from Jay Abrams MD sent at 11/27/2022  8:54 PM CST -----  Liver transplant labs reviewed and are stable. No changes in his immunosuppression. Please continue to monitor labs per transplant protocol.

## 2022-11-30 RX ORDER — CLOPIDOGREL BISULFATE 75 MG/1
75 TABLET ORAL DAILY
Qty: 30 TABLET | Refills: 6 | Status: CANCELLED | OUTPATIENT
Start: 2022-11-28 | End: 2023-11-28

## 2022-12-08 ENCOUNTER — TELEPHONE (OUTPATIENT)
Dept: TRANSPLANT | Facility: CLINIC | Age: 62
End: 2022-12-08
Payer: COMMERCIAL

## 2022-12-08 NOTE — TELEPHONE ENCOUNTER
----- Message from Marlin Avila MA sent at 12/8/2022  1:35 PM CST -----  Regarding: FW: Inquiry    ----- Message -----  From: Nancy Guevara MA  Sent: 12/8/2022  10:15 AM CST  To: [facility] Staff  Subject: Inquiry                                          [place]  want to know does the doctor want oral contrast only or IV and oral? Please call and advise.    [place] [phone] opt [phone]

## 2022-12-09 ENCOUNTER — HOSPITAL ENCOUNTER (OUTPATIENT)
Dept: RADIOLOGY | Facility: HOSPITAL | Age: 62
Discharge: HOME | End: 2022-12-09
Attending: STUDENT IN AN ORGANIZED HEALTH CARE EDUCATION/TRAINING PROGRAM
Payer: COMMERCIAL

## 2022-12-09 DIAGNOSIS — C22.0 HCC (HEPATOCELLULAR CARCINOMA): ICD-10-CM

## 2022-12-09 PROCEDURE — 74160 CT ABDOMEN W/CONTRAST: CPT | Mod: TC

## 2022-12-09 PROCEDURE — 25500020 PHARM REV CODE 255: Performed by: STUDENT IN AN ORGANIZED HEALTH CARE EDUCATION/TRAINING PROGRAM

## 2022-12-09 RX ADMIN — IOPAMIDOL 100 ML: 755 INJECTION, SOLUTION INTRAVENOUS at 03:12

## 2022-12-12 ENCOUNTER — TELEPHONE (OUTPATIENT)
Dept: TRANSPLANT | Facility: CLINIC | Age: 62
End: 2022-12-12
Payer: COMMERCIAL

## 2022-12-12 ENCOUNTER — PATIENT MESSAGE (OUTPATIENT)
Dept: TRANSPLANT | Facility: CLINIC | Age: 62
End: 2022-12-12
Payer: COMMERCIAL

## 2022-12-12 RX ORDER — CLOPIDOGREL BISULFATE 75 MG/1
75 TABLET ORAL DAILY
Qty: 30 TABLET | Refills: 11 | Status: CANCELLED | OUTPATIENT
Start: 2022-12-12 | End: 2023-12-12

## 2022-12-12 NOTE — TELEPHONE ENCOUNTER
Returned call, he is asking how he can get his plavix refilled.  Dr. Sin Faria prescribed post stent placement, but has been unable to get it refilled.  He has been out now for 2 weeks.  Explained to patient I will see how long he needs to be on plavix and if a refill is warranted, we will send over to pharmacy----- Message from Cira Wilkes sent at 12/12/2022 11:50 AM CST -----  Regarding: Medications - 2nd attempt  Contact: patient  Patient called requesting to speak to nurse regarding medication  Medication unspecified    Patient can be contacted @# [phone]

## 2022-12-14 ENCOUNTER — PATIENT MESSAGE (OUTPATIENT)
Dept: TRANSPLANT | Facility: CLINIC | Age: 62
End: 2022-12-14
Payer: COMMERCIAL

## 2022-12-14 ENCOUNTER — TELEPHONE (OUTPATIENT)
Dept: TRANSPLANT | Facility: CLINIC | Age: 62
End: 2022-12-14
Payer: COMMERCIAL

## 2022-12-14 NOTE — TELEPHONE ENCOUNTER
----- Message from Jay Abrams MD sent at 12/12/2022 11:43 AM CST -----  Reviewed. No evidence of recurrent liver cancer.

## 2022-12-14 NOTE — TELEPHONE ENCOUNTER
Patient notified via [facility]:    Per : CT scan Reviewed. No evidence of recurrent liver cancer.

## 2022-12-19 ENCOUNTER — LAB VISIT (OUTPATIENT)
Dept: LAB | Facility: HOSPITAL | Age: 62
End: 2022-12-19
Attending: STUDENT IN AN ORGANIZED HEALTH CARE EDUCATION/TRAINING PROGRAM
Payer: COMMERCIAL

## 2022-12-19 DIAGNOSIS — C22.0 HCC (HEPATOCELLULAR CARCINOMA): ICD-10-CM

## 2022-12-19 DIAGNOSIS — Z94.4 LIVER REPLACED BY TRANSPLANT: ICD-10-CM

## 2022-12-19 LAB
AFP-TM SERPL-MCNC: <2 NG/ML
ALBUMIN SERPL-MCNC: 3.9 G/DL (ref 3.4–4.8)
ALBUMIN/GLOB SERPL: 1.4 RATIO (ref 1.1–2)
ALP SERPL-CCNC: 77 UNIT/L (ref 40–150)
ALT SERPL-CCNC: 9 UNIT/L (ref 0–55)
AST SERPL-CCNC: 12 UNIT/L (ref 5–34)
BASOPHILS # BLD AUTO: 0.04 X10(3)/MCL (ref 0–0.2)
BASOPHILS NFR BLD AUTO: 0.7 %
BILIRUBIN DIRECT+TOT PNL SERPL-MCNC: 0.7 MG/DL
BUN SERPL-MCNC: 17 MG/DL (ref 8.4–25.7)
CALCIUM SERPL-MCNC: 9.3 MG/DL (ref 8.8–10)
CHLORIDE SERPL-SCNC: 107 MMOL/L (ref 98–107)
CO2 SERPL-SCNC: 29 MMOL/L (ref 23–31)
CREAT SERPL-MCNC: 0.82 MG/DL (ref 0.73–1.18)
EOSINOPHIL # BLD AUTO: 0.06 X10(3)/MCL (ref 0–0.9)
EOSINOPHIL NFR BLD AUTO: 1.1 %
ERYTHROCYTE [DISTWIDTH] IN BLOOD BY AUTOMATED COUNT: 13 % (ref 11.6–14.4)
GFR SERPLBLD CREATININE-BSD FMLA CKD-EPI: >60 MLS/MIN/1.73/M2
GLOBULIN SER-MCNC: 2.8 GM/DL (ref 2.4–3.5)
GLUCOSE SERPL-MCNC: 117 MG/DL (ref 82–115)
HCT VFR BLD AUTO: 37.1 % (ref 42–52)
HGB BLD-MCNC: 11.9 GM/DL (ref 14–18)
IMM GRANULOCYTES # BLD AUTO: 0.01 X10(3)/MCL (ref 0–0.04)
IMM GRANULOCYTES NFR BLD AUTO: 0.2 %
LYMPHOCYTES # BLD AUTO: 0.73 X10(3)/MCL (ref 0.6–4.6)
LYMPHOCYTES NFR BLD AUTO: 13 %
MCH RBC QN AUTO: 30.1 PG
MCHC RBC AUTO-ENTMCNC: 32.1 MG/DL (ref 33–36)
MCV RBC AUTO: 93.7 FL (ref 80–94)
MONOCYTES # BLD AUTO: 0.42 X10(3)/MCL (ref 0.1–1.3)
MONOCYTES NFR BLD AUTO: 7.5 %
NEUTROPHILS # BLD AUTO: 4.37 X10(3)/MCL (ref 2.1–9.2)
NEUTROPHILS NFR BLD AUTO: 77.5 %
NRBC BLD AUTO-RTO: 0 % (ref 0–1)
PLATELET # BLD AUTO: 190 X10(3)/MCL (ref 140–371)
PMV BLD AUTO: 10.1 FL (ref 9.4–12.4)
POTASSIUM SERPL-SCNC: 5.5 MMOL/L (ref 3.5–5.1)
PROT SERPL-MCNC: 6.7 GM/DL (ref 5.8–7.6)
RBC # BLD AUTO: 3.96 X10(6)/MCL (ref 4.7–6.1)
SODIUM SERPL-SCNC: 139 MMOL/L (ref 136–145)
WBC # SPEC AUTO: 5.6 X10(3)/MCL (ref 4.5–11.5)

## 2022-12-19 PROCEDURE — 82105 ALPHA-FETOPROTEIN SERUM: CPT

## 2022-12-19 PROCEDURE — 80197 ASSAY OF TACROLIMUS: CPT

## 2022-12-19 PROCEDURE — 80053 COMPREHEN METABOLIC PANEL: CPT

## 2022-12-19 PROCEDURE — 36415 COLL VENOUS BLD VENIPUNCTURE: CPT

## 2022-12-19 PROCEDURE — 85025 COMPLETE CBC W/AUTO DIFF WBC: CPT

## 2022-12-20 LAB — TACROLIMUS TROUGH BLD-MCNC: 5.7 NG/ML

## 2022-12-22 ENCOUNTER — PATIENT MESSAGE (OUTPATIENT)
Dept: TRANSPLANT | Facility: CLINIC | Age: 62
End: 2022-12-22
Payer: COMMERCIAL

## 2022-12-22 ENCOUNTER — TELEPHONE (OUTPATIENT)
Dept: TRANSPLANT | Facility: CLINIC | Age: 62
End: 2022-12-22
Payer: COMMERCIAL

## 2022-12-22 NOTE — TELEPHONE ENCOUNTER
Patient notified and instructed via [facility]:    Your labs have been reviewed by :  Liver transplant labs reviewed and are stable. No changes in his immunosuppression. Please continue to monitor labs per transplant protocol (repeat labs on 1/23/2023 as scheduled).     Potassium elevated. You will need to avoid foods that are high in potassium (such as : bananas/ oranges/ potatoes/ tomatoes/ cabbage/ nuts/ chocolate/ beans / sports drinks (such as gatorade/powerade, etc).

## 2022-12-22 NOTE — TELEPHONE ENCOUNTER
----- Message from Jay Abrams MD sent at 12/21/2022  8:46 PM CST -----  Liver transplant labs reviewed and are stable. No changes in his immunosuppression. Please continue to monitor labs per transplant protocol.    Potassium elevated. Please follow hyperkalemia protocol.

## 2022-12-29 RX ORDER — CARVEDILOL 12.5 MG/1
12.5 TABLET ORAL 2 TIMES DAILY
Qty: 60 TABLET | Refills: 11 | Status: CANCELLED | OUTPATIENT
Start: 2022-12-29 | End: 2023-12-29

## 2022-12-30 RX ORDER — CARVEDILOL 12.5 MG/1
12.5 TABLET ORAL 2 TIMES DAILY
Qty: 60 TABLET | Refills: 11 | Status: CANCELLED | OUTPATIENT
Start: 2022-12-29 | End: 2023-12-29

## 2023-01-20 ENCOUNTER — PATIENT MESSAGE (OUTPATIENT)
Dept: RESEARCH | Facility: HOSPITAL | Age: 63
End: 2023-01-20
Payer: COMMERCIAL

## 2023-01-20 DIAGNOSIS — C22.0 HCC (HEPATOCELLULAR CARCINOMA): ICD-10-CM

## 2023-01-20 DIAGNOSIS — Z00.6 RESEARCH STUDY PATIENT: Primary | ICD-10-CM

## 2023-01-23 ENCOUNTER — OFFICE VISIT (OUTPATIENT)
Dept: TRANSPLANT | Facility: CLINIC | Age: 63
End: 2023-01-23
Payer: COMMERCIAL

## 2023-01-23 ENCOUNTER — HOSPITAL ENCOUNTER (OUTPATIENT)
Dept: RADIOLOGY | Facility: HOSPITAL | Age: 63
Discharge: HOME | End: 2023-01-23
Attending: STUDENT IN AN ORGANIZED HEALTH CARE EDUCATION/TRAINING PROGRAM
Payer: COMMERCIAL

## 2023-01-23 ENCOUNTER — RESEARCH ENCOUNTER (OUTPATIENT)
Dept: RESEARCH | Facility: HOSPITAL | Age: 63
End: 2023-01-23
Payer: COMMERCIAL

## 2023-01-23 ENCOUNTER — HOSPITAL ENCOUNTER (OUTPATIENT)
Dept: RADIOLOGY | Facility: CLINIC | Age: 63
Discharge: HOME | End: 2023-01-23
Attending: STUDENT IN AN ORGANIZED HEALTH CARE EDUCATION/TRAINING PROGRAM
Payer: COMMERCIAL

## 2023-01-23 DIAGNOSIS — I77.1 STENOSIS OF HEPATIC ARTERY OF TRANSPLANTED LIVER: ICD-10-CM

## 2023-01-23 DIAGNOSIS — T86.49 STENOSIS OF HEPATIC ARTERY OF TRANSPLANTED LIVER: ICD-10-CM

## 2023-01-23 DIAGNOSIS — Z94.4 LIVER TRANSPLANTED: Primary | ICD-10-CM

## 2023-01-23 DIAGNOSIS — Z79.60 LONG-TERM USE OF IMMUNOSUPPRESSANT MEDICATION: ICD-10-CM

## 2023-01-23 DIAGNOSIS — Z94.4 LIVER REPLACED BY TRANSPLANT: ICD-10-CM

## 2023-01-23 DIAGNOSIS — Z85.05 HISTORY OF HEPATOCELLULAR CARCINOMA: ICD-10-CM

## 2023-01-23 DIAGNOSIS — Z86.19 HISTORY OF HEPATITIS C: ICD-10-CM

## 2023-01-23 DIAGNOSIS — Z91.89 AT RISK FOR BONE DISORDER: ICD-10-CM

## 2023-01-23 PROCEDURE — 99999 PR PBB SHADOW E&M-EST. PATIENT-LVL II: ICD-10-PCS | Mod: PBBFAC,,, | Performed by: STUDENT IN AN ORGANIZED HEALTH CARE EDUCATION/TRAINING PROGRAM

## 2023-01-23 PROCEDURE — 76705 US DOPPLER LIVER TRANSPLANT POST (XPD): ICD-10-PCS | Mod: 26,XS,, | Performed by: RADIOLOGY

## 2023-01-23 PROCEDURE — 1160F PR REVIEW ALL MEDS BY PRESCRIBER/CLIN PHARMACIST DOCUMENTED: ICD-10-PCS | Mod: CPTII,S$GLB,, | Performed by: STUDENT IN AN ORGANIZED HEALTH CARE EDUCATION/TRAINING PROGRAM

## 2023-01-23 PROCEDURE — 1160F RVW MEDS BY RX/DR IN RCRD: CPT | Mod: CPTII,S$GLB,, | Performed by: STUDENT IN AN ORGANIZED HEALTH CARE EDUCATION/TRAINING PROGRAM

## 2023-01-23 PROCEDURE — 1159F PR MEDICATION LIST DOCUMENTED IN MEDICAL RECORD: ICD-10-PCS | Mod: CPTII,S$GLB,, | Performed by: STUDENT IN AN ORGANIZED HEALTH CARE EDUCATION/TRAINING PROGRAM

## 2023-01-23 PROCEDURE — 77080 DEXA BONE DENSITY SPINE HIP: ICD-10-PCS | Mod: 26,,, | Performed by: INTERNAL MEDICINE

## 2023-01-23 PROCEDURE — 93976 VASCULAR STUDY: CPT | Mod: TC

## 2023-01-23 PROCEDURE — 1159F MED LIST DOCD IN RCRD: CPT | Mod: CPTII,S$GLB,, | Performed by: STUDENT IN AN ORGANIZED HEALTH CARE EDUCATION/TRAINING PROGRAM

## 2023-01-23 PROCEDURE — 99214 PR OFFICE/OUTPT VISIT, EST, LEVL IV, 30-39 MIN: ICD-10-PCS | Mod: S$GLB,,, | Performed by: STUDENT IN AN ORGANIZED HEALTH CARE EDUCATION/TRAINING PROGRAM

## 2023-01-23 PROCEDURE — 93976 VASCULAR STUDY: CPT | Mod: 26,,, | Performed by: RADIOLOGY

## 2023-01-23 PROCEDURE — 99214 OFFICE O/P EST MOD 30 MIN: CPT | Mod: S$GLB,,, | Performed by: STUDENT IN AN ORGANIZED HEALTH CARE EDUCATION/TRAINING PROGRAM

## 2023-01-23 PROCEDURE — 99999 PR PBB SHADOW E&M-EST. PATIENT-LVL II: CPT | Mod: PBBFAC,,, | Performed by: STUDENT IN AN ORGANIZED HEALTH CARE EDUCATION/TRAINING PROGRAM

## 2023-01-23 PROCEDURE — 93976 US DOPPLER LIVER TRANSPLANT POST (XPD): ICD-10-PCS | Mod: 26,,, | Performed by: RADIOLOGY

## 2023-01-23 PROCEDURE — 77080 DXA BONE DENSITY AXIAL: CPT | Mod: 26,,, | Performed by: INTERNAL MEDICINE

## 2023-01-23 PROCEDURE — 77080 DXA BONE DENSITY AXIAL: CPT | Mod: TC

## 2023-01-23 PROCEDURE — 76705 ECHO EXAM OF ABDOMEN: CPT | Mod: 26,XS,, | Performed by: RADIOLOGY

## 2023-01-23 NOTE — LETTER
January 29, 2023        Nitin Farmer  [address] [facility] [address]  [address]  [facility] [address]  Phone: [phone]  Fax: [phone]             [facility]st [facility]  [address]  [facility] [address]  Phone: [phone]   Patient: Eder Kong   MR Number: [id number]   YOB: 1960   Date of Visit: 1/23/2023       Dear Dr. Nitin Farmer    Thank you for referring Eder Kong to me for evaluation. Attached you will find relevant portions of my assessment and plan of care.    If you have questions, please do not hesitate to call me. I look forward to following Eder Kong along with you.    Sincerely,    Jay Abrams MD    Enclosure    If you would like to receive this communication electronically, please contact [email or web address] or [phone] to request [employer] access.    [employer] is a tool which provides read-only access to select patient information with whom you have a relationship. Its easy to use and provides real time access to review your patients record including encounter summaries, notes, results, and demographic information.    If you feel you have received this communication in error or would no longer like to receive these types of communications, please e-mail [email or web address]

## 2023-01-23 NOTE — PROGRESS NOTES
Transplant Hepatology  Liver Transplant Recipient Follow Up    PCP: Alexsandra Sandoval MD    Transplant History  Transplant Date: 1/23/2022  UNOS Native Liver Dx: Primary Liver Malignancy: Hepatoma (HCC) and Cirrhosis    ORGAN: LIVER  Whole or Partial: whole liver  Donor Type: donation after circulatory death   [facility] High Risk: yes  Donor CMV Status: Negative  Donor HCV Status: Negative  Donor HBcAb: Negative  Donor HBV SHIVANI: Negative  Donor HCV SHIVANI: Negative  Biliary Anastomosis: end to end  Arterial Anatomy: standard  IVC reconstruction: end to end ivc  Portal vein status: patent    He has had the following complications since transplant: hepatic artery stenosis s/p angioplasty and stenting 05/2022 with IR    Chief complaint: follow up, history of OLT    HPI:  Eder Kong is a 62 y.o. male with history of OLT in 01/2022 for hepatitis C related cirrhosis and HCC who presents for scheduled follow up. He reports continued intermittent diarrhea. He is otherwise without complaints today. No recent hospitalizations or ED visits. He reports compliance with Prograf. He denies recent fever, chills, nausea, vomiting, headache, tremors.    Past Medical History:   Diagnosis Date    Anemia     Arthritis     At risk for opportunistic infections 1/23/2022    Diabetes     Dizziness     Dyslipidemia     General anesthetics causing adverse effect in therapeutic use     Hep C w/o coma, chronic     failed 2 rounds of interferon-based medication. no protease inhibitorrs used    Hypertension     not on medication    Kidney stone     Liver cancer     Liver transplant candidate        Past Surgical History:   Procedure Laterality Date    AORTIC VALVE REPLACEMENT  2018    due to aortic regurgitation    CARDIAC SURGERY      CATHETERIZATION OF BOTH LEFT AND RIGHT HEART N/A 7/8/2021    Procedure: CATHETERIZATION, HEART, BOTH LEFT AND RIGHT;  Surgeon: Eder Beltran MD;  Location: [facility] CATH LAB;  Service: Cardiology;  Laterality: N/A;     "COLONOSCOPY W/ POLYPECTOMY          ESOPHAGOGASTRODUODENOSCOPY      x2 with cautery in ,     LIVER TRANSPLANT N/A 2022    Procedure: TRANSPLANT, LIVER;  Surgeon: Jesús Austin MD;  Location: [facility] OR [address] [place] [address];  Service: Transplant;  Laterality: N/A;    mastoid bone      right ear       Family History   Problem Relation Age of Onset    Diabetes Sister     Diabetes Brother        Social History     Tobacco Use    Smoking status: Former     Packs/day: 1.00     Years: 25.00     Pack years: 25.00     Types: Cigarettes     Quit date: 1999     Years since quittin.6    Smokeless tobacco: Never   Substance Use Topics    Alcohol use: No     Comment: quit     Drug use: No       Current Outpatient Medications   Medication Sig Dispense Refill    aspirin (ECOTRIN) 81 MG EC tablet Take 1 tablet (81 mg total) by mouth once daily. 30 tablet 5    BD ULTRA-FINE MINI PEN NEEDLE 31 gauge x 3/16" Ndle USE AS DIRECTED EVERY DAY      calcium carbonate-vitamin D3 600 mg-20 mcg (800 unit) Tab Take 1 tablet by mouth once daily. 30 tablet 5    carvediloL (COREG) 12.5 MG tablet Take 1 tablet (12.5 mg total) by mouth 2 (two) times daily. 60 tablet 11    clopidogreL (PLAVIX) 75 mg tablet take 1 tablet (75 mg) by oral route once daily 90 tablet 0    CONTOUR NEXT TEST STRIPS Strp USE TO TEST BLOOD GLUCOSE TWICE DAILY      EScitalopram oxalate (LEXAPRO) 5 MG Tab Take 1 tablet by mouth once daily 90 tablet 1    famotidine (PEPCID) 20 MG tablet Take 1 tablet (20 mg total) by mouth once daily. Stop 22 30 tablet 0    OZEMPIC 1 mg/dose (4 mg/3 mL) Inject 1 mg into the skin once a week.      pen needle, diabetic 31 gauge x 5/16" Ndle Use as directed 100 each 0    tacrolimus (PROGRAF) 1 MG Cap Take 4 capsules (4 mg total) by mouth every morning AND 4 capsules (4 mg total) every evening. 240 capsule 11    TOUJEO MAX U-300 SOLOSTAR 300 unit/mL (3 mL) insulin pen Inject 10 Units into the skin once daily. Check BG before " meals and at bedtime. If fasting BG is > 120 and no more hypoglycemic event, then start insulin Toujeo 10 units daily. 3 pen 11     No current facility-administered medications for this visit.       Review of patient's allergies indicates:  No Known Allergies    Review of Systems   Constitutional:  Negative for fever and weight loss.   Gastrointestinal:  Positive for diarrhea. Negative for abdominal pain, blood in stool, constipation, heartburn, melena, nausea and vomiting.   Neurological:  Negative for tremors and headaches.     There were no vitals filed for this visit.    Physical Exam  Constitutional:       General: He is not in acute distress.     Appearance: He is not ill-appearing.   HENT:      Head: Normocephalic and atraumatic.   Eyes:      General: No scleral icterus.     Extraocular Movements: Extraocular movements intact.   Pulmonary:      Effort: No respiratory distress.   Skin:     Coloration: Skin is not jaundiced.   Neurological:      Mental Status: He is alert.       LABS:  Lab Results   Component Value Date    WBC 5.06 01/23/2023    HGB 11.4 (L) 01/23/2023    HCT 36.4 (L) 01/23/2023    MCV 95 01/23/2023     01/23/2023       Lab Results   Component Value Date     01/23/2023    K 5.1 01/23/2023     01/23/2023    CO2 24 01/23/2023    BUN 23 01/23/2023    CREATININE 0.8 01/23/2023    CALCIUM 9.8 01/23/2023    ANIONGAP 8 01/23/2023    ESTGFRAFRICA >60.0 05/03/2022    EGFRNONAA >60 07/18/2022       Lab Results   Component Value Date    ALT 9 (L) 01/23/2023    AST 10 01/23/2023    GGT 50 06/17/2021    ALKPHOS 84 01/23/2023    BILITOT 0.5 01/23/2023       Lab Results   Component Value Date    TACROLIMUS 8.5 08/19/2022       Assessment:  62 y.o. male with history of OLT in 01/2022 for hepatitis C related cirrhosis and HCC who presents for scheduled follow up.    1. Liver transplanted    2. Long-term use of immunosuppressant medication    3. History of hepatocellular carcinoma    4.  History of hepatitis C    5. 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 Continue imaging surveillance per protocol.    Hepatic artery stenosis: He is s/p angioplasty and stenting 05/2022 with IR    History of HCV: He has completed treatment with SVR.    Kidney function   - Creatinine 0.8  - Avoid NSAIDs as able and maintain adequate hydration    Health Maintenance/Screening:  - Recommend age appropriate cancer screening  - Skin cancer: Recommend use of sunscreen SPF 30 or higher, hat and sunglasses while outside, dermatologist visit annually or sooner if any concerning lesions.  - Osteoporosis: Recommend bone density testing every 2-3 years if previously normal or annually if previously abnormal.    Return to clinic in 6 months.    UNOS Patient Status  Functional Status: 100% - Normal, no complaints, no evidence of disease  Physical Capacity: No Limitations    Jay Abrams MD  Staff Physician  Hepatology and Liver Transplant  [facility]  [facility]

## 2023-01-23 NOTE — PROGRESS NOTES
RESEARCH STUDY FOLLOW-UP SPECIMEN COLLECTION ENCOUNTER  ORGAN TRANSPLANT  [facility] [address]    Study Title: Role of Tumor-Induced Immune Tolerance in the Patient Response to Locoregional Therapy: Implications in Assessment Risk of Hepatocellular Carcinoma Recurrence Following Liver Transplantation    IRB #: [id number]    IRB Approval Date: 6/8/2016    : Darrin Leyva MD  Sub-investigator: Pranav Nieto, PhD    Patient Number: [id number]    In accordance with the study protocol, Research Lab orders were placed and follow-up specimens were collected on (date: 1/23/2023) in:  [facility] VNP: YES/NO: yes  [facility] LABTX: YES/NO: no  [facility] LAB IM: YES/NO: no    Odalys Valderrama  Admin Research- Liver Transplant

## 2023-01-25 ENCOUNTER — PATIENT MESSAGE (OUTPATIENT)
Dept: TRANSPLANT | Facility: CLINIC | Age: 63
End: 2023-01-25
Payer: COMMERCIAL

## 2023-01-25 ENCOUNTER — TELEPHONE (OUTPATIENT)
Dept: TRANSPLANT | Facility: CLINIC | Age: 63
End: 2023-01-25
Payer: COMMERCIAL

## 2023-01-25 NOTE — TELEPHONE ENCOUNTER
My chart msg and letter sent with below physician lab and ultrasound review.        ----- Message from Jay Abrams MD sent at 1/24/2023  8:16 PM CST -----  Reviewed. US ok.    Jay Abrams MD  [facility] Post-Liver Transplant Clinical  Liver transplant labs reviewed and are stable. Patient took take prior to labs. No changes in IS. Please repeat labs in 2 weeks.

## 2023-01-31 RX ORDER — ACETAMINOPHEN 500 MG
1 TABLET ORAL DAILY
Qty: 30 TABLET | Refills: 5 | Status: CANCELLED | OUTPATIENT
Start: 2023-01-31

## 2023-02-01 ENCOUNTER — TELEPHONE (OUTPATIENT)
Dept: TRANSPLANT | Facility: CLINIC | Age: 63
End: 2023-02-01
Payer: COMMERCIAL

## 2023-02-01 ENCOUNTER — PATIENT MESSAGE (OUTPATIENT)
Dept: TRANSPLANT | Facility: CLINIC | Age: 63
End: 2023-02-01
Payer: COMMERCIAL

## 2023-02-01 NOTE — TELEPHONE ENCOUNTER
Patient notified and instructed via [facility]:    Per : DEXA shows osteopenia (decreased bone density)     Recommendations: Daily calcium intake 5194-8521 mg, dietary sources preferred; Vitamin D 9159-0638 IU daily.

## 2023-02-01 NOTE — TELEPHONE ENCOUNTER
----- Message from Jay Abrams MD sent at 1/31/2023  7:29 PM CST -----  DEXA shows osteopenia (decreased bone density)    Recommendations: Daily calcium intake 7142-2675 mg, dietary sources preferred; Vitamin D 4444-6455 IU daily.

## 2023-02-06 ENCOUNTER — PATIENT MESSAGE (OUTPATIENT)
Dept: TRANSPLANT | Facility: CLINIC | Age: 63
End: 2023-02-06
Payer: COMMERCIAL

## 2023-02-06 ENCOUNTER — TELEPHONE (OUTPATIENT)
Dept: TRANSPLANT | Facility: CLINIC | Age: 63
End: 2023-02-06
Payer: COMMERCIAL

## 2023-02-06 RX ORDER — CARVEDILOL 12.5 MG/1
12.5 TABLET ORAL 2 TIMES DAILY
Qty: 60 TABLET | Refills: 11 | Status: CANCELLED | OUTPATIENT
Start: 2023-02-06 | End: 2024-02-06

## 2023-02-06 NOTE — TELEPHONE ENCOUNTER
Patient notified and instructed via [facility]:    You have to now follow up with your Primary Care Doctor for refills on your Coreg(Blood Pressure Medicine), the liver transplant hepatologists only fill your transplant medications. Thanks.

## 2023-02-13 ENCOUNTER — LAB VISIT (OUTPATIENT)
Dept: LAB | Facility: HOSPITAL | Age: 63
End: 2023-02-13
Attending: STUDENT IN AN ORGANIZED HEALTH CARE EDUCATION/TRAINING PROGRAM
Payer: COMMERCIAL

## 2023-02-13 DIAGNOSIS — Z94.4 LIVER REPLACED BY TRANSPLANT: ICD-10-CM

## 2023-02-13 LAB
ALBUMIN SERPL-MCNC: 3.8 G/DL (ref 3.4–4.8)
ALBUMIN/GLOB SERPL: 1.4 RATIO (ref 1.1–2)
ALP SERPL-CCNC: 77 UNIT/L (ref 40–150)
ALT SERPL-CCNC: 11 UNIT/L (ref 0–55)
AST SERPL-CCNC: 11 UNIT/L (ref 5–34)
BASOPHILS # BLD AUTO: 0.01 X10(3)/MCL (ref 0–0.2)
BASOPHILS NFR BLD AUTO: 0.2 %
BILIRUBIN DIRECT+TOT PNL SERPL-MCNC: 0.5 MG/DL
BUN SERPL-MCNC: 16.1 MG/DL (ref 8.4–25.7)
CALCIUM SERPL-MCNC: 9 MG/DL (ref 8.8–10)
CHLORIDE SERPL-SCNC: 108 MMOL/L (ref 98–107)
CO2 SERPL-SCNC: 25 MMOL/L (ref 23–31)
CREAT SERPL-MCNC: 0.85 MG/DL (ref 0.73–1.18)
EOSINOPHIL # BLD AUTO: 0.04 X10(3)/MCL (ref 0–0.9)
EOSINOPHIL NFR BLD AUTO: 0.8 %
ERYTHROCYTE [DISTWIDTH] IN BLOOD BY AUTOMATED COUNT: 13 % (ref 11.5–17)
GFR SERPLBLD CREATININE-BSD FMLA CKD-EPI: >60 MLS/MIN/1.73/M2
GLOBULIN SER-MCNC: 2.7 GM/DL (ref 2.4–3.5)
GLUCOSE SERPL-MCNC: 118 MG/DL (ref 82–115)
HCT VFR BLD AUTO: 34.7 % (ref 42–52)
HGB BLD-MCNC: 11.1 GM/DL (ref 14–18)
IMM GRANULOCYTES # BLD AUTO: 0.02 X10(3)/MCL (ref 0–0.04)
IMM GRANULOCYTES NFR BLD AUTO: 0.4 %
LYMPHOCYTES # BLD AUTO: 0.88 X10(3)/MCL (ref 0.6–4.6)
LYMPHOCYTES NFR BLD AUTO: 17.6 %
MCH RBC QN AUTO: 29.2 PG
MCHC RBC AUTO-ENTMCNC: 32 MG/DL (ref 33–36)
MCV RBC AUTO: 91.3 FL (ref 80–94)
MONOCYTES # BLD AUTO: 0.34 X10(3)/MCL (ref 0.1–1.3)
MONOCYTES NFR BLD AUTO: 6.8 %
NEUTROPHILS # BLD AUTO: 3.7 X10(3)/MCL (ref 2.1–9.2)
NEUTROPHILS NFR BLD AUTO: 74.2 %
NRBC BLD AUTO-RTO: 0 %
PLATELET # BLD AUTO: 231 X10(3)/MCL (ref 130–400)
PMV BLD AUTO: 10.1 FL (ref 7.4–10.4)
POTASSIUM SERPL-SCNC: 5.4 MMOL/L (ref 3.5–5.1)
PROT SERPL-MCNC: 6.5 GM/DL (ref 5.8–7.6)
RBC # BLD AUTO: 3.8 X10(6)/MCL (ref 4.7–6.1)
SODIUM SERPL-SCNC: 142 MMOL/L (ref 136–145)
WBC # SPEC AUTO: 5 X10(3)/MCL (ref 4.5–11.5)

## 2023-02-13 PROCEDURE — 80053 COMPREHEN METABOLIC PANEL: CPT

## 2023-02-13 PROCEDURE — 85025 COMPLETE CBC W/AUTO DIFF WBC: CPT

## 2023-02-13 PROCEDURE — 36415 COLL VENOUS BLD VENIPUNCTURE: CPT

## 2023-02-13 PROCEDURE — 80197 ASSAY OF TACROLIMUS: CPT

## 2023-02-14 LAB — TACROLIMUS TROUGH BLD-MCNC: 4.3 NG/ML

## 2023-02-15 ENCOUNTER — PATIENT MESSAGE (OUTPATIENT)
Dept: TRANSPLANT | Facility: CLINIC | Age: 63
End: 2023-02-15
Payer: COMMERCIAL

## 2023-02-15 ENCOUNTER — TELEPHONE (OUTPATIENT)
Dept: TRANSPLANT | Facility: CLINIC | Age: 63
End: 2023-02-15
Payer: COMMERCIAL

## 2023-02-15 DIAGNOSIS — Z94.4 LIVER REPLACED BY TRANSPLANT: Primary | ICD-10-CM

## 2023-02-15 DIAGNOSIS — C22.0 HCC (HEPATOCELLULAR CARCINOMA): ICD-10-CM

## 2023-02-15 NOTE — TELEPHONE ENCOUNTER
----- Message from Jay Abarms MD sent at 2/15/2023  2:45 PM CST -----  Liver tests are stable. No changes in his immunosuppression. Please continue to monitor labs per transplant protocol.    Potassium elevated. Please follow hyperkalemia protocol.

## 2023-02-15 NOTE — TELEPHONE ENCOUNTER
Patient notified and instructed via [facility]:    Labs reviewed by ; stable. Potassium level at upper levels  (5.4); please avoid foods that are high in potassium (such as: oranges/ bananas/ tomatoes/ potatoes/ cabbage/ beans/ chocholate/ sports drinks / avacado)  repeat labs due 3/13/23. You are due for your follow up scans in March, I faxed orders to [facility]. Please call them to schedule. Thanks.

## 2023-02-15 NOTE — TELEPHONE ENCOUNTER
----- Message from Jay Abrams MD sent at 2/15/2023  2:45 PM CST -----  Liver tests are stable. No changes in his immunosuppression. Please continue to monitor labs per transplant protocol.    Potassium elevated. Please follow hyperkalemia protocol.

## 2023-02-24 DIAGNOSIS — C22.0 HEPATOCELLULAR CARCINOMA: Primary | ICD-10-CM

## 2023-03-07 ENCOUNTER — PATIENT MESSAGE (OUTPATIENT)
Dept: TRANSPLANT | Facility: CLINIC | Age: 63
End: 2023-03-07
Payer: COMMERCIAL

## 2023-03-09 ENCOUNTER — HOSPITAL ENCOUNTER (OUTPATIENT)
Dept: RADIOLOGY | Facility: HOSPITAL | Age: 63
Discharge: HOME | End: 2023-03-09
Attending: STUDENT IN AN ORGANIZED HEALTH CARE EDUCATION/TRAINING PROGRAM
Payer: COMMERCIAL

## 2023-03-09 DIAGNOSIS — C22.0 HEPATOCELLULAR CARCINOMA: ICD-10-CM

## 2023-03-09 PROCEDURE — 74170 CT ABD WO CNTRST FLWD CNTRST: CPT | Mod: TC

## 2023-03-09 PROCEDURE — 71250 CT THORAX DX C-: CPT | Mod: TC

## 2023-03-09 PROCEDURE — 25500020 PHARM REV CODE 255: Performed by: STUDENT IN AN ORGANIZED HEALTH CARE EDUCATION/TRAINING PROGRAM

## 2023-03-09 RX ADMIN — IOPAMIDOL 100 ML: 755 INJECTION, SOLUTION INTRAVENOUS at 10:03

## 2023-03-10 ENCOUNTER — TELEPHONE (OUTPATIENT)
Dept: TRANSPLANT | Facility: CLINIC | Age: 63
End: 2023-03-10
Payer: COMMERCIAL

## 2023-03-10 ENCOUNTER — PATIENT MESSAGE (OUTPATIENT)
Dept: TRANSPLANT | Facility: CLINIC | Age: 63
End: 2023-03-10
Payer: COMMERCIAL

## 2023-03-10 NOTE — TELEPHONE ENCOUNTER
----- Message from Jay Abrams MD sent at 3/9/2023  9:44 PM CST -----  Reviewed. No evidence of recurrent liver cancer.

## 2023-03-10 NOTE — TELEPHONE ENCOUNTER
Patient notified and instructed via [facility]:    Your CT scans of the chest and abdomen were reviewed by :  No evidence of recurrent liver cancer.

## 2023-03-16 ENCOUNTER — TELEPHONE (OUTPATIENT)
Dept: TRANSPLANT | Facility: CLINIC | Age: 63
End: 2023-03-16
Payer: COMMERCIAL

## 2023-03-16 DIAGNOSIS — Z94.4 LIVER REPLACED BY TRANSPLANT: Primary | ICD-10-CM

## 2023-03-16 NOTE — TELEPHONE ENCOUNTER
----- Message from Jay Abrams MD sent at 3/15/2023  8:42 PM CDT -----  Liver tests are stable. No changes in his immunosuppression. Please continue to monitor labs per transplant protocol.    Potassium slightly elevated. Please follow hyperkalemia protocol.

## 2023-03-23 ENCOUNTER — HOSPITAL ENCOUNTER (EMERGENCY)
Facility: HOSPITAL | Age: 63
Discharge: HOME | End: 2023-03-23
Attending: STUDENT IN AN ORGANIZED HEALTH CARE EDUCATION/TRAINING PROGRAM
Payer: COMMERCIAL

## 2023-03-23 VITALS
DIASTOLIC BLOOD PRESSURE: 73 MMHG | OXYGEN SATURATION: 99 % | HEART RATE: 81 BPM | RESPIRATION RATE: 14 BRPM | HEIGHT: 65 IN | BODY MASS INDEX: 26.66 KG/M2 | TEMPERATURE: 99 F | WEIGHT: 160 LBS | SYSTOLIC BLOOD PRESSURE: 146 MMHG

## 2023-03-23 DIAGNOSIS — S00.03XA CONTUSION OF SCALP, INITIAL ENCOUNTER: ICD-10-CM
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
## 2023-03-23 DIAGNOSIS — E16.2 HYPOGLYCEMIA: ICD-10-CM

## 2023-03-23 DIAGNOSIS — R52 PAIN: ICD-10-CM

## 2023-03-23 LAB
ABORH RETYPE: NORMAL
ALBUMIN SERPL-MCNC: 4 G/DL (ref 3.4–4.8)
ALBUMIN/GLOB SERPL: 1.5 RATIO (ref 1.1–2)
ALP SERPL-CCNC: 77 UNIT/L (ref 40–150)
ALT SERPL-CCNC: 11 UNIT/L (ref 0–55)
AMPHET UR QL SCN: NEGATIVE
APPEARANCE UR: CLEAR
APTT PPP: 27.4 SECONDS (ref 23.2–33.7)
AST SERPL-CCNC: 13 UNIT/L (ref 5–34)
BACTERIA #/AREA URNS AUTO: NORMAL /HPF
BARBITURATE SCN PRESENT UR: NEGATIVE
BASOPHILS # BLD AUTO: 0.03 X10(3)/MCL (ref 0–0.2)
BASOPHILS NFR BLD AUTO: 0.6 %
BENZODIAZ UR QL SCN: NEGATIVE
BILIRUB UR QL STRIP.AUTO: NEGATIVE MG/DL
BILIRUBIN DIRECT+TOT PNL SERPL-MCNC: 0.6 MG/DL
BUN SERPL-MCNC: 28.4 MG/DL (ref 8.4–25.7)
CALCIUM SERPL-MCNC: 9.2 MG/DL (ref 8.8–10)
CANNABINOIDS UR QL SCN: NEGATIVE
CHLORIDE SERPL-SCNC: 110 MMOL/L (ref 98–107)
CO2 SERPL-SCNC: 21 MMOL/L (ref 23–31)
COCAINE UR QL SCN: NEGATIVE
COLOR UR AUTO: YELLOW
CREAT SERPL-MCNC: 1.01 MG/DL (ref 0.73–1.18)
EOSINOPHIL # BLD AUTO: 0.07 X10(3)/MCL (ref 0–0.9)
EOSINOPHIL NFR BLD AUTO: 1.4 %
ERYTHROCYTE [DISTWIDTH] IN BLOOD BY AUTOMATED COUNT: 13.8 % (ref 11.5–17)
ETHANOL SERPL-MCNC: <10 MG/DL
FENTANYL UR QL SCN: NEGATIVE
GFR SERPLBLD CREATININE-BSD FMLA CKD-EPI: >60 MLS/MIN/1.73/M2
GLOBULIN SER-MCNC: 2.7 GM/DL (ref 2.4–3.5)
GLUCOSE SERPL-MCNC: 34 MG/DL (ref 82–115)
GLUCOSE UR QL STRIP.AUTO: NEGATIVE MG/DL
GROUP & RH: NORMAL
HCT VFR BLD AUTO: 34.9 % (ref 42–52)
HGB BLD-MCNC: 11.3 G/DL (ref 14–18)
IMM GRANULOCYTES # BLD AUTO: 0.02 X10(3)/MCL (ref 0–0.04)
IMM GRANULOCYTES NFR BLD AUTO: 0.4 %
INDIRECT COOMBS GEL: NORMAL
INR BLD: 1.07 (ref 0–1.3)
KETONES UR QL STRIP.AUTO: NEGATIVE MG/DL
LACTATE SERPL-SCNC: 1.4 MMOL/L (ref 0.5–2.2)
LEUKOCYTE ESTERASE UR QL STRIP.AUTO: NEGATIVE UNIT/L
LYMPHOCYTES # BLD AUTO: 0.67 X10(3)/MCL (ref 0.6–4.6)
LYMPHOCYTES NFR BLD AUTO: 13.2 %
MCH RBC QN AUTO: 30 PG
MCHC RBC AUTO-ENTMCNC: 32.4 G/DL (ref 33–36)
MCV RBC AUTO: 92.6 FL (ref 80–94)
MDMA UR QL SCN: NEGATIVE
MONOCYTES # BLD AUTO: 0.48 X10(3)/MCL (ref 0.1–1.3)
MONOCYTES NFR BLD AUTO: 9.5 %
NEUTROPHILS # BLD AUTO: 3.79 X10(3)/MCL (ref 2.1–9.2)
NEUTROPHILS NFR BLD AUTO: 74.9 %
NITRITE UR QL STRIP.AUTO: NEGATIVE
NRBC BLD AUTO-RTO: 0 %
OPIATES UR QL SCN: NEGATIVE
PCP UR QL: NEGATIVE
PH UR STRIP.AUTO: 5.5 [PH]
PH UR: 5.5 [PH] (ref 3–11)
PLATELET # BLD AUTO: 195 X10(3)/MCL (ref 130–400)
PMV BLD AUTO: 10.5 FL (ref 7.4–10.4)
POCT GLUCOSE: 207 MG/DL (ref 70–110)
POCT GLUCOSE: 27 MG/DL (ref 70–110)
POCT GLUCOSE: 68 MG/DL (ref 70–110)
POTASSIUM SERPL-SCNC: 4.1 MMOL/L (ref 3.5–5.1)
PROT SERPL-MCNC: 6.7 GM/DL (ref 5.8–7.6)
PROT UR QL STRIP.AUTO: NEGATIVE MG/DL
PROTHROMBIN TIME: 13.8 SECONDS (ref 12.5–14.5)
RBC # BLD AUTO: 3.77 X10(6)/MCL (ref 4.7–6.1)
RBC #/AREA URNS AUTO: <5 /HPF
RBC UR QL AUTO: NEGATIVE UNIT/L
SODIUM SERPL-SCNC: 141 MMOL/L (ref 136–145)
SP GR UR STRIP.AUTO: >=1.04 (ref 1–1.03)
SPECIMEN OUTDATE: NORMAL
SQUAMOUS #/AREA URNS AUTO: <5 /HPF
UROBILINOGEN UR STRIP-ACNC: 0.2 MG/DL
WBC # SPEC AUTO: 5.1 X10(3)/MCL (ref 4.5–11.5)
WBC #/AREA URNS AUTO: <5 /HPF

## 2023-03-23 PROCEDURE — 96360 HYDRATION IV INFUSION INIT: CPT

## 2023-03-23 PROCEDURE — 25000003 PHARM REV CODE 250: Performed by: STUDENT IN AN ORGANIZED HEALTH CARE EDUCATION/TRAINING PROGRAM

## 2023-03-23 PROCEDURE — 86900 BLOOD TYPING SEROLOGIC ABO: CPT | Performed by: STUDENT IN AN ORGANIZED HEALTH CARE EDUCATION/TRAINING PROGRAM

## 2023-03-23 PROCEDURE — 81001 URINALYSIS AUTO W/SCOPE: CPT | Performed by: STUDENT IN AN ORGANIZED HEALTH CARE EDUCATION/TRAINING PROGRAM

## 2023-03-23 PROCEDURE — 99285 EMERGENCY DEPT VISIT HI MDM: CPT | Mod: 25

## 2023-03-23 PROCEDURE — 85025 COMPLETE CBC W/AUTO DIFF WBC: CPT | Performed by: STUDENT IN AN ORGANIZED HEALTH CARE EDUCATION/TRAINING PROGRAM

## 2023-03-23 PROCEDURE — 82077 ASSAY SPEC XCP UR&BREATH IA: CPT | Performed by: STUDENT IN AN ORGANIZED HEALTH CARE EDUCATION/TRAINING PROGRAM

## 2023-03-23 PROCEDURE — 83605 ASSAY OF LACTIC ACID: CPT | Performed by: STUDENT IN AN ORGANIZED HEALTH CARE EDUCATION/TRAINING PROGRAM

## 2023-03-23 PROCEDURE — 85730 THROMBOPLASTIN TIME PARTIAL: CPT | Performed by: STUDENT IN AN ORGANIZED HEALTH CARE EDUCATION/TRAINING PROGRAM

## 2023-03-23 PROCEDURE — 80307 DRUG TEST PRSMV CHEM ANLYZR: CPT | Performed by: STUDENT IN AN ORGANIZED HEALTH CARE EDUCATION/TRAINING PROGRAM

## 2023-03-23 PROCEDURE — G0390 TRAUMA RESPONS W/HOSP CRITI: HCPCS

## 2023-03-23 PROCEDURE — 85610 PROTHROMBIN TIME: CPT | Performed by: STUDENT IN AN ORGANIZED HEALTH CARE EDUCATION/TRAINING PROGRAM

## 2023-03-23 PROCEDURE — 80053 COMPREHEN METABOLIC PANEL: CPT | Performed by: STUDENT IN AN ORGANIZED HEALTH CARE EDUCATION/TRAINING PROGRAM

## 2023-03-23 PROCEDURE — 82962 GLUCOSE BLOOD TEST: CPT

## 2023-03-23 PROCEDURE — 25500020 PHARM REV CODE 255: Performed by: STUDENT IN AN ORGANIZED HEALTH CARE EDUCATION/TRAINING PROGRAM

## 2023-03-23 PROCEDURE — 63600175 PHARM REV CODE 636 W HCPCS: Performed by: STUDENT IN AN ORGANIZED HEALTH CARE EDUCATION/TRAINING PROGRAM

## 2023-03-23 RX ORDER — CARVEDILOL 12.5 MG/1
12.5 TABLET ORAL 2 TIMES DAILY WITH MEALS
COMMUNITY

## 2023-03-23 RX ORDER — ASPIRIN 81 MG/1
81 TABLET ORAL DAILY
Status: ON HOLD | COMMUNITY
End: 2024-03-02 | Stop reason: HOSPADM

## 2023-03-23 RX ORDER — INSULIN GLARGINE 300 [IU]/ML
INJECTION, SOLUTION SUBCUTANEOUS DAILY
Status: ON HOLD | COMMUNITY
End: 2024-03-02 | Stop reason: HOSPADM

## 2023-03-23 RX ORDER — ESCITALOPRAM OXALATE 5 MG/1
5 TABLET ORAL DAILY
Status: ON HOLD | COMMUNITY
End: 2024-03-02 | Stop reason: HOSPADM

## 2023-03-23 RX ORDER — CALCIUM CARBONATE 600 MG
600 TABLET ORAL ONCE
COMMUNITY

## 2023-03-23 RX ORDER — TACROLIMUS 1 MG/1
CAPSULE ORAL
COMMUNITY
End: 2023-04-20 | Stop reason: CLARIF

## 2023-03-23 RX ORDER — ACETAMINOPHEN 500 MG
1000 TABLET ORAL
Status: COMPLETED | OUTPATIENT
Start: 2023-03-23 | End: 2023-03-23

## 2023-03-23 RX ORDER — FAMOTIDINE 20 MG/1
20 TABLET, FILM COATED ORAL 2 TIMES DAILY
COMMUNITY

## 2023-03-23 RX ORDER — CLOPIDOGREL BISULFATE 75 MG/1
75 TABLET ORAL DAILY
COMMUNITY

## 2023-03-23 RX ADMIN — ACETAMINOPHEN 1000 MG: 500 TABLET ORAL at 09:03

## 2023-03-23 RX ADMIN — IOPAMIDOL 100 ML: 755 INJECTION, SOLUTION INTRAVENOUS at 08:03

## 2023-03-23 RX ADMIN — SODIUM CHLORIDE, POTASSIUM CHLORIDE, SODIUM LACTATE AND CALCIUM CHLORIDE 1000 ML: 600; 310; 30; 20 INJECTION, SOLUTION INTRAVENOUS at 07:03

## 2023-03-23 NOTE — ED PROVIDER NOTES
Encounter Date: 3/23/2023    SCRIBE #1 NOTE: I, Yesica Parsons, am scribing for, and in the presence of,  Eldon Nunez MD. I have scribed the following portions of the note - Other sections scribed: HPI, ROS, PE.     History     Chief Complaint   Patient presents with    Fall     LERN reports syncope, fall striking head, +Plavix, L sided head laceration and hematoma, GCS 15      62 year old male with history of liver cancer, liver transplant, DM, and  CABG presents to the ED via EMS following a syncopal episode. Per EMS, pt felt like his sugar was getting low prior to the episode and he hit his head on the cabinet when he fell. He is 14 months post-op from his liver transplant and is on Plavix. Pt reports that he was sitting on his bed drinking coffee and got light headed, so he checked his sugar and it was in the 80s. He went to the kitchen to get something to eat, and doesn't remember anything after that. He states that yesterday he was working on a roof and fell, landing on his chest and abdomen. He complains of left sided rib pain. He also notes that a few days ago he dropped a 2x6 post and it hit his left ankle. He only complains of pain in his abdomen and rates it a 1/10; he denies neck pain.     The history is provided by the patient. No  was used.   Neurologic Problem  The primary symptoms include syncope. The symptoms began just prior to arrival.   Review of patient's allergies indicates:  No Known Allergies  No past medical history on file.  No past surgical history on file.  No family history on file.     Review of Systems   Cardiovascular:  Positive for syncope.   Gastrointestinal:  Positive for abdominal pain.   Musculoskeletal:  Negative for neck pain.   Neurological:  Positive for syncope.     Physical Exam     Initial Vitals [03/23/23 0730]   BP Pulse Resp Temp SpO2   121/77 86 13 98.2 °F (36.8 °C) 100 %      MAP       --         Physical Exam    Constitutional: He appears  well-developed and well-nourished. He is not diaphoretic. No distress.   Airway intact    HENT:   Head: Normocephalic and atraumatic.   Right Ear: External ear normal.   Left Ear: External ear normal.   Nose: Nose normal.   Eyes: EOM are normal. Pupils are equal, round, and reactive to light. Right eye exhibits no discharge. Left eye exhibits no discharge.   Pupils 2-3 mm    Cardiovascular:  Normal rate, regular rhythm and normal heart sounds.     Exam reveals no gallop and no friction rub.       No murmur heard.  2+ radial and dp pulses bilaterally    Pulmonary/Chest: Effort normal and breath sounds normal. No respiratory distress. He has no wheezes. He has no rhonchi. He has no rales. He exhibits tenderness (left, distally).   Abdominal: Abdomen is soft. Bowel sounds are normal. He exhibits no distension and no mass. There is no abdominal tenderness. There is no rebound and no guarding.   Musculoskeletal:         General: Tenderness (mild, thoracic) present. No edema. Normal range of motion.     Neurological: He is alert and oriented to person, place, and time. No cranial nerve deficit or sensory deficit.   Skin: Skin is warm and dry. Capillary refill takes less than 2 seconds.   Well healed sternotomy scar and abdominal scar. Contusion to posterior left heel. Erythema to left lateral malleolus with tenderness to palpation        ED Course   Procedures  Labs Reviewed   COMPREHENSIVE METABOLIC PANEL - Abnormal; Notable for the following components:       Result Value    Chloride 110 (*)     Carbon Dioxide 21 (*)     Glucose Level 34 (*)     Blood Urea Nitrogen 28.4 (*)     All other components within normal limits   URINALYSIS, REFLEX TO URINE CULTURE - Abnormal; Notable for the following components:    Specific Gravity, UA >=1.040 (*)     All other components within normal limits   CBC WITH DIFFERENTIAL - Abnormal; Notable for the following components:    RBC 3.77 (*)     Hgb 11.3 (*)     Hct 34.9 (*)     MCHC  32.4 (*)     MPV 10.5 (*)     All other components within normal limits   POCT GLUCOSE - Abnormal; Notable for the following components:    POCT Glucose 27 (*)     All other components within normal limits   POCT GLUCOSE - Abnormal; Notable for the following components:    POCT Glucose 68 (*)     All other components within normal limits   POCT GLUCOSE - Abnormal; Notable for the following components:    POCT Glucose 207 (*)     All other components within normal limits   PROTIME-INR - Normal   APTT - Normal   LACTIC ACID, PLASMA - Normal   ALCOHOL,MEDICAL (ETHANOL) - Normal   URINALYSIS, MICROSCOPIC - Normal   CBC W/ AUTO DIFFERENTIAL    Narrative:     The following orders were created for panel order CBC auto differential.  Procedure                               Abnormality         Status                     ---------                               -----------         ------                     CBC with Differential[[id number]]        Abnormal            Final result                 Please view results for these tests on the individual orders.   DRUG SCREEN, URINE (BEAKER)   POCT GLUCOSE, HAND-HELD DEVICE   TYPE & SCREEN   ABORH RETYPE     EKG Readings: (Independently Interpreted)   Initial Reading: No STEMI. Rhythm: Normal Sinus Rhythm. Heart Rate: 79. Ectopy: No Ectopy. Conduction: Normal. ST Segments: Normal ST Segments. T Waves: Normal. Axis: Normal. Clinical Impression: Normal Sinus Rhythm Other Impression: good r wave progression.    EKG performed at 8:55 on 3/23/23.     Imaging Results              CT Chest Abdomen Pelvis With Contrast (xpd) (Final result)  Result time 03/23/23 08:29:07      Final result by Yasmany Hill MD (03/23/23 08:29:07)                   Impression:      1.  Bilateral anterior abdominal wall subcutaneous strandings suggest mild contusions.  Otherwise, no traumatic injury of the thorax, abdomen or pelvis identified.    2.  Details of findings above.      Electronically signed  by: Yasmany Hill  Date:    03/23/2023  Time:    08:29               Narrative:    EXAMINATION:  CT CHEST ABDOMEN PELVIS WITH CONTRAST (XPD)    CLINICAL HISTORY:  Trauma;    TECHNIQUE:  Multidetector axial images were obtained from the thoracic inlet through the greater trochanters following the administration of IV contrast.    Dose length product of 353 mGycm. Automated exposure control was utilized to minimize radiation dose.    COMPARISON:  CHEST FINDINGS:    The lungs are unremarkable without suspicious soft tissue pulmonary nodule, acute parenchyma consolidation,  pleural effusion or pneumothorax.  Bilateral lungs dependent hypoventilatory changes.  Lungs emphysematous changes with right upper lung lobe predominance.  No traumatic finding of the thoracic great vessels identified and there are no dominant mediastinal hematomas. Thoracic spine alignment is preserved. No consistent findings reflective of a displaced fracture.    ABDOMINAL FINDINGS:    Bilateral anterior abdominal wall subcutaneous stranding suggest mild contusion there is no abdominal solid parenchymal organs traumatic damage with unremarkable attenuation of the liver, pancreas and spleen. The adrenal glands size and configuration is within normal limits. Kidneys are symmetric in size and exhibit symmetric contrast enhancement.  There is right kidney 1.5 cm cystic structure for which no specific follow-up imaging is recommended no renal contusion or laceration identified. There is no hydronephrosis or perinephric fluid collection. The abdominal aorta is remarkable for calcified plaques without aneurysmal dilatation or dissection.  There are also calcified plaques of proximal right main renal artery and the iliac arteries.  No retroperitoneal hematoma. There is no extra luminal air.  There is noninflamed diverticulosis coli with sigmoid colon predominance.  No free fluid identified. Lumbar alignment is preserved.    PELVIC FINDINGS:    There is no  free fluid. Urinary bladder appears within normal limits without wall thickening. No evidence for bladder rupture. Femoral heads are well situated within their respective acetabula. Pubic symphysis and SI joints are intact. No pelvic fracture identified.                                       CT Cervical Spine Without Contrast (Final result)  Result time 03/23/23 08:24:51      Final result by Jessica Booth MD (03/23/23 08:24:51)                   Impression:      No acute fracture identified.      Electronically signed by: Jessica Booth  Date:    03/23/2023  Time:    08:24               Narrative:    EXAMINATION:  CT CERVICAL SPINE WITHOUT CONTRAST    CLINICAL HISTORY:  Trauma;    TECHNIQUE:  Noncontrast CT images of the cervical spine. Axial, coronal, and sagittal reformatted images were obtained. Dose length product is 1443 mGycm. Automatic exposure control, adjustment of mA/kV or iterative reconstruction technique was used to limit radiation dose.    COMPARISON:  None    FINDINGS:  The cervical spine is visualized to the level of T2.    There is no acute fracture identified.  There is chronic anterior wedging of the C5 vertebral body with minimal loss of height.  There are multilevel degenerative changes with disc height loss, marginal osteophyte formation and facet arthropathy.  There is no paraspinal hematoma.                                       CT Head Without Contrast (Final result)  Result time 03/23/23 08:21:10      Final result by Yasmany Hill MD (03/23/23 08:21:10)                   Impression:      No acute intracranial findings identified.      Electronically signed by: Yasmany iHll  Date:    03/23/2023  Time:    08:21               Narrative:    EXAMINATION:  CT HEAD WITHOUT CONTRAST    CLINICAL HISTORY:  Trauma;    TECHNIQUE:  Sequential axial images were performed of the brain without contrast.    Dose product length of 1443 mGycm. Automated exposure control was utilized to minimize  radiation dose.    COMPARISON:  None available.    FINDINGS:  Left parietal scalp inflammation without depressed skull fracture.  There is no intracranial mass effect, midline shift, hydrocephalus or hemorrhage. There is no sulcal effacement or low attenuation changes to suggest recent large vessel territory infarction. Chronic appearing periventricular and subcortical white matter low attenuation changes are present and are consistent with chronic microangiopathic ischemia. The ventricular system and sulcal markings prominence is consistent with atrophy. There is no acute extra axial fluid collection.    Right aspect of sphenoid sinus network of septations and on the left is marked mucoperiosteal thickening.  There is also right maxillary sinus retention cyst.  Otherwise, visualized paranasal sinuses are clear without mucosal thickening, polypoidal abnormality or air-fluid levels.  Chronic appearing under pneumatization right mastoid air cells with opacification and/or fluid.                                       X-Ray Tibia Fibula 2 View Left (Final result)  Result time 03/23/23 09:08:04      Final result by Rodrigue Bonilla MD (03/23/23 09:08:04)                   Impression:      No acute osseous abnormality.      Electronically signed by: Rodrigue Bonilla  Date:    03/23/2023  Time:    09:08               Narrative:    EXAMINATION:  XR TIBIA FIBULA 2 VIEW LEFT    CLINICAL HISTORY:  Pain, unspecified    COMPARISON:  None.    FINDINGS:  No acute displaced fractures or dislocations.    Joint spaces preserved.    No blastic or lytic lesions.    Soft tissues within normal limits.                                       X-Ray Chest 1 View (Final result)  Result time 03/23/23 08:25:09      Final result by Kenrick Rollins MD (03/23/23 08:25:09)                   Impression:      No acute cardiopulmonary process.      Electronically signed by: Kenrick Rollins  Date:    03/23/2023  Time:    08:25                Narrative:    EXAMINATION:  XR CHEST 1 VIEW    CLINICAL HISTORY:  r/o bleeding or hemorrhage;    TECHNIQUE:  Single view of the chest    COMPARISON:  No prior imaging available for comparison.    FINDINGS:  No focal opacification, pleural effusion, or pneumothorax.    The cardiomediastinal silhouette is within normal limits.    Postsurgical changes of median sternotomy noted.    No acute osseous abnormality.                                       X-Ray Pelvis Routine AP (Final result)  Result time 03/23/23 08:55:03      Final result by Rodrigue Bonilla MD (03/23/23 08:55:03)                   Impression:      Degenerative changes      Electronically signed by: Rodrigue Bonilla  Date:    03/23/2023  Time:    08:55               Narrative:    EXAMINATION:  XR PELVIS ROUTINE AP    CLINICAL HISTORY:  r/o bleeding or hemorrhage;    COMPARISON:  None.    FINDINGS:  No acute displaced fractures or dislocations.    There is narrowing of the inferior medial aspect of both hip joints with some degenerative changes of the sacroiliac joints and lumbosacral spine articular spaces are otherwise preserved with smooth articular surfaces    No blastic or lytic lesions.    Soft tissues within normal limits.                                       Medications   dextrose 10% bolus 250 mL 250 mL (has no administration in time range)   lactated ringers bolus 1,000 mL (0 mLs Intravenous Stopped 3/23/23 0855)   iopamidoL (ISOVUE-370) injection 100 mL (100 mLs Intravenous Given 3/23/23 0813)   acetaminophen tablet 1,000 mg (1,000 mg Oral Given 3/23/23 0946)                Attending Attestation:           Physician Attestation for Scribe:  Physician Attestation Statement for Scribe #1: I, Eldon Nunez MD, reviewed documentation, as scribed by Yesica Parsons in my presence, and it is both accurate and complete.       Medical Decision Making  Patient presents after fall, initial glucose somewhat low.    Differential diagnosis includes but  is not limited to abrasion, contusion, fracture, traumatic ICH, TBI, concussion, spinal injury, fracture, pneumothorax, hemothorax, intrathoracic injury, intraabdominal injury, hemorrhage, laceration, hypoglycemia     From a trauma standpoint patient's workup is largely unremarkable.  Some mild contusions on CT.  A little abrasion to the top of his head.  He is awake alert oriented.  His glucose on his labs was significantly low 34.  He is remained awake alert oriented here in the emergency department.  After some juices glucose has rebounded into the 60s, we are currently giving him a meal tray.  I discussed with him my concern that since he is on a long-acting insulin, Toujeo/glargine I am concerned history were may continue to dropped off throughout the day.  I did recommend admission for close monitoring with concern for hypoglycemia, confusion, seizures, falling and striking head, or traumatic injury.  Discussed this at length with both patient and wife however they are adamant they would like to go home.  We will give the patient a meal tray here and recheck his glucose.  It is fit is adequately elevated, back to normal and he will be discharged home.  He is encouraged to not work today, to check his glucose frequently. That he is welcome to return emergency department if he has any worsening symptoms.  Both patient and wife in room voiced understanding. Return precautions given.  Questions invited, questions answered to the best my ability.  Patient discharged home condition stable.      Amount and/or Complexity of Data Reviewed  Independent Historian: EMS     Details: Per EMS, pt felt like his sugar was getting low prior to the episode and he hit his head on the cabinet when he fell. He is 14 months post-op from his liver transplant and is on Plavix.  External Data Reviewed: notes.     Details: see ED course  Labs: ordered. Decision-making details documented in ED Course.  Radiology: ordered and independent  interpretation performed.     Details: CT head neck chest abdomen pelvis are unremarkable by my review.  ECG/medicine tests: ordered and independent interpretation performed.            ED Course as of 03/23/23 1021   Thu Mar 23, 2023   0909 Chart review reveals progress note from 01/23/2023, with patient's transplant specialist, he had age liver transplant secondary to hepatocellular carcinoma and cirrhosis.  Also has history of hep C.  Report he has been compliant with his progress at that time.  Otherwise was doing well at that time. [MM]   0909 Comprehensive metabolic panel(!!)  Glucose markedly low, history of liver transplant, took his glargine this a.m.. [MM]   0927 CBC auto differential(!)  Anemia 11.3, no left shift.  Platelets within normal limits. [MM]   1018 Patient's hemoglobin on 03/09 was 10.8. [MM]   1021 Repeat glucose 207 [MM]      ED Course User Index  [MM] Eldon Nunez MD                 Clinical Impression:   Final diagnoses:  [R52] Pain  [W19.XXXA] Fall, initial encounter (Primary)  [S00.03XA] Contusion of scalp, initial encounter  [E16.2] Hypoglycemia        ED Disposition Condition    Discharge Stable          ED Prescriptions    None       Follow-up Information       Follow up With Specialties Details Why Contact Info    Alexsandra Sandoval MD Internal Medicine Call   [address]  [place] [address]  [phone]               Eldon Nunez MD  03/23/23 1021

## 2023-04-17 ENCOUNTER — LAB VISIT (OUTPATIENT)
Dept: LAB | Facility: HOSPITAL | Age: 63
End: 2023-04-17
Attending: STUDENT IN AN ORGANIZED HEALTH CARE EDUCATION/TRAINING PROGRAM
Payer: COMMERCIAL

## 2023-04-17 ENCOUNTER — PATIENT MESSAGE (OUTPATIENT)
Dept: TRANSPLANT | Facility: CLINIC | Age: 63
End: 2023-04-17
Payer: COMMERCIAL

## 2023-04-17 DIAGNOSIS — Z94.4 LIVER REPLACED BY TRANSPLANT: ICD-10-CM

## 2023-04-17 DIAGNOSIS — E87.5 HYPERKALEMIA: Primary | ICD-10-CM

## 2023-04-17 LAB
ALBUMIN SERPL-MCNC: 3.8 G/DL (ref 3.4–4.8)
ALBUMIN/GLOB SERPL: 1.4 RATIO (ref 1.1–2)
ALP SERPL-CCNC: 86 UNIT/L (ref 40–150)
ALT SERPL-CCNC: 9 UNIT/L (ref 0–55)
AST SERPL-CCNC: 10 UNIT/L (ref 5–34)
BASOPHILS # BLD AUTO: 0.03 X10(3)/MCL (ref 0–0.2)
BASOPHILS NFR BLD AUTO: 0.6 %
BILIRUBIN DIRECT+TOT PNL SERPL-MCNC: 0.3 MG/DL
BUN SERPL-MCNC: 23.1 MG/DL (ref 8.4–25.7)
CALCIUM SERPL-MCNC: 9.1 MG/DL (ref 8.8–10)
CHLORIDE SERPL-SCNC: 112 MMOL/L (ref 98–107)
CO2 SERPL-SCNC: 25 MMOL/L (ref 23–31)
CREAT SERPL-MCNC: 0.92 MG/DL (ref 0.73–1.18)
EOSINOPHIL # BLD AUTO: 0.05 X10(3)/MCL (ref 0–0.9)
EOSINOPHIL NFR BLD AUTO: 1 %
ERYTHROCYTE [DISTWIDTH] IN BLOOD BY AUTOMATED COUNT: 13.5 % (ref 11.5–17)
GFR SERPLBLD CREATININE-BSD FMLA CKD-EPI: >60 MLS/MIN/1.73/M2
GLOBULIN SER-MCNC: 2.8 GM/DL (ref 2.4–3.5)
GLUCOSE SERPL-MCNC: 165 MG/DL (ref 82–115)
HCT VFR BLD AUTO: 35.7 % (ref 42–52)
HGB BLD-MCNC: 11.3 G/DL (ref 14–18)
IMM GRANULOCYTES # BLD AUTO: 0.02 X10(3)/MCL (ref 0–0.04)
IMM GRANULOCYTES NFR BLD AUTO: 0.4 %
LYMPHOCYTES # BLD AUTO: 0.72 X10(3)/MCL (ref 0.6–4.6)
LYMPHOCYTES NFR BLD AUTO: 13.7 %
MCH RBC QN AUTO: 30.3 PG (ref 27–31)
MCHC RBC AUTO-ENTMCNC: 31.7 G/DL (ref 33–36)
MCV RBC AUTO: 95.7 FL (ref 80–94)
MONOCYTES # BLD AUTO: 0.39 X10(3)/MCL (ref 0.1–1.3)
MONOCYTES NFR BLD AUTO: 7.4 %
NEUTROPHILS # BLD AUTO: 4.04 X10(3)/MCL (ref 2.1–9.2)
NEUTROPHILS NFR BLD AUTO: 76.9 %
NRBC BLD AUTO-RTO: 0 %
PLATELET # BLD AUTO: 179 X10(3)/MCL (ref 130–400)
PMV BLD AUTO: 10.3 FL (ref 7.4–10.4)
POTASSIUM SERPL-SCNC: 5.8 MMOL/L (ref 3.5–5.1)
PROT SERPL-MCNC: 6.6 GM/DL (ref 5.8–7.6)
RBC # BLD AUTO: 3.73 X10(6)/MCL (ref 4.7–6.1)
SODIUM SERPL-SCNC: 142 MMOL/L (ref 136–145)
WBC # SPEC AUTO: 5.3 X10(3)/MCL (ref 4.5–11.5)

## 2023-04-17 PROCEDURE — 36415 COLL VENOUS BLD VENIPUNCTURE: CPT

## 2023-04-17 PROCEDURE — 85025 COMPLETE CBC W/AUTO DIFF WBC: CPT

## 2023-04-17 PROCEDURE — 80053 COMPREHEN METABOLIC PANEL: CPT

## 2023-04-17 PROCEDURE — 80197 ASSAY OF TACROLIMUS: CPT | Mod: 90

## 2023-04-17 NOTE — TELEPHONE ENCOUNTER
Patient notified and instructed via [facility]:    Your potassium level came back high at 5.8.   is going to send a Rx. For Kayexelate to your local pharmacy, once you get it , you will take it and will need a repeat lab for potassium level the next day. Thanks.

## 2023-04-18 LAB — TACROLIMUS TROUGH BLD-MCNC: 5.8 NG/ML

## 2023-04-19 ENCOUNTER — LAB VISIT (OUTPATIENT)
Dept: LAB | Facility: HOSPITAL | Age: 63
End: 2023-04-19
Attending: STUDENT IN AN ORGANIZED HEALTH CARE EDUCATION/TRAINING PROGRAM
Payer: COMMERCIAL

## 2023-04-19 ENCOUNTER — TELEPHONE (OUTPATIENT)
Dept: TRANSPLANT | Facility: CLINIC | Age: 63
End: 2023-04-19
Payer: COMMERCIAL

## 2023-04-19 ENCOUNTER — PATIENT MESSAGE (OUTPATIENT)
Dept: TRANSPLANT | Facility: CLINIC | Age: 63
End: 2023-04-19
Payer: COMMERCIAL

## 2023-04-19 DIAGNOSIS — E87.5 HYPERKALEMIA: ICD-10-CM

## 2023-04-19 DIAGNOSIS — E87.5 HYPERKALEMIA: Primary | ICD-10-CM

## 2023-04-19 LAB — POTASSIUM SERPL-SCNC: 5.9 MMOL/L (ref 3.5–5.1)

## 2023-04-19 PROCEDURE — 84132 ASSAY OF SERUM POTASSIUM: CPT

## 2023-04-19 PROCEDURE — 36415 COLL VENOUS BLD VENIPUNCTURE: CPT

## 2023-04-19 NOTE — TELEPHONE ENCOUNTER
----- Message from Jay Abrams MD sent at 4/19/2023 12:17 PM CDT -----  Potassium elevated. Please follow hyperkalemia protocol.

## 2023-04-19 NOTE — TELEPHONE ENCOUNTER
Patient notified and instructed via [facility]:    Your repeat potassium level is still high at 5.9.  you will need to take another dose of Kayexelate today and repeat lab tomorrow per our protocol , thanks.   sent the refill to your pharmacy. Thanks.

## 2023-04-20 ENCOUNTER — PATIENT MESSAGE (OUTPATIENT)
Dept: TRANSPLANT | Facility: CLINIC | Age: 63
End: 2023-04-20
Payer: COMMERCIAL

## 2023-04-20 DIAGNOSIS — Z94.4 LIVER REPLACED BY TRANSPLANT: Primary | ICD-10-CM

## 2023-04-20 NOTE — TELEPHONE ENCOUNTER
----- Message from Jay Abrams MD sent at 4/18/2023  8:13 PM CDT -----  Liver tests are stable. Please decrease tac to 4/3 and repeat labs in 2 weeks and then monthly x2 to monitor for rejection.    Potassium elevated. Please follow hyperkalemia protocol.

## 2023-04-20 NOTE — TELEPHONE ENCOUNTER
Patient notified and instructed via [facility]:    Per : Liver tests are stable. Please decrease tac to 4 in the morning and 3 in the evening, and repeat labs in 2 weeks(due 5/1/23) .

## 2023-04-21 ENCOUNTER — LAB VISIT (OUTPATIENT)
Dept: LAB | Facility: HOSPITAL | Age: 63
End: 2023-04-21
Attending: STUDENT IN AN ORGANIZED HEALTH CARE EDUCATION/TRAINING PROGRAM
Payer: COMMERCIAL

## 2023-04-21 DIAGNOSIS — E87.5 HYPERKALEMIA: ICD-10-CM

## 2023-04-21 LAB — POTASSIUM SERPL-SCNC: 5.2 MMOL/L (ref 3.5–5.1)

## 2023-04-21 PROCEDURE — 36415 COLL VENOUS BLD VENIPUNCTURE: CPT

## 2023-04-21 PROCEDURE — 84132 ASSAY OF SERUM POTASSIUM: CPT

## 2023-04-21 RX ORDER — TACROLIMUS 1 MG/1
CAPSULE ORAL
Qty: 210 CAPSULE | Refills: 11 | Status: SHIPPED | OUTPATIENT
Start: 2023-04-21 | End: 2023-07-13 | Stop reason: DRUGHIGH

## 2023-04-27 ENCOUNTER — PATIENT MESSAGE (OUTPATIENT)
Dept: TRANSPLANT | Facility: CLINIC | Age: 63
End: 2023-04-27
Payer: COMMERCIAL

## 2023-04-27 ENCOUNTER — TELEPHONE (OUTPATIENT)
Dept: TRANSPLANT | Facility: CLINIC | Age: 63
End: 2023-04-27
Payer: COMMERCIAL

## 2023-04-27 NOTE — TELEPHONE ENCOUNTER
Patient notified and instructed via [facility]:    Your potassium level of 4/21/23 was better.  Continue to follow low potassium diet. And repeat labs on 5/1/23 as planned. Thanks.

## 2023-04-27 NOTE — TELEPHONE ENCOUNTER
----- Message from Jay Abrams MD sent at 4/26/2023 12:33 PM CDT -----  Potassium elevated but improved. Please follow hyperkalemia protocol.

## 2023-05-01 DIAGNOSIS — Z94.4 LIVER REPLACED BY TRANSPLANT: Primary | ICD-10-CM

## 2023-05-02 ENCOUNTER — LAB VISIT (OUTPATIENT)
Dept: LAB | Facility: HOSPITAL | Age: 63
End: 2023-05-02
Attending: STUDENT IN AN ORGANIZED HEALTH CARE EDUCATION/TRAINING PROGRAM
Payer: COMMERCIAL

## 2023-05-02 DIAGNOSIS — Z94.4 LIVER REPLACED BY TRANSPLANT: ICD-10-CM

## 2023-05-02 LAB
ALBUMIN SERPL-MCNC: 3.8 G/DL (ref 3.4–4.8)
ALBUMIN/GLOB SERPL: 1.4 RATIO (ref 1.1–2)
ALP SERPL-CCNC: 104 UNIT/L (ref 40–150)
ALT SERPL-CCNC: 9 UNIT/L (ref 0–55)
AST SERPL-CCNC: 11 UNIT/L (ref 5–34)
BASOPHILS # BLD AUTO: 0.04 X10(3)/MCL
BASOPHILS NFR BLD AUTO: 0.8 %
BILIRUBIN DIRECT+TOT PNL SERPL-MCNC: 0.4 MG/DL
BUN SERPL-MCNC: 15 MG/DL (ref 8.4–25.7)
CALCIUM SERPL-MCNC: 9.2 MG/DL (ref 8.8–10)
CHLORIDE SERPL-SCNC: 110 MMOL/L (ref 98–107)
CO2 SERPL-SCNC: 25 MMOL/L (ref 23–31)
CREAT SERPL-MCNC: 0.79 MG/DL (ref 0.73–1.18)
EOSINOPHIL # BLD AUTO: 0.06 X10(3)/MCL (ref 0–0.9)
EOSINOPHIL NFR BLD AUTO: 1.2 %
ERYTHROCYTE [DISTWIDTH] IN BLOOD BY AUTOMATED COUNT: 13.4 % (ref 11.5–17)
GFR SERPLBLD CREATININE-BSD FMLA CKD-EPI: >60 MLS/MIN/1.73/M2
GLOBULIN SER-MCNC: 2.7 GM/DL (ref 2.4–3.5)
GLUCOSE SERPL-MCNC: 136 MG/DL (ref 82–115)
HCT VFR BLD AUTO: 34.5 % (ref 42–52)
HGB BLD-MCNC: 10.9 G/DL (ref 14–18)
IMM GRANULOCYTES # BLD AUTO: 0.01 X10(3)/MCL (ref 0–0.04)
IMM GRANULOCYTES NFR BLD AUTO: 0.2 %
LYMPHOCYTES # BLD AUTO: 0.73 X10(3)/MCL (ref 0.6–4.6)
LYMPHOCYTES NFR BLD AUTO: 14.2 %
MCH RBC QN AUTO: 29.5 PG (ref 27–31)
MCHC RBC AUTO-ENTMCNC: 31.6 G/DL (ref 33–36)
MCV RBC AUTO: 93.2 FL (ref 80–94)
MONOCYTES # BLD AUTO: 0.47 X10(3)/MCL (ref 0.1–1.3)
MONOCYTES NFR BLD AUTO: 9.1 %
NEUTROPHILS # BLD AUTO: 3.83 X10(3)/MCL (ref 2.1–9.2)
NEUTROPHILS NFR BLD AUTO: 74.5 %
NRBC BLD AUTO-RTO: 0 %
PLATELET # BLD AUTO: 183 X10(3)/MCL (ref 130–400)
PMV BLD AUTO: 10.4 FL (ref 7.4–10.4)
POTASSIUM SERPL-SCNC: 5.6 MMOL/L (ref 3.5–5.1)
PROT SERPL-MCNC: 6.5 GM/DL (ref 5.8–7.6)
RBC # BLD AUTO: 3.7 X10(6)/MCL (ref 4.7–6.1)
SODIUM SERPL-SCNC: 141 MMOL/L (ref 136–145)
WBC # SPEC AUTO: 5.14 X10(3)/MCL (ref 4.5–11.5)

## 2023-05-02 PROCEDURE — 80053 COMPREHEN METABOLIC PANEL: CPT

## 2023-05-02 PROCEDURE — 36415 COLL VENOUS BLD VENIPUNCTURE: CPT

## 2023-05-02 PROCEDURE — 85025 COMPLETE CBC W/AUTO DIFF WBC: CPT

## 2023-05-02 PROCEDURE — 80197 ASSAY OF TACROLIMUS: CPT | Mod: 90

## 2023-05-03 ENCOUNTER — PATIENT MESSAGE (OUTPATIENT)
Dept: TRANSPLANT | Facility: CLINIC | Age: 63
End: 2023-05-03
Payer: COMMERCIAL

## 2023-05-03 DIAGNOSIS — E87.5 HYPERKALEMIA: Primary | ICD-10-CM

## 2023-05-03 LAB — TACROLIMUS TROUGH BLD-MCNC: 5 NG/ML

## 2023-05-03 NOTE — TELEPHONE ENCOUNTER
Patient notified and instructed via [facility]:    Your Prograf level is still pending. But your potassium level is high again at 5.6. so you will need to take another dose of Kayexelate and repeat lab the next day(tomorrow) for a potassium level. Thanks.

## 2023-05-03 NOTE — TELEPHONE ENCOUNTER
----- Message from Jay Abrams MD sent at 5/3/2023  9:03 AM CDT -----  Liver tests are stable. Tac pending.    Potassium elevated. Please follow hyperkalemia protocol.

## 2023-05-04 ENCOUNTER — LAB VISIT (OUTPATIENT)
Dept: LAB | Facility: HOSPITAL | Age: 63
End: 2023-05-04
Attending: STUDENT IN AN ORGANIZED HEALTH CARE EDUCATION/TRAINING PROGRAM
Payer: COMMERCIAL

## 2023-05-04 ENCOUNTER — PATIENT MESSAGE (OUTPATIENT)
Dept: TRANSPLANT | Facility: CLINIC | Age: 63
End: 2023-05-04
Payer: COMMERCIAL

## 2023-05-04 DIAGNOSIS — E87.5 HYPERKALEMIA: ICD-10-CM

## 2023-05-04 LAB — POTASSIUM SERPL-SCNC: 4.9 MMOL/L (ref 3.5–5.1)

## 2023-05-04 PROCEDURE — 36415 COLL VENOUS BLD VENIPUNCTURE: CPT

## 2023-05-04 PROCEDURE — 84132 ASSAY OF SERUM POTASSIUM: CPT

## 2023-05-10 ENCOUNTER — TELEPHONE (OUTPATIENT)
Dept: TRANSPLANT | Facility: CLINIC | Age: 63
End: 2023-05-10
Payer: COMMERCIAL

## 2023-05-10 DIAGNOSIS — Z94.4 LIVER REPLACED BY TRANSPLANT: Primary | ICD-10-CM

## 2023-05-10 DIAGNOSIS — C22.0 HCC (HEPATOCELLULAR CARCINOMA): ICD-10-CM

## 2023-05-10 NOTE — TELEPHONE ENCOUNTER
----- Message from Jay Abrams MD sent at 5/10/2023 11:55 AM CDT -----  No changes in his immunosuppression. Please continue to monitor labs per transplant protocol.

## 2023-05-16 ENCOUNTER — TELEPHONE (OUTPATIENT)
Dept: TRANSPLANT | Facility: CLINIC | Age: 63
End: 2023-05-16
Payer: COMMERCIAL

## 2023-05-29 ENCOUNTER — LAB VISIT (OUTPATIENT)
Dept: LAB | Facility: HOSPITAL | Age: 63
End: 2023-05-29
Attending: STUDENT IN AN ORGANIZED HEALTH CARE EDUCATION/TRAINING PROGRAM
Payer: COMMERCIAL

## 2023-05-29 DIAGNOSIS — C22.0 HCC (HEPATOCELLULAR CARCINOMA): ICD-10-CM

## 2023-05-29 DIAGNOSIS — Z94.4 LIVER REPLACED BY TRANSPLANT: Primary | ICD-10-CM

## 2023-05-29 LAB
AFP-TM SERPL-MCNC: <2 NG/ML
ALBUMIN SERPL-MCNC: 3.8 G/DL (ref 3.4–4.8)
ALBUMIN/GLOB SERPL: 1.2 RATIO (ref 1.1–2)
ALP SERPL-CCNC: 89 UNIT/L (ref 40–150)
ALT SERPL-CCNC: 11 UNIT/L (ref 0–55)
AST SERPL-CCNC: 11 UNIT/L (ref 5–34)
BASOPHILS # BLD AUTO: 0.03 X10(3)/MCL
BASOPHILS NFR BLD AUTO: 0.6 %
BILIRUBIN DIRECT+TOT PNL SERPL-MCNC: 0.5 MG/DL
BUN SERPL-MCNC: 14.8 MG/DL (ref 8.4–25.7)
CALCIUM SERPL-MCNC: 9 MG/DL (ref 8.8–10)
CHLORIDE SERPL-SCNC: 110 MMOL/L (ref 98–107)
CO2 SERPL-SCNC: 23 MMOL/L (ref 23–31)
CREAT SERPL-MCNC: 0.74 MG/DL (ref 0.73–1.18)
EOSINOPHIL # BLD AUTO: 0.07 X10(3)/MCL (ref 0–0.9)
EOSINOPHIL NFR BLD AUTO: 1.4 %
ERYTHROCYTE [DISTWIDTH] IN BLOOD BY AUTOMATED COUNT: 13.3 % (ref 11.5–17)
GFR SERPLBLD CREATININE-BSD FMLA CKD-EPI: >60 MLS/MIN/1.73/M2
GLOBULIN SER-MCNC: 3.3 GM/DL (ref 2.4–3.5)
GLUCOSE SERPL-MCNC: 142 MG/DL (ref 82–115)
HCT VFR BLD AUTO: 35.7 % (ref 42–52)
HGB BLD-MCNC: 11.4 G/DL (ref 14–18)
IMM GRANULOCYTES # BLD AUTO: 0.02 X10(3)/MCL (ref 0–0.04)
IMM GRANULOCYTES NFR BLD AUTO: 0.4 %
LYMPHOCYTES # BLD AUTO: 0.72 X10(3)/MCL (ref 0.6–4.6)
LYMPHOCYTES NFR BLD AUTO: 14.7 %
MCH RBC QN AUTO: 30.3 PG (ref 27–31)
MCHC RBC AUTO-ENTMCNC: 31.9 G/DL (ref 33–36)
MCV RBC AUTO: 94.9 FL (ref 80–94)
MONOCYTES # BLD AUTO: 0.31 X10(3)/MCL (ref 0.1–1.3)
MONOCYTES NFR BLD AUTO: 6.3 %
NEUTROPHILS # BLD AUTO: 3.75 X10(3)/MCL (ref 2.1–9.2)
NEUTROPHILS NFR BLD AUTO: 76.6 %
NRBC BLD AUTO-RTO: 0 %
PLATELET # BLD AUTO: 163 X10(3)/MCL (ref 130–400)
PLATELET # BLD EST: NORMAL 10*3/UL
PMV BLD AUTO: 11.4 FL (ref 7.4–10.4)
POTASSIUM SERPL-SCNC: 4.7 MMOL/L (ref 3.5–5.1)
PROT SERPL-MCNC: 7.1 GM/DL (ref 5.8–7.6)
RBC # BLD AUTO: 3.76 X10(6)/MCL (ref 4.7–6.1)
RBC MORPH BLD: NORMAL
SODIUM SERPL-SCNC: 141 MMOL/L (ref 136–145)
WBC # SPEC AUTO: 4.9 X10(3)/MCL (ref 4.5–11.5)

## 2023-05-29 PROCEDURE — 80197 ASSAY OF TACROLIMUS: CPT

## 2023-05-29 PROCEDURE — 80053 COMPREHEN METABOLIC PANEL: CPT

## 2023-05-29 PROCEDURE — 36415 COLL VENOUS BLD VENIPUNCTURE: CPT

## 2023-05-29 PROCEDURE — 85025 COMPLETE CBC W/AUTO DIFF WBC: CPT

## 2023-05-29 PROCEDURE — 82105 ALPHA-FETOPROTEIN SERUM: CPT

## 2023-05-31 LAB — TACROLIMUS TROUGH BLD-MCNC: 4.1 NG/ML

## 2023-06-08 ENCOUNTER — TELEPHONE (OUTPATIENT)
Dept: TRANSPLANT | Facility: CLINIC | Age: 63
End: 2023-06-08
Payer: COMMERCIAL

## 2023-06-08 DIAGNOSIS — C22.0 HCC (HEPATOCELLULAR CARCINOMA): Primary | ICD-10-CM

## 2023-06-08 DIAGNOSIS — Z94.4 LIVER REPLACED BY TRANSPLANT: ICD-10-CM

## 2023-06-08 NOTE — TELEPHONE ENCOUNTER
----- Message from Jay Abrams MD sent at 6/7/2023  8:51 PM CDT -----  Liver tests are stable. No changes in his immunosuppression. Please continue to monitor labs per transplant protocol.

## 2023-06-20 ENCOUNTER — HOSPITAL ENCOUNTER (OUTPATIENT)
Dept: RADIOLOGY | Facility: HOSPITAL | Age: 63
Discharge: HOME | End: 2023-06-20
Attending: STUDENT IN AN ORGANIZED HEALTH CARE EDUCATION/TRAINING PROGRAM
Payer: MEDICARE

## 2023-06-20 ENCOUNTER — HOSPITAL ENCOUNTER (OUTPATIENT)
Dept: RADIOLOGY | Facility: HOSPITAL | Age: 63
Discharge: HOME | End: 2023-06-20
Attending: STUDENT IN AN ORGANIZED HEALTH CARE EDUCATION/TRAINING PROGRAM
Payer: COMMERCIAL

## 2023-06-20 DIAGNOSIS — C22.0 HCC (HEPATOCELLULAR CARCINOMA): ICD-10-CM

## 2023-06-20 PROCEDURE — 74160 CT ABDOMEN W/CONTRAST: CPT | Mod: TC

## 2023-06-20 PROCEDURE — 71260 CT THORAX DX C+: CPT | Mod: TC

## 2023-06-20 PROCEDURE — 25500020 PHARM REV CODE 255: Performed by: STUDENT IN AN ORGANIZED HEALTH CARE EDUCATION/TRAINING PROGRAM

## 2023-06-20 RX ADMIN — IOPAMIDOL 100 ML: 755 INJECTION, SOLUTION INTRAVENOUS at 08:06

## 2023-07-03 ENCOUNTER — LAB VISIT (OUTPATIENT)
Dept: LAB | Facility: HOSPITAL | Age: 63
End: 2023-07-03
Attending: STUDENT IN AN ORGANIZED HEALTH CARE EDUCATION/TRAINING PROGRAM
Payer: COMMERCIAL

## 2023-07-03 DIAGNOSIS — C22.0 HCC (HEPATOCELLULAR CARCINOMA): ICD-10-CM

## 2023-07-03 DIAGNOSIS — Z94.4 LIVER REPLACED BY TRANSPLANT: ICD-10-CM

## 2023-07-03 LAB
AFP-TM SERPL-MCNC: 2.1 NG/ML
ALBUMIN SERPL-MCNC: 3.8 G/DL (ref 3.4–4.8)
ALBUMIN/GLOB SERPL: 1.4 RATIO (ref 1.1–2)
ALP SERPL-CCNC: 85 UNIT/L (ref 40–150)
ALT SERPL-CCNC: 12 UNIT/L (ref 0–55)
AST SERPL-CCNC: 12 UNIT/L (ref 5–34)
BASOPHILS # BLD AUTO: 0.04 X10(3)/MCL
BASOPHILS NFR BLD AUTO: 0.8 %
BILIRUBIN DIRECT+TOT PNL SERPL-MCNC: 0.4 MG/DL
BUN SERPL-MCNC: 16.5 MG/DL (ref 8.4–25.7)
CALCIUM SERPL-MCNC: 9.2 MG/DL (ref 8.8–10)
CHLORIDE SERPL-SCNC: 108 MMOL/L (ref 98–107)
CO2 SERPL-SCNC: 27 MMOL/L (ref 23–31)
CREAT SERPL-MCNC: 0.79 MG/DL (ref 0.73–1.18)
EOSINOPHIL # BLD AUTO: 0.06 X10(3)/MCL (ref 0–0.9)
EOSINOPHIL NFR BLD AUTO: 1.3 %
ERYTHROCYTE [DISTWIDTH] IN BLOOD BY AUTOMATED COUNT: 13.4 % (ref 11.5–17)
GFR SERPLBLD CREATININE-BSD FMLA CKD-EPI: >60 MLS/MIN/1.73/M2
GLOBULIN SER-MCNC: 2.7 GM/DL (ref 2.4–3.5)
GLUCOSE SERPL-MCNC: 133 MG/DL (ref 82–115)
HCT VFR BLD AUTO: 36.6 % (ref 42–52)
HGB BLD-MCNC: 11.6 G/DL (ref 14–18)
IMM GRANULOCYTES # BLD AUTO: 0.01 X10(3)/MCL (ref 0–0.04)
IMM GRANULOCYTES NFR BLD AUTO: 0.2 %
LYMPHOCYTES # BLD AUTO: 0.71 X10(3)/MCL (ref 0.6–4.6)
LYMPHOCYTES NFR BLD AUTO: 14.8 %
MCH RBC QN AUTO: 30 PG (ref 27–31)
MCHC RBC AUTO-ENTMCNC: 31.7 G/DL (ref 33–36)
MCV RBC AUTO: 94.6 FL (ref 80–94)
MONOCYTES # BLD AUTO: 0.36 X10(3)/MCL (ref 0.1–1.3)
MONOCYTES NFR BLD AUTO: 7.5 %
NEUTROPHILS # BLD AUTO: 3.62 X10(3)/MCL (ref 2.1–9.2)
NEUTROPHILS NFR BLD AUTO: 75.4 %
NRBC BLD AUTO-RTO: 0 %
PLATELET # BLD AUTO: 189 X10(3)/MCL (ref 130–400)
PMV BLD AUTO: 10.5 FL (ref 7.4–10.4)
POTASSIUM SERPL-SCNC: 5.3 MMOL/L (ref 3.5–5.1)
PROT SERPL-MCNC: 6.5 GM/DL (ref 5.8–7.6)
RBC # BLD AUTO: 3.87 X10(6)/MCL (ref 4.7–6.1)
SODIUM SERPL-SCNC: 142 MMOL/L (ref 136–145)
WBC # SPEC AUTO: 4.8 X10(3)/MCL (ref 4.5–11.5)

## 2023-07-03 PROCEDURE — 36415 COLL VENOUS BLD VENIPUNCTURE: CPT

## 2023-07-03 PROCEDURE — 80053 COMPREHEN METABOLIC PANEL: CPT

## 2023-07-03 PROCEDURE — 85025 COMPLETE CBC W/AUTO DIFF WBC: CPT

## 2023-07-03 PROCEDURE — 82105 ALPHA-FETOPROTEIN SERUM: CPT

## 2023-07-03 PROCEDURE — 80197 ASSAY OF TACROLIMUS: CPT

## 2023-07-05 ENCOUNTER — PATIENT MESSAGE (OUTPATIENT)
Dept: TRANSPLANT | Facility: CLINIC | Age: 63
End: 2023-07-05
Payer: COMMERCIAL

## 2023-07-05 ENCOUNTER — TELEPHONE (OUTPATIENT)
Dept: TRANSPLANT | Facility: CLINIC | Age: 63
End: 2023-07-05
Payer: COMMERCIAL

## 2023-07-05 NOTE — TELEPHONE ENCOUNTER
----- Message from Jay Abrams MD sent at 7/4/2023  4:35 PM CDT -----  Reviewed. No evidence of recurrent liver cancer.

## 2023-07-05 NOTE — TELEPHONE ENCOUNTER
Message sent to patient via [facility]:    Your CT scan was reviewed by :  No evidence of recurrent liver cancer.

## 2023-07-07 ENCOUNTER — PATIENT MESSAGE (OUTPATIENT)
Dept: TRANSPLANT | Facility: CLINIC | Age: 63
End: 2023-07-07
Payer: COMMERCIAL

## 2023-07-07 ENCOUNTER — TELEPHONE (OUTPATIENT)
Dept: TRANSPLANT | Facility: CLINIC | Age: 63
End: 2023-07-07
Payer: COMMERCIAL

## 2023-07-07 DIAGNOSIS — Z94.4 LIVER REPLACED BY TRANSPLANT: Primary | ICD-10-CM

## 2023-07-07 LAB — TACROLIMUS TROUGH BLD-MCNC: 11.9 NG/ML

## 2023-07-07 NOTE — TELEPHONE ENCOUNTER
Patient notified and instructed via [facility]:     Per : Liver tests are stable. Tacrolimus  level cancelled. Please repeat labs in 2 weeks.(Due 7/17/23)     Potassium level elevated at 5.3.  please follow a low potassium diet (avoid foods such as: watermelon, oranges, bananas, tomatoes, potatoes, cabbage, beans, nuts, chocolate, avacodes, sports drinks(ie: gatorade/powerade).   Thanks.

## 2023-07-07 NOTE — TELEPHONE ENCOUNTER
----- Message from Jay Abrams MD sent at 7/7/2023  9:33 AM CDT -----  Liver tests are stable. Tac level cancelled. Please repeat labs in 2 weeks.    Potassium elevated. Please follow hyperkalemia protocol.

## 2023-07-13 ENCOUNTER — PATIENT MESSAGE (OUTPATIENT)
Dept: TRANSPLANT | Facility: CLINIC | Age: 63
End: 2023-07-13
Payer: COMMERCIAL

## 2023-07-13 NOTE — TELEPHONE ENCOUNTER
----- Message from Jay Abrams MD sent at 7/12/2023  7:46 AM CDT -----  Please decrease tac to 3/3 and repeat labs in 2 weeks and then monthly x2 to monitor for rejection.

## 2023-07-13 NOTE — TELEPHONE ENCOUNTER
Message sent to patient via [facility]:    Per :  Please decrease Prograf dose to 3mg twice a day, and repeat labs due 7/17/23 as scheduled. Thanks.

## 2023-07-16 RX ORDER — TACROLIMUS 1 MG/1
CAPSULE ORAL
Qty: 180 CAPSULE | Refills: 11 | Status: SHIPPED | OUTPATIENT
Start: 2023-07-16 | End: 2023-11-08 | Stop reason: DRUGHIGH

## 2023-07-17 ENCOUNTER — LAB VISIT (OUTPATIENT)
Dept: LAB | Facility: HOSPITAL | Age: 63
End: 2023-07-17
Attending: STUDENT IN AN ORGANIZED HEALTH CARE EDUCATION/TRAINING PROGRAM
Payer: COMMERCIAL

## 2023-07-17 DIAGNOSIS — Z94.4 LIVER REPLACED BY TRANSPLANT: ICD-10-CM

## 2023-07-17 LAB
ALBUMIN SERPL-MCNC: 3.8 G/DL (ref 3.4–4.8)
ALBUMIN/GLOB SERPL: 1.4 RATIO (ref 1.1–2)
ALP SERPL-CCNC: 85 UNIT/L (ref 40–150)
ALT SERPL-CCNC: 11 UNIT/L (ref 0–55)
AST SERPL-CCNC: 11 UNIT/L (ref 5–34)
BASOPHILS # BLD AUTO: 0.05 X10(3)/MCL
BASOPHILS NFR BLD AUTO: 0.9 %
BILIRUBIN DIRECT+TOT PNL SERPL-MCNC: 0.5 MG/DL
BUN SERPL-MCNC: 20.5 MG/DL (ref 8.4–25.7)
CALCIUM SERPL-MCNC: 9.1 MG/DL (ref 8.8–10)
CHLORIDE SERPL-SCNC: 107 MMOL/L (ref 98–107)
CO2 SERPL-SCNC: 25 MMOL/L (ref 23–31)
CREAT SERPL-MCNC: 0.8 MG/DL (ref 0.73–1.18)
EOSINOPHIL # BLD AUTO: 0.07 X10(3)/MCL (ref 0–0.9)
EOSINOPHIL NFR BLD AUTO: 1.3 %
ERYTHROCYTE [DISTWIDTH] IN BLOOD BY AUTOMATED COUNT: 13.5 % (ref 11.5–17)
GFR SERPLBLD CREATININE-BSD FMLA CKD-EPI: >60 MLS/MIN/1.73/M2
GLOBULIN SER-MCNC: 2.7 GM/DL (ref 2.4–3.5)
GLUCOSE SERPL-MCNC: 132 MG/DL (ref 82–115)
HCT VFR BLD AUTO: 35.7 % (ref 42–52)
HGB BLD-MCNC: 11.7 G/DL (ref 14–18)
IMM GRANULOCYTES # BLD AUTO: 0.01 X10(3)/MCL (ref 0–0.04)
IMM GRANULOCYTES NFR BLD AUTO: 0.2 %
LYMPHOCYTES # BLD AUTO: 0.87 X10(3)/MCL (ref 0.6–4.6)
LYMPHOCYTES NFR BLD AUTO: 15.8 %
MCH RBC QN AUTO: 30.2 PG (ref 27–31)
MCHC RBC AUTO-ENTMCNC: 32.8 G/DL (ref 33–36)
MCV RBC AUTO: 92 FL (ref 80–94)
MONOCYTES # BLD AUTO: 0.43 X10(3)/MCL (ref 0.1–1.3)
MONOCYTES NFR BLD AUTO: 7.8 %
NEUTROPHILS # BLD AUTO: 4.06 X10(3)/MCL (ref 2.1–9.2)
NEUTROPHILS NFR BLD AUTO: 74 %
NRBC BLD AUTO-RTO: 0 %
PLATELET # BLD AUTO: 190 X10(3)/MCL (ref 130–400)
PMV BLD AUTO: 10.5 FL (ref 7.4–10.4)
POTASSIUM SERPL-SCNC: 5.1 MMOL/L (ref 3.5–5.1)
PROT SERPL-MCNC: 6.5 GM/DL (ref 5.8–7.6)
RBC # BLD AUTO: 3.88 X10(6)/MCL (ref 4.7–6.1)
SODIUM SERPL-SCNC: 141 MMOL/L (ref 136–145)
WBC # SPEC AUTO: 5.49 X10(3)/MCL (ref 4.5–11.5)

## 2023-07-17 PROCEDURE — 80197 ASSAY OF TACROLIMUS: CPT

## 2023-07-17 PROCEDURE — 36415 COLL VENOUS BLD VENIPUNCTURE: CPT

## 2023-07-17 PROCEDURE — 80053 COMPREHEN METABOLIC PANEL: CPT

## 2023-07-17 PROCEDURE — 85025 COMPLETE CBC W/AUTO DIFF WBC: CPT

## 2023-07-18 LAB — TACROLIMUS TROUGH BLD-MCNC: 4.2 NG/ML

## 2023-07-20 ENCOUNTER — TELEPHONE (OUTPATIENT)
Dept: TRANSPLANT | Facility: CLINIC | Age: 63
End: 2023-07-20
Payer: COMMERCIAL

## 2023-07-20 DIAGNOSIS — Z94.4 LIVER REPLACED BY TRANSPLANT: Primary | ICD-10-CM

## 2023-07-20 NOTE — TELEPHONE ENCOUNTER
----- Message from Jay Abrams MD sent at 7/19/2023  9:34 AM CDT -----  Liver tests are stable. No changes in his immunosuppression.

## 2023-07-31 ENCOUNTER — OFFICE VISIT (OUTPATIENT)
Dept: TRANSPLANT | Facility: CLINIC | Age: 63
End: 2023-07-31
Payer: COMMERCIAL

## 2023-07-31 DIAGNOSIS — Z79.60 LONG-TERM USE OF IMMUNOSUPPRESSANT MEDICATION: ICD-10-CM

## 2023-07-31 DIAGNOSIS — Z86.19 HISTORY OF HEPATITIS C: ICD-10-CM

## 2023-07-31 DIAGNOSIS — Z85.05 HISTORY OF HEPATOCELLULAR CARCINOMA: ICD-10-CM

## 2023-07-31 DIAGNOSIS — Z94.4 LIVER TRANSPLANTED: Primary | ICD-10-CM

## 2023-07-31 PROCEDURE — 1160F RVW MEDS BY RX/DR IN RCRD: CPT | Mod: CPTII,95,, | Performed by: STUDENT IN AN ORGANIZED HEALTH CARE EDUCATION/TRAINING PROGRAM

## 2023-07-31 PROCEDURE — 1160F PR REVIEW ALL MEDS BY PRESCRIBER/CLIN PHARMACIST DOCUMENTED: ICD-10-PCS | Mod: CPTII,95,, | Performed by: STUDENT IN AN ORGANIZED HEALTH CARE EDUCATION/TRAINING PROGRAM

## 2023-07-31 PROCEDURE — 99213 PR OFFICE/OUTPT VISIT, EST, LEVL III, 20-29 MIN: ICD-10-PCS | Mod: 95,,, | Performed by: STUDENT IN AN ORGANIZED HEALTH CARE EDUCATION/TRAINING PROGRAM

## 2023-07-31 PROCEDURE — 1159F MED LIST DOCD IN RCRD: CPT | Mod: CPTII,95,, | Performed by: STUDENT IN AN ORGANIZED HEALTH CARE EDUCATION/TRAINING PROGRAM

## 2023-07-31 PROCEDURE — 99213 OFFICE O/P EST LOW 20 MIN: CPT | Mod: 95,,, | Performed by: STUDENT IN AN ORGANIZED HEALTH CARE EDUCATION/TRAINING PROGRAM

## 2023-07-31 PROCEDURE — 1159F PR MEDICATION LIST DOCUMENTED IN MEDICAL RECORD: ICD-10-PCS | Mod: CPTII,95,, | Performed by: STUDENT IN AN ORGANIZED HEALTH CARE EDUCATION/TRAINING PROGRAM

## 2023-07-31 NOTE — LETTER
August 2, 2023        Nitin Farmer  [address]  [address]  [facility] [address]  Phone: [phone]  Fax: [phone]             [facility]  [address]  [facility] [address]  Phone: [phone]   Patient: Eder Kong   MR Number: [id number]   YOB: 1960   Date of Visit: 7/31/2023       Dear Dr. Nitin Farmer    Thank you for referring Eder Kong to me for evaluation. Attached you will find relevant portions of my assessment and plan of care.    If you have questions, please do not hesitate to call me. I look forward to following Eder Kong along with you.    Sincerely,    Jay Abrams MD    Enclosure    If you would like to receive this communication electronically, please contact [email or web address] or [phone] to request [employer] [employer] access.    [facility] is a tool which provides read-only access to select patient information with whom you have a relationship. Its easy to use and provides real time access to review your patients record including encounter summaries, notes, results, and demographic information.    If you feel you have received this communication in error or would no longer like to receive these types of communications, please e-mail [email or web address]

## 2023-07-31 NOTE — PROGRESS NOTES
Transplant Hepatology  Liver Transplant Recipient Follow Up    The patient location is: home ([place])  The chief complaint leading to consultation is: follow up, OLT    Visit type: audiovisual    Face to Face time with patient: 10  20 minutes of total time spent on the encounter, which includes face to face time and non-face to face time preparing to see the patient (eg, review of tests), Obtaining and/or reviewing separately obtained history, Documenting clinical information in the electronic or other health record, Independently interpreting results (not separately reported) and communicating results to the patient/family/caregiver, or Care coordination (not separately reported).     Each patient to whom he or she provides medical services by telemedicine is:  (1) informed of the relationship between the physician and patient and the respective role of any other health care provider with respect to management of the patient; and (2) notified that he or she may decline to receive medical services by telemedicine and may withdraw from such care at any time.    PCP: Alexsandra Sandoval MD    Transplant History  Transplant Date: 1/23/2022  UNOS Native Liver Dx: Primary Liver Malignancy: Hepatoma (HCC) and Cirrhosis    ORGAN: LIVER  Whole or Partial: whole liver  Donor Type: donation after circulatory death   CDC High Risk: yes  Donor CMV Status: Negative  Donor HCV Status: Negative  Donor HBcAb: Negative  Donor HBV SHIVANI: Negative  Donor HCV SHIVANI: Negative  Biliary Anastomosis: end to end  Arterial Anatomy: standard  IVC reconstruction: end to end ivc  Portal vein status: patent    He has had the following complications since transplant: hepatic artery stenosis s/p angioplasty and stenting 05/2022 with IR    Chief complaint: follow up, history of OLT    HPI:  Eder Kong is a 62 y.o. male with history of OLT in 01/2022 for hepatitis C related cirrhosis and HCC who presents for scheduled follow up.     He is without complaints  today. No recent hospitalizations or ED visits. Compliant with Prograf. Still has intermittent diarrhea though it has improved. He denies recent fever, chills, nausea, vomiting, headache, tremors.    Past Medical History:   Diagnosis Date    Anemia     Arthritis     At risk for opportunistic infections 2022    Diabetes     Dizziness     Dyslipidemia     General anesthetics causing adverse effect in therapeutic use     Hep C w/o coma, chronic     failed 2 rounds of interferon-based medication. no protease inhibitorrs used    Hypertension     not on medication    Kidney stone     Liver cancer     Liver transplant candidate        Past Surgical History:   Procedure Laterality Date    AORTIC VALVE REPLACEMENT      due to aortic regurgitation    CARDIAC SURGERY      CATHETERIZATION OF BOTH LEFT AND RIGHT HEART N/A 2021    Procedure: CATHETERIZATION, HEART, BOTH LEFT AND RIGHT;  Surgeon: Eder Beltran MD;  Location: [facility] CATH LAB;  Service: Cardiology;  Laterality: N/A;    COLONOSCOPY W/ POLYPECTOMY          ESOPHAGOGASTRODUODENOSCOPY      x2 with cautery in ,     LIVER TRANSPLANT N/A 2022    Procedure: TRANSPLANT, LIVER;  Surgeon: Jesús Austin MD;  Location: [facility] OR [address] [place] [address];  Service: Transplant;  Laterality: N/A;    mastoid bone      right ear       Family History   Problem Relation Age of Onset    Diabetes Sister     Diabetes Brother        Social History     Tobacco Use    Smoking status: Former     Current packs/day: 0.00     Average packs/day: 1 pack/day for 25.0 years (25.0 ttl pk-yrs)     Types: Cigarettes     Start date: 1974     Quit date: 1999     Years since quittin.1    Smokeless tobacco: Never   Substance Use Topics    Alcohol use: No     Comment: quit     Drug use: No       Current Outpatient Medications   Medication Sig Dispense Refill    aspirin (ECOTRIN) 81 MG EC tablet Take 1 tablet (81 mg total) by mouth once daily. 30 tablet 5    aspirin  "(ECOTRIN) 81 MG EC tablet Take 81 mg by mouth once daily.      BD ULTRA-FINE MINI PEN NEEDLE 31 gauge x 3/16" Ndle USE AS DIRECTED EVERY DAY      calcium carbonate (OS-EVELYN) 600 mg calcium (1,500 mg) Tab Take 600 mg by mouth once.      calcium carbonate-vitamin D3 600 mg-20 mcg (800 unit) Tab Take 1 tablet by mouth once daily. 30 tablet 5    carvediloL (COREG) 12.5 MG tablet Take 12.5 mg by mouth 2 (two) times daily with meals.      clopidogreL (PLAVIX) 75 mg tablet take 1 tablet (75 mg) by oral route once daily 90 tablet 0    clopidogreL (PLAVIX) 75 mg tablet Take 75 mg by mouth once daily.      CONTOUR NEXT TEST STRIPS Strp USE TO TEST BLOOD GLUCOSE TWICE DAILY      EScitalopram oxalate (LEXAPRO) 5 MG Tab Take 1 tablet by mouth once daily 90 tablet 1    EScitalopram oxalate (LEXAPRO) 5 MG Tab Take 5 mg by mouth once daily.      famotidine (PEPCID) 20 MG tablet Take 20 mg by mouth 2 (two) times daily.      insulin glargine, TOUJEO, (TOUJEO) 300 unit/mL (1.5 mL) InPn pen Inject into the skin once daily.      OZEMPIC 1 mg/dose (4 mg/3 mL) Inject 1 mg into the skin once a week.      pen needle, diabetic 31 gauge x 5/16" Ndle Use as directed 100 each 0    semaglutide (OZEMPIC) 1 mg/dose (2 mg/1.5 mL) PnIj Inject into the skin every 7 days.      tacrolimus (PROGRAF) 1 MG Cap Take 3 capsules (3 mg total) by mouth every morning AND 3 capsules (3 mg total) every evening. 180 capsule 11    TOUJEO MAX U-300 SOLOSTAR 300 unit/mL (3 mL) insulin pen Inject 10 Units into the skin once daily. Check BG before meals and at bedtime. If fasting BG is > 120 and no more hypoglycemic event, then start insulin Toujeo 10 units daily. 3 pen 11     No current facility-administered medications for this visit.       Review of patient's allergies indicates:  No Known Allergies    Review of Systems   Constitutional:  Negative for fever and weight loss.   Gastrointestinal:  Positive for diarrhea. Negative for abdominal pain, blood in stool, " constipation, heartburn, melena, nausea and vomiting.   Neurological:  Negative for tremors and headaches.       There were no vitals filed for this visit.    Physical Exam  Constitutional:       General: He is not in acute distress.     Appearance: He is not ill-appearing.   HENT:      Head: Normocephalic and atraumatic.   Eyes:      General: No scleral icterus.     Extraocular Movements: Extraocular movements intact.   Pulmonary:      Effort: No respiratory distress.   Skin:     Coloration: Skin is not jaundiced.   Neurological:      Mental Status: He is alert.         LABS:  Lab Results   Component Value Date    WBC 5.49 07/17/2023    HGB 11.7 (L) 07/17/2023    HCT 35.7 (L) 07/17/2023    MCV 92.0 07/17/2023     07/17/2023       Lab Results   Component Value Date     07/17/2023    K 5.1 07/17/2023     01/23/2023    CO2 25 07/17/2023    BUN 20.5 07/17/2023    CREATININE 0.80 07/17/2023    CALCIUM 9.1 07/17/2023    ANIONGAP 8 01/23/2023    ESTGFRAFRICA >60.0 05/03/2022    EGFRNONAA >60 07/18/2022       Lab Results   Component Value Date    ALT 11 07/17/2023    AST 11 07/17/2023    GGT 50 06/17/2021    ALKPHOS 85 07/17/2023    BILITOT 0.5 07/17/2023       Lab Results   Component Value Date    TACROLIMUS 17.0 (H) 01/23/2023       Assessment:  62 y.o. male with history of OLT in 01/2022 for hepatitis C related cirrhosis and HCC who presents for scheduled follow up.    1. Liver transplanted    2. Long-term use of immunosuppressant medication    3. History of hepatocellular carcinoma    4. History of hepatitis C        Recommendations:  Allograft Function  - Excellent graft function with normal LFTs    Immunosuppression   - Continue Prograf 3mg twice daily     History of HCC: Cross-sectional imaging in 03/2023 without evidence of recurrence. Continue imaging surveillance per protocol.    Hepatic artery stenosis: He is s/p angioplasty and stenting 05/2022 with IR    History of HCV: He has completed  treatment with SVR.    Kidney function   - Creatinine 0.8  - Avoid NSAIDs as able and maintain adequate hydration    Health Maintenance/Screening:  - Recommend age appropriate cancer screening  - Skin cancer: Recommend use of sunscreen SPF 30 or higher, hat and sunglasses while outside, dermatologist visit annually or sooner if any concerning lesions.  - Osteoporosis: Recommend bone density testing every 2-3 years if previously normal or annually if previously abnormal.    Return to clinic in 6 months.    UNOS Patient Status  Functional Status: 100% - Normal, no complaints, no evidence of disease  Physical Capacity: No Limitations    Jay Abrams MD  Staff Physician  Hepatology and Liver Transplant  [facility]  [facility] [place]

## 2023-08-10 ENCOUNTER — PATIENT OUTREACH (OUTPATIENT)
Dept: ADMINISTRATIVE | Facility: HOSPITAL | Age: 63
End: 2023-08-10
Payer: COMMERCIAL

## 2023-08-10 NOTE — PROGRESS NOTES
Population Health. Out Reach. Reviewing patient's chart for quality metrics.  The following record(s)  below were uploaded for Health Maintenance .    COLONOSCOPY     2/12/2018                                                  Discharged

## 2023-08-21 ENCOUNTER — LAB VISIT (OUTPATIENT)
Dept: LAB | Facility: HOSPITAL | Age: 63
End: 2023-08-21
Attending: STUDENT IN AN ORGANIZED HEALTH CARE EDUCATION/TRAINING PROGRAM
Payer: COMMERCIAL

## 2023-08-21 DIAGNOSIS — Z94.4 LIVER REPLACED BY TRANSPLANT: ICD-10-CM

## 2023-08-21 LAB
ALBUMIN SERPL-MCNC: 3.7 G/DL (ref 3.4–4.8)
ALBUMIN/GLOB SERPL: 1.3 RATIO (ref 1.1–2)
ALP SERPL-CCNC: 86 UNIT/L (ref 40–150)
ALT SERPL-CCNC: 16 UNIT/L (ref 0–55)
AST SERPL-CCNC: 14 UNIT/L (ref 5–34)
BASOPHILS # BLD AUTO: 0.03 X10(3)/MCL
BASOPHILS NFR BLD AUTO: 0.5 %
BILIRUB SERPL-MCNC: 0.5 MG/DL
BUN SERPL-MCNC: 17.5 MG/DL (ref 8.4–25.7)
CALCIUM SERPL-MCNC: 9.3 MG/DL (ref 8.8–10)
CHLORIDE SERPL-SCNC: 110 MMOL/L (ref 98–107)
CO2 SERPL-SCNC: 25 MMOL/L (ref 23–31)
CREAT SERPL-MCNC: 0.81 MG/DL (ref 0.73–1.18)
EOSINOPHIL # BLD AUTO: 0.11 X10(3)/MCL (ref 0–0.9)
EOSINOPHIL NFR BLD AUTO: 1.9 %
ERYTHROCYTE [DISTWIDTH] IN BLOOD BY AUTOMATED COUNT: 13.2 % (ref 11.5–17)
GFR SERPLBLD CREATININE-BSD FMLA CKD-EPI: >60 MLS/MIN/1.73/M2
GLOBULIN SER-MCNC: 2.9 GM/DL (ref 2.4–3.5)
GLUCOSE SERPL-MCNC: 125 MG/DL (ref 82–115)
HCT VFR BLD AUTO: 34.6 % (ref 42–52)
HGB BLD-MCNC: 11.5 G/DL (ref 14–18)
IMM GRANULOCYTES # BLD AUTO: 0.05 X10(3)/MCL (ref 0–0.04)
IMM GRANULOCYTES NFR BLD AUTO: 0.8 %
LYMPHOCYTES # BLD AUTO: 0.94 X10(3)/MCL (ref 0.6–4.6)
LYMPHOCYTES NFR BLD AUTO: 15.9 %
MCH RBC QN AUTO: 30.6 PG (ref 27–31)
MCHC RBC AUTO-ENTMCNC: 33.2 G/DL (ref 33–36)
MCV RBC AUTO: 92 FL (ref 80–94)
MONOCYTES # BLD AUTO: 0.45 X10(3)/MCL (ref 0.1–1.3)
MONOCYTES NFR BLD AUTO: 7.6 %
NEUTROPHILS # BLD AUTO: 4.35 X10(3)/MCL (ref 2.1–9.2)
NEUTROPHILS NFR BLD AUTO: 73.3 %
NRBC BLD AUTO-RTO: 0 %
PLATELET # BLD AUTO: 180 X10(3)/MCL (ref 130–400)
PMV BLD AUTO: 10.2 FL (ref 7.4–10.4)
POTASSIUM SERPL-SCNC: 5.4 MMOL/L (ref 3.5–5.1)
PROT SERPL-MCNC: 6.6 GM/DL (ref 5.8–7.6)
RBC # BLD AUTO: 3.76 X10(6)/MCL (ref 4.7–6.1)
SODIUM SERPL-SCNC: 141 MMOL/L (ref 136–145)
WBC # SPEC AUTO: 5.93 X10(3)/MCL (ref 4.5–11.5)

## 2023-08-21 PROCEDURE — 80197 ASSAY OF TACROLIMUS: CPT

## 2023-08-21 PROCEDURE — 36415 COLL VENOUS BLD VENIPUNCTURE: CPT

## 2023-08-21 PROCEDURE — 80053 COMPREHEN METABOLIC PANEL: CPT

## 2023-08-21 PROCEDURE — 85025 COMPLETE CBC W/AUTO DIFF WBC: CPT

## 2023-08-22 LAB — TACROLIMUS TROUGH BLD-MCNC: 3.7 NG/ML

## 2023-08-27 ENCOUNTER — PATIENT MESSAGE (OUTPATIENT)
Dept: TRANSPLANT | Facility: CLINIC | Age: 63
End: 2023-08-27
Payer: COMMERCIAL

## 2023-08-30 ENCOUNTER — TELEPHONE (OUTPATIENT)
Dept: TRANSPLANT | Facility: CLINIC | Age: 63
End: 2023-08-30
Payer: COMMERCIAL

## 2023-08-30 ENCOUNTER — PATIENT MESSAGE (OUTPATIENT)
Dept: TRANSPLANT | Facility: CLINIC | Age: 63
End: 2023-08-30
Payer: COMMERCIAL

## 2023-08-30 DIAGNOSIS — Z94.4 LIVER REPLACED BY TRANSPLANT: Primary | ICD-10-CM

## 2023-08-30 NOTE — TELEPHONE ENCOUNTER
----- Message from Jay Abrams MD sent at 8/30/2023  8:47 AM CDT -----  Liver tests are stable. No changes in his immunosuppression. Please continue to monitor labs per transplant protocol.    Potassium elevated. Please follow hyperkalemia protocol.

## 2023-08-30 NOTE — TELEPHONE ENCOUNTER
Patient notified and instructed via [facility]:    Your labs have been reviewed by ; results stable. No medicine changes made. Repeat labs due on 9/18/23.   Please follow low potassium diet as previously discussed:  avoid high potassium foods (like: oranges, bananas, watermelon, potatoes, tomatoes, beans, nuts, chocolate, sports drinks- like gatorade, powerade, or electrolyte rahman). Thanks.

## 2023-09-18 ENCOUNTER — LAB VISIT (OUTPATIENT)
Dept: LAB | Facility: HOSPITAL | Age: 63
End: 2023-09-18
Attending: STUDENT IN AN ORGANIZED HEALTH CARE EDUCATION/TRAINING PROGRAM
Payer: COMMERCIAL

## 2023-09-18 DIAGNOSIS — Z94.4 LIVER REPLACED BY TRANSPLANT: ICD-10-CM

## 2023-09-18 LAB
ALBUMIN SERPL-MCNC: 3.7 G/DL (ref 3.4–4.8)
ALBUMIN/GLOB SERPL: 1.3 RATIO (ref 1.1–2)
ALP SERPL-CCNC: 105 UNIT/L (ref 40–150)
ALT SERPL-CCNC: 19 UNIT/L (ref 0–55)
AST SERPL-CCNC: 16 UNIT/L (ref 5–34)
BASOPHILS # BLD AUTO: 0.04 X10(3)/MCL
BASOPHILS NFR BLD AUTO: 0.8 %
BILIRUB SERPL-MCNC: 0.3 MG/DL
BUN SERPL-MCNC: 18.3 MG/DL (ref 8.4–25.7)
CALCIUM SERPL-MCNC: 9 MG/DL (ref 8.8–10)
CHLORIDE SERPL-SCNC: 108 MMOL/L (ref 98–107)
CO2 SERPL-SCNC: 24 MMOL/L (ref 23–31)
CREAT SERPL-MCNC: 0.8 MG/DL (ref 0.73–1.18)
EOSINOPHIL # BLD AUTO: 0.13 X10(3)/MCL (ref 0–0.9)
EOSINOPHIL NFR BLD AUTO: 2.7 %
ERYTHROCYTE [DISTWIDTH] IN BLOOD BY AUTOMATED COUNT: 13.2 % (ref 11.5–17)
GFR SERPLBLD CREATININE-BSD FMLA CKD-EPI: >60 MLS/MIN/1.73/M2
GLOBULIN SER-MCNC: 2.8 GM/DL (ref 2.4–3.5)
GLUCOSE SERPL-MCNC: 120 MG/DL (ref 82–115)
HCT VFR BLD AUTO: 35 % (ref 42–52)
HGB BLD-MCNC: 11.2 G/DL (ref 14–18)
IMM GRANULOCYTES # BLD AUTO: 0.02 X10(3)/MCL (ref 0–0.04)
IMM GRANULOCYTES NFR BLD AUTO: 0.4 %
LYMPHOCYTES # BLD AUTO: 0.87 X10(3)/MCL (ref 0.6–4.6)
LYMPHOCYTES NFR BLD AUTO: 18 %
MCH RBC QN AUTO: 30.3 PG (ref 27–31)
MCHC RBC AUTO-ENTMCNC: 32 G/DL (ref 33–36)
MCV RBC AUTO: 94.6 FL (ref 80–94)
MONOCYTES # BLD AUTO: 0.38 X10(3)/MCL (ref 0.1–1.3)
MONOCYTES NFR BLD AUTO: 7.9 %
NEUTROPHILS # BLD AUTO: 3.4 X10(3)/MCL (ref 2.1–9.2)
NEUTROPHILS NFR BLD AUTO: 70.2 %
NRBC BLD AUTO-RTO: 0 %
PLATELET # BLD AUTO: 182 X10(3)/MCL (ref 130–400)
PMV BLD AUTO: 10.4 FL (ref 7.4–10.4)
POTASSIUM SERPL-SCNC: 5.3 MMOL/L (ref 3.5–5.1)
PROT SERPL-MCNC: 6.5 GM/DL (ref 5.8–7.6)
RBC # BLD AUTO: 3.7 X10(6)/MCL (ref 4.7–6.1)
SODIUM SERPL-SCNC: 138 MMOL/L (ref 136–145)
WBC # SPEC AUTO: 4.84 X10(3)/MCL (ref 4.5–11.5)

## 2023-09-18 PROCEDURE — 85025 COMPLETE CBC W/AUTO DIFF WBC: CPT

## 2023-09-18 PROCEDURE — 80197 ASSAY OF TACROLIMUS: CPT

## 2023-09-18 PROCEDURE — 36415 COLL VENOUS BLD VENIPUNCTURE: CPT

## 2023-09-18 PROCEDURE — 80053 COMPREHEN METABOLIC PANEL: CPT

## 2023-09-19 LAB — TACROLIMUS TROUGH BLD-MCNC: 3.8 NG/ML

## 2023-09-27 ENCOUNTER — PATIENT MESSAGE (OUTPATIENT)
Dept: TRANSPLANT | Facility: CLINIC | Age: 63
End: 2023-09-27
Payer: COMMERCIAL

## 2023-09-27 ENCOUNTER — TELEPHONE (OUTPATIENT)
Dept: TRANSPLANT | Facility: CLINIC | Age: 63
End: 2023-09-27
Payer: COMMERCIAL

## 2023-09-27 DIAGNOSIS — C22.0 HCC (HEPATOCELLULAR CARCINOMA): Primary | ICD-10-CM

## 2023-09-27 DIAGNOSIS — Z94.4 LIVER REPLACED BY TRANSPLANT: ICD-10-CM

## 2023-09-27 NOTE — TELEPHONE ENCOUNTER
----- Message from Jay Abrams MD sent at 9/26/2023  9:30 PM CDT -----  Liver tests are stable. No changes in his immunosuppression. Please continue to monitor labs per transplant protocol.    Potassium elevated. Please follow hyperkalemia protocol.

## 2023-09-27 NOTE — TELEPHONE ENCOUNTER
Message sent to patient via [facility] :    Your labs have been reviewed by ; no medicine changes made. Your potassium level was a little bit elevated (5.3) : please avoid foods that are high in potassium such as (oranges/ bananas/ potatoes/ tomatoes/ watermelon/ avacados/ nuts/ beans/ cabbage/ sports drinks (such as gatorade/powerade/ electrolyte rahman)). Repeat labs due on 10/30/23.  You are due for your follow up CT scans : please call [facility] Radiology department to schedule them - the orders have been faxed to them. Thanks.

## 2023-10-03 DIAGNOSIS — C22.0 CARCINOMA OF LIVER: Primary | ICD-10-CM

## 2023-10-19 ENCOUNTER — HOSPITAL ENCOUNTER (OUTPATIENT)
Dept: RADIOLOGY | Facility: HOSPITAL | Age: 63
Discharge: HOME | End: 2023-10-19
Attending: STUDENT IN AN ORGANIZED HEALTH CARE EDUCATION/TRAINING PROGRAM
Payer: MEDICARE

## 2023-10-19 DIAGNOSIS — C22.0 CARCINOMA OF LIVER: ICD-10-CM

## 2023-10-19 LAB
CREAT SERPL-MCNC: 0.9 MG/DL (ref 0.5–1.4)
SAMPLE: NORMAL

## 2023-10-19 PROCEDURE — 71250 CT THORAX DX C-: CPT | Mod: TC

## 2023-10-19 PROCEDURE — 25500020 PHARM REV CODE 255: Performed by: STUDENT IN AN ORGANIZED HEALTH CARE EDUCATION/TRAINING PROGRAM

## 2023-10-19 PROCEDURE — 74170 CT ABD WO CNTRST FLWD CNTRST: CPT | Mod: TC

## 2023-10-19 RX ADMIN — IOPAMIDOL 100 ML: 755 INJECTION, SOLUTION INTRAVENOUS at 02:10

## 2023-10-20 ENCOUNTER — TELEPHONE (OUTPATIENT)
Dept: TRANSPLANT | Facility: CLINIC | Age: 63
End: 2023-10-20
Payer: MEDICARE

## 2023-10-20 ENCOUNTER — PATIENT MESSAGE (OUTPATIENT)
Dept: TRANSPLANT | Facility: CLINIC | Age: 63
End: 2023-10-20
Payer: MEDICARE

## 2023-10-20 NOTE — TELEPHONE ENCOUNTER
----- Message from Jay Abrams MD sent at 10/19/2023  7:22 PM CDT -----  Reviewed. No evidence of recurrent liver cancer.

## 2023-10-20 NOTE — TELEPHONE ENCOUNTER
Patient notified and instructed via [facility]:    Per :  Your CT scans of chest and abdomen Reviewed. No evidence of recurrent liver cancer.

## 2023-10-30 ENCOUNTER — LAB VISIT (OUTPATIENT)
Dept: LAB | Facility: HOSPITAL | Age: 63
End: 2023-10-30
Attending: STUDENT IN AN ORGANIZED HEALTH CARE EDUCATION/TRAINING PROGRAM
Payer: MEDICARE

## 2023-10-30 DIAGNOSIS — Z94.4 LIVER REPLACED BY TRANSPLANT: ICD-10-CM

## 2023-10-30 DIAGNOSIS — C22.0 HCC (HEPATOCELLULAR CARCINOMA): ICD-10-CM

## 2023-10-30 LAB
AFP-TM SERPL-MCNC: 2.2 NG/ML
ALBUMIN SERPL-MCNC: 3.8 G/DL (ref 3.4–4.8)
ALBUMIN/GLOB SERPL: 1.2 RATIO (ref 1.1–2)
ALP SERPL-CCNC: 95 UNIT/L (ref 40–150)
ALT SERPL-CCNC: 18 UNIT/L (ref 0–55)
AST SERPL-CCNC: 16 UNIT/L (ref 5–34)
BASOPHILS # BLD AUTO: 0.05 X10(3)/MCL
BASOPHILS NFR BLD AUTO: 0.9 %
BILIRUB SERPL-MCNC: 0.6 MG/DL
BUN SERPL-MCNC: 17.1 MG/DL (ref 8.4–25.7)
CALCIUM SERPL-MCNC: 9.6 MG/DL (ref 8.8–10)
CHLORIDE SERPL-SCNC: 107 MMOL/L (ref 98–107)
CO2 SERPL-SCNC: 27 MMOL/L (ref 23–31)
CREAT SERPL-MCNC: 0.78 MG/DL (ref 0.73–1.18)
EOSINOPHIL # BLD AUTO: 0.21 X10(3)/MCL (ref 0–0.9)
EOSINOPHIL NFR BLD AUTO: 4 %
ERYTHROCYTE [DISTWIDTH] IN BLOOD BY AUTOMATED COUNT: 13.2 % (ref 11.5–17)
GFR SERPLBLD CREATININE-BSD FMLA CKD-EPI: >60 MLS/MIN/1.73/M2
GLOBULIN SER-MCNC: 3.2 GM/DL (ref 2.4–3.5)
GLUCOSE SERPL-MCNC: 111 MG/DL (ref 82–115)
HCT VFR BLD AUTO: 35.9 % (ref 42–52)
HGB BLD-MCNC: 11.3 G/DL (ref 14–18)
IMM GRANULOCYTES # BLD AUTO: 0.01 X10(3)/MCL (ref 0–0.04)
IMM GRANULOCYTES NFR BLD AUTO: 0.2 %
LYMPHOCYTES # BLD AUTO: 0.89 X10(3)/MCL (ref 0.6–4.6)
LYMPHOCYTES NFR BLD AUTO: 16.8 %
MCH RBC QN AUTO: 29.7 PG (ref 27–31)
MCHC RBC AUTO-ENTMCNC: 31.5 G/DL (ref 33–36)
MCV RBC AUTO: 94.5 FL (ref 80–94)
MONOCYTES # BLD AUTO: 0.43 X10(3)/MCL (ref 0.1–1.3)
MONOCYTES NFR BLD AUTO: 8.1 %
NEUTROPHILS # BLD AUTO: 3.7 X10(3)/MCL (ref 2.1–9.2)
NEUTROPHILS NFR BLD AUTO: 70 %
NRBC BLD AUTO-RTO: 0 %
PLATELET # BLD AUTO: 205 X10(3)/MCL (ref 130–400)
PMV BLD AUTO: 10.4 FL (ref 7.4–10.4)
POTASSIUM SERPL-SCNC: 5.4 MMOL/L (ref 3.5–5.1)
PROT SERPL-MCNC: 7 GM/DL (ref 5.8–7.6)
RBC # BLD AUTO: 3.8 X10(6)/MCL (ref 4.7–6.1)
SODIUM SERPL-SCNC: 140 MMOL/L (ref 136–145)
WBC # SPEC AUTO: 5.29 X10(3)/MCL (ref 4.5–11.5)

## 2023-10-30 PROCEDURE — 82105 ALPHA-FETOPROTEIN SERUM: CPT

## 2023-10-30 PROCEDURE — 80053 COMPREHEN METABOLIC PANEL: CPT

## 2023-10-30 PROCEDURE — 36415 COLL VENOUS BLD VENIPUNCTURE: CPT

## 2023-10-30 PROCEDURE — 85025 COMPLETE CBC W/AUTO DIFF WBC: CPT

## 2023-10-30 PROCEDURE — 80197 ASSAY OF TACROLIMUS: CPT

## 2023-10-31 LAB — TACROLIMUS TROUGH BLD-MCNC: 2.8 NG/ML

## 2023-11-08 ENCOUNTER — PATIENT MESSAGE (OUTPATIENT)
Dept: TRANSPLANT | Facility: CLINIC | Age: 63
End: 2023-11-08
Payer: MEDICARE

## 2023-11-08 DIAGNOSIS — Z94.4 LIVER REPLACED BY TRANSPLANT: Primary | ICD-10-CM

## 2023-11-08 RX ORDER — TACROLIMUS 1 MG/1
CAPSULE ORAL
Qty: 210 CAPSULE | Refills: 11 | Status: SHIPPED | OUTPATIENT
Start: 2023-11-08 | End: 2024-11-07

## 2023-11-08 NOTE — TELEPHONE ENCOUNTER
----- Message from Jay Abrams MD sent at 11/7/2023 12:17 PM CST -----  Liver tests are stable. Tac low. Please increase tac to 4/3 and repeat labs in 2 weeks.

## 2023-11-08 NOTE — TELEPHONE ENCOUNTER
Patient notified and instructed via [facility]:    Per : Liver tests are stable. Tacrolimus level is low. Please increase tac to 4 mg in the AM  and 3mg in the PM, and repeat labs in 2 weeks(due 11/20/23).

## 2023-11-20 ENCOUNTER — LAB VISIT (OUTPATIENT)
Dept: LAB | Facility: HOSPITAL | Age: 63
End: 2023-11-20
Attending: STUDENT IN AN ORGANIZED HEALTH CARE EDUCATION/TRAINING PROGRAM
Payer: MEDICARE

## 2023-11-20 DIAGNOSIS — Z94.4 LIVER REPLACED BY TRANSPLANT: ICD-10-CM

## 2023-11-20 LAB
ALBUMIN SERPL-MCNC: 3.7 G/DL (ref 3.4–4.8)
ALBUMIN/GLOB SERPL: 1.4 RATIO (ref 1.1–2)
ALP SERPL-CCNC: 96 UNIT/L (ref 40–150)
ALT SERPL-CCNC: 15 UNIT/L (ref 0–55)
AST SERPL-CCNC: 17 UNIT/L (ref 5–34)
BASOPHILS # BLD AUTO: 0.04 X10(3)/MCL
BASOPHILS NFR BLD AUTO: 0.7 %
BILIRUB SERPL-MCNC: 0.4 MG/DL
BUN SERPL-MCNC: 20.7 MG/DL (ref 8.4–25.7)
CALCIUM SERPL-MCNC: 9 MG/DL (ref 8.8–10)
CHLORIDE SERPL-SCNC: 109 MMOL/L (ref 98–107)
CO2 SERPL-SCNC: 25 MMOL/L (ref 23–31)
CREAT SERPL-MCNC: 1.43 MG/DL (ref 0.73–1.18)
EOSINOPHIL # BLD AUTO: 0.13 X10(3)/MCL (ref 0–0.9)
EOSINOPHIL NFR BLD AUTO: 2.4 %
ERYTHROCYTE [DISTWIDTH] IN BLOOD BY AUTOMATED COUNT: 12.9 % (ref 11.5–17)
GFR SERPLBLD CREATININE-BSD FMLA CKD-EPI: 54 MLS/MIN/1.73/M2
GLOBULIN SER-MCNC: 2.7 GM/DL (ref 2.4–3.5)
GLUCOSE SERPL-MCNC: 116 MG/DL (ref 82–115)
HCT VFR BLD AUTO: 32.6 % (ref 42–52)
HGB BLD-MCNC: 10.5 G/DL (ref 14–18)
IMM GRANULOCYTES # BLD AUTO: 0.02 X10(3)/MCL (ref 0–0.04)
IMM GRANULOCYTES NFR BLD AUTO: 0.4 %
LYMPHOCYTES # BLD AUTO: 0.97 X10(3)/MCL (ref 0.6–4.6)
LYMPHOCYTES NFR BLD AUTO: 18 %
MCH RBC QN AUTO: 30.2 PG (ref 27–31)
MCHC RBC AUTO-ENTMCNC: 32.2 G/DL (ref 33–36)
MCV RBC AUTO: 93.7 FL (ref 80–94)
MONOCYTES # BLD AUTO: 0.47 X10(3)/MCL (ref 0.1–1.3)
MONOCYTES NFR BLD AUTO: 8.7 %
NEUTROPHILS # BLD AUTO: 3.76 X10(3)/MCL (ref 2.1–9.2)
NEUTROPHILS NFR BLD AUTO: 69.8 %
NRBC BLD AUTO-RTO: 0 %
PLATELET # BLD AUTO: 194 X10(3)/MCL (ref 130–400)
PMV BLD AUTO: 11 FL (ref 7.4–10.4)
POTASSIUM SERPL-SCNC: 4.8 MMOL/L (ref 3.5–5.1)
PROT SERPL-MCNC: 6.4 GM/DL (ref 5.8–7.6)
RBC # BLD AUTO: 3.48 X10(6)/MCL (ref 4.7–6.1)
SODIUM SERPL-SCNC: 142 MMOL/L (ref 136–145)
WBC # SPEC AUTO: 5.39 X10(3)/MCL (ref 4.5–11.5)

## 2023-11-20 PROCEDURE — 80197 ASSAY OF TACROLIMUS: CPT

## 2023-11-20 PROCEDURE — 36415 COLL VENOUS BLD VENIPUNCTURE: CPT

## 2023-11-20 PROCEDURE — 80053 COMPREHEN METABOLIC PANEL: CPT

## 2023-11-20 PROCEDURE — 85025 COMPLETE CBC W/AUTO DIFF WBC: CPT

## 2023-11-21 LAB — TACROLIMUS TROUGH BLD-MCNC: 3.2 NG/ML

## 2023-11-22 ENCOUNTER — TELEPHONE (OUTPATIENT)
Dept: TRANSPLANT | Facility: CLINIC | Age: 63
End: 2023-11-22
Payer: MEDICARE

## 2023-11-22 DIAGNOSIS — Z94.4 LIVER REPLACED BY TRANSPLANT: Primary | ICD-10-CM

## 2023-11-22 NOTE — TELEPHONE ENCOUNTER
Message received from patient via [facility](see below) Labs done on Monday 11/20 were in afternoon, not fasting, he will go again on Monday 11/27 morning.       Good morning Keily , I was late for my labs on Monday and without thinking I did the in the afternoon without fasting. Can you reschedule a new appt. ?  I'm sorry.   Damien

## 2023-11-27 ENCOUNTER — LAB VISIT (OUTPATIENT)
Dept: LAB | Facility: HOSPITAL | Age: 63
End: 2023-11-27
Attending: STUDENT IN AN ORGANIZED HEALTH CARE EDUCATION/TRAINING PROGRAM
Payer: MEDICARE

## 2023-11-27 DIAGNOSIS — Z94.4 LIVER REPLACED BY TRANSPLANT: ICD-10-CM

## 2023-11-27 LAB
ALBUMIN SERPL-MCNC: 3.9 G/DL (ref 3.4–4.8)
ALBUMIN/GLOB SERPL: 1.3 RATIO (ref 1.1–2)
ALP SERPL-CCNC: 93 UNIT/L (ref 40–150)
ALT SERPL-CCNC: 16 UNIT/L (ref 0–55)
AST SERPL-CCNC: 14 UNIT/L (ref 5–34)
BASOPHILS # BLD AUTO: 0.04 X10(3)/MCL
BASOPHILS NFR BLD AUTO: 0.7 %
BILIRUB SERPL-MCNC: 0.5 MG/DL
BUN SERPL-MCNC: 20 MG/DL (ref 8.4–25.7)
CALCIUM SERPL-MCNC: 9.6 MG/DL (ref 8.8–10)
CHLORIDE SERPL-SCNC: 109 MMOL/L (ref 98–107)
CO2 SERPL-SCNC: 26 MMOL/L (ref 23–31)
CREAT SERPL-MCNC: 0.78 MG/DL (ref 0.73–1.18)
EOSINOPHIL # BLD AUTO: 0.17 X10(3)/MCL (ref 0–0.9)
EOSINOPHIL NFR BLD AUTO: 2.9 %
ERYTHROCYTE [DISTWIDTH] IN BLOOD BY AUTOMATED COUNT: 12.7 % (ref 11.5–17)
GFR SERPLBLD CREATININE-BSD FMLA CKD-EPI: >60 MLS/MIN/1.73/M2
GLOBULIN SER-MCNC: 3.1 GM/DL (ref 2.4–3.5)
GLUCOSE SERPL-MCNC: 130 MG/DL (ref 82–115)
HCT VFR BLD AUTO: 35.6 % (ref 42–52)
HGB BLD-MCNC: 11.3 G/DL (ref 14–18)
IMM GRANULOCYTES # BLD AUTO: 0 X10(3)/MCL (ref 0–0.04)
IMM GRANULOCYTES NFR BLD AUTO: 0 %
LYMPHOCYTES # BLD AUTO: 0.73 X10(3)/MCL (ref 0.6–4.6)
LYMPHOCYTES NFR BLD AUTO: 12.3 %
MCH RBC QN AUTO: 29.4 PG (ref 27–31)
MCHC RBC AUTO-ENTMCNC: 31.7 G/DL (ref 33–36)
MCV RBC AUTO: 92.7 FL (ref 80–94)
MONOCYTES # BLD AUTO: 0.41 X10(3)/MCL (ref 0.1–1.3)
MONOCYTES NFR BLD AUTO: 6.9 %
NEUTROPHILS # BLD AUTO: 4.59 X10(3)/MCL (ref 2.1–9.2)
NEUTROPHILS NFR BLD AUTO: 77.2 %
NRBC BLD AUTO-RTO: 0 %
PLATELET # BLD AUTO: 212 X10(3)/MCL (ref 130–400)
PMV BLD AUTO: 10.2 FL (ref 7.4–10.4)
POTASSIUM SERPL-SCNC: 5.1 MMOL/L (ref 3.5–5.1)
PROT SERPL-MCNC: 7 GM/DL (ref 5.8–7.6)
RBC # BLD AUTO: 3.84 X10(6)/MCL (ref 4.7–6.1)
SODIUM SERPL-SCNC: 140 MMOL/L (ref 136–145)
WBC # SPEC AUTO: 5.94 X10(3)/MCL (ref 4.5–11.5)

## 2023-11-27 PROCEDURE — 80197 ASSAY OF TACROLIMUS: CPT

## 2023-11-27 PROCEDURE — 85025 COMPLETE CBC W/AUTO DIFF WBC: CPT

## 2023-11-27 PROCEDURE — 36415 COLL VENOUS BLD VENIPUNCTURE: CPT

## 2023-11-27 PROCEDURE — 80053 COMPREHEN METABOLIC PANEL: CPT

## 2023-11-28 LAB — TACROLIMUS TROUGH BLD-MCNC: 5.4 NG/ML

## 2023-12-06 ENCOUNTER — TELEPHONE (OUTPATIENT)
Dept: TRANSPLANT | Facility: CLINIC | Age: 63
End: 2023-12-06
Payer: MEDICARE

## 2023-12-06 ENCOUNTER — PATIENT MESSAGE (OUTPATIENT)
Dept: TRANSPLANT | Facility: CLINIC | Age: 63
End: 2023-12-06
Payer: MEDICARE

## 2023-12-06 DIAGNOSIS — Z94.4 LIVER REPLACED BY TRANSPLANT: ICD-10-CM

## 2023-12-06 DIAGNOSIS — C22.0 HCC (HEPATOCELLULAR CARCINOMA): Primary | ICD-10-CM

## 2023-12-06 NOTE — TELEPHONE ENCOUNTER
Patient notified and instructed via [facility]:    Your labs have been reviewed by ; results stable, no medicine changes made. Repeat labs due 1/8/24. Thanks.

## 2023-12-06 NOTE — TELEPHONE ENCOUNTER
----- Message from Jay Abrams MD sent at 12/6/2023  9:58 AM CST -----  Liver tests are stable. No changes in his immunosuppression. Please continue to monitor labs per transplant protocol.

## 2023-12-11 DIAGNOSIS — C22.0 HEPATOCELLULAR CARCINOMA: Primary | ICD-10-CM

## 2023-12-27 ENCOUNTER — HOSPITAL ENCOUNTER (OUTPATIENT)
Dept: RADIOLOGY | Facility: HOSPITAL | Age: 63
Discharge: HOME | End: 2023-12-27
Attending: STUDENT IN AN ORGANIZED HEALTH CARE EDUCATION/TRAINING PROGRAM
Payer: MEDICARE

## 2023-12-27 DIAGNOSIS — C22.0 HEPATOCELLULAR CARCINOMA: ICD-10-CM

## 2023-12-27 PROCEDURE — 71250 CT THORAX DX C-: CPT | Mod: TC

## 2023-12-27 PROCEDURE — 74170 CT ABD WO CNTRST FLWD CNTRST: CPT | Mod: TC

## 2023-12-27 PROCEDURE — 25500020 PHARM REV CODE 255: Performed by: STUDENT IN AN ORGANIZED HEALTH CARE EDUCATION/TRAINING PROGRAM

## 2023-12-27 RX ADMIN — IOPAMIDOL 100 ML: 755 INJECTION, SOLUTION INTRAVENOUS at 10:12

## 2023-12-28 ENCOUNTER — TELEPHONE (OUTPATIENT)
Dept: TRANSPLANT | Facility: CLINIC | Age: 63
End: 2023-12-28
Payer: MEDICARE

## 2023-12-28 ENCOUNTER — PATIENT MESSAGE (OUTPATIENT)
Dept: TRANSPLANT | Facility: CLINIC | Age: 63
End: 2023-12-28
Payer: MEDICARE

## 2023-12-28 NOTE — TELEPHONE ENCOUNTER
Patient notified and instructed via [facility]:    Per : CT of the chest Reviewed. CT shows new small lung nodule. Will repeat CT chest in 3 months . Thanks.

## 2023-12-28 NOTE — TELEPHONE ENCOUNTER
Patient notified and instructed via [facility]:    Your CT of the abdomen was reviewed by :  No evidence of recurrent liver cancer.

## 2023-12-28 NOTE — TELEPHONE ENCOUNTER
----- Message from Jay Abrams MD sent at 12/27/2023  8:33 PM CST -----  Reviewed. CT shows new small lung nodule. Please repeat CT chest in 3 months and also obtain AFP around that time.

## 2023-12-28 NOTE — TELEPHONE ENCOUNTER
----- Message from Jay Abrams MD sent at 12/27/2023  8:32 PM CST -----  Reviewed. No evidence of recurrent liver cancer.

## 2024-01-02 DIAGNOSIS — Z00.6 RESEARCH STUDY PATIENT: Primary | ICD-10-CM

## 2024-01-08 ENCOUNTER — LAB VISIT (OUTPATIENT)
Dept: LAB | Facility: HOSPITAL | Age: 64
End: 2024-01-08
Attending: STUDENT IN AN ORGANIZED HEALTH CARE EDUCATION/TRAINING PROGRAM
Payer: MEDICARE

## 2024-01-08 ENCOUNTER — RESEARCH ENCOUNTER (OUTPATIENT)
Dept: RESEARCH | Facility: HOSPITAL | Age: 64
End: 2024-01-08
Payer: MEDICARE

## 2024-01-08 DIAGNOSIS — Z94.4 LIVER REPLACED BY TRANSPLANT: ICD-10-CM

## 2024-01-08 LAB
ALBUMIN SERPL-MCNC: 3.6 G/DL (ref 3.4–4.8)
ALBUMIN/GLOB SERPL: 1.2 RATIO (ref 1.1–2)
ALP SERPL-CCNC: 89 UNIT/L (ref 40–150)
ALT SERPL-CCNC: 11 UNIT/L (ref 0–55)
AST SERPL-CCNC: 11 UNIT/L (ref 5–34)
BASOPHILS # BLD AUTO: 0.04 X10(3)/MCL
BASOPHILS NFR BLD AUTO: 0.8 %
BILIRUB SERPL-MCNC: 0.3 MG/DL
BUN SERPL-MCNC: 14.3 MG/DL (ref 8.4–25.7)
CALCIUM SERPL-MCNC: 8.9 MG/DL (ref 8.8–10)
CHLORIDE SERPL-SCNC: 106 MMOL/L (ref 98–107)
CO2 SERPL-SCNC: 27 MMOL/L (ref 23–31)
CREAT SERPL-MCNC: 0.81 MG/DL (ref 0.73–1.18)
EOSINOPHIL # BLD AUTO: 0.15 X10(3)/MCL (ref 0–0.9)
EOSINOPHIL NFR BLD AUTO: 3 %
ERYTHROCYTE [DISTWIDTH] IN BLOOD BY AUTOMATED COUNT: 13.6 % (ref 11.5–17)
GFR SERPLBLD CREATININE-BSD FMLA CKD-EPI: >60 MLS/MIN/1.73/M2
GLOBULIN SER-MCNC: 3 GM/DL (ref 2.4–3.5)
GLUCOSE SERPL-MCNC: 132 MG/DL (ref 82–115)
HCT VFR BLD AUTO: 31.3 % (ref 42–52)
HGB BLD-MCNC: 9.8 G/DL (ref 14–18)
IMM GRANULOCYTES # BLD AUTO: 0.01 X10(3)/MCL (ref 0–0.04)
IMM GRANULOCYTES NFR BLD AUTO: 0.2 %
LYMPHOCYTES # BLD AUTO: 0.71 X10(3)/MCL (ref 0.6–4.6)
LYMPHOCYTES NFR BLD AUTO: 14.4 %
MCH RBC QN AUTO: 28.7 PG (ref 27–31)
MCHC RBC AUTO-ENTMCNC: 31.3 G/DL (ref 33–36)
MCV RBC AUTO: 91.5 FL (ref 80–94)
MONOCYTES # BLD AUTO: 0.41 X10(3)/MCL (ref 0.1–1.3)
MONOCYTES NFR BLD AUTO: 8.3 %
NEUTROPHILS # BLD AUTO: 3.6 X10(3)/MCL (ref 2.1–9.2)
NEUTROPHILS NFR BLD AUTO: 73.3 %
NRBC BLD AUTO-RTO: 0 %
PLATELET # BLD AUTO: 226 X10(3)/MCL (ref 130–400)
PMV BLD AUTO: 10.6 FL (ref 7.4–10.4)
POTASSIUM SERPL-SCNC: 4.6 MMOL/L (ref 3.5–5.1)
PROT SERPL-MCNC: 6.6 GM/DL (ref 5.8–7.6)
RBC # BLD AUTO: 3.42 X10(6)/MCL (ref 4.7–6.1)
SODIUM SERPL-SCNC: 138 MMOL/L (ref 136–145)
WBC # SPEC AUTO: 4.92 X10(3)/MCL (ref 4.5–11.5)

## 2024-01-08 PROCEDURE — 80053 COMPREHEN METABOLIC PANEL: CPT

## 2024-01-08 PROCEDURE — 80197 ASSAY OF TACROLIMUS: CPT

## 2024-01-08 PROCEDURE — 85025 COMPLETE CBC W/AUTO DIFF WBC: CPT

## 2024-01-08 PROCEDURE — 36415 COLL VENOUS BLD VENIPUNCTURE: CPT

## 2024-01-08 NOTE — PROGRESS NOTES
RESEARCH STUDY SPECIMEN COLLECTION ENCOUNTER  ORGAN TRANSPLANT  [facility] JESSY LARRY    Study Title: Role of Tumor-Induced Immune Tolerance in the Patient Response to Locoregional Therapy: Implications in Assessment Risk of Hepatocellular Carcinoma Recurrence Following Liver Transplantation    IRB #: [id number]    IRB Approval Date: 6/8/2016    : Darrin Leyva MD  Sub-investigator: Pranav Nieto, PhD    Patient Number: [id number]    In accordance with the study protocol, Research Lab orders were placed and follow-up specimens were collected on (date: 1/8/2023) in:  [facility] VNP: YES/NO: No  [facility] LABTX: YES/NO: No  [facility] IM: YES/NO: No  Other [facility] location: YES/NO: Yes   Location: [facility]    A  was used to transport blood specimens to ITR-Transplant for processing: YES/NO: No  Blood specimens were transported to [facility]-Transplant for processing: YES/NO: No    Note: Lab orders were cancelled, but patient was drawn regardless of research lab orders. Sample was destroyed onsite at [facility] due to inclement weather making retrieval/processing difficult      Bang Ramirez  Admin Research- Liver Transplant

## 2024-01-09 LAB — TACROLIMUS TROUGH BLD-MCNC: 3.3 NG/ML

## 2024-01-11 ENCOUNTER — PATIENT MESSAGE (OUTPATIENT)
Dept: TRANSPLANT | Facility: CLINIC | Age: 64
End: 2024-01-11
Payer: MEDICARE

## 2024-01-11 ENCOUNTER — TELEPHONE (OUTPATIENT)
Dept: TRANSPLANT | Facility: CLINIC | Age: 64
End: 2024-01-11
Payer: MEDICARE

## 2024-01-11 DIAGNOSIS — Z94.4 LIVER REPLACED BY TRANSPLANT: Primary | ICD-10-CM

## 2024-01-11 NOTE — TELEPHONE ENCOUNTER
Patient notified and instructed via [employer]:    Your labs have been reviewed by ; no changes made. Repeat labs due 2/19/24. Thanks. You are due for a follow up clinic visit with : please call [facility] at [phone] to schedule. Thanks.

## 2024-01-11 NOTE — TELEPHONE ENCOUNTER
----- Message from Jay Abrams MD sent at 1/10/2024  3:33 PM CST -----  Liver tests are stable. No changes in his immunosuppression. Please continue to monitor labs per transplant protocol.

## 2024-02-19 ENCOUNTER — LAB VISIT (OUTPATIENT)
Dept: LAB | Facility: HOSPITAL | Age: 64
End: 2024-02-19
Attending: STUDENT IN AN ORGANIZED HEALTH CARE EDUCATION/TRAINING PROGRAM
Payer: MEDICARE

## 2024-02-19 ENCOUNTER — RESEARCH ENCOUNTER (OUTPATIENT)
Dept: RESEARCH | Facility: HOSPITAL | Age: 64
End: 2024-02-19
Payer: MEDICARE

## 2024-02-19 DIAGNOSIS — Z94.4 LIVER REPLACED BY TRANSPLANT: ICD-10-CM

## 2024-02-19 LAB
ALBUMIN SERPL-MCNC: 3.6 G/DL (ref 3.4–4.8)
ALBUMIN/GLOB SERPL: 1.2 RATIO (ref 1.1–2)
ALP SERPL-CCNC: 75 UNIT/L (ref 40–150)
ALT SERPL-CCNC: 11 UNIT/L (ref 0–55)
AST SERPL-CCNC: 9 UNIT/L (ref 5–34)
BASOPHILS # BLD AUTO: 0.04 X10(3)/MCL
BASOPHILS NFR BLD AUTO: 0.8 %
BILIRUB SERPL-MCNC: 0.4 MG/DL
BUN SERPL-MCNC: 15.8 MG/DL (ref 8.4–25.7)
CALCIUM SERPL-MCNC: 9.2 MG/DL (ref 8.8–10)
CHLORIDE SERPL-SCNC: 109 MMOL/L (ref 98–107)
CO2 SERPL-SCNC: 26 MMOL/L (ref 23–31)
CREAT SERPL-MCNC: 0.78 MG/DL (ref 0.73–1.18)
EOSINOPHIL # BLD AUTO: 0.12 X10(3)/MCL (ref 0–0.9)
EOSINOPHIL NFR BLD AUTO: 2.4 %
ERYTHROCYTE [DISTWIDTH] IN BLOOD BY AUTOMATED COUNT: 13.8 % (ref 11.5–17)
GFR SERPLBLD CREATININE-BSD FMLA CKD-EPI: >60 MLS/MIN/1.73/M2
GLOBULIN SER-MCNC: 2.9 GM/DL (ref 2.4–3.5)
GLUCOSE SERPL-MCNC: 136 MG/DL (ref 82–115)
HCT VFR BLD AUTO: 30.3 % (ref 42–52)
HGB BLD-MCNC: 9.1 G/DL (ref 14–18)
IMM GRANULOCYTES # BLD AUTO: 0.03 X10(3)/MCL (ref 0–0.04)
IMM GRANULOCYTES NFR BLD AUTO: 0.6 %
LYMPHOCYTES # BLD AUTO: 0.56 X10(3)/MCL (ref 0.6–4.6)
LYMPHOCYTES NFR BLD AUTO: 11.2 %
MCH RBC QN AUTO: 26.5 PG (ref 27–31)
MCHC RBC AUTO-ENTMCNC: 30 G/DL (ref 33–36)
MCV RBC AUTO: 88.1 FL (ref 80–94)
MONOCYTES # BLD AUTO: 0.3 X10(3)/MCL (ref 0.1–1.3)
MONOCYTES NFR BLD AUTO: 6 %
NEUTROPHILS # BLD AUTO: 3.97 X10(3)/MCL (ref 2.1–9.2)
NEUTROPHILS NFR BLD AUTO: 79 %
NRBC BLD AUTO-RTO: 0 %
PLATELET # BLD AUTO: 212 X10(3)/MCL (ref 130–400)
PMV BLD AUTO: 10.5 FL (ref 7.4–10.4)
POTASSIUM SERPL-SCNC: 5.1 MMOL/L (ref 3.5–5.1)
PROT SERPL-MCNC: 6.5 GM/DL (ref 5.8–7.6)
RBC # BLD AUTO: 3.44 X10(6)/MCL (ref 4.7–6.1)
SODIUM SERPL-SCNC: 140 MMOL/L (ref 136–145)
WBC # SPEC AUTO: 5.02 X10(3)/MCL (ref 4.5–11.5)

## 2024-02-19 PROCEDURE — 85025 COMPLETE CBC W/AUTO DIFF WBC: CPT

## 2024-02-19 PROCEDURE — 36415 COLL VENOUS BLD VENIPUNCTURE: CPT

## 2024-02-19 PROCEDURE — 80053 COMPREHEN METABOLIC PANEL: CPT

## 2024-02-19 PROCEDURE — 80197 ASSAY OF TACROLIMUS: CPT

## 2024-02-19 NOTE — PROGRESS NOTES
RESEARCH STUDY SPECIMEN COLLECTION ENCOUNTER  ORGAN TRANSPLANT  [facility] JESSY LARRY    Study Title: Role of Tumor-Induced Immune Tolerance in the Patient Response to Locoregional Therapy: Implications in Assessment Risk of Hepatocellular Carcinoma Recurrence Following Liver Transplantation    IRB #: [id number]    IRB Approval Date: 6/8/2016    : Darrin Leyva MD  Sub-investigator: Pranav Nieto, PhD    Patient Number: [id number]    In accordance with the study protocol, Research Lab orders were placed and follow-up specimens were collected on (date: 02/19/2024) in:  [facility] VNP: YES/NO: No  [facility] LABTX: YES/NO: No  [facility] IM: YES/NO: No  Other [facility] location: YES/NO: Yes   Location: [facility]    A  was used to transport blood specimens to ITR-Transplant for processing: YES/NO: Yes  Blood specimens were transported to ITR-Transplant for processing: YES/NO: Yes    Bang Ramirez  Admin Research- Liver Transplant

## 2024-02-20 LAB — TACROLIMUS TROUGH BLD-MCNC: 16.1 NG/ML

## 2024-02-22 ENCOUNTER — PATIENT MESSAGE (OUTPATIENT)
Dept: TRANSPLANT | Facility: CLINIC | Age: 64
End: 2024-02-22
Payer: MEDICARE

## 2024-02-22 ENCOUNTER — TELEPHONE (OUTPATIENT)
Dept: TRANSPLANT | Facility: CLINIC | Age: 64
End: 2024-02-22
Payer: MEDICARE

## 2024-02-22 DIAGNOSIS — Z94.4 LIVER REPLACED BY TRANSPLANT: Primary | ICD-10-CM

## 2024-02-22 NOTE — TELEPHONE ENCOUNTER
----- Message from Jay Abrams MD sent at 2/21/2024  4:00 PM CST -----  Liver tests are stable. Tac high. Did he take prior to labs?

## 2024-02-22 NOTE — TELEPHONE ENCOUNTER
Patient notified and instructed via [facility]:    Per : no medicine changes at this time; repeat labs next week (can you go on Wednesday 2/28?) thanks.

## 2024-02-22 NOTE — TELEPHONE ENCOUNTER
Message sent to patient via [facility];    Your labs were reviewed by ; your Prograf level was high at 16.  Did you happen to take your dose before your blood was drawn that day?  Thanks.

## 2024-02-22 NOTE — TELEPHONE ENCOUNTER
----- Message from Jay Abrams MD sent at 2/22/2024 12:22 PM CST -----  It would be odd for his tac level to increase that much. Let's please repeat labs next week.  ----- Message -----  From: Keily Rodriguez  Sent: 2/22/2024  12:13 PM CST  To: Jay Abrams MD    Patient said he did not take his Prograf prior to labs.   ----- Message -----  From: Jay Abrams MD  Sent: 2/21/2024   4:00 PM CST  To: [facility] Post-Liver Transplant Clinical    Liver tests are stable. Tac high. Did he take prior to labs?

## 2024-02-29 ENCOUNTER — HOSPITAL ENCOUNTER (INPATIENT)
Facility: HOSPITAL | Age: 64
LOS: 2 days | Discharge: HOME | DRG: 378 | End: 2024-03-02
Attending: EMERGENCY MEDICINE | Admitting: INTERNAL MEDICINE
Payer: MEDICARE

## 2024-02-29 DIAGNOSIS — K92.2 GASTROINTESTINAL HEMORRHAGE, UNSPECIFIED GASTROINTESTINAL HEMORRHAGE TYPE: ICD-10-CM

## 2024-02-29 DIAGNOSIS — D64.9 SYMPTOMATIC ANEMIA: Primary | ICD-10-CM

## 2024-02-29 LAB
ABO + RH BLD: NORMAL
ABO + RH BLD: NORMAL
ALBUMIN SERPL-MCNC: 3.8 G/DL (ref 3.4–4.8)
ALBUMIN/GLOB SERPL: 1.2 RATIO (ref 1.1–2)
ALP SERPL-CCNC: 75 UNIT/L (ref 40–150)
ALT SERPL-CCNC: 13 UNIT/L (ref 0–55)
APPEARANCE UR: CLEAR
APTT PPP: 27.1 SECONDS (ref 23.2–33.7)
AST SERPL-CCNC: 13 UNIT/L (ref 5–34)
BACTERIA #/AREA URNS AUTO: ABNORMAL /HPF
BASOPHILS # BLD AUTO: 0.03 X10(3)/MCL
BASOPHILS NFR BLD AUTO: 0.5 %
BILIRUB SERPL-MCNC: 0.5 MG/DL
BILIRUB UR QL STRIP.AUTO: NEGATIVE
BLD PROD TYP BPU: NORMAL
BLD PROD TYP BPU: NORMAL
BLOOD UNIT EXPIRATION DATE: NORMAL
BLOOD UNIT EXPIRATION DATE: NORMAL
BLOOD UNIT TYPE CODE: 5100
BLOOD UNIT TYPE CODE: 5100
BUN SERPL-MCNC: 19.1 MG/DL (ref 8.4–25.7)
CALCIUM SERPL-MCNC: 9 MG/DL (ref 8.8–10)
CHLORIDE SERPL-SCNC: 108 MMOL/L (ref 98–107)
CO2 SERPL-SCNC: 23 MMOL/L (ref 23–31)
COLOR UR AUTO: ABNORMAL
CREAT SERPL-MCNC: 0.97 MG/DL (ref 0.73–1.18)
CROSSMATCH INTERPRETATION: NORMAL
CROSSMATCH INTERPRETATION: NORMAL
DISPENSE STATUS: NORMAL
DISPENSE STATUS: NORMAL
EOSINOPHIL # BLD AUTO: 0.01 X10(3)/MCL (ref 0–0.9)
EOSINOPHIL NFR BLD AUTO: 0.2 %
ERYTHROCYTE [DISTWIDTH] IN BLOOD BY AUTOMATED COUNT: 14 % (ref 11.5–17)
FERRITIN SERPL-MCNC: 9.08 NG/ML (ref 21.81–274.66)
GFR SERPLBLD CREATININE-BSD FMLA CKD-EPI: >60 MLS/MIN/1.73/M2
GLOBULIN SER-MCNC: 3.2 GM/DL (ref 2.4–3.5)
GLUCOSE SERPL-MCNC: 129 MG/DL (ref 82–115)
GLUCOSE UR QL STRIP.AUTO: NORMAL
GROUP & RH: NORMAL
HAPTOGLOB SERPL-MCNC: 257 MG/DL (ref 40–368)
HCT VFR BLD AUTO: 27.1 % (ref 42–52)
HGB BLD-MCNC: 8.2 G/DL (ref 14–18)
HYALINE CASTS #/AREA URNS LPF: ABNORMAL /LPF
IMM GRANULOCYTES # BLD AUTO: 0.02 X10(3)/MCL (ref 0–0.04)
IMM GRANULOCYTES NFR BLD AUTO: 0.4 %
INDIRECT COOMBS: NORMAL
INR PPP: 1.1
IRON SATN MFR SERPL: 8 % (ref 20–50)
IRON SERPL-MCNC: 27 UG/DL (ref 65–175)
KETONES UR QL STRIP.AUTO: NEGATIVE
LDH SERPL-CCNC: 250 U/L (ref 125–220)
LEUKOCYTE ESTERASE UR QL STRIP.AUTO: NEGATIVE
LYMPHOCYTES # BLD AUTO: 0.52 X10(3)/MCL (ref 0.6–4.6)
LYMPHOCYTES NFR BLD AUTO: 9.2 %
MCH RBC QN AUTO: 25.7 PG (ref 27–31)
MCHC RBC AUTO-ENTMCNC: 30.3 G/DL (ref 33–36)
MCV RBC AUTO: 85 FL (ref 80–94)
MONOCYTES # BLD AUTO: 0.33 X10(3)/MCL (ref 0.1–1.3)
MONOCYTES NFR BLD AUTO: 5.8 %
MUCOUS THREADS URNS QL MICRO: ABNORMAL /LPF
NEUTROPHILS # BLD AUTO: 4.77 X10(3)/MCL (ref 2.1–9.2)
NEUTROPHILS NFR BLD AUTO: 83.9 %
NITRITE UR QL STRIP.AUTO: NEGATIVE
NRBC BLD AUTO-RTO: 0 %
PH UR STRIP.AUTO: 6 [PH]
PLATELET # BLD AUTO: 234 X10(3)/MCL (ref 130–400)
PMV BLD AUTO: 10.3 FL (ref 7.4–10.4)
POTASSIUM SERPL-SCNC: 5.3 MMOL/L (ref 3.5–5.1)
PROT SERPL-MCNC: 7 GM/DL (ref 5.8–7.6)
PROT UR QL STRIP.AUTO: NEGATIVE
PROTHROMBIN TIME: 14 SECONDS (ref 12.5–14.5)
RBC # BLD AUTO: 3.19 X10(6)/MCL (ref 4.7–6.1)
RBC #/AREA URNS AUTO: ABNORMAL /HPF
RBC UR QL AUTO: NEGATIVE
SODIUM SERPL-SCNC: 139 MMOL/L (ref 136–145)
SP GR UR STRIP.AUTO: 1.02 (ref 1–1.03)
SPECIMEN OUTDATE: NORMAL
SQUAMOUS #/AREA URNS LPF: ABNORMAL /HPF
TIBC SERPL-MCNC: 319 UG/DL (ref 69–240)
TIBC SERPL-MCNC: 346 UG/DL (ref 250–450)
TRANSFERRIN SERPL-MCNC: 336 MG/DL (ref 163–344)
UNIT NUMBER: NORMAL
UNIT NUMBER: NORMAL
UROBILINOGEN UR STRIP-ACNC: NORMAL
VIT B12 SERPL-MCNC: 313 PG/ML (ref 213–816)
WBC # SPEC AUTO: 5.68 X10(3)/MCL (ref 4.5–11.5)
WBC #/AREA URNS AUTO: ABNORMAL /HPF

## 2024-02-29 PROCEDURE — 86923 COMPATIBILITY TEST ELECTRIC: CPT

## 2024-02-29 PROCEDURE — 81001 URINALYSIS AUTO W/SCOPE: CPT | Performed by: NURSE PRACTITIONER

## 2024-02-29 PROCEDURE — 63600175 PHARM REV CODE 636 W HCPCS

## 2024-02-29 PROCEDURE — 82607 VITAMIN B-12: CPT | Performed by: INTERNAL MEDICINE

## 2024-02-29 PROCEDURE — P9016 RBC LEUKOCYTES REDUCED: HCPCS | Performed by: INTERNAL MEDICINE

## 2024-02-29 PROCEDURE — P9016 RBC LEUKOCYTES REDUCED: HCPCS

## 2024-02-29 PROCEDURE — 30233N0 TRANSFUSION OF AUTOLOGOUS RED BLOOD CELLS INTO PERIPHERAL VEIN, PERCUTANEOUS APPROACH: ICD-10-PCS | Performed by: INTERNAL MEDICINE

## 2024-02-29 PROCEDURE — 11000001 HC ACUTE MED/SURG PRIVATE ROOM

## 2024-02-29 PROCEDURE — 83010 ASSAY OF HAPTOGLOBIN QUANT: CPT | Performed by: INTERNAL MEDICINE

## 2024-02-29 PROCEDURE — 86923 COMPATIBILITY TEST ELECTRIC: CPT | Mod: 91 | Performed by: INTERNAL MEDICINE

## 2024-02-29 PROCEDURE — 63600175 PHARM REV CODE 636 W HCPCS: Performed by: INTERNAL MEDICINE

## 2024-02-29 PROCEDURE — 83615 LACTATE (LD) (LDH) ENZYME: CPT | Performed by: INTERNAL MEDICINE

## 2024-02-29 PROCEDURE — 83540 ASSAY OF IRON: CPT | Performed by: INTERNAL MEDICINE

## 2024-02-29 PROCEDURE — 85025 COMPLETE CBC W/AUTO DIFF WBC: CPT | Performed by: NURSE PRACTITIONER

## 2024-02-29 PROCEDURE — 86901 BLOOD TYPING SEROLOGIC RH(D): CPT | Performed by: NURSE PRACTITIONER

## 2024-02-29 PROCEDURE — 36430 TRANSFUSION BLD/BLD COMPNT: CPT

## 2024-02-29 PROCEDURE — 96374 THER/PROPH/DIAG INJ IV PUSH: CPT

## 2024-02-29 PROCEDURE — 99285 EMERGENCY DEPT VISIT HI MDM: CPT | Mod: 25

## 2024-02-29 PROCEDURE — 85730 THROMBOPLASTIN TIME PARTIAL: CPT | Performed by: NURSE PRACTITIONER

## 2024-02-29 PROCEDURE — C9113 INJ PANTOPRAZOLE SODIUM, VIA: HCPCS

## 2024-02-29 PROCEDURE — 85610 PROTHROMBIN TIME: CPT | Performed by: NURSE PRACTITIONER

## 2024-02-29 PROCEDURE — 80053 COMPREHEN METABOLIC PANEL: CPT | Performed by: NURSE PRACTITIONER

## 2024-02-29 PROCEDURE — 82728 ASSAY OF FERRITIN: CPT | Performed by: INTERNAL MEDICINE

## 2024-02-29 RX ORDER — ONDANSETRON HYDROCHLORIDE 2 MG/ML
4 INJECTION, SOLUTION INTRAVENOUS EVERY 4 HOURS PRN
Status: DISCONTINUED | OUTPATIENT
Start: 2024-02-29 | End: 2024-03-02 | Stop reason: HOSPADM

## 2024-02-29 RX ORDER — GLUCAGON 1 MG
1 KIT INJECTION
Status: DISCONTINUED | OUTPATIENT
Start: 2024-02-29 | End: 2024-03-02 | Stop reason: HOSPADM

## 2024-02-29 RX ORDER — PANTOPRAZOLE SODIUM 40 MG/10ML
80 INJECTION, POWDER, LYOPHILIZED, FOR SOLUTION INTRAVENOUS ONCE
Status: COMPLETED | OUTPATIENT
Start: 2024-02-29 | End: 2024-02-29

## 2024-02-29 RX ORDER — CARVEDILOL 12.5 MG/1
12.5 TABLET ORAL 2 TIMES DAILY WITH MEALS
Status: DISCONTINUED | OUTPATIENT
Start: 2024-03-01 | End: 2024-03-02 | Stop reason: HOSPADM

## 2024-02-29 RX ORDER — FAMOTIDINE 20 MG/1
20 TABLET, FILM COATED ORAL 2 TIMES DAILY
Status: DISCONTINUED | OUTPATIENT
Start: 2024-02-29 | End: 2024-03-02 | Stop reason: HOSPADM

## 2024-02-29 RX ORDER — IBUPROFEN 200 MG
24 TABLET ORAL
Status: DISCONTINUED | OUTPATIENT
Start: 2024-02-29 | End: 2024-03-02 | Stop reason: HOSPADM

## 2024-02-29 RX ORDER — HYDROCODONE BITARTRATE AND ACETAMINOPHEN 500; 5 MG/1; MG/1
TABLET ORAL
Status: DISCONTINUED | OUTPATIENT
Start: 2024-02-29 | End: 2024-03-02 | Stop reason: HOSPADM

## 2024-02-29 RX ORDER — ACETAMINOPHEN 325 MG/1
650 TABLET ORAL EVERY 8 HOURS PRN
Status: DISCONTINUED | OUTPATIENT
Start: 2024-02-29 | End: 2024-03-02 | Stop reason: HOSPADM

## 2024-02-29 RX ORDER — SODIUM CHLORIDE 0.9 % (FLUSH) 0.9 %
10 SYRINGE (ML) INJECTION EVERY 12 HOURS PRN
Status: DISCONTINUED | OUTPATIENT
Start: 2024-02-29 | End: 2024-03-02 | Stop reason: HOSPADM

## 2024-02-29 RX ORDER — TACROLIMUS 1 MG/1
4 CAPSULE ORAL EVERY MORNING
Status: DISCONTINUED | OUTPATIENT
Start: 2024-03-01 | End: 2024-03-02 | Stop reason: HOSPADM

## 2024-02-29 RX ORDER — ESCITALOPRAM OXALATE 5 MG/1
5 TABLET ORAL DAILY
Status: DISCONTINUED | OUTPATIENT
Start: 2024-03-01 | End: 2024-03-02 | Stop reason: HOSPADM

## 2024-02-29 RX ORDER — FERROUS SULFATE, DRIED 160(50) MG
1 TABLET, EXTENDED RELEASE ORAL DAILY
Status: DISCONTINUED | OUTPATIENT
Start: 2024-03-01 | End: 2024-03-02 | Stop reason: HOSPADM

## 2024-02-29 RX ORDER — ACETAMINOPHEN 325 MG/1
650 TABLET ORAL EVERY 4 HOURS PRN
Status: DISCONTINUED | OUTPATIENT
Start: 2024-02-29 | End: 2024-03-02 | Stop reason: HOSPADM

## 2024-02-29 RX ORDER — IBUPROFEN 200 MG
16 TABLET ORAL
Status: DISCONTINUED | OUTPATIENT
Start: 2024-02-29 | End: 2024-03-02 | Stop reason: HOSPADM

## 2024-02-29 RX ORDER — PANTOPRAZOLE SODIUM 40 MG/10ML
40 INJECTION, POWDER, LYOPHILIZED, FOR SOLUTION INTRAVENOUS 2 TIMES DAILY
Status: DISCONTINUED | OUTPATIENT
Start: 2024-03-01 | End: 2024-03-02 | Stop reason: HOSPADM

## 2024-02-29 RX ORDER — TACROLIMUS 1 MG/1
3 CAPSULE ORAL EVERY EVENING
Status: DISCONTINUED | OUTPATIENT
Start: 2024-02-29 | End: 2024-03-02 | Stop reason: HOSPADM

## 2024-02-29 RX ADMIN — TACROLIMUS 3 MG: 1 CAPSULE ORAL at 09:02

## 2024-02-29 RX ADMIN — PANTOPRAZOLE SODIUM 80 MG: 40 INJECTION, POWDER, FOR SOLUTION INTRAVENOUS at 05:02

## 2024-02-29 NOTE — FIRST PROVIDER EVALUATION
Medical screening examination initiated.  I have conducted a focused provider triage encounter, findings are as follows:    Brief history of present illness:  Patient states that he was instructed to report to the ED due to low H&H. States dark stools. States hx. Of a liver transplant in 2022.    There were no vitals filed for this visit.    Pertinent physical exam:  Awake, alert, ambulatory    Brief workup plan:  Labs    Preliminary workup initiated; this workup will be continued and followed by the physician or advanced practice provider that is assigned to the patient when roomed.

## 2024-02-29 NOTE — Clinical Note
Diagnosis: Gastrointestinal hemorrhage, unspecified gastrointestinal hemorrhage type [[id number]]   Future Attending Provider: ISACC LAMB [[id number]]   Admit to which facility:: [facility] [[id number]]   Reason for IP Medical Treatment  (Clinical interventions that can only be accomplished in the IP setting? ) :: as above   I certify that Inpatient services for greater than or equal to 2 midnights are medically necessary:: Yes   Plans for Post-Acute care--if anticipated (pick the single best option):: A. No post acute care anticipated at this time

## 2024-02-29 NOTE — ED PROVIDER NOTES
Encounter Date: 2/29/2024       History     Chief Complaint   Patient presents with    Fatigue     Advised internal medicine physician told him to come in for possible GIB, pt has been having dark stools     63 year old male with a hx of HCC/treated chronic hepatitis C s/p liver transplant in 2022, HTN, HLD, DM2, RICK, aortic valve replacement sent by PCP for GIB. Pt reports recently been feeling fatigue, went to see PCP who ordered labs and noticed dropping H/H with positive FOBT. He endorses 3 month hx of dark stools. Denies  seeing blood on the tissue when wiping. Denies chest pain, SOB, fever, chills. He is on plavix and ASA    The history is provided by the patient. No  was used.     Review of patient's allergies indicates:  No Known Allergies  Past Medical History:   Diagnosis Date    Anemia     Arthritis     At risk for opportunistic infections 01/23/2022    Diabetes     Dizziness     Dyslipidemia     General anesthetics causing adverse effect in therapeutic use     Hep C w/o coma, chronic     failed 2 rounds of interferon-based medication. no protease inhibitorrs used    Hypertension     not on medication    Kidney stone     Liver cancer     Liver transplant candidate     Personal history of colonic polyps 02/12/2018     Past Surgical History:   Procedure Laterality Date    AORTIC VALVE REPLACEMENT  2018    due to aortic regurgitation    CARDIAC SURGERY      CATHETERIZATION OF BOTH LEFT AND RIGHT HEART N/A 07/08/2021    Procedure: CATHETERIZATION, HEART, BOTH LEFT AND RIGHT;  Surgeon: Eder Beltran MD;  Location: [facility] CATH LAB;  Service: Cardiology;  Laterality: N/A;    COLONOSCOPY  02/12/2018    Dr. Ashok Arita    COLONOSCOPY W/ POLYPECTOMY      2011    ESOPHAGOGASTRODUODENOSCOPY      x2 with cautery in 2001, 2012    LIVER TRANSPLANT N/A 01/23/2022    Procedure: TRANSPLANT, LIVER;  Surgeon: Jesús Austin MD;  Location: [facility] OR [address] [place] [address];  Service: Transplant;  Laterality: N/A;     mastoid bone      right ear     Family History   Problem Relation Age of Onset    Diabetes Sister     Diabetes Brother      Social History     Tobacco Use    Smoking status: Former     Current packs/day: 0.00     Average packs/day: 1 pack/day for 25.0 years (25.0 ttl pk-yrs)     Types: Cigarettes     Start date: 1974     Quit date: 1999     Years since quittin.7    Smokeless tobacco: Never   Substance Use Topics    Alcohol use: No     Comment: quit     Drug use: No     Review of Systems   Constitutional:  Positive for fatigue. Negative for appetite change.   HENT:  Negative for trouble swallowing.    Respiratory:  Negative for chest tightness and shortness of breath.    Cardiovascular:  Negative for chest pain and palpitations.   Gastrointestinal:  Negative for abdominal pain, constipation, diarrhea, nausea and vomiting.        Dark stools   Genitourinary:  Negative for hematuria.   Neurological:  Negative for dizziness, weakness, light-headedness and headaches.       Physical Exam     Initial Vitals [24 1324]   BP Pulse Resp Temp SpO2   (!) 151/68 79 18 98.1 °F (36.7 °C) 100 %      MAP       --         Physical Exam    Constitutional: He appears well-developed. No distress.   HENT:   Head: Normocephalic and atraumatic.   Eyes: Pupils are equal, round, and reactive to light.   Neck: Neck supple.   Cardiovascular:  Normal rate, regular rhythm, normal heart sounds and intact distal pulses.     Exam reveals no gallop.       Pulmonary/Chest: Breath sounds normal. No respiratory distress. He has no wheezes. He has no rales.   Abdominal: Abdomen is soft. Bowel sounds are normal. He exhibits no distension. There is no abdominal tenderness. There is no rebound and no guarding.   Genitourinary:    Genitourinary Comments: HARESH: negative      Musculoskeletal:      Cervical back: Neck supple.     Neurological: He is alert and oriented to person, place, and time. GCS score is 15. GCS eye subscore is  4. GCS verbal subscore is 5. GCS motor subscore is 6.   Skin: Skin is warm. Capillary refill takes less than 2 seconds.         ED Course   Procedures  Labs Reviewed   COMPREHENSIVE METABOLIC PANEL - Abnormal; Notable for the following components:       Result Value    Potassium Level 5.3 (*)     Chloride 108 (*)     Glucose Level 129 (*)     All other components within normal limits   URINALYSIS, REFLEX TO URINE CULTURE - Abnormal; Notable for the following components:    Mucous, UA Trace (*)     Hyaline Casts, UA 0-2 (*)     All other components within normal limits   CBC WITH DIFFERENTIAL - Abnormal; Notable for the following components:    RBC 3.19 (*)     Hgb 8.2 (*)     Hct 27.1 (*)     MCH 25.7 (*)     MCHC 30.3 (*)     Lymph # 0.52 (*)     All other components within normal limits   APTT - Normal   PROTIME-INR - Normal   CBC W/ AUTO DIFFERENTIAL    Narrative:     The following orders were created for panel order CBC Auto Differential.  Procedure                               Abnormality         Status                     ---------                               -----------         ------                     CBC with Differential[[id number]]       Abnormal            Final result                 Please view results for these tests on the individual orders.   TYPE & SCREEN   PREPARE RBC SOFT          Imaging Results    None          Medications   0.9%  NaCl infusion (for blood administration) (has no administration in time range)   pantoprazole injection 40 mg (has no administration in time range)   sodium chloride 0.9% flush 10 mL (has no administration in time range)   ondansetron injection 4 mg (has no administration in time range)   acetaminophen tablet 650 mg (has no administration in time range)   acetaminophen tablet 650 mg (has no administration in time range)   glucose chewable tablet 16 g (has no administration in time range)   glucose chewable tablet 24 g (has no administration in time range)    glucagon (human recombinant) injection 1 mg (has no administration in time range)   dextrose 10% bolus 125 mL 125 mL (has no administration in time range)   dextrose 10% bolus 250 mL 250 mL (has no administration in time range)   pantoprazole injection 80 mg (80 mg Intravenous Given 2/29/24 1725)     Medical Decision Making  Risk  Prescription drug management.                          Medical Decision Making:   Initial Assessment:   63 year old male with a hx of HCC/treated chronic hepatitis C s/p liver transplant in 2022, HTN, HLD, DM2, RICK, aortic valve replacement sent by PCP for GIB. Cbc, cmp, UA, PT/PTT/INR ordered  Differential Diagnosis:   PUD, esophageal/gastric varices, Gastritis, marietta monteiro syndrome, esophagitis   Clinical Tests:   Lab Tests: Ordered and Reviewed  The following lab test(s) were unremarkable: CBC, CMP, Urinalysis, PTT and PT  ED Management:  Labs remarkable for normocytic anemia with progressive dropping in H/H and mild hyperkalemia. Negative FOBT test    Loaded with IV protonix 80mg, transfusing 1unit PRBC. Discussed with GI for EGD in AM; hospital medicine paged for admission.  Other:   I have discussed this case with another health care provider.             Clinical Impression:  Final diagnoses:  [K92.2] Gastrointestinal hemorrhage, unspecified gastrointestinal hemorrhage type (Primary)          ED Disposition Condition    Admit Stable                Asael Brar MD  Resident  02/29/24 4553

## 2024-03-01 ENCOUNTER — ANESTHESIA (OUTPATIENT)
Dept: ENDOSCOPY | Facility: HOSPITAL | Age: 64
DRG: 378 | End: 2024-03-01
Payer: MEDICARE

## 2024-03-01 ENCOUNTER — ANESTHESIA EVENT (OUTPATIENT)
Dept: ENDOSCOPY | Facility: HOSPITAL | Age: 64
DRG: 378 | End: 2024-03-01
Payer: MEDICARE

## 2024-03-01 LAB
ALBUMIN SERPL-MCNC: 3.4 G/DL (ref 3.4–4.8)
ALBUMIN/GLOB SERPL: 1.3 RATIO (ref 1.1–2)
ALP SERPL-CCNC: 67 UNIT/L (ref 40–150)
ALT SERPL-CCNC: 9 UNIT/L (ref 0–55)
AST SERPL-CCNC: 10 UNIT/L (ref 5–34)
BASOPHILS # BLD AUTO: 0.02 X10(3)/MCL
BASOPHILS NFR BLD AUTO: 0.4 %
BILIRUB SERPL-MCNC: 0.5 MG/DL
BUN SERPL-MCNC: 18.3 MG/DL (ref 8.4–25.7)
CALCIUM SERPL-MCNC: 8.7 MG/DL (ref 8.8–10)
CHLORIDE SERPL-SCNC: 108 MMOL/L (ref 98–107)
CO2 SERPL-SCNC: 24 MMOL/L (ref 23–31)
CREAT SERPL-MCNC: 0.79 MG/DL (ref 0.73–1.18)
EOSINOPHIL # BLD AUTO: 0 X10(3)/MCL (ref 0–0.9)
EOSINOPHIL NFR BLD AUTO: 0 %
ERYTHROCYTE [DISTWIDTH] IN BLOOD BY AUTOMATED COUNT: 14 % (ref 11.5–17)
FOLATE SERPL-MCNC: 10.8 NG/ML (ref 7–31.4)
GFR SERPLBLD CREATININE-BSD FMLA CKD-EPI: >60 MLS/MIN/1.73/M2
GLOBULIN SER-MCNC: 2.7 GM/DL (ref 2.4–3.5)
GLUCOSE SERPL-MCNC: 146 MG/DL (ref 82–115)
HCT VFR BLD AUTO: 30.5 % (ref 42–52)
HGB BLD-MCNC: 9.4 G/DL (ref 14–18)
IMM GRANULOCYTES # BLD AUTO: 0.02 X10(3)/MCL (ref 0–0.04)
IMM GRANULOCYTES NFR BLD AUTO: 0.4 %
LYMPHOCYTES # BLD AUTO: 0.57 X10(3)/MCL (ref 0.6–4.6)
LYMPHOCYTES NFR BLD AUTO: 11.2 %
MCH RBC QN AUTO: 26.7 PG (ref 27–31)
MCHC RBC AUTO-ENTMCNC: 30.8 G/DL (ref 33–36)
MCV RBC AUTO: 86.6 FL (ref 80–94)
MONOCYTES # BLD AUTO: 0.28 X10(3)/MCL (ref 0.1–1.3)
MONOCYTES NFR BLD AUTO: 5.5 %
NEUTROPHILS # BLD AUTO: 4.2 X10(3)/MCL (ref 2.1–9.2)
NEUTROPHILS NFR BLD AUTO: 82.5 %
NRBC BLD AUTO-RTO: 0 %
PLATELET # BLD AUTO: 190 X10(3)/MCL (ref 130–400)
PMV BLD AUTO: 11 FL (ref 7.4–10.4)
POCT GLUCOSE: 109 MG/DL (ref 70–110)
POCT GLUCOSE: 274 MG/DL (ref 70–110)
POTASSIUM SERPL-SCNC: 4.4 MMOL/L (ref 3.5–5.1)
PROT SERPL-MCNC: 6.1 GM/DL (ref 5.8–7.6)
RBC # BLD AUTO: 3.52 X10(6)/MCL (ref 4.7–6.1)
SODIUM SERPL-SCNC: 139 MMOL/L (ref 136–145)
WBC # SPEC AUTO: 5.09 X10(3)/MCL (ref 4.5–11.5)

## 2024-03-01 PROCEDURE — 37000009 HC ANESTHESIA EA ADD 15 MINS: Performed by: INTERNAL MEDICINE

## 2024-03-01 PROCEDURE — 80053 COMPREHEN METABOLIC PANEL: CPT | Performed by: PHYSICIAN ASSISTANT

## 2024-03-01 PROCEDURE — 25000003 PHARM REV CODE 250: Performed by: INTERNAL MEDICINE

## 2024-03-01 PROCEDURE — 44366 SMALL BOWEL ENDOSCOPY: CPT | Performed by: INTERNAL MEDICINE

## 2024-03-01 PROCEDURE — 82746 ASSAY OF FOLIC ACID SERUM: CPT | Performed by: INTERNAL MEDICINE

## 2024-03-01 PROCEDURE — 63600175 PHARM REV CODE 636 W HCPCS: Performed by: PHYSICIAN ASSISTANT

## 2024-03-01 PROCEDURE — 21400001 HC TELEMETRY ROOM

## 2024-03-01 PROCEDURE — 11000001 HC ACUTE MED/SURG PRIVATE ROOM

## 2024-03-01 PROCEDURE — 63600175 PHARM REV CODE 636 W HCPCS: Performed by: INTERNAL MEDICINE

## 2024-03-01 PROCEDURE — C9113 INJ PANTOPRAZOLE SODIUM, VIA: HCPCS | Performed by: PHYSICIAN ASSISTANT

## 2024-03-01 PROCEDURE — 37000008 HC ANESTHESIA 1ST 15 MINUTES: Performed by: INTERNAL MEDICINE

## 2024-03-01 PROCEDURE — 0W3P8ZZ CONTROL BLEEDING IN GASTROINTESTINAL TRACT, VIA NATURAL OR ARTIFICIAL OPENING ENDOSCOPIC: ICD-10-PCS | Performed by: INTERNAL MEDICINE

## 2024-03-01 PROCEDURE — 25000003 PHARM REV CODE 250: Performed by: ANESTHESIOLOGY

## 2024-03-01 PROCEDURE — 85025 COMPLETE CBC W/AUTO DIFF WBC: CPT | Performed by: PHYSICIAN ASSISTANT

## 2024-03-01 PROCEDURE — 27201423 OPTIME MED/SURG SUP & DEVICES STERILE SUPPLY: Performed by: INTERNAL MEDICINE

## 2024-03-01 PROCEDURE — 25000003 PHARM REV CODE 250

## 2024-03-01 PROCEDURE — D9220A PRA ANESTHESIA: Mod: CRNA,,,

## 2024-03-01 PROCEDURE — D9220A PRA ANESTHESIA: Mod: ANES,,, | Performed by: ANESTHESIOLOGY

## 2024-03-01 PROCEDURE — 63600175 PHARM REV CODE 636 W HCPCS

## 2024-03-01 RX ORDER — SODIUM CHLORIDE, SODIUM GLUCONATE, SODIUM ACETATE, POTASSIUM CHLORIDE AND MAGNESIUM CHLORIDE 30; 37; 368; 526; 502 MG/100ML; MG/100ML; MG/100ML; MG/100ML; MG/100ML
INJECTION, SOLUTION INTRAVENOUS CONTINUOUS
Status: DISCONTINUED | OUTPATIENT
Start: 2024-03-01 | End: 2024-03-02 | Stop reason: HOSPADM

## 2024-03-01 RX ORDER — LIDOCAINE HYDROCHLORIDE 20 MG/ML
INJECTION INTRAVENOUS
Status: DISCONTINUED | OUTPATIENT
Start: 2024-03-01 | End: 2024-03-01

## 2024-03-01 RX ORDER — LIDOCAINE HYDROCHLORIDE 20 MG/ML
INJECTION, SOLUTION EPIDURAL; INFILTRATION; INTRACAUDAL; PERINEURAL
Status: DISPENSED
Start: 2024-03-01 | End: 2024-03-01

## 2024-03-01 RX ORDER — HYDRALAZINE HYDROCHLORIDE 20 MG/ML
10 INJECTION INTRAMUSCULAR; INTRAVENOUS EVERY 6 HOURS PRN
Status: DISCONTINUED | OUTPATIENT
Start: 2024-03-01 | End: 2024-03-02 | Stop reason: HOSPADM

## 2024-03-01 RX ORDER — MAGNESIUM SULFATE HEPTAHYDRATE 40 MG/ML
2 INJECTION, SOLUTION INTRAVENOUS ONCE
Status: COMPLETED | OUTPATIENT
Start: 2024-03-01 | End: 2024-03-01

## 2024-03-01 RX ORDER — PROPOFOL 10 MG/ML
VIAL (ML) INTRAVENOUS
Status: COMPLETED
Start: 2024-03-01 | End: 2024-03-01

## 2024-03-01 RX ORDER — PROPOFOL 10 MG/ML
VIAL (ML) INTRAVENOUS
Status: DISCONTINUED | OUTPATIENT
Start: 2024-03-01 | End: 2024-03-01

## 2024-03-01 RX ORDER — CLOPIDOGREL BISULFATE 75 MG/1
75 TABLET ORAL DAILY
Status: DISCONTINUED | OUTPATIENT
Start: 2024-03-02 | End: 2024-03-02 | Stop reason: HOSPADM

## 2024-03-01 RX ORDER — ONDANSETRON HYDROCHLORIDE 2 MG/ML
4 INJECTION, SOLUTION INTRAVENOUS ONCE AS NEEDED
Status: DISCONTINUED | OUTPATIENT
Start: 2024-03-01 | End: 2024-03-01 | Stop reason: HOSPADM

## 2024-03-01 RX ADMIN — PANTOPRAZOLE SODIUM 40 MG: 40 INJECTION, POWDER, FOR SOLUTION INTRAVENOUS at 08:03

## 2024-03-01 RX ADMIN — Medication 1 TABLET: at 08:03

## 2024-03-01 RX ADMIN — PROPOFOL 20 MG: 10 INJECTION, EMULSION INTRAVENOUS at 11:03

## 2024-03-01 RX ADMIN — CARVEDILOL 12.5 MG: 12.5 TABLET, FILM COATED ORAL at 06:03

## 2024-03-01 RX ADMIN — PROPOFOL 60 MG: 10 INJECTION, EMULSION INTRAVENOUS at 11:03

## 2024-03-01 RX ADMIN — FAMOTIDINE 20 MG: 20 TABLET, FILM COATED ORAL at 08:03

## 2024-03-01 RX ADMIN — MAGNESIUM SULFATE HEPTAHYDRATE 2 G: 40 INJECTION, SOLUTION INTRAVENOUS at 03:03

## 2024-03-01 RX ADMIN — ESCITALOPRAM OXALATE 5 MG: 5 TABLET, FILM COATED ORAL at 08:03

## 2024-03-01 RX ADMIN — SODIUM CHLORIDE, SODIUM GLUCONATE, SODIUM ACETATE, POTASSIUM CHLORIDE AND MAGNESIUM CHLORIDE: 526; 502; 368; 37; 30 INJECTION, SOLUTION INTRAVENOUS at 10:03

## 2024-03-01 RX ADMIN — LIDOCAINE HYDROCHLORIDE 100 MG: 20 INJECTION INTRAVENOUS at 11:03

## 2024-03-01 RX ADMIN — SODIUM CHLORIDE, SODIUM GLUCONATE, SODIUM ACETATE, POTASSIUM CHLORIDE AND MAGNESIUM CHLORIDE: 526; 502; 368; 37; 30 INJECTION, SOLUTION INTRAVENOUS at 11:03

## 2024-03-01 RX ADMIN — CARVEDILOL 12.5 MG: 12.5 TABLET, FILM COATED ORAL at 05:03

## 2024-03-01 RX ADMIN — TACROLIMUS 4 MG: 1 CAPSULE ORAL at 08:03

## 2024-03-01 RX ADMIN — PANTOPRAZOLE SODIUM 40 MG: 40 INJECTION, POWDER, FOR SOLUTION INTRAVENOUS at 05:03

## 2024-03-01 RX ADMIN — SODIUM CHLORIDE 125 MG: 9 INJECTION, SOLUTION INTRAVENOUS at 08:03

## 2024-03-01 RX ADMIN — TACROLIMUS 3 MG: 1 CAPSULE ORAL at 05:03

## 2024-03-01 RX ADMIN — HYDRALAZINE HYDROCHLORIDE 10 MG: 20 INJECTION INTRAMUSCULAR; INTRAVENOUS at 03:03

## 2024-03-01 NOTE — PROVATION PATIENT INSTRUCTIONS
Discharge Summary/Instructions after an Endoscopic Procedure  Patient Name: Eder Kong  Patient MRN: [id number]  Patient YOB: 1960  Friday, March 1, 2024  Ranjith Abraham MD  Dear patient,  As a result of recent federal legislation (The Federal Cures Act), you may   receive lab or pathology results from your procedure in your [facility]   account before your physician is able to contact you. Your physician or   their representative will relay the results to you with their   recommendations at their soonest availability.  Thank you,  RESTRICTIONS:  During your procedure today, you received medications for sedation.  These   medications may affect your judgment, balance and coordination.  Therefore,   for 24 hours, you have the following restrictions:   - DO NOT drive a car, operate machinery, make legal/financial decisions,   sign important papers or drink alcohol.    ACTIVITY:  Today: no heavy lifting, straining or running due to procedural   sedation/anesthesia.  The following day: return to full activity including work.  DIET:  Eat and drink normally unless instructed otherwise.     TREATMENT FOR COMMON SIDE EFFECTS:  - Mild abdominal pain, nausea, belching, bloating or excessive gas:  rest,   eat lightly and use a heating pad.  - Sore Throat: treat with throat lozenges and/or gargle with warm salt   water.  - Because air was used during the procedure, expelling large amounts of air   from your rectum or belching is normal.  - If a bowel prep was taken, you may not have a bowel movement for 1-3 days.    This is normal.  SYMPTOMS TO WATCH FOR AND REPORT TO YOUR PHYSICIAN:  1. Abdominal pain or bloating, other than gas cramps.  2. Chest pain.  3. Back pain.  4. Signs of infection such as: chills or fever occurring within 24 hours   after the procedure.  5. Rectal bleeding, which would show as bright red, maroon, or black stools.   (A tablespoon of blood from the rectum is not serious, especially if    hemorrhoids are present.)  6. Vomiting.  7. Weakness or dizziness.  GO DIRECTLY TO THE NEAREST EMERGENCY ROOM IF YOU HAVE ANY OF THE FOLLOWING:      Difficulty breathing              Chills and/or fever over 101 F   Persistent vomiting and/or vomiting blood   Severe abdominal pain   Severe chest pain   Black, tarry stools   Bleeding- more than one tablespoon   Any other symptom or condition that you feel may need urgent attention  Your doctor recommends these additional instructions:  If any biopsies were taken, your doctors clinic will contact you in 1 to 2   weeks with any results.  - [facility] patient to [facility] for [facility] care.   - Use Protonix (pantoprazole) 40 mg PO daily.   - Resume Plavix (clopidogrel) at prior dose tomorrow.   - IV iron infusion x 1 while hospitalized  - Follow up in clinic with primary GI - if anemia persists can consider   repeat Colonoscopy +/- M2A as the next step.  - Resume previous diet.  For questions, problems or results please call your physician - Ranjith Abraham MD at Work:  [phone].  [facility], EMERGENCY ROOM PHONE NUMBER: [phone]  IF A COMPLICATION OR EMERGENCY SITUATION ARISES AND YOU ARE UNABLE TO REACH   YOUR PHYSICIAN - GO DIRECTLY TO THE EMERGENCY ROOM.  Ranjith Abraham MD  3/1/2024 12:18:42 PM  This report has been verified and signed electronically.  Dear patient,  As a result of recent federal legislation (The Federal Cures Act), you may   receive lab or pathology results from your procedure in your [facility]   account before your physician is able to contact you. Your physician or   their representative will relay the results to you with their   recommendations at their soonest availability.  Thank you,  PROVATION

## 2024-03-01 NOTE — ANESTHESIA POSTPROCEDURE EVALUATION
Anesthesia Post Evaluation    Patient: Eder Kong    Procedure(s) Performed: Procedure(s) (LRB):  EGD (N/A)  EGD,WITH HEMORRHAGE CONTROL    Final Anesthesia Type: general      Patient location during evaluation: GI PACU  Patient participation: Yes- Able to Participate  Level of consciousness: oriented and awake  Post-procedure vital signs: reviewed and stable  Pain management: adequate  Airway patency: patent    PONV status at discharge: No PONV  Anesthetic complications: no      Cardiovascular status: hemodynamically stable  Respiratory status: spontaneous ventilation and unassisted  Hydration status: euvolemic  Follow-up not needed.  Comments: [facility]              Vitals Value Taken Time   /69 03/01/24 1230   Temp 36.9 °C (98.4 °F) 03/01/24 1200   Pulse 72 03/01/24 1230   Resp 14 03/01/24 1230   SpO2 98 % 03/01/24 1230         No case tracking events are documented in the log.      Pain/Bibiana Score: Bibiana Score: 10 (3/1/2024 12:27 PM)

## 2024-03-01 NOTE — ANESTHESIA PREPROCEDURE EVALUATION
03/01/2024  Eder Kong is a 63 y.o., male, who presents for the following:    Procedure: EGD (Abdomen)   Anesthesia type: General/MAC   Diagnosis: Symptomatic anemia [D64.9]   Location: [facility] ENDO 02 / [facility] ENDOSCOPY   Surgeons: Ranjith Abraham MD       HPI:  Symptomatic anemia, reported melena, heme-positive stools      HX:  HCV/cirrhosis/HCC s/p liver transplant, DM2, HTN, HLD, CAD on Plavix (last cardiac stent 2018), s/p AVR     -patient receiving 2 unit blood transfusion, monitor H&H, IV Protonix  -consultation to Gastroenterology     2D Echo:  6/30/21  The left ventricle is normal in size with concentric remodeling and normal systolic function.  The estimated ejection fraction is 65%.  Normal left ventricular diastolic function.  There is abnormal septal wall motion.  Normal right ventricular size with normal right ventricular systolic function.  Moderate left atrial enlargement.  Mild right atrial enlargement.  Mild tricuspid regurgitation.  Normal central venous pressure (3 mmHg).  The estimated PA systolic pressure is 25 mmHg.    LAB:        Pre-op Assessment    I have reviewed the Patient Summary Reports.     I have reviewed the Nursing Notes. I have reviewed the NPO Status.   I have reviewed the Medications.     Review of Systems  Anesthesia Hx:  No problems with previous Anesthesia             Denies Family Hx of Anesthesia complications.    Denies Personal Hx of Anesthesia complications.                    Social:  Former Smoker       Cardiovascular:     Hypertension           hyperlipidemia    AVR Hx                         Pulmonary:  Pulmonary Normal                       Renal/:   renal calculi               Hepatic/GI:     GERD  Hepatitis, C H/O HCC  Liver Transplant (2022), on Immunosupression          Endocrine:  Diabetes, type 2               Physical Exam  General: Alert  and Oriented    Airway:  Mallampati: I   Mouth Opening: Normal  TM Distance: Normal  Tongue: Normal  Neck ROM: Normal ROM    Dental:  Intact  Age Related Wear  Chest/Lungs:  Normal Respiratory Rate    Heart:  Rate: Normal  Rhythm: Regular Rhythm        Anesthesia Plan  Type of Anesthesia, risks & benefits discussed:    Anesthesia Type: Gen Natural Airway  Intra-op Monitoring Plan: Standard ASA Monitors  Post Op Pain Control Plan: IV/PO Opioids PRN  Induction:  IV  Airway Plan: Direct  Informed Consent: Informed consent signed with the Patient and all parties understand the risks and agree with anesthesia plan.  All questions answered. Patient consented to blood products? No  ASA Score: 3  Day of Surgery Review of History & Physical: H&P Update referred to the surgeon/provider.  Anesthesia Plan Notes: Nasal cannula vs facemask supplemental oxygenation   For patients with RYAN/obesity, may consider SuperNoval Nasal CPAP      Ready For Surgery From Anesthesia Perspective.     .

## 2024-03-01 NOTE — ED NOTES
Spoke with Dr. Abraham, states plan is to do EGD this afternoon and to make pt NPO at this point. Pt updated on plan at this time.

## 2024-03-01 NOTE — CONSULTS
Gastroenterology Consultation Note    Reason for Consult:  The primary encounter diagnosis was Symptomatic anemia. A diagnosis of Gastrointestinal hemorrhage, unspecified gastrointestinal hemorrhage type was also pertinent to this visit.    PCP:   Alexsandra Sandoval MD    Referring MD:  Alexsandra Sandoval Md  [address] [place] [address].  SAMANTHA Freeman [id number]    Hospital Day: 1     Initial History of Present Illness (HPI):  This is a 63 y.o. male known to Dr. PRASANNA Arita (last seen 2018) presented to [facility] with known chronic but worsening anemia and black stools x1 month that were noted to be FOB+ as an outpatient with his PCP.   He has a pertinent PMHx of CAD and hx of aortic valve replacement (on plavix- last dose 2/29/24 am), Hepatitis C cirrhosis s/p liver transplant about 2 years ago and he continues to follow with [facility]- he is on cellcept. He is also diabetic on Ozempic and Toujeo.    Anemia has been chronic and he is iron deficient.   He did receive 1 unit PRBC last night with increase in hgb from 8.2--> 9.4    INR 1.1  Lfts wnl  VSS    Today he reports 1 month of noting black stools 1-2 per day but he normally has 1 brown stool daily. Denies lower abdominal pain, BRBPR.     Denies frequent heartburn, nausea, vomiting, indigestion, dysphagia. He does not routinely take PPIs or H2 blockers with the exception of a prn pepcid without additional problems.     Last endoscopy was here in [place] with our GI team -records below. He has not had endoscopy since his liver transplant.     ROS:  Review of Systems   Constitutional:  Negative for chills, fever and weight loss.   HENT:  Negative for hearing loss.    Eyes:  Negative for blurred vision.   Respiratory:  Negative for cough and shortness of breath.    Cardiovascular:  Negative for chest pain and leg swelling.   Gastrointestinal:  Positive for melena. Negative for abdominal pain, constipation, diarrhea, heartburn, nausea and vomiting.   Skin:  Negative for  rash.   Neurological:  Positive for dizziness and weakness. Negative for headaches.   Psychiatric/Behavioral:  Negative for depression and memory loss. The patient is not nervous/anxious.        Medical History:   Past Medical History:   Diagnosis Date    Anemia     Arthritis     At risk for opportunistic infections 2022    Diabetes     Dizziness     Dyslipidemia     General anesthetics causing adverse effect in therapeutic use     Hep C w/o coma, chronic     failed 2 rounds of interferon-based medication. no protease inhibitorrs used    Hypertension     not on medication    Kidney stone     Liver cancer     Liver transplant candidate     Personal history of colonic polyps 2018       Surgical History:   Past Surgical History:   Procedure Laterality Date    AORTIC VALVE REPLACEMENT      due to aortic regurgitation    CARDIAC SURGERY      CATHETERIZATION OF BOTH LEFT AND RIGHT HEART N/A 2021    Procedure: CATHETERIZATION, HEART, BOTH LEFT AND RIGHT;  Surgeon: Eder Beltran MD;  Location: [facility] CATH LAB;  Service: Cardiology;  Laterality: N/A;    COLONOSCOPY  2018    Dr. Ashok Arita    COLONOSCOPY W/ POLYPECTOMY          ESOPHAGOGASTRODUODENOSCOPY      x2 with cautery in ,     LIVER TRANSPLANT N/A 2022    Procedure: TRANSPLANT, LIVER;  Surgeon: Jesús Austin MD;  Location: [facility] OR [address] [place] [address];  Service: Transplant;  Laterality: N/A;    mastoid bone      right ear       Family History:   Family History   Problem Relation Age of Onset    Diabetes Sister     Diabetes Brother    .     Social History:   Social History     Tobacco Use    Smoking status: Former     Current packs/day: 0.00     Average packs/day: 1 pack/day for 25.0 years (25.0 ttl pk-yrs)     Types: Cigarettes     Start date: 1974     Quit date: 1999     Years since quittin.7    Smokeless tobacco: Never   Substance Use Topics    Alcohol use: No     Comment: quit   "      Allergies:  Review of patient's allergies indicates:  No Known Allergies    (Not in a hospital admission)        Objective Findings:    Vital Signs:  BP (!) 155/83   Pulse 85   Temp 98.1 °F (36.7 °C) (Oral)   Resp 18   Ht 5' 5" (1.651 m)   Wt 73.9 kg (163 lb)   SpO2 99%   BMI 27.12 kg/m²   Body mass index is 27.12 kg/m².    Physical Exam:  Physical Exam  Constitutional:       General: He is not in acute distress.     Appearance: Normal appearance. He is not ill-appearing.   HENT:      Head: Normocephalic.      Mouth/Throat:      Mouth: Mucous membranes are moist.      Pharynx: Oropharynx is clear.   Eyes:      General: No scleral icterus.     Pupils: Pupils are equal, round, and reactive to light.   Cardiovascular:      Rate and Rhythm: Normal rate and regular rhythm.   Pulmonary:      Effort: Pulmonary effort is normal.      Breath sounds: Normal breath sounds.   Abdominal:      General: Bowel sounds are normal. There is no distension.      Palpations: Abdomen is soft.      Tenderness: There is no abdominal tenderness. There is no guarding.   Skin:     General: Skin is warm and dry.      Coloration: Skin is not jaundiced.   Neurological:      Mental Status: He is alert and oriented to person, place, and time.      Motor: No weakness.   Psychiatric:         Mood and Affect: Mood normal.         Behavior: Behavior normal.         Labs:  Recent Results (from the past 48 hour(s))   OCCULT BLOOD STOOL, CA SCREEN 3RD SPEC    Collection Time: 02/29/24  7:45 AM   Result Value Ref Range    Occult Blood Stool 3 Negative Negative, N/A   Comprehensive Metabolic Panel    Collection Time: 02/29/24  1:52 PM   Result Value Ref Range    Sodium Level 139 136 - 145 mmol/L    Potassium Level 5.3 (H) 3.5 - 5.1 mmol/L    Chloride 108 (H) 98 - 107 mmol/L    Carbon Dioxide 23 23 - 31 mmol/L    Glucose Level 129 (H) 82 - 115 mg/dL    Blood Urea Nitrogen 19.1 8.4 - 25.7 mg/dL    Creatinine 0.97 0.73 - 1.18 mg/dL    Calcium " Level Total 9.0 8.8 - 10.0 mg/dL    Protein Total 7.0 5.8 - 7.6 gm/dL    Albumin Level 3.8 3.4 - 4.8 g/dL    Globulin 3.2 2.4 - 3.5 gm/dL    Albumin/Globulin Ratio 1.2 1.1 - 2.0 ratio    Bilirubin Total 0.5 <=1.5 mg/dL    Alkaline Phosphatase 75 40 - 150 unit/L    Alanine Aminotransferase 13 0 - 55 unit/L    Aspartate Aminotransferase 13 5 - 34 unit/L    eGFR >60 mls/min/1.73/m2   APTT    Collection Time: 02/29/24  1:52 PM   Result Value Ref Range    PTT 27.1 23.2 - 33.7 seconds   Protime-INR    Collection Time: 02/29/24  1:52 PM   Result Value Ref Range    PT 14.0 12.5 - 14.5 seconds    INR 1.1 <=1.3   Type & Screen    Collection Time: 02/29/24  1:52 PM   Result Value Ref Range    Group & Rh O POS     Indirect Ted GEL NEG     Specimen Outdate 03/03/2024 23:59    CBC with Differential    Collection Time: 02/29/24  1:52 PM   Result Value Ref Range    WBC 5.68 4.50 - 11.50 x10(3)/mcL    RBC 3.19 (L) 4.70 - 6.10 x10(6)/mcL    Hgb 8.2 (L) 14.0 - 18.0 g/dL    Hct 27.1 (L) 42.0 - 52.0 %    MCV 85.0 80.0 - 94.0 fL    MCH 25.7 (L) 27.0 - 31.0 pg    MCHC 30.3 (L) 33.0 - 36.0 g/dL    RDW 14.0 11.5 - 17.0 %    Platelet 234 130 - 400 x10(3)/mcL    MPV 10.3 7.4 - 10.4 fL    Neut % 83.9 %    Lymph % 9.2 %    Mono % 5.8 %    Eos % 0.2 %    Basophil % 0.5 %    Lymph # 0.52 (L) 0.6 - 4.6 x10(3)/mcL    Neut # 4.77 2.1 - 9.2 x10(3)/mcL    Mono # 0.33 0.1 - 1.3 x10(3)/mcL    Eos # 0.01 0 - 0.9 x10(3)/mcL    Baso # 0.03 <=0.2 x10(3)/mcL    IG# 0.02 0 - 0.04 x10(3)/mcL    IG% 0.4 %    NRBC% 0.0 %   Prepare RBC 1 Unit    Collection Time: 02/29/24  1:52 PM   Result Value Ref Range    UNIT NUMBER [id number]     UNIT ABO/RH O POS     DISPENSE STATUS Transfused     Unit Expiration [id number]     Product Code D5268L49     Unit Blood Type Code 5100     CROSSMATCH INTERPRETATION Compatible    Iron and TIBC    Collection Time: 02/29/24  1:52 PM   Result Value Ref Range    Iron Binding Capacity Unsaturated 319 (H) 69 - 240 ug/dL    Iron  Level 27 (L) 65 - 175 ug/dL    Transferrin 336 163 - 344 mg/dL    Iron Binding Capacity Total 346 250 - 450 ug/dL    Iron Saturation 8 (L) 20 - 50 %   Ferritin    Collection Time: 02/29/24  1:52 PM   Result Value Ref Range    Ferritin Level 9.08 (L) 21.81 - 274.66 ng/mL   Vitamin B12    Collection Time: 02/29/24  1:52 PM   Result Value Ref Range    Vitamin B12 Level 313 213 - 816 pg/mL   Lactate Dehydrogenase    Collection Time: 02/29/24  1:52 PM   Result Value Ref Range    Lactate Dehydrogenase 250 (H) 125 - 220 U/L   Haptoglobin    Collection Time: 02/29/24  1:52 PM   Result Value Ref Range    Haptoglobin 257 40 - 368 mg/dL   Prepare RBC 1 Unit    Collection Time: 02/29/24  1:52 PM   Result Value Ref Range    UNIT NUMBER [id number]     UNIT ABO/RH O POS     DISPENSE STATUS Transfused     Unit Expiration [id number]     Product Code D5113W55     Unit Blood Type Code 5100     CROSSMATCH INTERPRETATION Compatible    Urinalysis, Reflex to Urine Culture    Collection Time: 02/29/24  2:58 PM    Specimen: Urine   Result Value Ref Range    Color, UA Light-Yellow Yellow, Light-Yellow, Colorless, Straw, Dark-Yellow    Appearance, UA Clear Clear    Specific Gravity, UA 1.017 1.005 - 1.030    pH, UA 6.0 5.0 - 8.5    Protein, UA Negative Negative    Glucose, UA Normal Negative, Normal    Ketones, UA Negative Negative    Blood, UA Negative Negative    Bilirubin, UA Negative Negative    Urobilinogen, UA Normal 0.2, 1.0, Normal    Nitrites, UA Negative Negative    Leukocyte Esterase, UA Negative Negative    WBC, UA 0-5 None Seen, 0-2, 3-5, 0-5 /HPF    Bacteria, UA None Seen None Seen, Trace /HPF    Squamous Epithelial Cells, UA None Seen None Seen /HPF    Mucous, UA Trace (A) None Seen /LPF    Hyaline Casts, UA 0-2 (A) None Seen /lpf    RBC, UA 0-5 None Seen, 0-2, 3-5, 0-5 /HPF   Comprehensive Metabolic Panel (CMP)    Collection Time: 03/01/24  2:35 AM   Result Value Ref Range    Sodium Level 139 136 - 145 mmol/L    Potassium  Level 4.4 3.5 - 5.1 mmol/L    Chloride 108 (H) 98 - 107 mmol/L    Carbon Dioxide 24 23 - 31 mmol/L    Glucose Level 146 (H) 82 - 115 mg/dL    Blood Urea Nitrogen 18.3 8.4 - 25.7 mg/dL    Creatinine 0.79 0.73 - 1.18 mg/dL    Calcium Level Total 8.7 (L) 8.8 - 10.0 mg/dL    Protein Total 6.1 5.8 - 7.6 gm/dL    Albumin Level 3.4 3.4 - 4.8 g/dL    Globulin 2.7 2.4 - 3.5 gm/dL    Albumin/Globulin Ratio 1.3 1.1 - 2.0 ratio    Bilirubin Total 0.5 <=1.5 mg/dL    Alkaline Phosphatase 67 40 - 150 unit/L    Alanine Aminotransferase 9 0 - 55 unit/L    Aspartate Aminotransferase 10 5 - 34 unit/L    eGFR >60 mls/min/1.73/m2   Folate    Collection Time: 03/01/24  2:35 AM   Result Value Ref Range    Folate Level 10.8 7.0 - 31.4 ng/mL   CBC with Differential    Collection Time: 03/01/24  2:35 AM   Result Value Ref Range    WBC 5.09 4.50 - 11.50 x10(3)/mcL    RBC 3.52 (L) 4.70 - 6.10 x10(6)/mcL    Hgb 9.4 (L) 14.0 - 18.0 g/dL    Hct 30.5 (L) 42.0 - 52.0 %    MCV 86.6 80.0 - 94.0 fL    MCH 26.7 (L) 27.0 - 31.0 pg    MCHC 30.8 (L) 33.0 - 36.0 g/dL    RDW 14.0 11.5 - 17.0 %    Platelet 190 130 - 400 x10(3)/mcL    MPV 11.0 (H) 7.4 - 10.4 fL    Neut % 82.5 %    Lymph % 11.2 %    Mono % 5.5 %    Eos % 0.0 %    Basophil % 0.4 %    Lymph # 0.57 (L) 0.6 - 4.6 x10(3)/mcL    Neut # 4.20 2.1 - 9.2 x10(3)/mcL    Mono # 0.28 0.1 - 1.3 x10(3)/mcL    Eos # 0.00 0 - 0.9 x10(3)/mcL    Baso # 0.02 <=0.2 x10(3)/mcL    IG# 0.02 0 - 0.04 x10(3)/mcL    IG% 0.4 %    NRBC% 0.0 %       No orders to display       Imaging:  No abdominal imaging since arrival    Endoscopy:    Upper Single Balloon Enteroscopy - Dr. Bronson Solis- 2/10/2014: The esophagus was normal.        The stomach was normal.        The examined duodenum was normal.        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        Coagulation for hemostasis and lesion ablation using argon plasma at        1 liter/minute and 30 lynch was successful. This lesion actively        bled  upon initial application of cautery indicating a signficant        abnormality.     EGD- Dr. Leonard 6/15/18: for anemia: multiple gastric ulcers and one duodenal AVMs (with APC); biopsies negative    Colonoscopy-PRASANNA Arita- 2/12/2018- descending polypectomy- TA-with 5 year recall    Assessment/Plan:  Acute on Chronic anemia  S/p 1 unit PRBC  Iron deficient  Black stools x1 month  EGD today       On plavix- last dose yesterday  He is not on PPI therapy at home (started on PPI BID upon arrival)    Thank you for allowing us to participate in the care of Eder Kong.    Pili Jacinto NP acting as scribe for Dr. Ranjith Abraham MD

## 2024-03-01 NOTE — ED NOTES
Pt arrives back to ED from GI lab; awake and alert; denies any c/o pain at this time; pt reports he is hungry. GCS 15. NAD.

## 2024-03-01 NOTE — H&P
[facility] Hospital Medicine History & Physical Examination       Patient Name: Eder Kong  MRN: [id number]  Patient Class: IP- Inpatient   Admission Date: 2/29/2024  1:30 PM  Length of Stay: 0  Admitting Service: Hospital Medicine   Attending Physician: Steve Fink MD   Primary Care Provider: Alexsandra Sandoval MD  History source: EMR, patient and/or patient's family    CHIEF COMPLAINT   Fatigue dark stools     HISTORY OF PRESENT ILLNESS:   Patient is a 63-year-old male with a history of HCV associated cirrhosis, HCC status post liver transplant 1/2022 on immunosuppressants, diabetes, hypertension and additional past medical history as below presented to the ER for further evaluation for fatigue and dark stools.  Patient denies fever chills.  Patient denies a prior history of GI bleeding.  He reports his stools have been dark now for almost a month.    He arrived to the ER afebrile hemodynamically stable maintaining normal sats on room air.  Laboratory work showed a hemoglobin of 8.2 with prior being 9.1 10 days ago, and 9.8 last month.  Occult blood was positive.  Hospitalist service was consulted for GI and of note his FOBT was negative in the ER but there is a positive occult blood done prior to arrival by PCP.    PAST MEDICAL HISTORY:   HCV/cirrhosis, HCC status post liver transplant 01/2022   Chronic immunosuppressants, 2/2 transplant status   Type 2 diabetes   CAD on Plavix    PAST SURGICAL HISTORY:     Past Surgical History:   Procedure Laterality Date    AORTIC VALVE REPLACEMENT  2018    due to aortic regurgitation    CARDIAC SURGERY      CATHETERIZATION OF BOTH LEFT AND RIGHT HEART N/A 07/08/2021    Procedure: CATHETERIZATION, HEART, BOTH LEFT AND RIGHT;  Surgeon: Eder Beltran MD;  Location: [facility] CATH LAB;  Service: Cardiology;  Laterality: N/A;    COLONOSCOPY  02/12/2018    Dr. Ashok Arita    COLONOSCOPY W/ POLYPECTOMY      2011     "ESOPHAGOGASTRODUODENOSCOPY      x2 with cautery in ,     LIVER TRANSPLANT N/A 2022    Procedure: TRANSPLANT, LIVER;  Surgeon: Jesús Austin MD;  Location: [facility] OR [address] [place] [address];  Service: Transplant;  Laterality: N/A;    mastoid bone      right ear       ALLERGIES:   Patient has no known allergies.    FAMILY HISTORY:   Reviewed and non-contributory     SOCIAL HISTORY:     Social History     Tobacco Use    Smoking status: Former     Current packs/day: 0.00     Average packs/day: 1 pack/day for 25.0 years (25.0 ttl pk-yrs)     Types: Cigarettes     Start date: 1974     Quit date: 1999     Years since quittin.7    Smokeless tobacco: Never   Substance Use Topics    Alcohol use: No     Comment: quit         HOME MEDICATIONS:     Prior to Admission medications    Medication Sig Start Date End Date Taking? Authorizing Provider   carvediloL (COREG) 12.5 MG tablet Take 12.5 mg by mouth 2 (two) times daily with meals.   Yes Provider, Historical   aspirin (ECOTRIN) 81 MG EC tablet Take 1 tablet (81 mg total) by mouth once daily. 22   Sterling Tom MD   aspirin (ECOTRIN) 81 MG EC tablet Take 81 mg by mouth once daily.    Provider, Historical   BD ULTRA-FINE MINI PEN NEEDLE 31 gauge x 3/16" Ndle USE AS DIRECTED EVERY DAY 21   Provider, Historical   calcium carbonate (OS-EVELYN) 600 mg calcium (1,500 mg) Tab Take 600 mg by mouth once.    Provider, Historical   calcium carbonate-vitamin D3 600 mg-20 mcg (800 unit) Tab Take 1 tablet by mouth once daily. 22   Jay Abrams MD   clopidogreL (PLAVIX) 75 mg tablet take 1 tablet (75 mg) by oral route once daily 22      clopidogreL (PLAVIX) 75 mg tablet Take 75 mg by mouth once daily.    Provider, Historical   CONTOUR NEXT TEST STRIPS Strp USE TO TEST BLOOD GLUCOSE TWICE DAILY 20   Provider, Historical   EScitalopram oxalate (LEXAPRO) 5 MG Tab Take 1 tablet by mouth once daily 22   Alexsandra Sandoval MD   EScitalopram " "oxalate (LEXAPRO) 5 MG Tab Take 5 mg by mouth once daily.    Provider, Historical   famotidine (PEPCID) 20 MG tablet Take 20 mg by mouth 2 (two) times daily.    Provider, Historical   insulin glargine, TOUJEO, (TOUJEO) 300 unit/mL (1.5 mL) InPn pen Inject into the skin once daily.    Provider, Historical   OZEMPIC 1 mg/dose (4 mg/3 mL) Inject 1 mg into the skin once a week. 1/6/22   Provider, Historical   pen needle, diabetic 31 gauge x 5/16" Ndle Use as directed 8/19/22   Zackary Khan Jr., MD   semaglutide (OZEMPIC) 1 mg/dose (2 mg/1.5 mL) PnIj Inject into the skin every 7 days.    Provider, Historical   tacrolimus (PROGRAF) 1 MG Cap Take 4 capsules (4 mg total) by mouth every morning AND 3 capsules (3 mg total) every evening. 11/8/23 11/7/24  Jay Abrams MD   TOUJEO MAX U-300 SOLOSTAR 300 unit/mL (3 mL) insulin pen Inject 10 Units into the skin once daily. Check BG before meals and at bedtime. If fasting BG is > 120 and no more hypoglycemic event, then start insulin Toujeo 10 units daily. 8/19/22   Zackary Khan Jr., MD   insulin aspart U-100 (NOVOLOG) 100 unit/mL (3 mL) InPn pen Inject 6 Units into the skin 3 (three) times daily. Plus sliding scale: TDD: 33 units 1/28/22 8/19/22  Zackary Khan Jr., MD   mycophenolate (CELLCEPT) 250 mg Cap Take 2 capsules (500 mg total) by mouth 2 (two) times daily. 4/27/22 8/19/22  Jay Abrams MD   valGANciclovir (VALCYTE) 450 mg Tab Take 1 tablet (450 mg total) by mouth once daily. Stop 4/23/22 1/23/22 8/19/22  Sterling Tom MD       REVIEW OF SYSTEMS:   Except as documented, all other systems reviewed and negative     PHYSICAL EXAM:   T 98.8 °F (37.1 °C)   BP (!) 143/76   P 84   RR 17   O2 99 %  GENERAL: awake, alert, oriented and in no acute distress, non-toxic appearing   HEENT: normocephalic atraumatic   NECK: supple   LUNGS: Clear bilaterally, no wheezing or rales, no accessory muscle use   CVS: Regular rate and rhythm, normal peripheral " "perfusion  ABD: Soft, non-tender, non-distended, bowel sounds present  EXTREMITIES: no clubbing or cyanosis  SKIN: Warm, dry.   NEURO: alert and oriented, grossly without focal deficits   PSYCHIATRIC: Cooperative    LABS AND IMAGING:     Recent Labs     02/28/24  0718 02/29/24  1352   WBC 4.76 5.68   RBC 3.19* 3.19*   HGB 8.2* 8.2*   HCT 28.4* 27.1*   MCV 89.0 85.0   MCH 25.7* 25.7*   MCHC 28.9* 30.3*   RDW 14.1 14.0    234     No results for input(s): "LACTIC" in the last 72 hours.  Recent Labs     02/29/24  1352   INR 1.1     No results for input(s): "HGBA1C", "CHOL", "TRIG", "LDL", "VLDL", "HDL" in the last 72 hours.   Recent Labs     02/28/24  0718 02/29/24  1352    139   K 5.4* 5.3*   CHLORIDE 110* 108*   CO2 26 23   BUN 12.7 19.1   CREATININE 0.81 0.97   GLUCOSE 99 129*   CALCIUM 9.1 9.0   MG 1.30*  --    ALBUMIN 3.4 3.8   GLOBULIN 2.9 3.2   ALKPHOS 69 75   ALT 12 13   AST 11 13   BILITOT 0.4 0.5   FERRITIN 8.89*  --    IRON 19*  --    TIBC 314  --    TSH 1.181  --      Recent Labs     02/28/24  0718   CPK 37          CT Chest Without Contrast  Narrative: EXAMINATION:  CT CHEST WITHOUT CONTRAST    CLINICAL HISTORY:  C22.0; Liver cell carcinoma    TECHNIQUE:  Helically acquired axial images, sagittal and coronal reformations were obtained from the thoracic inlet to the lung bases without the IV administration of contrast.    Automated tube current modulation, weight-based exposure dosing, and/or iterative reconstruction technique utilized to reach lowest reasonably achievable exposure rate.    DLP: 431 mGy*cm    COMPARISON:  CT chest 10/19/2023    FINDINGS:  BASE OF NECK: No significant abnormality.    AORTA: Aortic atherosclerosis.    HEART: There is an aortic valve prosthesis.  There are coronary artery calcifications.    OUMOU/MEDIASTINUM: No enlarged lymph nodes by size criteria.  Evaluation of hilar lymphadenopathy is limited without intravenous contrast.    AIRWAYS: Patent.    LUNGS: Mild " paraseptal emphysematous change at the apices.  Sub 6 mm nodule in the medial right lower lobe (3, 49), new from previous.    PLEURA: No pleural effusion or pneumothorax.    UPPER ABDOMEN: See separately dictated report for CT of the abdomen.    THORACIC SOFT TISSUES: Unremarkable.    BONES: Postop sternotomy.  Impression: New sub 6 mm right lower lobe nodule.  Recommend continued attention on follow-up.    Electronically signed by: Cayla Iverson  Date:    12/27/2023  Time:    14:50  CT Abdomen w/o Contrast Plus Triphasic w/Contrast  Narrative: EXAMINATION:  CT ABDOMEN W/O CONTRAST PLUS TRIPHASIC W/CONTRAST    CLINICAL HISTORY:  Liver cell carcinoma    TECHNIQUE:  Helically acquired axial images, sagittal and coronal reformations were obtained through the abdomen prior to and after the IV administration of contrast.    Automated tube current modulation, weight-based exposure dosing, and/or iterative reconstruction technique utilized to reach lowest reasonably achievable exposure rate.    DLP: 1810 mGy*cm    COMPARISON:  CT abdomen 10/19/2023    FINDINGS:  LUNG BASES: See separate report for CT chest.    LIVER: Postop liver transplant.  Normal contour of the liver.  No focal mass.  Normal enhancement.  There is a hepatic artery stent which appears patent.  Portal and hepatic veins are patent.    BILIARY: Gallbladder is surgically absent.    PANCREAS: No inflammatory change.    SPLEEN: Normal in size    ADRENALS: No mass.    KIDNEYS/URETERS: No renal or ureteral calculus. No hydronephrosis.  Kidneys enhance symmetrically.  There is an incidental small right renal cortical cyst which requires no imaging follow-up.    GI TRACT/MESENTERY:  No evidence of bowel obstruction or inflammation. Appendix has a normal appearance.  Colonic diverticulosis without evidence of acute diverticulitis.    PERITONEUM: No free fluid.No free air.    LYMPH NODES: No enlarged lymph nodes by size criteria.    VASCULATURE: Aortoiliac  atherosclerosis.    ABDOMINAL WALL: Unremarkable.    BONES: No acute osseous abnormality.  Impression: Postop liver transplant.  No suspicious hepatic lesion.    Electronically signed by: Cayla Iverson  Date:    12/27/2023  Time:    14:44      ASSESSMENT & PLAN:   Symptomatic anemia, reported melena, heme-positive stools     HX:  HCV/cirrhosis/HCC s/p liver transplant, DM2, HTN, HLD, CAD on Plavix (last cardiac stent 2018)    -patient receiving 2 unit blood transfusion, monitor H&H, IV Protonix  -consultation to Gastroenterology   -hold Plavix  -NPO after midnight  -anemia labs ordered  -resume home medications pending med    DVT prophylaxis:  SCDs  Code status:  Full code    If patient was admitted under observational status it is with my approval/permission.     At least 55 min was spent on this history and physical.  Time seen: 9PM 2/9/24  Critical care time = 35 min; Critical care diagnosis = symptomatic anemia requiring transfusion  Steve Fink MD

## 2024-03-01 NOTE — ED NOTES
Pt asking if Dr. Abraham with GI plans to do a scope today/update on plan of care. No notes from GI in chart. Dr. Abraham paged. Waiting for call back.

## 2024-03-01 NOTE — TRANSFER OF CARE
"Anesthesia Transfer of Care Note    Patient: Eder Kong    Procedure(s) Performed: Procedure(s) (LRB):  EGD (N/A)  EGD,WITH HEMORRHAGE CONTROL    Patient location: GI    Anesthesia Type: general    Transport from OR: Transported from OR on room air with adequate spontaneous ventilation    Post pain: adequate analgesia    Post assessment: no apparent anesthetic complications and tolerated procedure well    Post vital signs: stable    Level of consciousness: awake    Complications: none    Transfer of care protocol was followedComments: /68  HR 72  SPO2 98  RR 14    Last vitals: Visit Vitals  BP (!) 170/94 (BP Location: Right arm, Patient Position: Lying)   Pulse 76   Temp 37 °C (98.6 °F) (Temporal)   Resp 12   Ht 5' 5" (1.651 m)   Wt 73.9 kg (163 lb)   SpO2 100%   BMI 27.12 kg/m²     "

## 2024-03-01 NOTE — NURSING
Nurses Note -- 4 Eyes      3/1/2024   3:53 PM      Skin assessed during: Admit      [x] No Altered Skin Integrity Present    [x]Prevention Measures Documented      [] Yes- Altered Skin Integrity Present or Discovered   [] LDA Added if Not in Epic (Describe Wound)   [] New Altered Skin Integrity was Present on Admit and Documented in LDA   [] Wound Image Taken    Wound Care Consulted? No    Attending Nurse:  Berta Velarde RN     Second RN/Staff Member:   Dayana Gill RN

## 2024-03-02 VITALS
BODY MASS INDEX: 25.9 KG/M2 | WEIGHT: 155.44 LBS | DIASTOLIC BLOOD PRESSURE: 80 MMHG | SYSTOLIC BLOOD PRESSURE: 150 MMHG | HEIGHT: 65 IN | RESPIRATION RATE: 18 BRPM | OXYGEN SATURATION: 99 % | HEART RATE: 79 BPM | TEMPERATURE: 98 F

## 2024-03-02 PROBLEM — K92.2 GI BLEED: Status: ACTIVE | Noted: 2024-03-02

## 2024-03-02 LAB
ANION GAP SERPL CALC-SCNC: 6 MEQ/L
BASOPHILS # BLD AUTO: 0.02 X10(3)/MCL
BASOPHILS NFR BLD AUTO: 0.3 %
BUN SERPL-MCNC: 16.2 MG/DL (ref 8.4–25.7)
CALCIUM SERPL-MCNC: 8.5 MG/DL (ref 8.8–10)
CHLORIDE SERPL-SCNC: 110 MMOL/L (ref 98–107)
CO2 SERPL-SCNC: 25 MMOL/L (ref 23–31)
CREAT SERPL-MCNC: 0.82 MG/DL (ref 0.73–1.18)
CREAT/UREA NIT SERPL: 20
EOSINOPHIL # BLD AUTO: 0 X10(3)/MCL (ref 0–0.9)
EOSINOPHIL NFR BLD AUTO: 0 %
ERYTHROCYTE [DISTWIDTH] IN BLOOD BY AUTOMATED COUNT: 14.1 % (ref 11.5–17)
GFR SERPLBLD CREATININE-BSD FMLA CKD-EPI: >60 MLS/MIN/1.73/M2
GLUCOSE SERPL-MCNC: 123 MG/DL (ref 82–115)
HCT VFR BLD AUTO: 32.5 % (ref 42–52)
HGB BLD-MCNC: 10 G/DL (ref 14–18)
IMM GRANULOCYTES # BLD AUTO: 0.03 X10(3)/MCL (ref 0–0.04)
IMM GRANULOCYTES NFR BLD AUTO: 0.4 %
LYMPHOCYTES # BLD AUTO: 0.58 X10(3)/MCL (ref 0.6–4.6)
LYMPHOCYTES NFR BLD AUTO: 7.4 %
MAGNESIUM SERPL-MCNC: 1.8 MG/DL (ref 1.6–2.6)
MCH RBC QN AUTO: 26 PG (ref 27–31)
MCHC RBC AUTO-ENTMCNC: 30.8 G/DL (ref 33–36)
MCV RBC AUTO: 84.6 FL (ref 80–94)
MONOCYTES # BLD AUTO: 0.48 X10(3)/MCL (ref 0.1–1.3)
MONOCYTES NFR BLD AUTO: 6.1 %
NEUTROPHILS # BLD AUTO: 6.72 X10(3)/MCL (ref 2.1–9.2)
NEUTROPHILS NFR BLD AUTO: 85.8 %
NRBC BLD AUTO-RTO: 0 %
PHOSPHATE SERPL-MCNC: 2.7 MG/DL (ref 2.3–4.7)
PLATELET # BLD AUTO: 231 X10(3)/MCL (ref 130–400)
PMV BLD AUTO: 10.3 FL (ref 7.4–10.4)
POTASSIUM SERPL-SCNC: 4.9 MMOL/L (ref 3.5–5.1)
RBC # BLD AUTO: 3.84 X10(6)/MCL (ref 4.7–6.1)
SODIUM SERPL-SCNC: 141 MMOL/L (ref 136–145)
WBC # SPEC AUTO: 7.83 X10(3)/MCL (ref 4.5–11.5)

## 2024-03-02 PROCEDURE — 25000003 PHARM REV CODE 250: Performed by: INTERNAL MEDICINE

## 2024-03-02 PROCEDURE — 85025 COMPLETE CBC W/AUTO DIFF WBC: CPT | Performed by: INTERNAL MEDICINE

## 2024-03-02 PROCEDURE — 25000003 PHARM REV CODE 250: Performed by: PHYSICIAN ASSISTANT

## 2024-03-02 PROCEDURE — C9113 INJ PANTOPRAZOLE SODIUM, VIA: HCPCS | Performed by: PHYSICIAN ASSISTANT

## 2024-03-02 PROCEDURE — 84100 ASSAY OF PHOSPHORUS: CPT | Performed by: INTERNAL MEDICINE

## 2024-03-02 PROCEDURE — 63600175 PHARM REV CODE 636 W HCPCS: Performed by: PHYSICIAN ASSISTANT

## 2024-03-02 PROCEDURE — 80048 BASIC METABOLIC PNL TOTAL CA: CPT | Performed by: INTERNAL MEDICINE

## 2024-03-02 PROCEDURE — 63600175 PHARM REV CODE 636 W HCPCS: Performed by: INTERNAL MEDICINE

## 2024-03-02 PROCEDURE — 83735 ASSAY OF MAGNESIUM: CPT | Performed by: INTERNAL MEDICINE

## 2024-03-02 RX ORDER — FERROUS GLUCONATE 324(38)MG
324 TABLET ORAL
Qty: 3 TABLET | Refills: 0 | Status: SHIPPED | OUTPATIENT
Start: 2024-03-02 | End: 2024-03-05

## 2024-03-02 RX ADMIN — Medication 1 TABLET: at 08:03

## 2024-03-02 RX ADMIN — CLOPIDOGREL BISULFATE 75 MG: 75 TABLET ORAL at 08:03

## 2024-03-02 RX ADMIN — FAMOTIDINE 20 MG: 20 TABLET, FILM COATED ORAL at 08:03

## 2024-03-02 RX ADMIN — ESCITALOPRAM OXALATE 5 MG: 5 TABLET, FILM COATED ORAL at 08:03

## 2024-03-02 RX ADMIN — PANTOPRAZOLE SODIUM 40 MG: 40 INJECTION, POWDER, FOR SOLUTION INTRAVENOUS at 08:03

## 2024-03-02 RX ADMIN — SODIUM CHLORIDE 125 MG: 9 INJECTION, SOLUTION INTRAVENOUS at 08:03

## 2024-03-02 RX ADMIN — ACETAMINOPHEN 650 MG: 325 TABLET, FILM COATED ORAL at 09:03

## 2024-03-02 RX ADMIN — TACROLIMUS 4 MG: 1 CAPSULE ORAL at 08:03

## 2024-03-02 RX ADMIN — CARVEDILOL 12.5 MG: 12.5 TABLET, FILM COATED ORAL at 08:03

## 2024-03-02 NOTE — PROGRESS NOTES
[facility] Hospital Medicine Progress Note        Chief Complaint: Inpatient Follow-up for Fatigue dark stools     HPI: Patient is a 63-year-old male with a history of HCV associated cirrhosis, HCC status post liver transplant 1/2022 on immunosuppressants, diabetes, hypertension and additional past medical history as below presented to the ER for further evaluation for fatigue and dark stools.  Patient denies fever chills.  Patient denies a prior history of GI bleeding.  He reports his stools have been dark now for almost a month.     He arrived to the ER afebrile hemodynamically stable maintaining normal sats on room air.  Laboratory work showed a hemoglobin of 8.2 with prior being 9.1 10 days ago, and 9.8 last month.  Occult blood was positive.  Hospitalist service was consulted for GI and of note his FOBT was negative in the ER but there is a positive occult blood done prior to arrival by PCP.    Interval Hx:   Status post 1 unit PRBC.  Continues on pantoprazole IV b.i.d..  GI was consulted.  Went for EGD with push enteroscopy.  No overnight events reported    Patient was seen and examined.    Chart was reviewed, afebrile, blood pressure intermittently fluctuates, most recent blood work reviewed.  Most recent .Hemoglobin 9.4.  Noted to have iron deficiency      Objective/physical exam:  General: In no acute distress, afebrile  Chest: Clear to auscultation bilaterally  Heart: +S1, S2, no appreciable murmur  Abdomen: Soft, nontender, BS +  MSK: Warm, no lower extremity edema, no clubbing or cyanosis  Neurologic: Alert and oriented x4, Cranial nerve II-XII intact, Strength 5/5 in all 4 extremities    VITAL SIGNS: 24 HRS MIN & MAX LAST   Temp  Min: 97.9 °F (36.6 °C)  Max: 98.7 °F (37.1 °C) 97.9 °F (36.6 °C)   BP  Min: 109/60  Max: 175/91 (!) 175/91 (reported to MD - PRN hydralazine ordered)   Pulse  Min: 71  Max: 86  77   Resp  Min: 12  Max: 19 18   SpO2  Min: 97 %  Max: 100 % 99 %      I have reviewed the following labs:  Recent Labs   Lab 02/28/24 0718 02/29/24  1352 03/01/24  0235   WBC 4.76 5.68 5.09   RBC 3.19* 3.19* 3.52*   HGB 8.2* 8.2* 9.4*   HCT 28.4* 27.1* 30.5*   MCV 89.0 85.0 86.6   MCH 25.7* 25.7* 26.7*   MCHC 28.9* 30.3* 30.8*   RDW 14.1 14.0 14.0    234 190   MPV 10.7* 10.3 11.0*     Recent Labs   Lab 02/28/24 0718 02/29/24  1352 03/01/24  0235    139 139   K 5.4* 5.3* 4.4   CO2 26 23 24   BUN 12.7 19.1 18.3   CREATININE 0.81 0.97 0.79   CALCIUM 9.1 9.0 8.7*   MG 1.30*  --   --    ALBUMIN 3.4 3.8 3.4   ALKPHOS 69 75 67   ALT 12 13 9   AST 11 13 10   BILITOT 0.4 0.5 0.5     Microbiology Results (last 7 days)       ** No results found for the last 168 hours. **             See below for Radiology    Scheduled Med:   calcium-vitamin D3  1 tablet Oral Daily    carvediloL  12.5 mg Oral BID WM    EScitalopram oxalate  5 mg Oral Daily    famotidine  20 mg Oral BID    ferric gluconate (FERRLECIT) 125 mg in sodium chloride 0.9% 100 mL IVPB  125 mg Intravenous Daily    LIDOcaine (PF) 20 mg/mL (2%)        pantoprazole  40 mg Intravenous BID    tacrolimus  3 mg Oral Daily PM    tacrolimus  4 mg Oral Daily AM      Continuous Infusions:   electrolyte-A 20 mL/hr at 03/01/24 1041      PRN Meds:  0.9%  NaCl infusion (for blood administration), 0.9%  NaCl infusion (for blood administration), acetaminophen, acetaminophen, dextrose 10%, dextrose 10%, glucagon (human recombinant), glucose, glucose, hydrALAZINE, LIDOcaine (PF) 20 mg/mL (2%), ondansetron, sodium chloride 0.9%     Assessment/Plan:  Symptomatic anemia, reported melena, heme-positive stools   Iron deficiency    HX:  HCV/cirrhosis/HCC s/p liver transplant, DM2, HTN, HLD, CAD on Plavix (last cardiac stent 2018)     Plan   Holding Plavix  Continue pantoprazole  Continue to monitor hemoglobin, keep hemoglobin above 8.  Status post blood transfusion  Gastroenterology consulted, EGD with push enteroscopy today, found to have  AVMs  Patient need to follow up with GI regarding colonoscopy  We will replete iron in-house.  Continue supportive care and other appropriate home medications.    Critical care Time Spent>35 min  Anemia requiring transfusions    VTE prophylaxis:  SCDs      Anticipated discharge and Disposition:   To be decided, if hemodynamically stable, may need IV iron transfusions, GI follow up    All diagnosis and differential diagnosis have been reviewed; assessment and plan has been documented; I have personally reviewed the labs and test results that are presently available; I have reviewed the patients medication list; I have reviewed the consulting providers response and recommendations. I have reviewed or attempted to review medical records based upon their availability    All of the patient's questions have been  addressed and answered. Patient's is agreeable to the above stated plan. I will continue to monitor closely and make adjustments to medical management as needed.  _____________________________________________________________________    Nutrition Status:    Radiology:  I have personally reviewed the following imaging and agree with the radiologist.     CT Chest Without Contrast  Narrative: EXAMINATION:  CT CHEST WITHOUT CONTRAST    CLINICAL HISTORY:  C22.0; Liver cell carcinoma    TECHNIQUE:  Helically acquired axial images, sagittal and coronal reformations were obtained from the thoracic inlet to the lung bases without the IV administration of contrast.    Automated tube current modulation, weight-based exposure dosing, and/or iterative reconstruction technique utilized to reach lowest reasonably achievable exposure rate.    DLP: 431 mGy*cm    COMPARISON:  CT chest 10/19/2023    FINDINGS:  BASE OF NECK: No significant abnormality.    AORTA: Aortic atherosclerosis.    HEART: There is an aortic valve prosthesis.  There are coronary artery calcifications.    OUMOU/MEDIASTINUM: No enlarged lymph nodes by size  criteria.  Evaluation of hilar lymphadenopathy is limited without intravenous contrast.    AIRWAYS: Patent.    LUNGS: Mild paraseptal emphysematous change at the apices.  Sub 6 mm nodule in the medial right lower lobe (3, 49), new from previous.    PLEURA: No pleural effusion or pneumothorax.    UPPER ABDOMEN: See separately dictated report for CT of the abdomen.    THORACIC SOFT TISSUES: Unremarkable.    BONES: Postop sternotomy.  Impression: New sub 6 mm right lower lobe nodule.  Recommend continued attention on follow-up.    Electronically signed by: Cayla Iverson  Date:    12/27/2023  Time:    14:50  CT Abdomen w/o Contrast Plus Triphasic w/Contrast  Narrative: EXAMINATION:  CT ABDOMEN W/O CONTRAST PLUS TRIPHASIC W/CONTRAST    CLINICAL HISTORY:  Liver cell carcinoma    TECHNIQUE:  Helically acquired axial images, sagittal and coronal reformations were obtained through the abdomen prior to and after the IV administration of contrast.    Automated tube current modulation, weight-based exposure dosing, and/or iterative reconstruction technique utilized to reach lowest reasonably achievable exposure rate.    DLP: 1810 mGy*cm    COMPARISON:  CT abdomen 10/19/2023    FINDINGS:  LUNG BASES: See separate report for CT chest.    LIVER: Postop liver transplant.  Normal contour of the liver.  No focal mass.  Normal enhancement.  There is a hepatic artery stent which appears patent.  Portal and hepatic veins are patent.    BILIARY: Gallbladder is surgically absent.    PANCREAS: No inflammatory change.    SPLEEN: Normal in size    ADRENALS: No mass.    KIDNEYS/URETERS: No renal or ureteral calculus. No hydronephrosis.  Kidneys enhance symmetrically.  There is an incidental small right renal cortical cyst which requires no imaging follow-up.    GI TRACT/MESENTERY:  No evidence of bowel obstruction or inflammation. Appendix has a normal appearance.  Colonic diverticulosis without evidence of acute  diverticulitis.    PERITONEUM: No free fluid.No free air.    LYMPH NODES: No enlarged lymph nodes by size criteria.    VASCULATURE: Aortoiliac atherosclerosis.    ABDOMINAL WALL: Unremarkable.    BONES: No acute osseous abnormality.  Impression: Postop liver transplant.  No suspicious hepatic lesion.    Electronically signed by: Cayla Iverson  Date:    12/27/2023  Time:    14:44      Tayler Graham MD  Department of Hospital Medicine   [facility]   03/01/2024

## 2024-03-02 NOTE — PROGRESS NOTES
"Gastroenterology Progress Note    Subjective/Interval History:  3-2-24  Pt with 6 Gastric AVMs treated during EGD with push enteroscopy on 3-1-24.  No bloody bm overnight  Hgb stable 9.4-->10.  Got iron infusion  Plan o/p colon and M2A  Plavix ok to be restarted    ROS:  12 point system reviewed and is negative except as noted in HPI    Vital Signs:  BP (!) 146/73   Pulse 79   Temp 98 °F (36.7 °C) (Oral)   Resp 18   Ht 5' 5" (1.651 m)   Wt 70.5 kg (155 lb 6.8 oz)   SpO2 98%   BMI 25.86 kg/m²   Body mass index is 25.86 kg/m².    Physical Exam:  Constitutional:       General: He is not in acute distress.     Appearance: Normal appearance. He is not ill-appearing.   HENT:      Head: Normocephalic.      Mouth/Throat:      Mouth: Mucous membranes are moist.      Pharynx: Oropharynx is clear.   Eyes:      General: No scleral icterus.     Pupils: Pupils are equal, round, and reactive to light.   Cardiovascular:      Rate and Rhythm: Normal rate and regular rhythm.   Pulmonary:      Effort: Pulmonary effort is normal.      Breath sounds: Normal breath sounds.   Abdominal:      General: Bowel sounds are normal. There is no distension.      Palpations: Abdomen is soft.      Tenderness: There is no abdominal tenderness. There is no guarding.   Skin:     General: Skin is warm and dry.      Coloration: Skin is not jaundiced.   Neurological:      Mental Status: He is alert and oriented to person, place, and time.      Motor: No weakness.   Psychiatric:         Mood and Affect: Mood normal.         Behavior: Behavior normal.     Labs:  Recent Results (from the past 48 hour(s))   Comprehensive Metabolic Panel    Collection Time: 02/29/24  1:52 PM   Result Value Ref Range    Sodium Level 139 136 - 145 mmol/L    Potassium Level 5.3 (H) 3.5 - 5.1 mmol/L    Chloride 108 (H) 98 - 107 mmol/L    Carbon Dioxide 23 23 - 31 mmol/L    Glucose Level 129 (H) 82 - 115 mg/dL    Blood Urea Nitrogen 19.1 8.4 - 25.7 mg/dL    Creatinine 0.97 " 0.73 - 1.18 mg/dL    Calcium Level Total 9.0 8.8 - 10.0 mg/dL    Protein Total 7.0 5.8 - 7.6 gm/dL    Albumin Level 3.8 3.4 - 4.8 g/dL    Globulin 3.2 2.4 - 3.5 gm/dL    Albumin/Globulin Ratio 1.2 1.1 - 2.0 ratio    Bilirubin Total 0.5 <=1.5 mg/dL    Alkaline Phosphatase 75 40 - 150 unit/L    Alanine Aminotransferase 13 0 - 55 unit/L    Aspartate Aminotransferase 13 5 - 34 unit/L    eGFR >60 mls/min/1.73/m2   APTT    Collection Time: 02/29/24  1:52 PM   Result Value Ref Range    PTT 27.1 23.2 - 33.7 seconds   Protime-INR    Collection Time: 02/29/24  1:52 PM   Result Value Ref Range    PT 14.0 12.5 - 14.5 seconds    INR 1.1 <=1.3   Type & Screen    Collection Time: 02/29/24  1:52 PM   Result Value Ref Range    Group & Rh O POS     Indirect Ted GEL NEG     Specimen Outdate 03/03/2024 23:59    CBC with Differential    Collection Time: 02/29/24  1:52 PM   Result Value Ref Range    WBC 5.68 4.50 - 11.50 x10(3)/mcL    RBC 3.19 (L) 4.70 - 6.10 x10(6)/mcL    Hgb 8.2 (L) 14.0 - 18.0 g/dL    Hct 27.1 (L) 42.0 - 52.0 %    MCV 85.0 80.0 - 94.0 fL    MCH 25.7 (L) 27.0 - 31.0 pg    MCHC 30.3 (L) 33.0 - 36.0 g/dL    RDW 14.0 11.5 - 17.0 %    Platelet 234 130 - 400 x10(3)/mcL    MPV 10.3 7.4 - 10.4 fL    Neut % 83.9 %    Lymph % 9.2 %    Mono % 5.8 %    Eos % 0.2 %    Basophil % 0.5 %    Lymph # 0.52 (L) 0.6 - 4.6 x10(3)/mcL    Neut # 4.77 2.1 - 9.2 x10(3)/mcL    Mono # 0.33 0.1 - 1.3 x10(3)/mcL    Eos # 0.01 0 - 0.9 x10(3)/mcL    Baso # 0.03 <=0.2 x10(3)/mcL    IG# 0.02 0 - 0.04 x10(3)/mcL    IG% 0.4 %    NRBC% 0.0 %   Prepare RBC 1 Unit    Collection Time: 02/29/24  1:52 PM   Result Value Ref Range    UNIT NUMBER [id number]     UNIT ABO/RH O POS     DISPENSE STATUS Transfused     Unit Expiration [id number]     Product Code V8759Q93     Unit Blood Type Code 5100     CROSSMATCH INTERPRETATION Compatible    Iron and TIBC    Collection Time: 02/29/24  1:52 PM   Result Value Ref Range    Iron Binding Capacity Unsaturated 319  (H) 69 - 240 ug/dL    Iron Level 27 (L) 65 - 175 ug/dL    Transferrin 336 163 - 344 mg/dL    Iron Binding Capacity Total 346 250 - 450 ug/dL    Iron Saturation 8 (L) 20 - 50 %   Ferritin    Collection Time: 02/29/24  1:52 PM   Result Value Ref Range    Ferritin Level 9.08 (L) 21.81 - 274.66 ng/mL   Vitamin B12    Collection Time: 02/29/24  1:52 PM   Result Value Ref Range    Vitamin B12 Level 313 213 - 816 pg/mL   Lactate Dehydrogenase    Collection Time: 02/29/24  1:52 PM   Result Value Ref Range    Lactate Dehydrogenase 250 (H) 125 - 220 U/L   Haptoglobin    Collection Time: 02/29/24  1:52 PM   Result Value Ref Range    Haptoglobin 257 40 - 368 mg/dL   Prepare RBC 1 Unit    Collection Time: 02/29/24  1:52 PM   Result Value Ref Range    UNIT NUMBER [id number]     UNIT ABO/RH O POS     DISPENSE STATUS Transfused     Unit Expiration [id number]     Product Code L8981S98     Unit Blood Type Code 5100     CROSSMATCH INTERPRETATION Compatible    Urinalysis, Reflex to Urine Culture    Collection Time: 02/29/24  2:58 PM    Specimen: Urine   Result Value Ref Range    Color, UA Light-Yellow Yellow, Light-Yellow, Colorless, Straw, Dark-Yellow    Appearance, UA Clear Clear    Specific Gravity, UA 1.017 1.005 - 1.030    pH, UA 6.0 5.0 - 8.5    Protein, UA Negative Negative    Glucose, UA Normal Negative, Normal    Ketones, UA Negative Negative    Blood, UA Negative Negative    Bilirubin, UA Negative Negative    Urobilinogen, UA Normal 0.2, 1.0, Normal    Nitrites, UA Negative Negative    Leukocyte Esterase, UA Negative Negative    WBC, UA 0-5 None Seen, 0-2, 3-5, 0-5 /HPF    Bacteria, UA None Seen None Seen, Trace /HPF    Squamous Epithelial Cells, UA None Seen None Seen /HPF    Mucous, UA Trace (A) None Seen /LPF    Hyaline Casts, UA 0-2 (A) None Seen /lpf    RBC, UA 0-5 None Seen, 0-2, 3-5, 0-5 /HPF   Comprehensive Metabolic Panel (CMP)    Collection Time: 03/01/24  2:35 AM   Result Value Ref Range    Sodium Level 139  136 - 145 mmol/L    Potassium Level 4.4 3.5 - 5.1 mmol/L    Chloride 108 (H) 98 - 107 mmol/L    Carbon Dioxide 24 23 - 31 mmol/L    Glucose Level 146 (H) 82 - 115 mg/dL    Blood Urea Nitrogen 18.3 8.4 - 25.7 mg/dL    Creatinine 0.79 0.73 - 1.18 mg/dL    Calcium Level Total 8.7 (L) 8.8 - 10.0 mg/dL    Protein Total 6.1 5.8 - 7.6 gm/dL    Albumin Level 3.4 3.4 - 4.8 g/dL    Globulin 2.7 2.4 - 3.5 gm/dL    Albumin/Globulin Ratio 1.3 1.1 - 2.0 ratio    Bilirubin Total 0.5 <=1.5 mg/dL    Alkaline Phosphatase 67 40 - 150 unit/L    Alanine Aminotransferase 9 0 - 55 unit/L    Aspartate Aminotransferase 10 5 - 34 unit/L    eGFR >60 mls/min/1.73/m2   Folate    Collection Time: 03/01/24  2:35 AM   Result Value Ref Range    Folate Level 10.8 7.0 - 31.4 ng/mL   CBC with Differential    Collection Time: 03/01/24  2:35 AM   Result Value Ref Range    WBC 5.09 4.50 - 11.50 x10(3)/mcL    RBC 3.52 (L) 4.70 - 6.10 x10(6)/mcL    Hgb 9.4 (L) 14.0 - 18.0 g/dL    Hct 30.5 (L) 42.0 - 52.0 %    MCV 86.6 80.0 - 94.0 fL    MCH 26.7 (L) 27.0 - 31.0 pg    MCHC 30.8 (L) 33.0 - 36.0 g/dL    RDW 14.0 11.5 - 17.0 %    Platelet 190 130 - 400 x10(3)/mcL    MPV 11.0 (H) 7.4 - 10.4 fL    Neut % 82.5 %    Lymph % 11.2 %    Mono % 5.5 %    Eos % 0.0 %    Basophil % 0.4 %    Lymph # 0.57 (L) 0.6 - 4.6 x10(3)/mcL    Neut # 4.20 2.1 - 9.2 x10(3)/mcL    Mono # 0.28 0.1 - 1.3 x10(3)/mcL    Eos # 0.00 0 - 0.9 x10(3)/mcL    Baso # 0.02 <=0.2 x10(3)/mcL    IG# 0.02 0 - 0.04 x10(3)/mcL    IG% 0.4 %    NRBC% 0.0 %   POCT glucose    Collection Time: 03/01/24 10:50 AM   Result Value Ref Range    POCT Glucose 109 70 - 110 mg/dL   POCT glucose    Collection Time: 03/01/24  8:01 PM   Result Value Ref Range    POCT Glucose 274 (H) 70 - 110 mg/dL   Basic Metabolic Panel    Collection Time: 03/02/24  4:03 AM   Result Value Ref Range    Sodium Level 141 136 - 145 mmol/L    Potassium Level 4.9 3.5 - 5.1 mmol/L    Chloride 110 (H) 98 - 107 mmol/L    Carbon Dioxide 25 23 - 31  mmol/L    Glucose Level 123 (H) 82 - 115 mg/dL    Blood Urea Nitrogen 16.2 8.4 - 25.7 mg/dL    Creatinine 0.82 0.73 - 1.18 mg/dL    BUN/Creatinine Ratio 20     Calcium Level Total 8.5 (L) 8.8 - 10.0 mg/dL    Anion Gap 6.0 mEq/L    eGFR >60 mls/min/1.73/m2   Magnesium    Collection Time: 03/02/24  4:03 AM   Result Value Ref Range    Magnesium Level 1.80 1.60 - 2.60 mg/dL   Phosphorus    Collection Time: 03/02/24  4:03 AM   Result Value Ref Range    Phosphorus Level 2.7 2.3 - 4.7 mg/dL   CBC with Differential    Collection Time: 03/02/24  4:03 AM   Result Value Ref Range    WBC 7.83 4.50 - 11.50 x10(3)/mcL    RBC 3.84 (L) 4.70 - 6.10 x10(6)/mcL    Hgb 10.0 (L) 14.0 - 18.0 g/dL    Hct 32.5 (L) 42.0 - 52.0 %    MCV 84.6 80.0 - 94.0 fL    MCH 26.0 (L) 27.0 - 31.0 pg    MCHC 30.8 (L) 33.0 - 36.0 g/dL    RDW 14.1 11.5 - 17.0 %    Platelet 231 130 - 400 x10(3)/mcL    MPV 10.3 7.4 - 10.4 fL    Neut % 85.8 %    Lymph % 7.4 %    Mono % 6.1 %    Eos % 0.0 %    Basophil % 0.3 %    Lymph # 0.58 (L) 0.6 - 4.6 x10(3)/mcL    Neut # 6.72 2.1 - 9.2 x10(3)/mcL    Mono # 0.48 0.1 - 1.3 x10(3)/mcL    Eos # 0.00 0 - 0.9 x10(3)/mcL    Baso # 0.02 <=0.2 x10(3)/mcL    IG# 0.03 0 - 0.04 x10(3)/mcL    IG% 0.4 %    NRBC% 0.0 %       Imaging:  No results found.       Assessment/Plan:  Acute on Chronic anemia  S/p 1 unit PRBC  Iron deficient  Black stools x1 month    3-1-24.- Pt with 6 Gastric AVMs treated during EGD with push enteroscopy  No bloody bm overnight  Hgb stable 9.4-->10.  Got iron infusion  Plan o/p colon and M2A  Plavix ok to be restarted    No further Gi recs - please call if needed       Vesta Fink NP as scribe for Dr. Ranjith Abraham

## 2024-03-02 NOTE — DISCHARGE SUMMARY
[facility] Medicine Discharge Summary    Admit Date: 2/29/2024  Discharge Date and Time: 3/2/23394:47 PM  Admitting Physician:  Team  Discharging Physician: Awilda Reeves DO.  Primary Care Physician: Alexsandra Sandoval MD  Consults: Gastroenterology    Discharge Diagnoses:  Symptomatic anemia, reported melena, heme-positive stools   Iron deficiency    HX:  HCV/cirrhosis/HCC s/p liver transplant, DM2, HTN, HLD, CAD on Plavix (last cardiac stent 2018)     Hospital Course:   Fatigue dark stools      HPI: Patient is a 63-year-old male with a history of HCV associated cirrhosis, HCC status post liver transplant 1/2022 on immunosuppressants, diabetes, hypertension and additional past medical history as below presented to the ER for further evaluation for fatigue and dark stools.  Patient denies fever chills.  Patient denies a prior history of GI bleeding.  He reports his stools have been dark now for almost a month.     He arrived to the ER afebrile hemodynamically stable maintaining normal sats on room air.  Laboratory work showed a hemoglobin of 8.2 with prior being 9.1 10 days ago, and 9.8 last month.  Occult blood was positive.  Hospitalist service was consulted for GI and of note his FOBT was negative in the ER but there is a positive occult blood done prior to arrival by PCP.     Interval Hx:   Status post 1 unit PRBC.  Continues on pantoprazole IV b.i.d..  GI was consulted.  Went for EGD with push enteroscopy.  No overnight events reported  Pt was seen and examined on the day of discharge.  Plavix resumed.  Patient to follow up with gastroenterologist in the outpatient setting    Hospital course and discharge care plan has been discussed with patient, patient voices understanding and agreement with plan. All questions have been answered to the best of my ability. Patient is advised to return to ED or call 911 in case of emergency and or if symptoms  worsen.    Vitals:  VITAL SIGNS: 24 HRS MIN & MAX LAST   Temp  Min: 97.9 °F (36.6 °C)  Max: 98.8 °F (37.1 °C) 98.3 °F (36.8 °C)   BP  Min: 128/64  Max: 175/91 (!) 150/80   Pulse  Min: 72  Max: 82  79   Resp  Min: 16  Max: 18 18   SpO2  Min: 98 %  Max: 100 % 99 %     Physical Exam:  General: Appears comfortable, no acute distress.  Integumentary: Warm, dry, intact.    Musculoskeletal: Purposeful movement noted.   Respiratory: No accessory muscle use. Breath sounds are equal.  Cardiovascular: Regular rate. No peripheral edema.      Procedures Performed: No admission procedures for hospital encounter.     Significant Diagnostic Studies: See Full reports for all details    Recent Labs   Lab 02/29/24  1352 03/01/24  0235 03/02/24  0403   WBC 5.68 5.09 7.83   RBC 3.19* 3.52* 3.84*   HGB 8.2* 9.4* 10.0*   HCT 27.1* 30.5* 32.5*   MCV 85.0 86.6 84.6   MCH 25.7* 26.7* 26.0*   MCHC 30.3* 30.8* 30.8*   RDW 14.0 14.0 14.1    190 231   MPV 10.3 11.0* 10.3       Recent Labs   Lab 02/28/24  0718 02/29/24  1352 03/01/24  0235 03/02/24  0403    139 139 141   K 5.4* 5.3* 4.4 4.9   CO2 26 23 24 25   BUN 12.7 19.1 18.3 16.2   CREATININE 0.81 0.97 0.79 0.82   CALCIUM 9.1 9.0 8.7* 8.5*   MG 1.30*  --   --  1.80   ALBUMIN 3.4 3.8 3.4  --    ALKPHOS 69 75 67  --    ALT 12 13 9  --    AST 11 13 10  --    BILITOT 0.4 0.5 0.5  --         Microbiology Results (last 7 days)       ** No results found for the last 168 hours. **             CT Chest Without Contrast  Narrative: EXAMINATION:  CT CHEST WITHOUT CONTRAST    CLINICAL HISTORY:  C22.0; Liver cell carcinoma    TECHNIQUE:  Helically acquired axial images, sagittal and coronal reformations were obtained from the thoracic inlet to the lung bases without the IV administration of contrast.    Automated tube current modulation, weight-based exposure dosing, and/or iterative reconstruction technique utilized to reach lowest reasonably achievable exposure rate.    DLP: 431  mGy*cm    COMPARISON:  CT chest 10/19/2023    FINDINGS:  BASE OF NECK: No significant abnormality.    AORTA: Aortic atherosclerosis.    HEART: There is an aortic valve prosthesis.  There are coronary artery calcifications.    OUMOU/MEDIASTINUM: No enlarged lymph nodes by size criteria.  Evaluation of hilar lymphadenopathy is limited without intravenous contrast.    AIRWAYS: Patent.    LUNGS: Mild paraseptal emphysematous change at the apices.  Sub 6 mm nodule in the medial right lower lobe (3, 49), new from previous.    PLEURA: No pleural effusion or pneumothorax.    UPPER ABDOMEN: See separately dictated report for CT of the abdomen.    THORACIC SOFT TISSUES: Unremarkable.    BONES: Postop sternotomy.  Impression: New sub 6 mm right lower lobe nodule.  Recommend continued attention on follow-up.    Electronically signed by: Cayla Iverson  Date:    12/27/2023  Time:    14:50  CT Abdomen w/o Contrast Plus Triphasic w/Contrast  Narrative: EXAMINATION:  CT ABDOMEN W/O CONTRAST PLUS TRIPHASIC W/CONTRAST    CLINICAL HISTORY:  Liver cell carcinoma    TECHNIQUE:  Helically acquired axial images, sagittal and coronal reformations were obtained through the abdomen prior to and after the IV administration of contrast.    Automated tube current modulation, weight-based exposure dosing, and/or iterative reconstruction technique utilized to reach lowest reasonably achievable exposure rate.    DLP: 1810 mGy*cm    COMPARISON:  CT abdomen 10/19/2023    FINDINGS:  LUNG BASES: See separate report for CT chest.    LIVER: Postop liver transplant.  Normal contour of the liver.  No focal mass.  Normal enhancement.  There is a hepatic artery stent which appears patent.  Portal and hepatic veins are patent.    BILIARY: Gallbladder is surgically absent.    PANCREAS: No inflammatory change.    SPLEEN: Normal in size    ADRENALS: No mass.    KIDNEYS/URETERS: No renal or ureteral calculus. No hydronephrosis.  Kidneys enhance symmetrically.   There is an incidental small right renal cortical cyst which requires no imaging follow-up.    GI TRACT/MESENTERY:  No evidence of bowel obstruction or inflammation. Appendix has a normal appearance.  Colonic diverticulosis without evidence of acute diverticulitis.    PERITONEUM: No free fluid.No free air.    LYMPH NODES: No enlarged lymph nodes by size criteria.    VASCULATURE: Aortoiliac atherosclerosis.    ABDOMINAL WALL: Unremarkable.    BONES: No acute osseous abnormality.  Impression: Postop liver transplant.  No suspicious hepatic lesion.    Electronically signed by: Cayla vIerson  Date:    12/27/2023  Time:    14:44         Medication List        START taking these medications      ferrous gluconate 324 MG tablet  Commonly known as: FERGON  Take 1 tablet (324 mg total) by mouth daily with breakfast. for 3 days            CHANGE how you take these medications      clopidogreL 75 mg tablet  Commonly known as: PLAVIX  What changed: Another medication with the same name was removed. Continue taking this medication, and follow the directions you see here.     EScitalopram oxalate 5 MG Tab  Commonly known as: LEXAPRO  Take 1 tablet by mouth once daily  What changed: Another medication with the same name was removed. Continue taking this medication, and follow the directions you see here.     OZEMPIC 1 mg/dose (4 mg/3 mL)  Generic drug: semaglutide  What changed: Another medication with the same name was removed. Continue taking this medication, and follow the directions you see here.     TOUJEO MAX U-300 SOLOSTAR 300 unit/mL (3 mL) insulin pen  Generic drug: insulin glargine U-300 conc  Inject 10 Units into the skin once daily. Check BG before meals and at bedtime. If fasting BG is > 120 and no more hypoglycemic event, then start insulin Toujeo 10 units daily.  What changed: Another medication with the same name was removed. Continue taking this medication, and follow the directions you see here.         "    CONTINUE taking these medications      * BD ULTRA-FINE MINI PEN NEEDLE 31 gauge x 3/16" Ndle  Generic drug: pen needle, diabetic     * pen needle, diabetic 31 gauge x 5/16" Ndle  Use as directed     calcium carbonate 600 mg calcium (1,500 mg) Tab  Commonly known as: OS-EVELYN     carvediloL 12.5 MG tablet  Commonly known as: COREG     CONTOUR NEXT TEST STRIPS Strp  Generic drug: blood sugar diagnostic     famotidine 20 MG tablet  Commonly known as: PEPCID     tacrolimus 1 MG Cap  Commonly known as: PROGRAF  Take 4 capsules (4 mg total) by mouth every morning AND 3 capsules (3 mg total) every evening.           * This list has 2 medication(s) that are the same as other medications prescribed for you. Read the directions carefully, and ask your doctor or other care provider to review them with you.                STOP taking these medications      aspirin 81 MG EC tablet  Commonly known as: ECOTRIN     calcium carbonate-vitamin D3 600 mg-20 mcg (800 unit) Tab               Where to Get Your Medications        These medications were sent to [id number] [place], [place] - [address]   [address] [address]      Phone: [phone]   ferrous gluconate 324 MG tablet          Explained in detail to the patient about the discharge plan, medications, and follow-up visits. Pt understands and agrees with the treatment plan  Discharge Disposition: Home or Self Care   Discharged Condition: stable  Diet-   Dietary Orders (From admission, onward)       Start     Ordered    03/01/24 1535  Diet Adult Regular  Diet effective now         03/01/24 1535                   Medications Per DC med rec  Activities as tolerated    For further questions contact hospitalist office    Discharge time 33 minutes    For worsening symptoms, chest pain, shortness of breath, increased abdominal pain, high grade fever, stroke or stroke like symptoms, immediately go to the " nearest Emergency Room or call 911 as soon as possible.    Awilda Swann M.D, on 3/2/2024. at 1:47 PM.   Yes

## 2024-03-05 ENCOUNTER — PATIENT MESSAGE (OUTPATIENT)
Dept: ADMINISTRATIVE | Facility: OTHER | Age: 64
End: 2024-03-05
Payer: MEDICARE

## 2024-03-05 ENCOUNTER — PATIENT OUTREACH (OUTPATIENT)
Dept: ADMINISTRATIVE | Facility: CLINIC | Age: 64
End: 2024-03-05
Payer: MEDICARE

## 2024-03-05 NOTE — PROGRESS NOTES
C3 nurse spoke with Eder Kong  for a TCC post hospital discharge follow up call. The patient has a scheduled \[facility] [place] [facility]\"" appointment with Alexsandra Sandoval MD  on 3/6/24 @2:45pm

## 2024-03-07 ENCOUNTER — TELEPHONE (OUTPATIENT)
Dept: TRANSPLANT | Facility: CLINIC | Age: 64
End: 2024-03-07
Payer: MEDICARE

## 2024-03-07 ENCOUNTER — PATIENT MESSAGE (OUTPATIENT)
Dept: TRANSPLANT | Facility: CLINIC | Age: 64
End: 2024-03-07
Payer: MEDICARE

## 2024-03-07 DIAGNOSIS — Z94.4 LIVER REPLACED BY TRANSPLANT: ICD-10-CM

## 2024-03-07 DIAGNOSIS — C22.0 HCC (HEPATOCELLULAR CARCINOMA): Primary | ICD-10-CM

## 2024-03-07 NOTE — TELEPHONE ENCOUNTER
----- Message from Jay Abrams MD sent at 3/6/2024 11:55 AM CST -----  Liver tests are stable. No changes in his immunosuppression. Please continue to monitor labs per transplant protocol.    Potassium elevated. Please follow hyperkalemia protocol.

## 2024-03-07 NOTE — TELEPHONE ENCOUNTER
Patient notified and instructed via [facility]:    Your labs have been reviewd by ; your liver tests are stable, but your potassium level was a little bit elevated at 5.4.  Please follow a low potassium diet (avoid foods such as: bananas/ oranges/ tomatoes/ potatoes/ beans/ cabbage/ nuts/ chocolate/ sports drinks ie: Gatorade/Powerade/ electrolyte rahman).  Repeat labs due 3/25/24. Thanks.

## 2024-03-11 DIAGNOSIS — C22.0 CARCINOMA OF LIVER: Primary | ICD-10-CM

## 2024-03-18 ENCOUNTER — TELEPHONE (OUTPATIENT)
Dept: TRANSPLANT | Facility: CLINIC | Age: 64
End: 2024-03-18
Payer: MEDICARE

## 2024-03-18 NOTE — TELEPHONE ENCOUNTER
Left message returning call : reported patient cleared by  for oral surgery procedure proposed,  no objections from liver transplant standpoint: may follow AHA guidelines for abx. Prophylaxis.  Requested a call back for any questions.

## 2024-03-18 NOTE — TELEPHONE ENCOUNTER
----- Message from Cira Katrin sent at 3/18/2024  1:20 PM CDT -----  Regarding: [facility] - clearance  Contact: @#  [phone]  ext #[phone]  [facility] called checking on surgery clearance they sent on 3/4, the form was sent back with Dr. Jose Alberto cowart but nothing filled out. Please contact to advise @#  [phone]  ext #[phone]

## 2024-03-25 ENCOUNTER — LAB VISIT (OUTPATIENT)
Dept: LAB | Facility: HOSPITAL | Age: 64
End: 2024-03-25
Attending: STUDENT IN AN ORGANIZED HEALTH CARE EDUCATION/TRAINING PROGRAM
Payer: MEDICARE

## 2024-03-25 DIAGNOSIS — Z94.4 LIVER REPLACED BY TRANSPLANT: ICD-10-CM

## 2024-03-25 DIAGNOSIS — C22.0 HCC (HEPATOCELLULAR CARCINOMA): ICD-10-CM

## 2024-03-25 LAB
AFP-TM SERPL-MCNC: 2.2 NG/ML
ALBUMIN SERPL-MCNC: 3.5 G/DL (ref 3.4–4.8)
ALBUMIN/GLOB SERPL: 1.2 RATIO (ref 1.1–2)
ALP SERPL-CCNC: 101 UNIT/L (ref 40–150)
ALT SERPL-CCNC: 18 UNIT/L (ref 0–55)
AST SERPL-CCNC: 16 UNIT/L (ref 5–34)
BASOPHILS # BLD AUTO: 0.05 X10(3)/MCL
BASOPHILS NFR BLD AUTO: 0.9 %
BILIRUB SERPL-MCNC: 0.2 MG/DL
BUN SERPL-MCNC: 22.2 MG/DL (ref 8.4–25.7)
CALCIUM SERPL-MCNC: 9.2 MG/DL (ref 8.8–10)
CHLORIDE SERPL-SCNC: 110 MMOL/L (ref 98–107)
CO2 SERPL-SCNC: 27 MMOL/L (ref 23–31)
CREAT SERPL-MCNC: 0.84 MG/DL (ref 0.73–1.18)
EOSINOPHIL # BLD AUTO: 0.29 X10(3)/MCL (ref 0–0.9)
EOSINOPHIL NFR BLD AUTO: 5.2 %
ERYTHROCYTE [DISTWIDTH] IN BLOOD BY AUTOMATED COUNT: 17.6 % (ref 11.5–17)
GFR SERPLBLD CREATININE-BSD FMLA CKD-EPI: >60 MLS/MIN/1.73/M2
GLOBULIN SER-MCNC: 3 GM/DL (ref 2.4–3.5)
GLUCOSE SERPL-MCNC: 154 MG/DL (ref 82–115)
HCT VFR BLD AUTO: 36.3 % (ref 42–52)
HGB BLD-MCNC: 11.2 G/DL (ref 14–18)
IMM GRANULOCYTES # BLD AUTO: 0.02 X10(3)/MCL (ref 0–0.04)
IMM GRANULOCYTES NFR BLD AUTO: 0.4 %
LYMPHOCYTES # BLD AUTO: 0.91 X10(3)/MCL (ref 0.6–4.6)
LYMPHOCYTES NFR BLD AUTO: 16.4 %
MCH RBC QN AUTO: 27.5 PG (ref 27–31)
MCHC RBC AUTO-ENTMCNC: 30.9 G/DL (ref 33–36)
MCV RBC AUTO: 89.2 FL (ref 80–94)
MONOCYTES # BLD AUTO: 0.4 X10(3)/MCL (ref 0.1–1.3)
MONOCYTES NFR BLD AUTO: 7.2 %
NEUTROPHILS # BLD AUTO: 3.88 X10(3)/MCL (ref 2.1–9.2)
NEUTROPHILS NFR BLD AUTO: 69.9 %
NRBC BLD AUTO-RTO: 0 %
PLATELET # BLD AUTO: 163 X10(3)/MCL (ref 130–400)
PMV BLD AUTO: 10.3 FL (ref 7.4–10.4)
POTASSIUM SERPL-SCNC: 5.3 MMOL/L (ref 3.5–5.1)
PROT SERPL-MCNC: 6.5 GM/DL (ref 5.8–7.6)
RBC # BLD AUTO: 4.07 X10(6)/MCL (ref 4.7–6.1)
SODIUM SERPL-SCNC: 142 MMOL/L (ref 136–145)
WBC # SPEC AUTO: 5.55 X10(3)/MCL (ref 4.5–11.5)

## 2024-03-25 PROCEDURE — 80197 ASSAY OF TACROLIMUS: CPT

## 2024-03-25 PROCEDURE — 85025 COMPLETE CBC W/AUTO DIFF WBC: CPT

## 2024-03-25 PROCEDURE — 82105 ALPHA-FETOPROTEIN SERUM: CPT

## 2024-03-25 PROCEDURE — 80053 COMPREHEN METABOLIC PANEL: CPT

## 2024-03-25 PROCEDURE — 36415 COLL VENOUS BLD VENIPUNCTURE: CPT

## 2024-03-26 LAB — TACROLIMUS TROUGH BLD-MCNC: 4.5 NG/ML

## 2024-04-02 ENCOUNTER — TELEPHONE (OUTPATIENT)
Dept: TRANSPLANT | Facility: CLINIC | Age: 64
End: 2024-04-02
Payer: MEDICARE

## 2024-04-02 DIAGNOSIS — Z94.4 LIVER REPLACED BY TRANSPLANT: Primary | ICD-10-CM

## 2024-04-02 NOTE — TELEPHONE ENCOUNTER
----- Message from Jay Abrams MD sent at 4/2/2024 12:03 PM CDT -----  Liver tests are stable. No changes in his immunosuppression. Please continue to monitor labs per transplant protocol.    Potassium slightly elevated. Please follow hyperkalemia protocol.

## 2024-04-02 NOTE — TELEPHONE ENCOUNTER
Patient notified and instructed via [facility]:      Your labs have been reviewed by ; no medicine changes made. Repeat labs due 5/27/24. Your Potassium level was mildly elevated at 5.4 - please avoid/limit foods that are high in potassium (such as: bananas/oranges/ tomatoes/potatoes/ beans/ nuts/ cabbage/ chocolate/ watermelon/ sports drinks like Gatorade/Powerade/Electrolyte rahman), thanks.   The skin of the bilateral groins was clipped, prepped and draped in the usual sterile manner. (If not otherwise specified, skin prep was bilateral.)

## 2024-04-04 ENCOUNTER — HOSPITAL ENCOUNTER (OUTPATIENT)
Dept: RADIOLOGY | Facility: HOSPITAL | Age: 64
Discharge: HOME | End: 2024-04-04
Attending: STUDENT IN AN ORGANIZED HEALTH CARE EDUCATION/TRAINING PROGRAM
Payer: MEDICARE

## 2024-04-04 DIAGNOSIS — C22.0 CARCINOMA OF LIVER: ICD-10-CM

## 2024-04-04 PROCEDURE — 25500020 PHARM REV CODE 255: Performed by: STUDENT IN AN ORGANIZED HEALTH CARE EDUCATION/TRAINING PROGRAM

## 2024-04-04 PROCEDURE — 71250 CT THORAX DX C-: CPT | Mod: TC

## 2024-04-04 PROCEDURE — 74170 CT ABD WO CNTRST FLWD CNTRST: CPT | Mod: TC

## 2024-04-04 RX ADMIN — IOPAMIDOL 100 ML: 755 INJECTION, SOLUTION INTRAVENOUS at 03:04

## 2024-04-05 ENCOUNTER — TELEPHONE (OUTPATIENT)
Dept: TRANSPLANT | Facility: CLINIC | Age: 64
End: 2024-04-05
Payer: MEDICARE

## 2024-04-05 ENCOUNTER — PATIENT MESSAGE (OUTPATIENT)
Dept: TRANSPLANT | Facility: CLINIC | Age: 64
End: 2024-04-05
Payer: MEDICARE

## 2024-04-05 NOTE — TELEPHONE ENCOUNTER
----- Message from Jay Abrams MD sent at 4/5/2024 11:12 AM CDT -----  Reviewed. No evidence of recurrent liver cancer.

## 2024-04-05 NOTE — TELEPHONE ENCOUNTER
Patient notified and instructed via [facility]:    Your CT scans of chest and abdomen were reviewed by : no evidence of recurrent liver cancer. Thanks.

## 2024-04-25 NOTE — PROGRESS NOTES
"GYN Problem Visit - Abnormal uterine bleeding    Chief Complaint   Patient presents with    Follow-up     Follow up on US done with [facility]                [facility]  Elizabeth Rhodes is a 45 y.o. female, , who presents for follow-up on ultrasound imaging performed with nurse practitioner.   Patient referred to MD secondary to ultrasound findings.  Patient reports that she is having monthly menstruations.  She has noted that she has had heavy menstrual cycles for majority of her life.  She is no longer and desiring future fertility.  Additionally she is looking for either medical or surgical assistance for bleeding profile.  She is not currently sexually active.  Does have allergies to shellfish.  Medications include Wellbutrin, vitamins and iron supplementation.  She has a history of 2 prior vaginal births and 1 .  She reports that she had a Pap smear performed with outside facility previously.  She is a non-smoker.    Additional OB/GYN History   Patient's last menstrual period was 2024.  Current contraception: contraceptive methods: Abstinence  Desires to: change to sterilization for possible  contraception  Allergies : Shellfish-derived products     The additional following portions of the patient's history were reviewed and updated as appropriate: allergies, current medications, past family history, past medical history, past social history, past surgical history, and problem list.    Review of Systems   Constitutional:  Negative for fatigue and fever.   Respiratory:  Negative for cough and shortness of breath.    Cardiovascular:  Negative for chest pain.   Gastrointestinal:  Negative for abdominal distention and abdominal pain.   Genitourinary:  Positive for menstrual problem. Negative for difficulty urinating and dysuria.       I have reviewed and agree with the HPI, ROS, and historical information as entered above. Kurtis Quarles MD    Objective   /96   Pulse 91   Ht 147.3 cm (58\")   Wt 59.9 kg " SW 2nd Note     (SW) presented to the patient's (pt) room for follow up and continuity of care. Pt observed sitting up in the bed and presents alert and oriented x4, calm and engaged in discussion appropriately. Pt expected to go to [facility] today. Pt presented with his wife Charo at the bedside. SW discussed discharge plans with the patient and wife. Pt will discharge to [facility] under the care of his wife. Fee for the apartment will be $16.66. Pt denies any mental health difficulties. Pt and his wife denied any concerns or needs. SW providing psychosocial and counseling support, education, resources, assistance, and discharge planning as indicated. SW remains available.        (132 lb)   LMP 04/13/2024   BMI 27.59 kg/m²     Physical Exam  Vitals and nursing note reviewed. Exam conducted with a chaperone present.   Constitutional:       General: She is not in acute distress.     Appearance: Normal appearance. She is not toxic-appearing.   HENT:      Head: Normocephalic and atraumatic.   Eyes:      Extraocular Movements: Extraocular movements intact.      Conjunctiva/sclera: Conjunctivae normal.   Cardiovascular:      Pulses: Normal pulses.   Pulmonary:      Effort: Pulmonary effort is normal.   Abdominal:      General: There is no distension.      Palpations: Abdomen is soft.      Tenderness: There is no abdominal tenderness.   Genitourinary:     General: Normal vulva.      Exam position: Lithotomy position.      Labia:         Right: No tenderness, lesion or injury.         Left: No tenderness, lesion or injury.       Vagina: Normal.      Cervix: Normal.      Uterus: Normal.       Adnexa: Right adnexa normal and left adnexa normal.   Musculoskeletal:      Cervical back: Normal range of motion.   Skin:     General: Skin is warm and dry.   Neurological:      Mental Status: She is alert and oriented to person, place, and time.   Psychiatric:         Mood and Affect: Mood normal.         Behavior: Behavior normal.         Thought Content: Thought content normal.       Endometrial Biopsy    Date/Time: 4/26/2024 10:06 AM    Performed by: Kurtis Quarles MD  Authorized by: Kurtis Quarles MD    Consent:     Consent obtained: verbal and written    Consent given by: patient    Risks discussed: bleeding, infection and pain    Patient agrees, verbalizes understanding, and wants to proceed: yes    Indications:     Indications: abnormal uterine bleeding    Pre-procedure:     Urine pregnancy test: negative    Procedure:     A bimanual exam was performed: yes      Uterus size: non-gravid    Prepped with: chlorhexidine    Tenaculum used: yes      A local block was performed: no      Cervix dilated:  no      Number of passes: 1  Findings:     Cervix: normal      Uterus depth by sound (cm): 9    Specimen collected: specimen collected and sent to pathology      Patient tolerance: tolerated well, no immediate complications        1. Hyperechoic polypoid focus within the endometrial canal. Endometrial  lesion versus submucosal fibroid are considerations. Consider  endometrial biopsy.     2. Fibroid uterus.     3. Heterogeneous appearance of the left ovary which is poorly  characterized. MRI could be useful for further characterization          Assessment and Plan  Elizabeth Rhodes is a 45 y.o.  presenting for follow-up ultrasound assessment for menorrhagia with regular cycles.  Personal review of imaging on transvaginal ultrasound.  It appears the patient has a cystic lesion within the endometrial cavity most likely representing an endometrial polyp.  Small intramural fibroid does not seem to be abutting endometrial lining.  Discussions of options of management for patient.  Recommendation for endometrial biopsy today which was performed.  Discussion regards to options for management.  Discussed with patient that if a lesion is present within the endometrial lining that a IUD likely would fail with assistance with bleeding profile.  Additionally discussed with patient performing a hysteroscopy D&C with assessment of uterine cavity and removal of any structural abnormality observed.  Additionally discussed with patient performing sterilization procedure and NovaSure ablation at the time of structural abnormality if a polyp is present.  After discussions of possible interventions including risk and benefits related to IUD placement and surgical intervention patient is leaning towards having surgical intervention performed.  Patient was given information regarding sterilization procedure.  Discussed the added benefit of contraception for unwanted fertility but as well as the decreased risk of ovarian cancer with a  bilateral salpingectomy performed.  Specifically discussed with patient the risk of post ablative tubal syndrome citing a 1% risk chance.  Reoperation risk sided to the patient of 7 to 12% if not pleased with endometrial ablation procedure.  However discussed with patient that approximately 80% of individuals are pleased with ablative procedure.  Goal of endometrial ablation with reductions in menses for patient satisfaction discussed.  Amenorrhea not guaranteed.  Patient to return to office in 2 weeks after researching procedures and to follow-up on endometrial biopsy.  CBC was collected today as last seen CBC was performed in the summer 2023.    Diagnoses and all orders for this visit:    1. Abnormal uterine bleeding (AUB) (Primary)  -     POC Pregnancy, Urine  -     Tissue Pathology Exam  -     CBC (No Diff); Future  -     IgP, Aptima HPV; Future  -     IgP, Aptima HPV  -     CBC (No Diff)    Other orders  -     Endometrial Biopsy        Counseling:  Bleeding and infection precautions        She understands the importance of having the above orders performed in a timely fashion.  The risks of not performing them include, but are not limited to, cancer and/or subsequent increase in morbidity and/or mortality.  She is encouraged to review her results online and/or contact or office if she has questions.     Follow Up:  Return in about 2 weeks (around 5/9/2024) for Next scheduled follow up.      Kurtis Quarles MD  04/25/2024

## 2024-05-24 ENCOUNTER — TELEPHONE (OUTPATIENT)
Dept: TRANSPLANT | Facility: CLINIC | Age: 64
End: 2024-05-24
Payer: MEDICARE

## 2024-05-27 ENCOUNTER — LAB VISIT (OUTPATIENT)
Dept: LAB | Facility: HOSPITAL | Age: 64
End: 2024-05-27
Attending: STUDENT IN AN ORGANIZED HEALTH CARE EDUCATION/TRAINING PROGRAM
Payer: MEDICARE

## 2024-05-27 DIAGNOSIS — Z94.4 LIVER REPLACED BY TRANSPLANT: ICD-10-CM

## 2024-05-27 LAB
ALBUMIN SERPL-MCNC: 3.5 G/DL (ref 3.4–4.8)
ALBUMIN/GLOB SERPL: 1.3 RATIO (ref 1.1–2)
ALP SERPL-CCNC: 91 UNIT/L (ref 40–150)
ALT SERPL-CCNC: 22 UNIT/L (ref 0–55)
ANION GAP SERPL CALC-SCNC: 8 MEQ/L
AST SERPL-CCNC: 15 UNIT/L (ref 5–34)
BASOPHILS # BLD AUTO: 0.02 X10(3)/MCL
BASOPHILS NFR BLD AUTO: 0.4 %
BILIRUB SERPL-MCNC: 0.3 MG/DL
BUN SERPL-MCNC: 17.1 MG/DL (ref 8.4–25.7)
CALCIUM SERPL-MCNC: 9.1 MG/DL (ref 8.8–10)
CHLORIDE SERPL-SCNC: 109 MMOL/L (ref 98–107)
CO2 SERPL-SCNC: 25 MMOL/L (ref 23–31)
CREAT SERPL-MCNC: 0.85 MG/DL (ref 0.73–1.18)
CREAT/UREA NIT SERPL: 20
EOSINOPHIL # BLD AUTO: 0.13 X10(3)/MCL (ref 0–0.9)
EOSINOPHIL NFR BLD AUTO: 2.6 %
ERYTHROCYTE [DISTWIDTH] IN BLOOD BY AUTOMATED COUNT: 16.7 % (ref 11.5–17)
GFR SERPLBLD CREATININE-BSD FMLA CKD-EPI: >60 ML/MIN/1.73/M2
GLOBULIN SER-MCNC: 2.7 GM/DL (ref 2.4–3.5)
GLUCOSE SERPL-MCNC: 154 MG/DL (ref 82–115)
HCT VFR BLD AUTO: 34.4 % (ref 42–52)
HGB BLD-MCNC: 11.2 G/DL (ref 14–18)
IMM GRANULOCYTES # BLD AUTO: 0.01 X10(3)/MCL (ref 0–0.04)
IMM GRANULOCYTES NFR BLD AUTO: 0.2 %
LYMPHOCYTES # BLD AUTO: 0.76 X10(3)/MCL (ref 0.6–4.6)
LYMPHOCYTES NFR BLD AUTO: 15.3 %
MCH RBC QN AUTO: 29.6 PG (ref 27–31)
MCHC RBC AUTO-ENTMCNC: 32.6 G/DL (ref 33–36)
MCV RBC AUTO: 90.8 FL (ref 80–94)
MONOCYTES # BLD AUTO: 0.42 X10(3)/MCL (ref 0.1–1.3)
MONOCYTES NFR BLD AUTO: 8.5 %
NEUTROPHILS # BLD AUTO: 3.62 X10(3)/MCL (ref 2.1–9.2)
NEUTROPHILS NFR BLD AUTO: 73 %
NRBC BLD AUTO-RTO: 0 %
PLATELET # BLD AUTO: 185 X10(3)/MCL (ref 130–400)
PMV BLD AUTO: 10 FL (ref 7.4–10.4)
POTASSIUM SERPL-SCNC: 5 MMOL/L (ref 3.5–5.1)
PROT SERPL-MCNC: 6.2 GM/DL (ref 5.8–7.6)
RBC # BLD AUTO: 3.79 X10(6)/MCL (ref 4.7–6.1)
SODIUM SERPL-SCNC: 142 MMOL/L (ref 136–145)
WBC # SPEC AUTO: 4.96 X10(3)/MCL (ref 4.5–11.5)

## 2024-05-27 PROCEDURE — 80053 COMPREHEN METABOLIC PANEL: CPT

## 2024-05-27 PROCEDURE — 85025 COMPLETE CBC W/AUTO DIFF WBC: CPT

## 2024-05-27 PROCEDURE — 36415 COLL VENOUS BLD VENIPUNCTURE: CPT

## 2024-05-27 PROCEDURE — 80197 ASSAY OF TACROLIMUS: CPT

## 2024-05-30 LAB — TACROLIMUS TROUGH BLD-MCNC: 4.5 NG/ML

## 2024-06-03 PROBLEM — K92.2 GI BLEED: Status: RESOLVED | Noted: 2024-03-02 | Resolved: 2024-06-03

## 2024-06-05 ENCOUNTER — PATIENT MESSAGE (OUTPATIENT)
Dept: TRANSPLANT | Facility: CLINIC | Age: 64
End: 2024-06-05
Payer: MEDICARE

## 2024-06-05 ENCOUNTER — TELEPHONE (OUTPATIENT)
Dept: TRANSPLANT | Facility: CLINIC | Age: 64
End: 2024-06-05
Payer: MEDICARE

## 2024-06-05 DIAGNOSIS — Z94.4 LIVER REPLACED BY TRANSPLANT: Primary | ICD-10-CM

## 2024-06-05 DIAGNOSIS — C22.0 HCC (HEPATOCELLULAR CARCINOMA): Primary | ICD-10-CM

## 2024-06-05 DIAGNOSIS — C22.0 HCC (HEPATOCELLULAR CARCINOMA): ICD-10-CM

## 2024-06-05 NOTE — TELEPHONE ENCOUNTER
Patient notified and instructed via [facility]:    Your labs have been reviewed by ; results stable. No medicine changes made. Repeat labs due 7/29/24. Thanks.

## 2024-06-05 NOTE — TELEPHONE ENCOUNTER
----- Message from Jay Abrams MD sent at 6/5/2024 12:28 PM CDT -----  Liver tests are stable. No changes in his immunosuppression. Please continue to monitor labs per transplant protocol.

## 2024-07-25 ENCOUNTER — PATIENT MESSAGE (OUTPATIENT)
Dept: TRANSPLANT | Facility: CLINIC | Age: 64
End: 2024-07-25
Payer: MEDICARE

## 2024-08-02 LAB
EXT AFP: 2.1
EXT ALBUMIN: 3.7
EXT ALKALINE PHOSPHATASE: 101
EXT ALT: 16
EXT AST: 13
EXT BILIRUBIN TOTAL: 0.5
EXT BUN: 18
EXT CALCIUM: 9.7
EXT CHLORIDE: 108
EXT CO2: 27
EXT CREATININE: 0.8 MG/DL
EXT EOSINOPHIL%: 3.6
EXT GFR MDRD NON AF AMER: 98
EXT GLUCOSE: 98
EXT HEMATOCRIT: 34.6
EXT HEMOGLOBIN: 11.1
EXT LYMPH%: 16.5
EXT MONOCYTES%: 7.6
EXT PLATELETS: 193
EXT POTASSIUM: 4.9
EXT PROTEIN TOTAL: 6.5
EXT SEGS%: 71.6
EXT SODIUM: 143 MMOL/L
EXT TACROLIMUS LVL: 4.1
EXT WBC: 5.6

## 2024-08-06 ENCOUNTER — PATIENT MESSAGE (OUTPATIENT)
Dept: TRANSPLANT | Facility: CLINIC | Age: 64
End: 2024-08-06
Payer: MEDICARE

## 2024-08-06 ENCOUNTER — TELEPHONE (OUTPATIENT)
Dept: TRANSPLANT | Facility: CLINIC | Age: 64
End: 2024-08-06
Payer: MEDICARE

## 2024-08-07 ENCOUNTER — PATIENT MESSAGE (OUTPATIENT)
Dept: TRANSPLANT | Facility: CLINIC | Age: 64
End: 2024-08-07
Payer: MEDICARE

## 2024-08-16 ENCOUNTER — PATIENT MESSAGE (OUTPATIENT)
Dept: TRANSPLANT | Facility: CLINIC | Age: 64
End: 2024-08-16
Payer: MEDICARE

## 2024-08-25 ENCOUNTER — PATIENT MESSAGE (OUTPATIENT)
Dept: TRANSPLANT | Facility: CLINIC | Age: 64
End: 2024-08-25
Payer: MEDICARE

## 2024-09-04 ENCOUNTER — TELEPHONE (OUTPATIENT)
Dept: TRANSPLANT | Facility: CLINIC | Age: 64
End: 2024-09-04
Payer: MEDICARE

## 2024-09-04 ENCOUNTER — PATIENT MESSAGE (OUTPATIENT)
Dept: TRANSPLANT | Facility: CLINIC | Age: 64
End: 2024-09-04
Payer: MEDICARE

## 2024-09-04 NOTE — TELEPHONE ENCOUNTER
Update received from patient via [email or web address]:    I have cancelled my appointments,  which were to be in [facility] , until which time my new internist can schedule the here in [place], [place] .  Thank  you,   Damien Kong

## 2024-09-28 ENCOUNTER — PATIENT MESSAGE (OUTPATIENT)
Dept: TRANSPLANT | Facility: CLINIC | Age: 64
End: 2024-09-28
Payer: MEDICARE

## 2024-09-30 ENCOUNTER — PATIENT MESSAGE (OUTPATIENT)
Dept: TRANSPLANT | Facility: CLINIC | Age: 64
End: 2024-09-30
Payer: MEDICARE

## 2024-10-07 LAB
EXT AFP: 2.1
EXT ALBUMIN: 3.6
EXT ALKALINE PHOSPHATASE: 89
EXT ALT: 19
EXT AST: 15
EXT BILIRUBIN TOTAL: 0.4
EXT BUN: 16
EXT CALCIUM: 9.2
EXT CHLORIDE: 108
EXT CO2: 27
EXT CREATININE: 0.85 MG/DL
EXT EOSINOPHIL%: 2.7
EXT GFR MDRD NON AF AMER: 96
EXT GLUCOSE: 99
EXT HEMATOCRIT: 34.3
EXT HEMOGLOBIN: 11.1
EXT LYMPH%: 17.4
EXT MONOCYTES%: 7.3
EXT PLATELETS: 175
EXT POTASSIUM: 4.5
EXT PROTEIN TOTAL: 6.3
EXT SEGS%: 71.7
EXT SODIUM: 142 MMOL/L
EXT TACROLIMUS LVL: 5.6
EXT WBC: 5.6

## 2024-10-08 ENCOUNTER — TELEPHONE (OUTPATIENT)
Dept: TRANSPLANT | Facility: CLINIC | Age: 64
End: 2024-10-08
Payer: MEDICARE

## 2024-10-08 ENCOUNTER — PATIENT MESSAGE (OUTPATIENT)
Dept: TRANSPLANT | Facility: CLINIC | Age: 64
End: 2024-10-08
Payer: MEDICARE

## 2024-10-08 NOTE — TELEPHONE ENCOUNTER
Response received from patient re: CT scans due:    Ric Michaud ,   I have my 1st appt.with an internist on Oct.22 , hopefully I will be able to accomplish this nannette her.  Will keep you informed.   Damien

## 2024-10-08 NOTE — TELEPHONE ENCOUNTER
----- Message from Jay Abrams MD sent at 10/8/2024 12:20 PM CDT -----  Liver tests are stable. No changes in his immunosuppression. Please continue to monitor labs per transplant protocol.

## 2024-10-08 NOTE — TELEPHONE ENCOUNTER
Patient notified and instructed via [facility]:    Your labs have been reviewed by ; results were stable. No medicine changes made. Repeat labs due 1/6/2025.   You are due for follow up CT scans now, where should I fax the orders in [place]?  Thanks.

## 2024-10-21 ENCOUNTER — TELEPHONE (OUTPATIENT)
Dept: TRANSPLANT | Facility: CLINIC | Age: 64
End: 2024-10-21
Payer: MEDICARE

## 2024-10-21 NOTE — TELEPHONE ENCOUNTER
Returned call to LIAM PULIDO in [place] (PCP- patient establishing care there) , confirmed with her CT chest and triple phase CT abdomen needed for HCC survaillence , she will schedule, she said.

## 2024-10-21 NOTE — TELEPHONE ENCOUNTER
----- Message from Brissa sent at 10/21/2024  1:47 PM CDT -----  Regarding: Consult/Advisory  Contact: Dr. Ferrell @ [phone]  Consult/Advisory     Name Of Caller: Dr. Yesica Ferrell      Contact Preference: [phone]     Nature of call: Message is for Dr Keily is calling about CT scan. Asking for a call back

## 2024-11-01 ENCOUNTER — TELEPHONE (OUTPATIENT)
Dept: TRANSPLANT | Facility: CLINIC | Age: 64
End: 2024-11-01
Payer: MEDICARE

## 2024-11-01 ENCOUNTER — PATIENT MESSAGE (OUTPATIENT)
Dept: TRANSPLANT | Facility: CLINIC | Age: 64
End: 2024-11-01
Payer: MEDICARE

## 2024-11-06 NOTE — PROGRESS NOTES
Addended by: ALEM SALAZAR on: 11/6/2024 04:01 PM     Modules accepted: Orders     Discharge:     Pt discharging today. Pt discussed HH orders for PT with pt. Pt reported having no preference for HH agencies. SW made referral to [facility]. Pt reported wife is currently setting LR apt before pt discharges today. Pt is accepting of discharge. Pt denied having questions or concerns at this time. SW providing ongoing psychosocial and counseling support, education, resources, assistance and discharge planning as indicated. Following and available.

## 2024-11-13 ENCOUNTER — PATIENT MESSAGE (OUTPATIENT)
Dept: TRANSPLANT | Facility: CLINIC | Age: 64
End: 2024-11-13
Payer: MEDICARE

## 2024-11-20 ENCOUNTER — TELEPHONE (OUTPATIENT)
Dept: TRANSPLANT | Facility: CLINIC | Age: 64
End: 2024-11-20
Payer: MEDICARE

## 2024-11-25 NOTE — PROGRESS NOTES
"[facility] - Transplant Stepdown  Endocrinology  Progress Note    Admit Date: 1/22/2022     Reason for Consult: Management of T2DM, Hyperglycemia     Surgical Procedure and Date: Liver Transplant 1/23/22    Diabetes diagnosis year:     Home Diabetes Medications:  Toujo (unsure of dose- variable 10-30 units) and  ozempic 1 mg weekly   Lab Results   Component Value Date    HGBA1C 7.6 (H) 01/22/2022           How often checking glucose at home? 1-3   BG readings on regimen: 100-200  Hypoglycemia on the regimen?  no  Missed doses on regimen?  no    Diabetes Complications include:     Hyperglycemia    Complicating diabetes co morbidities:   CIRRHOSIS and Glucocorticoid use       HPI:   Patient is a 61 y.o. 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 Endocrinology consulted for management of T2DM.       Lab Results   Component Value Date    HGBA1C 7.6 (H) 01/22/2022           Interval HPI:   Overnight events:  Transferred to TSU. BG reasonably well controlled on IV insulin infusion rates 1.1- 4.5 u/hr. Solumedrol 160 mg daily; steroids per primary. 2 Days Post-Op  Eating:   Diet advancing. Diet diabetic [facility]; 2000 Calorie  Nausea: No  Hypoglycemia and intervention: No  Fever: No  TPN and/or TF: No    BP (!) 169/81 (BP Location: Right arm, Patient Position: Lying)   Pulse 81   Temp 97.8 °F (36.6 °C) (Oral)   Resp 16   Ht 5' 5" (1.651 m)   Wt 76.5 kg (168 lb 10.4 oz)   SpO2 97%   BMI 28.07 kg/m²     Labs Reviewed and Include    Recent Labs   Lab 01/25/22  0307   *   CALCIUM 7.7*   ALBUMIN 3.3*   PROT 5.1*      K 4.6   CO2 19*   *   BUN 32*   CREATININE 0.7   ALKPHOS 127   ALT 1,771*   AST 1,309*   BILITOT 3.3*     Lab Results   Component Value Date    WBC 13.38 (H) 01/25/2022    HGB 9.0 (L) 01/25/2022    HCT 27.2 (L) 01/25/2022    MCV 93 01/25/2022    PLT 97 (L) 01/25/2022     No results for input(s): TSH, FREET4 in the " Your blood pressure goal is 140/80 or less.    Taking into consideration the fact that you had coronary artery bypass graft surgery and your heart is recovering its function, we are selecting medications to control your blood pressure that also help your heart function.    You recently had high potassium levels, and 2 of your former blood pressure medications can raise potassium, both spironolactone and losartan.    I recommend you discontinue spironolactone, and discard that medication.    Today we checked a potassium level and kidney function, depending on those results I will likely call you and recommend that you start losartan at a low dose of 25 mg each day.    We will continue to adjust the dose of losartan until your blood pressure is at goal of 140/80 or less, or your potassium level gets too high.    As your potassium level returned today still mildly elevated, do NOT start losartan.  Instead, add another 25mg of Metoprolol XL once daily to your Metoprolol XL 50mg daily for a total of 75mg daily.  Monitor your blood pressure and pulse.  Call if your pulse is regularly under 60, or blood pressure above 160 on top, or above 100 on the bottom.  Also call if you are feeling dizzy, lightheaded or other new symptoms.    A prescription has been sent to your pharmacy for the Metoprolol XL 25mg tablets.    Return to clinic on Wednesday 11/27 or  Friday 11/29 to see a doctor and have another potassium level (BMP) drawn.  Call for an appointment if you are not contacted by noon on 11/26.    It is very important that you avoid all NSAID type medications including ibuprofen, Aleve, Naprosyn, naproxen or anything this is an NSAID on it except for your daily aspirin.    Return to see Dr. Alejandro or one of his partners again in 2 to 3 weeks to review your home blood pressure readings and recheck your kidney function and potassium level --or this visit may be even earlier depending on your potassium level later this  "last 168 hours.  Lab Results   Component Value Date    HGBA1C 7.6 (H) 01/22/2022       Nutritional status:   Body mass index is 28.07 kg/m².  Lab Results   Component Value Date    ALBUMIN 3.3 (L) 01/25/2022    ALBUMIN 3.1 (L) 01/24/2022    ALBUMIN 2.9 (L) 01/23/2022     No results found for: PREALBUMIN    Estimated Creatinine Clearance: 103.1 mL/min (based on SCr of 0.7 mg/dL).    Accu-Checks  Recent Labs     01/25/22  0103 01/25/22  0203 01/25/22  0305 01/25/22  0403 01/25/22  0606 01/25/22  0722 01/25/22  0832 01/25/22  0916 01/25/22  1022 01/25/22  1151   POCTGLUCOSE 147* 175* 161* 171* 184* 194* 200* 194* 173* 150*       Current Medications and/or Treatments Impacting Glycemic Control  Immunotherapy:    Immunosuppressants         Stop Route Frequency     tacrolimus (PROGRAF) 1 mg/mL oral syringe         -- Oral 2 times daily     mycophenolate mofetil 200 mg/mL suspension 1,000 mg         -- Oral 2 times daily        Steroids:   Hormones (From admission, onward)            Start     Stop Route Frequency Ordered    01/29/22 0900  predniSONE tablet 20 mg  (methylprednisolone taper panel)        "Followed by" Linked Group Details    -- Oral Daily 01/23/22 1058    01/28/22 0900  methylPREDNISolone sodium succinate injection 40 mg  (methylprednisolone taper panel)        "Followed by" Linked Group Details    01/29 0859 IV Daily 01/23/22 1058    01/27/22 0900  methylPREDNISolone sodium succinate injection 80 mg  (methylprednisolone taper panel)        "Followed by" Linked Group Details    01/28 0859 IV Daily 01/23/22 1058    01/26/22 0900  methylPREDNISolone sodium succinate injection 120 mg  (methylprednisolone taper panel)        "Followed by" Linked Group Details    01/27 0859 IV Daily 01/23/22 1058        Pressors:    Autonomic Drugs (From admission, onward)            None        Hyperglycemia/Diabetes Medications:   Antihyperglycemics (From admission, onward)            Start     Stop Route Frequency Ordered    " week.    At home check your blood pressure 3-5 times per week at various times during the day, and bring these blood pressure values into the clinic.    Nice to meet you today and have a happy Thanksgiving!   01/25/22 1645  insulin aspart U-100 pen 6 Units         -- SubQ 3 times daily with meals 01/25/22 1246    01/25/22 1345  insulin regular in 0.9 % NaCl 100 unit/100 mL (1 unit/mL) infusion        Question:  Enter initial dose (Units/hr):  Answer:  1.5    -- IV Continuous 01/25/22 1246    01/25/22 1342  insulin aspart U-100 pen 0-10 Units         -- SubQ As needed (PRN) 01/25/22 1246          ASSESSMENT and PLAN    * S/P liver transplant  Managed per primary team  Optimize BG control    Type 2 diabetes mellitus with hyperglycemia  BG goal 140-180    Change to transition insulin infusion at 1.5 u/hr.   BG monitoring ac/hs/0200 and moderate dose correction scale.   Start novolog 6 units with meals (0.5 u/kg wt based).     Discharge planning: TBD        Prophylactic immunotherapy  May increase insulin resistance.         Adverse effect of corticosteroids  Glucocorticoids markedly increase glucoses. Expect steroid taper to help with glucose control.          Aster Ha NP  Endocrinology  [facility] - Transplant Stepdown

## 2025-01-10 LAB
EXT ALBUMIN: 3.7
EXT ALKALINE PHOSPHATASE: 110
EXT ALT: 14
EXT AST: 14
EXT BILIRUBIN TOTAL: 0.3
EXT BUN: 14
EXT CALCIUM: 9.1
EXT CHLORIDE: 111
EXT CO2: 25
EXT CREATININE: 0.8 MG/DL
EXT EOSINOPHIL%: 3.2
EXT GLUCOSE: 189
EXT HEMATOCRIT: 35
EXT HEMOGLOBIN: 11.4
EXT LYMPH%: 19.6
EXT MONOCYTES%: 7.7
EXT PLATELETS: 171
EXT POTASSIUM: 5
EXT PROTEIN TOTAL: 6.6
EXT SEGS%: 68.5
EXT SODIUM: 144 MMOL/L
EXT TACROLIMUS LVL: 5.7
EXT WBC: 5

## 2025-01-17 ENCOUNTER — PATIENT MESSAGE (OUTPATIENT)
Dept: TRANSPLANT | Facility: CLINIC | Age: 65
End: 2025-01-17
Payer: MEDICARE

## 2025-01-17 ENCOUNTER — TELEPHONE (OUTPATIENT)
Dept: TRANSPLANT | Facility: CLINIC | Age: 65
End: 2025-01-17
Payer: MEDICARE

## 2025-01-17 NOTE — TELEPHONE ENCOUNTER
Patient notified and instructed via [facility]:    Your labs have been reviewed by ; results stable. No changes were made. Repeat labs due 4/7/25. Thanks.

## 2025-01-17 NOTE — TELEPHONE ENCOUNTER
----- Message from Jay Abrams MD sent at 1/17/2025 10:09 AM CST -----  Liver tests are stable. No changes in his immunosuppression. Please continue to monitor labs per transplant protocol.

## 2025-04-10 ENCOUNTER — PATIENT MESSAGE (OUTPATIENT)
Dept: TRANSPLANT | Facility: CLINIC | Age: 65
End: 2025-04-10
Payer: MEDICARE

## 2025-04-10 LAB
EXT AFP: 2.9
EXT ALBUMIN: 3.8
EXT ALKALINE PHOSPHATASE: 84
EXT ALT: 17
EXT AST: 17
EXT BILIRUBIN TOTAL: 0.6
EXT BUN: 13
EXT CALCIUM: 9
EXT CHLORIDE: 107
EXT CO2: 26
EXT CREATININE: 0.87 MG/DL
EXT EOSINOPHIL%: 2.9
EXT GFR MDRD NON AF AMER: 95
EXT GLUCOSE: 103
EXT HEMATOCRIT: 36.5
EXT HEMOGLOBIN: 12
EXT LYMPH%: 18.3
EXT MONOCYTES%: 8.1
EXT PLATELETS: 182
EXT POTASSIUM: 4.6
EXT PROTEIN TOTAL: 6.8
EXT SEGS%: 69.7
EXT SODIUM: 140 MMOL/L
EXT TACROLIMUS LVL: 22.9
EXT WBC: 5.2

## 2025-04-11 ENCOUNTER — TELEPHONE (OUTPATIENT)
Dept: TRANSPLANT | Facility: CLINIC | Age: 65
End: 2025-04-11
Payer: MEDICARE

## 2025-04-11 NOTE — TELEPHONE ENCOUNTER
Patient reported that he thinks he did take his morning dose of Prograf before his labs were drawn that day.

## 2025-04-15 ENCOUNTER — RESULTS FOLLOW-UP (OUTPATIENT)
Dept: HEPATOLOGY | Facility: CLINIC | Age: 65
End: 2025-04-15
Payer: MEDICARE

## 2025-04-15 ENCOUNTER — PATIENT MESSAGE (OUTPATIENT)
Dept: TRANSPLANT | Facility: CLINIC | Age: 65
End: 2025-04-15
Payer: MEDICARE

## 2025-04-15 NOTE — TELEPHONE ENCOUNTER
----- Message from Jay Abrams MD sent at 4/15/2025 12:51 PM CDT -----  Thanks. No changes in IS. Please repeat labs in 2 weeks.  ----- Message -----  From: Keily Rodriguez  Sent: 4/15/2025  12:18 PM CDT  To: Jay Abrams MD    Yes, he thinks he took it that day before his labs were drawn.  ----- Message -----  From: Jay Abrams MD  Sent: 4/15/2025  12:18 PM CDT  To: [facility] Post-Liver Transplant Clinical    Liver tests are stable. Tac high. Has he replied whether or not he took tac before labs?  ----- Message -----  From: Keily Rodriguez  Sent: 4/10/2025  12:50 PM CDT  To: Jay Abrams MD

## 2025-04-15 NOTE — TELEPHONE ENCOUNTER
Patient notified and instructed via [facility]:    Your labs have been reviewed by ; no medicine changes were made. Repeat labs in 2 weeks(due 4/28/25), thanks.

## 2025-06-11 LAB
EXT AFP: 2.4
EXT ALBUMIN: 3.8
EXT ALKALINE PHOSPHATASE: 86
EXT ALT: 18
EXT AST: 17
EXT BILIRUBIN TOTAL: 0.5
EXT BUN: 11
EXT CALCIUM: 9.2
EXT CHLORIDE: 108
EXT CO2: 27
EXT CREATININE: 0.8 MG/DL
EXT EOSINOPHIL%: 3.7
EXT GFR MDRD NON AF AMER: 97
EXT GLUCOSE: 155
EXT HEMATOCRIT: 36.6
EXT HEMOGLOBIN: 11.8
EXT LYMPH%: 21.4
EXT MONOCYTES%: 8.4
EXT PLATELETS: 173
EXT POTASSIUM: 5.2
EXT PROTEIN TOTAL: 6.5
EXT SEGS%: 65.5
EXT SODIUM: 141 MMOL/L
EXT TACROLIMUS LVL: 5.5
EXT WBC: 5.1

## 2025-06-17 ENCOUNTER — RESULTS FOLLOW-UP (OUTPATIENT)
Dept: HEPATOLOGY | Facility: HOSPITAL | Age: 65
End: 2025-06-17
Payer: MEDICARE

## 2025-06-17 ENCOUNTER — PATIENT MESSAGE (OUTPATIENT)
Dept: TRANSPLANT | Facility: CLINIC | Age: 65
End: 2025-06-17
Payer: MEDICARE

## 2025-06-17 NOTE — TELEPHONE ENCOUNTER
----- Message from Jay Abrams MD sent at 6/17/2025 12:31 PM CDT -----  Liver tests are stable. No changes in his immunosuppression. Please continue to monitor labs per transplant protocol.  ----- Message -----  From: Keily Rodriguez  Sent: 6/11/2025  11:05 AM CDT  To: Jay Abrams MD

## 2025-06-17 NOTE — TELEPHONE ENCOUNTER
Patient notified and instructed via [facility]:    Labs have been reviewed by ; results stable. No changes were made. Repeat labs due on 7/28/25, thanks.

## 2025-08-04 ENCOUNTER — TELEPHONE (OUTPATIENT)
Dept: TRANSPLANT | Facility: CLINIC | Age: 65
End: 2025-08-04
Payer: MEDICARE

## 2025-08-26 ENCOUNTER — TELEPHONE (OUTPATIENT)
Dept: TRANSPLANT | Facility: CLINIC | Age: 65
End: 2025-08-26
Payer: MEDICARE

## 2025-08-26 LAB
EXT AFP: 2.4
EXT ALBUMIN: 3.8
EXT ALKALINE PHOSPHATASE: 85
EXT ALT: 21
EXT AST: 17
EXT BILIRUBIN TOTAL: 0.5
EXT BUN: 16
EXT CALCIUM: 9
EXT CHLORIDE: 106
EXT CO2: 26
EXT CREATININE: 0.8 MG/DL
EXT EOSINOPHIL%: 3.3
EXT GFR MDRD NON AF AMER: 96
EXT GLUCOSE: 130
EXT HEMATOCRIT: 34.8
EXT HEMOGLOBIN: 11.4
EXT LYMPH%: 18.5
EXT MONOCYTES%: 7.2
EXT PLATELETS: 170
EXT POTASSIUM: 4.7
EXT PROTEIN TOTAL: 6.6
EXT SEGS%: 70.1
EXT SODIUM: 141 MMOL/L
EXT TACROLIMUS LVL: ABNORMAL
EXT WBC: 5.45

## 2025-08-27 ENCOUNTER — RESULTS FOLLOW-UP (OUTPATIENT)
Dept: HEPATOLOGY | Facility: CLINIC | Age: 65
End: 2025-08-27
Payer: MEDICARE

## 2025-08-28 ENCOUNTER — TELEPHONE (OUTPATIENT)
Dept: TRANSPLANT | Facility: CLINIC | Age: 65
End: 2025-08-28
Payer: MEDICARE

## 2025-09-05 ENCOUNTER — TELEPHONE (OUTPATIENT)
Dept: TRANSPLANT | Facility: CLINIC | Age: 65
End: 2025-09-05
Payer: MEDICARE

## (undated) DEVICE — SUT 4-0 12-18IN SILK BLACK

## (undated) DEVICE — WIPE ESENTA BARR STNG FREE 3ML

## (undated) DEVICE — SUT PROLENE 4-0 SH BLU 36IN

## (undated) DEVICE — TIP SUCTION YANKAUER

## (undated) DEVICE — SUT 1 36IN PDS II VIO MONO

## (undated) DEVICE — DRESSING AQUACEL SACRAL 9 X 9

## (undated) DEVICE — SYR ONLY LUER LOCK 20CC

## (undated) DEVICE — ELECTRODE REM POLYHESIVE II

## (undated) DEVICE — SUT PROLENE 3-0 SH DA 36 BL

## (undated) DEVICE — NDL MONOPTY BIOPSY 14GX10CM

## (undated) DEVICE — CATH JACKY RADIAL 5FR 100CM

## (undated) DEVICE — SUT 3-0 12-18IN SILK

## (undated) DEVICE — SPIKE CONTRAST CONTROLLER

## (undated) DEVICE — SEE MEDLINE ITEM 156901

## (undated) DEVICE — BOOT AIR FLUID HEEL ADLT STD

## (undated) DEVICE — GOWN SURGICAL X-LARGE

## (undated) DEVICE — SUT SILK 2-0 STRANDS 30IN

## (undated) DEVICE — PAD K-THERMIA 24IN X 60IN

## (undated) DEVICE — PROTECTION STATION PLUS

## (undated) DEVICE — HEMOSTAT SURGICEL NU-KNIT 6X9

## (undated) DEVICE — CATH WEDGE 7FRX110CM

## (undated) DEVICE — SUT PROLENE 5-0 36IN C-1

## (undated) DEVICE — SEALER LIGASURE MARYLAND 23CM

## (undated) DEVICE — SOL IRRI STRL WATER 1000ML

## (undated) DEVICE — WIRE BENTSON 035/180

## (undated) DEVICE — TUBING NEPTUNE 2 SMOKE 10IN

## (undated) DEVICE — HEMOSTAT VASC BAND REG 24CM

## (undated) DEVICE — DRAIN CHANNEL ROUND 19FR

## (undated) DEVICE — DRAPE CORETEMP FLD WRM 56X62IN

## (undated) DEVICE — DRAPE BAG ISOLATION 20 X 20

## (undated) DEVICE — HANDSET ARGON PLUS

## (undated) DEVICE — PAD DEFIB CADENCE ADULT R2

## (undated) DEVICE — DRESSING ADH ISLAND 3.6 X 14

## (undated) DEVICE — KIT CANIST SUCTION 1200CC

## (undated) DEVICE — SUT 2-0 12-18IN SILK

## (undated) DEVICE — SUT PROLENE 6-0 BV-1 30IN

## (undated) DEVICE — SET DECANTER MEDICHOICE

## (undated) DEVICE — SEE MEDLINE ITEM 157128

## (undated) DEVICE — ELECTRODE REM PLYHSV RETURN 9

## (undated) DEVICE — TRANSDUCER ADULT DISP

## (undated) DEVICE — SUT SILK 3-0 SH 18IN BLACK

## (undated) DEVICE — PROBE CIRCLER FIRE APC

## (undated) DEVICE — SEE MEDLINE ITEM 157187

## (undated) DEVICE — SET EXTENSION STERILE 30IN

## (undated) DEVICE — SUT SILK 3-0 STRANDS 30IN

## (undated) DEVICE — TOWEL OR XRAY WHITE 17X26IN

## (undated) DEVICE — TIP YANKAUERS BULB NO VENT

## (undated) DEVICE — PATCH SEALANT EVARREST 2X4

## (undated) DEVICE — SUT PDS BV 6-0

## (undated) DEVICE — CLIP SPRING 6MM

## (undated) DEVICE — KIT CUSTOM MANIFOLD

## (undated) DEVICE — KIT SURGICAL COLON .25 1.1OZ

## (undated) DEVICE — VISIPAQUE 320 200ML +PK

## (undated) DEVICE — SOL NS 1000CC

## (undated) DEVICE — CONNECTOR TUBING STR 5 IN 1

## (undated) DEVICE — ELECTRODE PAD DEFIB STERILE

## (undated) DEVICE — EVACUATOR WOUND BULB 100CC

## (undated) DEVICE — SEE MEDLINE ITEM 156894

## (undated) DEVICE — TUBING O2 FEMALE CONN 13FT

## (undated) DEVICE — KIT GLIDESHEATH SLEND 6FR 10CM

## (undated) DEVICE — CATH URETHRAL RED RUBBER 18FR

## (undated) DEVICE — COLLECTION SPECIMEN NEPTUNE

## (undated) DEVICE — KIT SAHARA DRAPE DRAW/LIFT

## (undated) DEVICE — SUT ETHILON 3-0 PS2 18 BLK

## (undated) DEVICE — SUT 4-0 12-30IN SILK

## (undated) DEVICE — KIT MICROINTRO 4F .018X40X7CM

## (undated) DEVICE — TRAY FOLEY 16FR INFECTION CONT

## (undated) DEVICE — STAPLER SKIN PROXIMATE WIDE

## (undated) DEVICE — SET IV ADMIN 15DROP 3 CARESITE

## (undated) DEVICE — SUT SILK 0 STRANDS 30IN BLK

## (undated) DEVICE — SHEATH INTRODUCER 7FR 11CM